# Patient Record
Sex: FEMALE | Race: WHITE | NOT HISPANIC OR LATINO | Employment: OTHER | URBAN - NONMETROPOLITAN AREA
[De-identification: names, ages, dates, MRNs, and addresses within clinical notes are randomized per-mention and may not be internally consistent; named-entity substitution may affect disease eponyms.]

---

## 2021-04-23 DIAGNOSIS — H53.461 HOMONYMOUS BILATERAL FIELD DEFECTS, RIGHT SIDE: ICD-10-CM

## 2021-04-30 ENCOUNTER — HOSPITAL ENCOUNTER (OUTPATIENT)
Dept: CT IMAGING | Facility: HOSPITAL | Age: 76
Discharge: HOME | End: 2021-04-30
Payer: MEDICARE

## 2021-04-30 DIAGNOSIS — H53.461 HOMONYMOUS BILATERAL FIELD DEFECTS, RIGHT SIDE: ICD-10-CM

## 2021-04-30 PROCEDURE — 70450 CT HEAD/BRAIN W/O DYE: CPT

## 2022-10-15 ENCOUNTER — APPOINTMENT (EMERGENCY)
Dept: CT IMAGING | Facility: HOSPITAL | Age: 77
End: 2022-10-15
Payer: MEDICARE

## 2022-10-15 ENCOUNTER — HOSPITAL ENCOUNTER (EMERGENCY)
Facility: HOSPITAL | Age: 77
Discharge: HOME | End: 2022-10-15
Attending: EMERGENCY MEDICINE
Payer: MEDICARE

## 2022-10-15 VITALS
HEART RATE: 65 BPM | OXYGEN SATURATION: 96 % | HEIGHT: 62 IN | BODY MASS INDEX: 20.49 KG/M2 | DIASTOLIC BLOOD PRESSURE: 73 MMHG | RESPIRATION RATE: 16 BRPM | WEIGHT: 111.33 LBS | SYSTOLIC BLOOD PRESSURE: 150 MMHG | TEMPERATURE: 96.6 F

## 2022-10-15 DIAGNOSIS — R41.0 INTERMITTENT CONFUSION: Primary | ICD-10-CM

## 2022-10-15 LAB
ALBUMIN SERPL BCP-MCNC: 3.3 G/DL (ref 3.5–5)
ALP SERPL-CCNC: 58 U/L (ref 46–116)
ALT SERPL W P-5'-P-CCNC: 14 U/L (ref 12–78)
ANION GAP SERPL CALCULATED.3IONS-SCNC: 3 MMOL/L (ref 4–13)
APTT PPP: 26 SECONDS (ref 23–37)
AST SERPL W P-5'-P-CCNC: 20 U/L (ref 5–45)
BASOPHILS # BLD AUTO: 0.02 THOUSANDS/ÂΜL (ref 0–0.1)
BASOPHILS NFR BLD AUTO: 0 % (ref 0–1)
BILIRUB SERPL-MCNC: 0.66 MG/DL (ref 0.2–1)
BILIRUB UR QL STRIP: NEGATIVE
BUN SERPL-MCNC: 24 MG/DL (ref 5–25)
CALCIUM ALBUM COR SERPL-MCNC: 9.1 MG/DL (ref 8.3–10.1)
CALCIUM SERPL-MCNC: 8.5 MG/DL (ref 8.3–10.1)
CARDIAC TROPONIN I PNL SERPL HS: 23 NG/L
CHLORIDE SERPL-SCNC: 106 MMOL/L (ref 96–108)
CLARITY UR: NORMAL
CO2 SERPL-SCNC: 32 MMOL/L (ref 21–32)
COLOR UR: YELLOW
CREAT SERPL-MCNC: 0.84 MG/DL (ref 0.6–1.3)
EOSINOPHIL # BLD AUTO: 0.2 THOUSAND/ÂΜL (ref 0–0.61)
EOSINOPHIL NFR BLD AUTO: 3 % (ref 0–6)
ERYTHROCYTE [DISTWIDTH] IN BLOOD BY AUTOMATED COUNT: 13.2 % (ref 11.6–15.1)
GFR SERPL CREATININE-BSD FRML MDRD: 66 ML/MIN/1.73SQ M
GLUCOSE SERPL-MCNC: 98 MG/DL (ref 65–140)
GLUCOSE UR STRIP-MCNC: NEGATIVE MG/DL
HCT VFR BLD AUTO: 42.4 % (ref 34.8–46.1)
HGB BLD-MCNC: 13.8 G/DL (ref 11.5–15.4)
HGB UR QL STRIP.AUTO: NEGATIVE
IMM GRANULOCYTES # BLD AUTO: 0.02 THOUSAND/UL (ref 0–0.2)
IMM GRANULOCYTES NFR BLD AUTO: 0 % (ref 0–2)
INR PPP: 1.05 (ref 0.84–1.19)
KETONES UR STRIP-MCNC: NEGATIVE MG/DL
LEUKOCYTE ESTERASE UR QL STRIP: NEGATIVE
LYMPHOCYTES # BLD AUTO: 0.83 THOUSANDS/ÂΜL (ref 0.6–4.47)
LYMPHOCYTES NFR BLD AUTO: 13 % (ref 14–44)
MCH RBC QN AUTO: 31.5 PG (ref 26.8–34.3)
MCHC RBC AUTO-ENTMCNC: 32.5 G/DL (ref 31.4–37.4)
MCV RBC AUTO: 97 FL (ref 82–98)
MONOCYTES # BLD AUTO: 0.52 THOUSAND/ÂΜL (ref 0.17–1.22)
MONOCYTES NFR BLD AUTO: 8 % (ref 4–12)
NEUTROPHILS # BLD AUTO: 4.78 THOUSANDS/ÂΜL (ref 1.85–7.62)
NEUTS SEG NFR BLD AUTO: 76 % (ref 43–75)
NITRITE UR QL STRIP: NEGATIVE
NRBC BLD AUTO-RTO: 0 /100 WBCS
PH UR STRIP.AUTO: 5 [PH]
PLATELET # BLD AUTO: 192 THOUSANDS/UL (ref 149–390)
PMV BLD AUTO: 9.9 FL (ref 8.9–12.7)
POTASSIUM SERPL-SCNC: 4.4 MMOL/L (ref 3.5–5.3)
PROT SERPL-MCNC: 6.3 G/DL (ref 6.4–8.4)
PROT UR STRIP-MCNC: NEGATIVE MG/DL
PROTHROMBIN TIME: 13.9 SECONDS (ref 11.6–14.5)
RBC # BLD AUTO: 4.38 MILLION/UL (ref 3.81–5.12)
SODIUM SERPL-SCNC: 141 MMOL/L (ref 135–147)
SP GR UR STRIP.AUTO: >=1.03 (ref 1–1.03)
UROBILINOGEN UR QL STRIP.AUTO: 0.2 E.U./DL
WBC # BLD AUTO: 6.37 THOUSAND/UL (ref 4.31–10.16)

## 2022-10-15 PROCEDURE — 84484 ASSAY OF TROPONIN QUANT: CPT | Performed by: EMERGENCY MEDICINE

## 2022-10-15 PROCEDURE — 99285 EMERGENCY DEPT VISIT HI MDM: CPT

## 2022-10-15 PROCEDURE — 93005 ELECTROCARDIOGRAM TRACING: CPT

## 2022-10-15 PROCEDURE — 99285 EMERGENCY DEPT VISIT HI MDM: CPT | Performed by: EMERGENCY MEDICINE

## 2022-10-15 PROCEDURE — 81003 URINALYSIS AUTO W/O SCOPE: CPT | Performed by: EMERGENCY MEDICINE

## 2022-10-15 PROCEDURE — G1004 CDSM NDSC: HCPCS

## 2022-10-15 PROCEDURE — 70450 CT HEAD/BRAIN W/O DYE: CPT

## 2022-10-15 PROCEDURE — 85610 PROTHROMBIN TIME: CPT | Performed by: EMERGENCY MEDICINE

## 2022-10-15 PROCEDURE — 80053 COMPREHEN METABOLIC PANEL: CPT | Performed by: EMERGENCY MEDICINE

## 2022-10-15 PROCEDURE — 36415 COLL VENOUS BLD VENIPUNCTURE: CPT | Performed by: EMERGENCY MEDICINE

## 2022-10-15 PROCEDURE — 85025 COMPLETE CBC W/AUTO DIFF WBC: CPT | Performed by: EMERGENCY MEDICINE

## 2022-10-15 PROCEDURE — 85730 THROMBOPLASTIN TIME PARTIAL: CPT | Performed by: EMERGENCY MEDICINE

## 2022-10-15 RX ORDER — BUSPIRONE HYDROCHLORIDE 10 MG/1
1 TABLET ORAL 2 TIMES DAILY
COMMUNITY
Start: 2022-06-10

## 2022-10-15 RX ORDER — DULOXETIN HYDROCHLORIDE 60 MG/1
60 CAPSULE, DELAYED RELEASE ORAL DAILY
COMMUNITY
Start: 2022-08-29

## 2022-10-15 RX ORDER — ROSUVASTATIN CALCIUM 10 MG/1
1 TABLET, COATED ORAL DAILY
COMMUNITY
Start: 2022-10-13

## 2022-10-15 RX ORDER — ALENDRONATE SODIUM 70 MG/1
70 TABLET ORAL WEEKLY
COMMUNITY
Start: 2022-07-26

## 2022-10-15 RX ORDER — LATANOPROST 50 UG/ML
SOLUTION/ DROPS OPHTHALMIC
COMMUNITY
Start: 2022-04-20

## 2022-10-15 RX ORDER — TIMOLOL MALEATE 5 MG/ML
1 SOLUTION/ DROPS OPHTHALMIC 2 TIMES DAILY
COMMUNITY
Start: 2022-04-20

## 2022-10-15 RX ORDER — BISOPROLOL FUMARATE AND HYDROCHLOROTHIAZIDE 10; 6.25 MG/1; MG/1
1 TABLET ORAL DAILY
COMMUNITY
Start: 2022-10-12

## 2022-10-15 RX ORDER — ASPIRIN 81 MG/1
81 TABLET ORAL DAILY
COMMUNITY

## 2022-10-15 NOTE — ED PROVIDER NOTES
History  Chief Complaint   Patient presents with   • Altered Mental Status     Son stated she called his home phone which she never does and was confused, asking where he lived and other questions that made no sense  Has history of stroke  Patient is a 77-year-old female with history of prior strokes and son describes some intermittent cognitive impairment not formally diagnosed with dementia but gets more confused in the mornings frequently  presents the emergency department due to increased confusion this morning no focal numbness or weakness no headache no slurred speech or stroke-like symptoms  Son reports patient called him and seemed more confused this morning this is gradually improving upon arrival to the ED but patient still mildly disoriented  Patient denies any abdominal pain nausea vomiting no chest pain cough or shortness of breath no fevers or chills  History provided by:  Patient  Altered Mental Status  Presenting symptoms: confusion    Severity:  Moderate  Most recent episode: Today  Episode history:  Single  Duration:  2 hours  Timing:  Intermittent  Progression:  Worsening  Chronicity:  Recurrent  Associated symptoms: no abdominal pain, no fever, no headaches, no light-headedness, no nausea, no palpitations, no rash, no vomiting and no weakness        Prior to Admission Medications   Prescriptions Last Dose Informant Patient Reported? Taking?    DULoxetine (CYMBALTA) 60 mg delayed release capsule   Yes Yes   Sig: Take 60 mg by mouth in the morning   Diclofenac Sodium (VOLTAREN) 1 %   Yes Yes   Sig: Apply topically 4 (four) times a day   alendronate (FOSAMAX) 70 mg tablet   Yes Yes   Sig: Take 70 mg by mouth once a week   aspirin (ECOTRIN LOW STRENGTH) 81 mg EC tablet  Self Yes Yes   Sig: Take 81 mg by mouth daily   bisoprolol-hydrochlorothiazide (ZIAC) 10-6 25 MG per tablet   Yes Yes   Sig: Take 1 tablet by mouth daily   busPIRone (BUSPAR) 10 mg tablet   Yes Yes   Sig: Take 1 tablet by mouth 2 (two) times a day   latanoprost (XALATAN) 0 005 % ophthalmic solution   Yes Yes   rosuvastatin (CRESTOR) 10 MG tablet   Yes Yes   Sig: Take 1 tablet by mouth daily   timolol (TIMOPTIC) 0 5 % ophthalmic solution   Yes Yes   Sig: Apply 1 drop to eye 2 (two) times a day      Facility-Administered Medications: None       Past Medical History:   Diagnosis Date   • Stroke [facility])        History reviewed  No pertinent surgical history  History reviewed  No pertinent family history  I have reviewed and agree with the history as documented  E-Cigarette/Vaping   • E-Cigarette Use Never User      E-Cigarette/Vaping Substances     Social History     Tobacco Use   • Smoking status: Current Every Day Smoker     Packs/day: 0 25     Types: Cigarettes   • Smokeless tobacco: Never Used   Vaping Use   • Vaping Use: Never used   Substance Use Topics   • Alcohol use: Yes     Comment: ocassional   • Drug use: Never       Review of Systems   Constitutional: Negative for activity change, appetite change, chills, fatigue and fever  HENT: Negative for congestion, ear pain, rhinorrhea and sore throat  Eyes: Negative for discharge, redness and visual disturbance  Respiratory: Negative for cough, chest tightness, shortness of breath and wheezing  Cardiovascular: Negative for chest pain and palpitations  Gastrointestinal: Negative for abdominal pain, constipation, diarrhea, nausea and vomiting  Endocrine: Negative for polydipsia and polyuria  Genitourinary: Negative for difficulty urinating, dysuria, frequency, hematuria and urgency  Musculoskeletal: Negative for arthralgias and myalgias  Skin: Negative for color change, pallor and rash  Neurological: Negative for dizziness, weakness, light-headedness, numbness and headaches  Hematological: Negative for adenopathy  Does not bruise/bleed easily  Psychiatric/Behavioral: Positive for confusion  All other systems reviewed and are negative        Physical Exam  Physical Exam  Vitals and nursing note reviewed  Constitutional:       Appearance: She is well-developed  HENT:      Head: Normocephalic and atraumatic  Right Ear: External ear normal       Left Ear: External ear normal       Nose: Nose normal    Eyes:      Conjunctiva/sclera: Conjunctivae normal       Pupils: Pupils are equal, round, and reactive to light  Cardiovascular:      Rate and Rhythm: Normal rate and regular rhythm  Heart sounds: Normal heart sounds  Pulmonary:      Effort: Pulmonary effort is normal  No respiratory distress  Breath sounds: Normal breath sounds  No wheezing or rales  Chest:      Chest wall: No tenderness  Abdominal:      General: Bowel sounds are normal  There is no distension  Palpations: Abdomen is soft  Tenderness: There is no abdominal tenderness  There is no guarding  Musculoskeletal:         General: Normal range of motion  Cervical back: Normal range of motion and neck supple  Skin:     General: Skin is warm and dry  Neurological:      Mental Status: She is alert  She is disoriented and confused  Cranial Nerves: No cranial nerve deficit  Sensory: No sensory deficit  Comments: Patient is oriented to person and year not to place and recall is impaired  Psychiatric:         Mood and Affect: Mood is anxious  Cognition and Memory: Memory is impaired           Vital Signs  ED Triage Vitals [10/15/22 0822]   Temperature Pulse Respirations Blood Pressure SpO2   (!) 96 6 °F (35 9 °C) 69 16 169/77 96 %      Temp Source Heart Rate Source Patient Position - Orthostatic VS BP Location FiO2 (%)   Temporal Monitor Lying Right arm --      Pain Score       8           Vitals:    10/15/22 0822 10/15/22 0845 10/15/22 0900 10/15/22 0930   BP: 169/77 130/62 128/62 138/67   Pulse: 69 64 73 70   Patient Position - Orthostatic VS: Lying Lying Lying Lying         Visual Acuity  Visual Acuity    Flowsheet Row Most Recent Value L Pupil Size (mm) 3   R Pupil Size (mm) 3          ED Medications  Medications - No data to display    Diagnostic Studies  Results Reviewed     Procedure Component Value Units Date/Time    UA w Reflex to Microscopic w Reflex to Culture [[id number]] Collected: 10/15/22 0936    Lab Status: Final result Specimen: Urine, Clean Catch Updated: 10/15/22 0958     Color, UA Yellow     Clarity, UA Cloudy     Specific Gravity, UA >=1 030     pH, UA 5 0     Leukocytes, UA Negative     Nitrite, UA Negative     Protein, UA Negative mg/dl      Glucose, UA Negative mg/dl      Ketones, UA Negative mg/dl      Urobilinogen, UA 0 2 E U /dl      Bilirubin, UA Negative     Occult Blood, UA Negative    HS Troponin 0hr (reflex protocol) [[id number]]  (Normal) Collected: 10/15/22 0844    Lab Status: Final result Specimen: Blood from Arm, Left Updated: 10/15/22 0923     hs TnI 0hr 23 ng/L     Comprehensive metabolic panel [[id number]]  (Abnormal) Collected: 10/15/22 0844    Lab Status: Final result Specimen: Blood from Arm, Left Updated: 10/15/22 0919     Sodium 141 mmol/L      Potassium 4 4 mmol/L      Chloride 106 mmol/L      CO2 32 mmol/L      ANION GAP 3 mmol/L      BUN 24 mg/dL      Creatinine 0 84 mg/dL      Glucose 98 mg/dL      Calcium 8 5 mg/dL      Corrected Calcium 9 1 mg/dL      AST 20 U/L      ALT 14 U/L      Alkaline Phosphatase 58 U/L      Total Protein 6 3 g/dL      Albumin 3 3 g/dL      Total Bilirubin 0 66 mg/dL      eGFR 66 ml/min/1 73sq m     Narrative:      Prema guidelines for Chronic Kidney Disease (CKD):   •  Stage 1 with normal or high GFR (GFR > 90 mL/min/1 73 square meters)  •  Stage 2 Mild CKD (GFR = 60-89 mL/min/1 73 square meters)  •  Stage 3A Moderate CKD (GFR = 45-59 mL/min/1 73 square meters)  •  Stage 3B Moderate CKD (GFR = 30-44 mL/min/1 73 square meters)  •  Stage 4 Severe CKD (GFR = 15-29 mL/min/1 73 square meters)  •  Stage 5 End Stage CKD (GFR <15 mL/min/1 73 square meters)  Note: GFR calculation is accurate only with a steady state creatinine    Protime-INR [[id number]]  (Normal) Collected: 10/15/22 0844    Lab Status: Final result Specimen: Blood from Arm, Left Updated: 10/15/22 0913     Protime 13 9 seconds      INR 1 05    APTT [[id number]]  (Normal) Collected: 10/15/22 0844    Lab Status: Final result Specimen: Blood from Arm, Left Updated: 10/15/22 0913     PTT 26 seconds     CBC and differential [[id number]]  (Abnormal) Collected: 10/15/22 0844    Lab Status: Final result Specimen: Blood from Arm, Left Updated: 10/15/22 0858     WBC 6 37 Thousand/uL      RBC 4 38 Million/uL      Hemoglobin 13 8 g/dL      Hematocrit 42 4 %      MCV 97 fL      MCH 31 5 pg      MCHC 32 5 g/dL      RDW 13 2 %      MPV 9 9 fL      Platelets 321 Thousands/uL      nRBC 0 /100 WBCs      Neutrophils Relative 76 %      Immat GRANS % 0 %      Lymphocytes Relative 13 %      Monocytes Relative 8 %      Eosinophils Relative 3 %      Basophils Relative 0 %      Neutrophils Absolute 4 78 Thousands/µL      Immature Grans Absolute 0 02 Thousand/uL      Lymphocytes Absolute 0 83 Thousands/µL      Monocytes Absolute 0 52 Thousand/µL      Eosinophils Absolute 0 20 Thousand/µL      Basophils Absolute 0 02 Thousands/µL                  CT head without contrast   Final Result by Jud Cohen MD (10/15 1022)      No acute intracranial abnormality  Stable chronic microangiopathic changes within the brain  Stable areas of prior lacunar and left occipital CVA  Workstation performed: [id number]                    Procedures  ECG 12 Lead Documentation Only    Date/Time: 10/15/2022 8:58 AM  Performed by: Kassandra Hatch DO  Authorized by:  Kassandra Hatch DO     ECG reviewed by me, the ED Provider: yes    Patient location:  ED  Previous ECG:     Comparison to cardiac monitor: Yes    Quality:     Tracing quality:  Limited by artifact  Rate:     ECG rate:  64    ECG rate assessment: normal    Rhythm:     Rhythm: sinus rhythm    QRS:     QRS axis:  Normal    QRS intervals:  Normal  Conduction:     Conduction: abnormal (lvh)      Abnormal conduction: incomplete LBBB    ST segments:     ST segments:  Non-specific  T waves:     T waves: non-specific and peaked               ED Course                               SBIRT 22yo+    Flowsheet Row Most Recent Value   SBIRT (23 yo +)    In order to provide better care to our patients, we are screening all of our patients for alcohol and drug use  Would it be okay to ask you these screening questions? Yes Filed at: 10/15/2022 0840   Initial Alcohol Screen: US AUDIT-C     1  How often do you have a drink containing alcohol? 1 Filed at: 10/15/2022 0840   2  How many drinks containing alcohol do you have on a typical day you are drinking? 0 Filed at: 10/15/2022 0840   3b  FEMALE Any Age, or MALE 65+: How often do you have 4 or more drinks on one occassion? 0 Filed at: 10/15/2022 0840   Audit-C Score 1 Filed at: 10/15/2022 0840   LETA: How many times in the past year have you    Used an illegal drug or used a prescription medication for non-medical reasons? Never Filed at: 10/15/2022 0840                    MDM  Number of Diagnoses or Management Options  Intermittent confusion: new and requires workup  Diagnosis management comments: Patient remained clinically and hemodynamically stable in the emergency department her mild confusion seemed to improve with rest and monitoring in the ED basis on son's report that his symptoms wax and wane frequently for the patient and are not new  No evidence of acute infarction on workup in the ED and no evidence of acute stroke clinically on evaluation in the ED neuro exam otherwise normal nonfocal aside from the intermittent confusion which is waxing and waning in nature    No evidence of acute infection or metabolic derangement advised supportive care for now and prompt follow-up with primary physician for further evaluation and treatment return precautions and anticipatory guidance discussed  Amount and/or Complexity of Data Reviewed  Clinical lab tests: ordered and reviewed  Tests in the radiology section of CPT®: reviewed and ordered  Tests in the medicine section of CPT®: ordered and reviewed  Decide to obtain previous medical records or to obtain history from someone other than the patient: yes  Review and summarize past medical records: yes  Independent visualization of images, tracings, or specimens: yes    Risk of Complications, Morbidity, and/or Mortality  Presenting problems: moderate  Diagnostic procedures: moderate  Management options: moderate    Patient Progress  Patient progress: stable      Disposition  Final diagnoses:   Intermittent confusion     Time reflects when diagnosis was documented in both MDM as applicable and the Disposition within this note     Time User Action Codes Description Comment    10/15/2022 10:02 AM Anabela Rangel Add [R41 0] Intermittent confusion       ED Disposition     ED Disposition   Discharge    Condition   Stable    Date/Time   Sat Oct 15, 2022 10:02 AM    Comment   Rober Villarreal discharge to home/self care  Follow-up Information     Follow up With Specialties Details Why Contact Info    Balwinder Riggs DO  Schedule an appointment as soon as possible for a visit in 2 days  [address] [place] [address]  [phone]            Patient's Medications   Discharge Prescriptions    No medications on file       No discharge procedures on file      PDMP Review     None          ED Provider  Electronically Signed by           García Painting DO  10/15/22 6602

## 2022-10-16 LAB
ATRIAL RATE: 64 BPM
P AXIS: 67 DEGREES
PR INTERVAL: 156 MS
QRS AXIS: 58 DEGREES
QRSD INTERVAL: 86 MS
QT INTERVAL: 400 MS
QTC INTERVAL: 412 MS
T WAVE AXIS: -49 DEGREES
VENTRICULAR RATE: 64 BPM

## 2023-01-01 ENCOUNTER — HOME CARE VISIT (OUTPATIENT)
Dept: HOME HEALTH SERVICES | Facility: HOME HEALTHCARE | Age: 78
End: 2023-01-01
Payer: MEDICARE

## 2023-01-01 ENCOUNTER — HOSPITAL ENCOUNTER (INPATIENT)
Facility: HOSPITAL | Age: 78
LOS: 4 days | End: 2023-06-18
Attending: STUDENT IN AN ORGANIZED HEALTH CARE EDUCATION/TRAINING PROGRAM | Admitting: STUDENT IN AN ORGANIZED HEALTH CARE EDUCATION/TRAINING PROGRAM
Payer: MEDICARE

## 2023-01-01 ENCOUNTER — HOSPITAL ENCOUNTER (INPATIENT)
Facility: HOSPITAL | Age: 78
LOS: 1 days | End: 2023-06-19
Attending: EMERGENCY MEDICINE | Admitting: INTERNAL MEDICINE
Payer: MEDICARE

## 2023-01-01 ENCOUNTER — APPOINTMENT (OUTPATIENT)
Dept: RADIOLOGY | Facility: HOSPITAL | Age: 78
End: 2023-01-01
Payer: MEDICARE

## 2023-01-01 ENCOUNTER — HOSPICE ADMISSION (OUTPATIENT)
Dept: HOME HOSPICE | Facility: HOSPICE | Age: 78
End: 2023-01-01
Payer: MEDICARE

## 2023-01-01 VITALS
OXYGEN SATURATION: 91 % | DIASTOLIC BLOOD PRESSURE: 63 MMHG | RESPIRATION RATE: 17 BRPM | HEART RATE: 106 BPM | TEMPERATURE: 97.5 F | SYSTOLIC BLOOD PRESSURE: 136 MMHG

## 2023-01-01 VITALS
HEIGHT: 62 IN | WEIGHT: 216.05 LBS | BODY MASS INDEX: 39.76 KG/M2 | DIASTOLIC BLOOD PRESSURE: 89 MMHG | SYSTOLIC BLOOD PRESSURE: 139 MMHG | OXYGEN SATURATION: 94 % | HEART RATE: 105 BPM | TEMPERATURE: 97.4 F | RESPIRATION RATE: 20 BRPM

## 2023-01-01 DIAGNOSIS — E43 SEVERE PROTEIN-CALORIE MALNUTRITION (HCC): ICD-10-CM

## 2023-01-01 DIAGNOSIS — Z00.8 MEDICAL CLEARANCE FOR PSYCHIATRIC ADMISSION: ICD-10-CM

## 2023-01-01 DIAGNOSIS — F01.C2 SEVERE VASCULAR DEMENTIA WITH PSYCHOTIC DISTURBANCE (HCC): ICD-10-CM

## 2023-01-01 DIAGNOSIS — F01.C0 MAJOR NEUROCOGNITIVE DISORDER DUE TO VASCULAR DISEASE, WITHOUT BEHAVIORAL DISTURBANCE, SEVERE (HCC): ICD-10-CM

## 2023-01-01 DIAGNOSIS — R09.02 HYPOXIA: ICD-10-CM

## 2023-01-01 DIAGNOSIS — F33.2 MAJOR DEPRESSIVE DISORDER, RECURRENT SEVERE WITHOUT PSYCHOTIC FEATURES (HCC): ICD-10-CM

## 2023-01-01 DIAGNOSIS — J69.0 ASPIRATION PNEUMONITIS (HCC): ICD-10-CM

## 2023-01-01 DIAGNOSIS — K44.9 HIATAL HERNIA: ICD-10-CM

## 2023-01-01 DIAGNOSIS — R06.03 RESPIRATORY DISTRESS: Primary | ICD-10-CM

## 2023-01-01 DIAGNOSIS — J69.0 ASPIRATION PNEUMONIA OF BOTH LUNGS, UNSPECIFIED ASPIRATION PNEUMONIA TYPE, UNSPECIFIED PART OF LUNG (HCC): ICD-10-CM

## 2023-01-01 LAB
25(OH)D3 SERPL-MCNC: 36.6 NG/ML (ref 30–100)
ALBUMIN SERPL BCP-MCNC: 2.6 G/DL (ref 3.5–5)
ALBUMIN SERPL BCP-MCNC: 3.2 G/DL (ref 3.5–5)
ALP SERPL-CCNC: 44 U/L (ref 34–104)
ALP SERPL-CCNC: 47 U/L (ref 34–104)
ALT SERPL W P-5'-P-CCNC: 7 U/L (ref 7–52)
ALT SERPL W P-5'-P-CCNC: 8 U/L (ref 7–52)
ANION GAP SERPL CALCULATED.3IONS-SCNC: 10 MMOL/L (ref 4–13)
ANION GAP SERPL CALCULATED.3IONS-SCNC: 6 MMOL/L (ref 4–13)
AST SERPL W P-5'-P-CCNC: 17 U/L (ref 13–39)
AST SERPL W P-5'-P-CCNC: 19 U/L (ref 13–39)
ATRIAL RATE: 102 BPM
BASOPHILS # BLD AUTO: 0.01 THOUSANDS/ÂΜL (ref 0–0.1)
BASOPHILS # BLD AUTO: 0.03 THOUSANDS/ÂΜL (ref 0–0.1)
BASOPHILS NFR BLD AUTO: 0 % (ref 0–1)
BASOPHILS NFR BLD AUTO: 0 % (ref 0–1)
BILIRUB SERPL-MCNC: 0.4 MG/DL (ref 0.2–1)
BILIRUB SERPL-MCNC: 0.53 MG/DL (ref 0.2–1)
BNP SERPL-MCNC: 359 PG/ML (ref 0–100)
BUN SERPL-MCNC: 18 MG/DL (ref 5–25)
BUN SERPL-MCNC: 7 MG/DL (ref 5–25)
CALCIUM ALBUM COR SERPL-MCNC: 10.5 MG/DL (ref 8.3–10.1)
CALCIUM ALBUM COR SERPL-MCNC: 9.7 MG/DL (ref 8.3–10.1)
CALCIUM SERPL-MCNC: 8.6 MG/DL (ref 8.4–10.2)
CALCIUM SERPL-MCNC: 9.9 MG/DL (ref 8.4–10.2)
CARDIAC TROPONIN I PNL SERPL HS: 66 NG/L (ref 8–18)
CHLORIDE SERPL-SCNC: 101 MMOL/L (ref 96–108)
CHLORIDE SERPL-SCNC: 107 MMOL/L (ref 96–108)
CO2 SERPL-SCNC: 29 MMOL/L (ref 21–32)
CO2 SERPL-SCNC: 32 MMOL/L (ref 21–32)
CREAT SERPL-MCNC: 0.51 MG/DL (ref 0.6–1.3)
CREAT SERPL-MCNC: 0.71 MG/DL (ref 0.6–1.3)
EOSINOPHIL # BLD AUTO: 0 THOUSAND/ÂΜL (ref 0–0.61)
EOSINOPHIL # BLD AUTO: 0.14 THOUSAND/ÂΜL (ref 0–0.61)
EOSINOPHIL NFR BLD AUTO: 0 % (ref 0–6)
EOSINOPHIL NFR BLD AUTO: 2 % (ref 0–6)
ERYTHROCYTE [DISTWIDTH] IN BLOOD BY AUTOMATED COUNT: 16.3 % (ref 11.6–15.1)
ERYTHROCYTE [DISTWIDTH] IN BLOOD BY AUTOMATED COUNT: 16.4 % (ref 11.6–15.1)
FOLATE SERPL-MCNC: 6.5 NG/ML
GFR SERPL CREATININE-BSD FRML MDRD: 81 ML/MIN/1.73SQ M
GFR SERPL CREATININE-BSD FRML MDRD: 91 ML/MIN/1.73SQ M
GLUCOSE SERPL-MCNC: 102 MG/DL (ref 65–140)
GLUCOSE SERPL-MCNC: 147 MG/DL (ref 65–140)
HCT VFR BLD AUTO: 33.4 % (ref 34.8–46.1)
HCT VFR BLD AUTO: 42 % (ref 34.8–46.1)
HGB BLD-MCNC: 10.6 G/DL (ref 11.5–15.4)
HGB BLD-MCNC: 13.1 G/DL (ref 11.5–15.4)
IMM GRANULOCYTES # BLD AUTO: 0.03 THOUSAND/UL (ref 0–0.2)
IMM GRANULOCYTES # BLD AUTO: 0.09 THOUSAND/UL (ref 0–0.2)
IMM GRANULOCYTES NFR BLD AUTO: 0 % (ref 0–2)
IMM GRANULOCYTES NFR BLD AUTO: 0 % (ref 0–2)
INR PPP: 1.04 (ref 0.84–1.19)
LACTATE SERPL-SCNC: 1.9 MMOL/L (ref 0.5–2)
LIPASE SERPL-CCNC: <6 U/L (ref 11–82)
LYMPHOCYTES # BLD AUTO: 1.26 THOUSANDS/ÂΜL (ref 0.6–4.47)
LYMPHOCYTES # BLD AUTO: 1.29 THOUSANDS/ÂΜL (ref 0.6–4.47)
LYMPHOCYTES NFR BLD AUTO: 19 % (ref 14–44)
LYMPHOCYTES NFR BLD AUTO: 6 % (ref 14–44)
MAGNESIUM SERPL-MCNC: 1.8 MG/DL (ref 1.9–2.7)
MCH RBC QN AUTO: 30 PG (ref 26.8–34.3)
MCH RBC QN AUTO: 30.5 PG (ref 26.8–34.3)
MCHC RBC AUTO-ENTMCNC: 31.2 G/DL (ref 31.4–37.4)
MCHC RBC AUTO-ENTMCNC: 31.7 G/DL (ref 31.4–37.4)
MCV RBC AUTO: 96 FL (ref 82–98)
MCV RBC AUTO: 96 FL (ref 82–98)
MONOCYTES # BLD AUTO: 0.6 THOUSAND/ÂΜL (ref 0.17–1.22)
MONOCYTES # BLD AUTO: 1.55 THOUSAND/ÂΜL (ref 0.17–1.22)
MONOCYTES NFR BLD AUTO: 7 % (ref 4–12)
MONOCYTES NFR BLD AUTO: 9 % (ref 4–12)
NEUTROPHILS # BLD AUTO: 19.22 THOUSANDS/ÂΜL (ref 1.85–7.62)
NEUTROPHILS # BLD AUTO: 4.65 THOUSANDS/ÂΜL (ref 1.85–7.62)
NEUTS SEG NFR BLD AUTO: 70 % (ref 43–75)
NEUTS SEG NFR BLD AUTO: 87 % (ref 43–75)
NRBC BLD AUTO-RTO: 0 /100 WBCS
NRBC BLD AUTO-RTO: 0 /100 WBCS
P AXIS: 28 DEGREES
PHOSPHATE SERPL-MCNC: 3.4 MG/DL (ref 2.3–4.1)
PLATELET # BLD AUTO: 189 THOUSANDS/UL (ref 149–390)
PLATELET # BLD AUTO: 255 THOUSANDS/UL (ref 149–390)
PMV BLD AUTO: 8.9 FL (ref 8.9–12.7)
PMV BLD AUTO: 9.6 FL (ref 8.9–12.7)
POTASSIUM SERPL-SCNC: 3.7 MMOL/L (ref 3.5–5.3)
POTASSIUM SERPL-SCNC: 3.8 MMOL/L (ref 3.5–5.3)
PR INTERVAL: 164 MS
PROCALCITONIN SERPL-MCNC: 0.64 NG/ML
PROT SERPL-MCNC: 5.8 G/DL (ref 6.4–8.4)
PROT SERPL-MCNC: 7.6 G/DL (ref 6.4–8.4)
PROTHROMBIN TIME: 13.9 SECONDS (ref 11.6–14.5)
QRS AXIS: 38 DEGREES
QRSD INTERVAL: 84 MS
QT INTERVAL: 362 MS
QTC INTERVAL: 471 MS
RBC # BLD AUTO: 3.47 MILLION/UL (ref 3.81–5.12)
RBC # BLD AUTO: 4.36 MILLION/UL (ref 3.81–5.12)
SODIUM SERPL-SCNC: 142 MMOL/L (ref 135–147)
SODIUM SERPL-SCNC: 143 MMOL/L (ref 135–147)
T WAVE AXIS: -76 DEGREES
TSH SERPL DL<=0.05 MIU/L-ACNC: 2.1 UIU/ML (ref 0.45–4.5)
VALPROATE SERPL-MCNC: 55 UG/ML (ref 50–100)
VENTRICULAR RATE: 102 BPM
VIT B12 SERPL-MCNC: 600 PG/ML (ref 180–914)
WBC # BLD AUTO: 22.15 THOUSAND/UL (ref 4.31–10.16)
WBC # BLD AUTO: 6.72 THOUSAND/UL (ref 4.31–10.16)

## 2023-01-01 PROCEDURE — 99222 1ST HOSP IP/OBS MODERATE 55: CPT | Performed by: PHYSICIAN ASSISTANT

## 2023-01-01 PROCEDURE — 99223 1ST HOSP IP/OBS HIGH 75: CPT | Performed by: INTERNAL MEDICINE

## 2023-01-01 PROCEDURE — 93010 ELECTROCARDIOGRAM REPORT: CPT | Performed by: STUDENT IN AN ORGANIZED HEALTH CARE EDUCATION/TRAINING PROGRAM

## 2023-01-01 PROCEDURE — 80164 ASSAY DIPROPYLACETIC ACD TOT: CPT | Performed by: STUDENT IN AN ORGANIZED HEALTH CARE EDUCATION/TRAINING PROGRAM

## 2023-01-01 PROCEDURE — 85610 PROTHROMBIN TIME: CPT | Performed by: EMERGENCY MEDICINE

## 2023-01-01 PROCEDURE — 93005 ELECTROCARDIOGRAM TRACING: CPT

## 2023-01-01 PROCEDURE — 99232 SBSQ HOSP IP/OBS MODERATE 35: CPT | Performed by: STUDENT IN AN ORGANIZED HEALTH CARE EDUCATION/TRAINING PROGRAM

## 2023-01-01 PROCEDURE — 85025 COMPLETE CBC W/AUTO DIFF WBC: CPT | Performed by: EMERGENCY MEDICINE

## 2023-01-01 PROCEDURE — C9113 INJ PANTOPRAZOLE SODIUM, VIA: HCPCS | Performed by: INTERNAL MEDICINE

## 2023-01-01 PROCEDURE — 99239 HOSP IP/OBS DSCHRG MGMT >30: CPT | Performed by: STUDENT IN AN ORGANIZED HEALTH CARE EDUCATION/TRAINING PROGRAM

## 2023-01-01 PROCEDURE — 83605 ASSAY OF LACTIC ACID: CPT | Performed by: EMERGENCY MEDICINE

## 2023-01-01 PROCEDURE — 99223 1ST HOSP IP/OBS HIGH 75: CPT | Performed by: STUDENT IN AN ORGANIZED HEALTH CARE EDUCATION/TRAINING PROGRAM

## 2023-01-01 PROCEDURE — 36415 COLL VENOUS BLD VENIPUNCTURE: CPT | Performed by: EMERGENCY MEDICINE

## 2023-01-01 PROCEDURE — 99285 EMERGENCY DEPT VISIT HI MDM: CPT

## 2023-01-01 PROCEDURE — 82607 VITAMIN B-12: CPT

## 2023-01-01 PROCEDURE — 83690 ASSAY OF LIPASE: CPT | Performed by: EMERGENCY MEDICINE

## 2023-01-01 PROCEDURE — 80053 COMPREHEN METABOLIC PANEL: CPT

## 2023-01-01 PROCEDURE — 84145 PROCALCITONIN (PCT): CPT | Performed by: EMERGENCY MEDICINE

## 2023-01-01 PROCEDURE — 80053 COMPREHEN METABOLIC PANEL: CPT | Performed by: EMERGENCY MEDICINE

## 2023-01-01 PROCEDURE — 84443 ASSAY THYROID STIM HORMONE: CPT

## 2023-01-01 PROCEDURE — 85025 COMPLETE CBC W/AUTO DIFF WBC: CPT

## 2023-01-01 PROCEDURE — 99285 EMERGENCY DEPT VISIT HI MDM: CPT | Performed by: EMERGENCY MEDICINE

## 2023-01-01 PROCEDURE — 87040 BLOOD CULTURE FOR BACTERIA: CPT | Performed by: EMERGENCY MEDICINE

## 2023-01-01 PROCEDURE — 94762 N-INVAS EAR/PLS OXIMTRY CONT: CPT

## 2023-01-01 PROCEDURE — 82306 VITAMIN D 25 HYDROXY: CPT

## 2023-01-01 PROCEDURE — 84100 ASSAY OF PHOSPHORUS: CPT

## 2023-01-01 PROCEDURE — 84484 ASSAY OF TROPONIN QUANT: CPT | Performed by: EMERGENCY MEDICINE

## 2023-01-01 PROCEDURE — 82746 ASSAY OF FOLIC ACID SERUM: CPT

## 2023-01-01 PROCEDURE — 71045 X-RAY EXAM CHEST 1 VIEW: CPT

## 2023-01-01 PROCEDURE — 99239 HOSP IP/OBS DSCHRG MGMT >30: CPT | Performed by: INTERNAL MEDICINE

## 2023-01-01 PROCEDURE — 83880 ASSAY OF NATRIURETIC PEPTIDE: CPT | Performed by: EMERGENCY MEDICINE

## 2023-01-01 PROCEDURE — 83735 ASSAY OF MAGNESIUM: CPT

## 2023-01-01 RX ORDER — DIVALPROEX SODIUM 125 MG/1
500 CAPSULE, COATED PELLETS ORAL
Status: DISCONTINUED | OUTPATIENT
Start: 2023-01-01 | End: 2023-01-01

## 2023-01-01 RX ORDER — DIVALPROEX SODIUM 125 MG/1
625 CAPSULE, COATED PELLETS ORAL
Status: DISCONTINUED | OUTPATIENT
Start: 2023-01-01 | End: 2023-01-01

## 2023-01-01 RX ORDER — ONDANSETRON 4 MG/1
4 TABLET, ORALLY DISINTEGRATING ORAL EVERY 6 HOURS PRN
Status: DISCONTINUED | OUTPATIENT
Start: 2023-01-01 | End: 2023-01-01

## 2023-01-01 RX ORDER — ONDANSETRON 4 MG/1
4 TABLET, ORALLY DISINTEGRATING ORAL EVERY 6 HOURS PRN
Status: CANCELLED | OUTPATIENT
Start: 2023-01-01

## 2023-01-01 RX ORDER — BISACODYL 10 MG
10 SUPPOSITORY, RECTAL RECTAL DAILY PRN
Status: DISCONTINUED | OUTPATIENT
Start: 2023-01-01 | End: 2023-01-01

## 2023-01-01 RX ORDER — HALOPERIDOL 0.5 MG/1
2 TABLET ORAL EVERY 6 HOURS PRN
Status: CANCELLED | OUTPATIENT
Start: 2023-01-01

## 2023-01-01 RX ORDER — FAMOTIDINE 20 MG/1
20 TABLET, FILM COATED ORAL 2 TIMES DAILY
Status: CANCELLED | OUTPATIENT
Start: 2023-01-01

## 2023-01-01 RX ORDER — POLYETHYLENE GLYCOL 3350 17 G/17G
17 POWDER, FOR SOLUTION ORAL DAILY PRN
Status: DISCONTINUED | OUTPATIENT
Start: 2023-01-01 | End: 2023-01-01

## 2023-01-01 RX ORDER — BENZTROPINE MESYLATE 1 MG/ML
1 INJECTION INTRAMUSCULAR; INTRAVENOUS 2 TIMES DAILY PRN
Status: DISCONTINUED | OUTPATIENT
Start: 2023-01-01 | End: 2023-01-01 | Stop reason: HOSPADM

## 2023-01-01 RX ORDER — BUSPIRONE HYDROCHLORIDE 10 MG/1
10 TABLET ORAL 3 TIMES DAILY
Status: CANCELLED | OUTPATIENT
Start: 2023-01-01

## 2023-01-01 RX ORDER — HYDROXYZINE 50 MG/1
50 TABLET, FILM COATED ORAL
Status: DISCONTINUED | OUTPATIENT
Start: 2023-01-01 | End: 2023-01-01 | Stop reason: HOSPADM

## 2023-01-01 RX ORDER — HALOPERIDOL 0.5 MG/1
2 TABLET ORAL EVERY 6 HOURS PRN
Status: DISCONTINUED | OUTPATIENT
Start: 2023-01-01 | End: 2023-01-01

## 2023-01-01 RX ORDER — ACETAMINOPHEN 325 MG/1
650 TABLET ORAL EVERY 4 HOURS PRN
Status: DISCONTINUED | OUTPATIENT
Start: 2023-01-01 | End: 2023-01-01

## 2023-01-01 RX ORDER — SODIUM CHLORIDE 9 MG/ML
75 INJECTION, SOLUTION INTRAVENOUS CONTINUOUS
Status: DISCONTINUED | OUTPATIENT
Start: 2023-01-01 | End: 2023-01-01

## 2023-01-01 RX ORDER — TRAZODONE HYDROCHLORIDE 50 MG/1
50 TABLET ORAL
Status: DISCONTINUED | OUTPATIENT
Start: 2023-01-01 | End: 2023-01-01 | Stop reason: HOSPADM

## 2023-01-01 RX ORDER — ONDANSETRON 2 MG/ML
4 INJECTION INTRAMUSCULAR; INTRAVENOUS EVERY 6 HOURS PRN
Status: CANCELLED | OUTPATIENT
Start: 2023-01-01

## 2023-01-01 RX ORDER — SERTRALINE HYDROCHLORIDE 100 MG/1
100 TABLET, FILM COATED ORAL DAILY
Status: CANCELLED | OUTPATIENT
Start: 2023-01-01

## 2023-01-01 RX ORDER — ACETAMINOPHEN 325 MG/1
650 TABLET ORAL EVERY 4 HOURS PRN
Status: CANCELLED | OUTPATIENT
Start: 2023-01-01

## 2023-01-01 RX ORDER — QUETIAPINE FUMARATE 25 MG/1
25 TABLET, FILM COATED ORAL 3 TIMES DAILY PRN
Status: CANCELLED | OUTPATIENT
Start: 2023-01-01

## 2023-01-01 RX ORDER — HALOPERIDOL 5 MG/ML
5 INJECTION INTRAMUSCULAR EVERY 6 HOURS PRN
Status: CANCELLED | OUTPATIENT
Start: 2023-01-01

## 2023-01-01 RX ORDER — HALOPERIDOL 5 MG/ML
1 INJECTION INTRAMUSCULAR EVERY 6 HOURS PRN
Status: CANCELLED | OUTPATIENT
Start: 2023-01-01

## 2023-01-01 RX ORDER — HALOPERIDOL 1 MG/1
1 TABLET ORAL EVERY 6 HOURS PRN
Status: CANCELLED | OUTPATIENT
Start: 2023-01-01

## 2023-01-01 RX ORDER — LATANOPROST 50 UG/ML
1 SOLUTION/ DROPS OPHTHALMIC
Status: CANCELLED | OUTPATIENT
Start: 2023-01-01

## 2023-01-01 RX ORDER — CEFTRIAXONE 1 G/50ML
1000 INJECTION, SOLUTION INTRAVENOUS EVERY 24 HOURS
Status: CANCELLED | OUTPATIENT
Start: 2023-01-01

## 2023-01-01 RX ORDER — HYDROXYZINE HYDROCHLORIDE 25 MG/1
25 TABLET, FILM COATED ORAL
Status: DISCONTINUED | OUTPATIENT
Start: 2023-01-01 | End: 2023-01-01

## 2023-01-01 RX ORDER — LANOLIN ALCOHOL/MO/W.PET/CERES
3 CREAM (GRAM) TOPICAL
Status: DISCONTINUED | OUTPATIENT
Start: 2023-01-01 | End: 2023-01-01 | Stop reason: HOSPADM

## 2023-01-01 RX ORDER — BISACODYL 5 MG/1
5 TABLET, DELAYED RELEASE ORAL DAILY PRN
Status: DISCONTINUED | OUTPATIENT
Start: 2023-01-01 | End: 2023-01-01

## 2023-01-01 RX ORDER — LANOLIN ALCOHOL/MO/W.PET/CERES
3 CREAM (GRAM) TOPICAL
Status: DISCONTINUED | OUTPATIENT
Start: 2023-01-01 | End: 2023-01-01

## 2023-01-01 RX ORDER — LANOLIN ALCOHOL/MO/W.PET/CERES
400 CREAM (GRAM) TOPICAL 2 TIMES DAILY
Status: DISCONTINUED | OUTPATIENT
Start: 2023-01-01 | End: 2023-01-01 | Stop reason: HOSPADM

## 2023-01-01 RX ORDER — BENZTROPINE MESYLATE 1 MG/ML
1 INJECTION INTRAMUSCULAR; INTRAVENOUS 2 TIMES DAILY PRN
Status: CANCELLED | OUTPATIENT
Start: 2023-01-01

## 2023-01-01 RX ORDER — HALOPERIDOL 0.5 MG/1
1 TABLET ORAL EVERY 6 HOURS PRN
Status: DISCONTINUED | OUTPATIENT
Start: 2023-01-01 | End: 2023-01-01

## 2023-01-01 RX ORDER — BISACODYL 10 MG
10 SUPPOSITORY, RECTAL RECTAL DAILY PRN
Status: CANCELLED | OUTPATIENT
Start: 2023-01-01

## 2023-01-01 RX ORDER — RISPERIDONE 0.25 MG/1
0.25 TABLET ORAL
Status: DISCONTINUED | OUTPATIENT
Start: 2023-01-01 | End: 2023-01-01

## 2023-01-01 RX ORDER — LORAZEPAM 1 MG/1
1 TABLET ORAL
Status: DISCONTINUED | OUTPATIENT
Start: 2023-01-01 | End: 2023-01-01

## 2023-01-01 RX ORDER — OLANZAPINE 5 MG/1
5 TABLET ORAL EVERY 6 HOURS PRN
Status: DISCONTINUED | OUTPATIENT
Start: 2023-01-01 | End: 2023-01-01

## 2023-01-01 RX ORDER — WATER 1000 ML/1000ML
INJECTION, SOLUTION INTRAVENOUS
Status: COMPLETED
Start: 2023-01-01 | End: 2023-01-01

## 2023-01-01 RX ORDER — TRAZODONE HYDROCHLORIDE 50 MG/1
50 TABLET ORAL
Status: DISCONTINUED | OUTPATIENT
Start: 2023-01-01 | End: 2023-01-01

## 2023-01-01 RX ORDER — HALOPERIDOL 5 MG/ML
2 INJECTION INTRAMUSCULAR EVERY 6 HOURS PRN
Status: CANCELLED | OUTPATIENT
Start: 2023-01-01

## 2023-01-01 RX ORDER — DIVALPROEX SODIUM 125 MG/1
625 CAPSULE, COATED PELLETS ORAL
Status: DISCONTINUED | OUTPATIENT
Start: 2023-01-01 | End: 2023-01-01 | Stop reason: HOSPADM

## 2023-01-01 RX ORDER — HALOPERIDOL 5 MG/ML
5 INJECTION INTRAMUSCULAR EVERY 6 HOURS PRN
Status: DISCONTINUED | OUTPATIENT
Start: 2023-01-01 | End: 2023-01-01

## 2023-01-01 RX ORDER — ACETAMINOPHEN 325 MG/1
975 TABLET ORAL EVERY 6 HOURS PRN
Status: DISCONTINUED | OUTPATIENT
Start: 2023-01-01 | End: 2023-01-01

## 2023-01-01 RX ORDER — HALOPERIDOL 5 MG/ML
5 INJECTION INTRAMUSCULAR EVERY 6 HOURS PRN
Status: DISCONTINUED | OUTPATIENT
Start: 2023-01-01 | End: 2023-01-01 | Stop reason: HOSPADM

## 2023-01-01 RX ORDER — HALOPERIDOL 5 MG/1
5 TABLET ORAL EVERY 6 HOURS PRN
Status: CANCELLED | OUTPATIENT
Start: 2023-01-01

## 2023-01-01 RX ORDER — HALOPERIDOL 5 MG/ML
1 INJECTION INTRAMUSCULAR EVERY 6 HOURS PRN
Status: DISCONTINUED | OUTPATIENT
Start: 2023-01-01 | End: 2023-01-01 | Stop reason: HOSPADM

## 2023-01-01 RX ORDER — HALOPERIDOL 5 MG/1
5 TABLET ORAL EVERY 6 HOURS PRN
Status: DISCONTINUED | OUTPATIENT
Start: 2023-01-01 | End: 2023-01-01

## 2023-01-01 RX ORDER — HALOPERIDOL 5 MG/ML
5 INJECTION INTRAMUSCULAR
Status: DISCONTINUED | OUTPATIENT
Start: 2023-01-01 | End: 2023-01-01

## 2023-01-01 RX ORDER — OLANZAPINE 10 MG/1
5 INJECTION, POWDER, LYOPHILIZED, FOR SOLUTION INTRAMUSCULAR EVERY 6 HOURS PRN
Status: DISCONTINUED | OUTPATIENT
Start: 2023-01-01 | End: 2023-01-01

## 2023-01-01 RX ORDER — HYDROXYZINE HYDROCHLORIDE 25 MG/1
25 TABLET, FILM COATED ORAL
Status: DISCONTINUED | OUTPATIENT
Start: 2023-01-01 | End: 2023-01-01 | Stop reason: HOSPADM

## 2023-01-01 RX ORDER — ACETAMINOPHEN 325 MG/1
975 TABLET ORAL EVERY 6 HOURS PRN
Status: DISCONTINUED | OUTPATIENT
Start: 2023-01-01 | End: 2023-01-01 | Stop reason: HOSPADM

## 2023-01-01 RX ORDER — LANOLIN ALCOHOL/MO/W.PET/CERES
3 CREAM (GRAM) TOPICAL
Status: CANCELLED | OUTPATIENT
Start: 2023-01-01

## 2023-01-01 RX ORDER — ONDANSETRON 4 MG/1
4 TABLET, ORALLY DISINTEGRATING ORAL EVERY 6 HOURS PRN
Status: DISCONTINUED | OUTPATIENT
Start: 2023-01-01 | End: 2023-01-01 | Stop reason: HOSPADM

## 2023-01-01 RX ORDER — HALOPERIDOL 5 MG/ML
2 INJECTION INTRAMUSCULAR EVERY 6 HOURS PRN
Status: DISCONTINUED | OUTPATIENT
Start: 2023-01-01 | End: 2023-01-01 | Stop reason: HOSPADM

## 2023-01-01 RX ORDER — CEFTRIAXONE 1 G/50ML
1000 INJECTION, SOLUTION INTRAVENOUS EVERY 24 HOURS
Status: DISCONTINUED | OUTPATIENT
Start: 2023-01-01 | End: 2023-01-01

## 2023-01-01 RX ORDER — BUSPIRONE HYDROCHLORIDE 5 MG/1
5 TABLET ORAL 2 TIMES DAILY
Status: DISCONTINUED | OUTPATIENT
Start: 2023-01-01 | End: 2023-01-01

## 2023-01-01 RX ORDER — OLANZAPINE 10 MG/1
2.5 INJECTION, POWDER, LYOPHILIZED, FOR SOLUTION INTRAMUSCULAR EVERY 6 HOURS PRN
Status: DISCONTINUED | OUTPATIENT
Start: 2023-01-01 | End: 2023-01-01

## 2023-01-01 RX ORDER — VALPROIC ACID 250 MG/5ML
250 SOLUTION ORAL 2 TIMES DAILY
Status: CANCELLED | OUTPATIENT
Start: 2023-01-01

## 2023-01-01 RX ORDER — POLYETHYLENE GLYCOL 3350 17 G/17G
17 POWDER, FOR SOLUTION ORAL DAILY PRN
Status: CANCELLED | OUTPATIENT
Start: 2023-01-01

## 2023-01-01 RX ORDER — DIVALPROEX SODIUM 125 MG/1
625 CAPSULE, COATED PELLETS ORAL
Status: CANCELLED | OUTPATIENT
Start: 2023-01-01

## 2023-01-01 RX ORDER — ACETAMINOPHEN 325 MG/1
975 TABLET ORAL EVERY 6 HOURS PRN
Status: CANCELLED | OUTPATIENT
Start: 2023-01-01

## 2023-01-01 RX ORDER — LANOLIN ALCOHOL/MO/W.PET/CERES
400 CREAM (GRAM) TOPICAL 2 TIMES DAILY
Status: CANCELLED | OUTPATIENT
Start: 2023-01-01

## 2023-01-01 RX ORDER — RISPERIDONE 0.5 MG/1
0.5 TABLET ORAL
Status: DISCONTINUED | OUTPATIENT
Start: 2023-01-01 | End: 2023-01-01

## 2023-01-01 RX ORDER — HALOPERIDOL 1 MG/1
1 TABLET ORAL EVERY 6 HOURS PRN
Status: DISCONTINUED | OUTPATIENT
Start: 2023-01-01 | End: 2023-01-01 | Stop reason: HOSPADM

## 2023-01-01 RX ORDER — HALOPERIDOL 5 MG/ML
1 INJECTION INTRAMUSCULAR EVERY 6 HOURS PRN
Status: DISCONTINUED | OUTPATIENT
Start: 2023-01-01 | End: 2023-01-01

## 2023-01-01 RX ORDER — BENZTROPINE MESYLATE 1 MG/ML
1 INJECTION INTRAMUSCULAR; INTRAVENOUS 2 TIMES DAILY PRN
Status: DISCONTINUED | OUTPATIENT
Start: 2023-01-01 | End: 2023-01-01

## 2023-01-01 RX ORDER — ACETAMINOPHEN 325 MG/1
650 TABLET ORAL EVERY 4 HOURS PRN
Status: DISCONTINUED | OUTPATIENT
Start: 2023-01-01 | End: 2023-01-01 | Stop reason: HOSPADM

## 2023-01-01 RX ORDER — SODIUM CHLORIDE 9 MG/ML
75 INJECTION, SOLUTION INTRAVENOUS CONTINUOUS
Status: CANCELLED | OUTPATIENT
Start: 2023-01-01

## 2023-01-01 RX ORDER — LANOLIN ALCOHOL/MO/W.PET/CERES
400 CREAM (GRAM) TOPICAL 2 TIMES DAILY
Status: DISCONTINUED | OUTPATIENT
Start: 2023-01-01 | End: 2023-01-01

## 2023-01-01 RX ORDER — OLANZAPINE 10 MG/1
10 TABLET ORAL EVERY 6 HOURS PRN
Status: DISCONTINUED | OUTPATIENT
Start: 2023-01-01 | End: 2023-01-01

## 2023-01-01 RX ORDER — MELATONIN
1000 DAILY
Status: DISCONTINUED | OUTPATIENT
Start: 2023-01-01 | End: 2023-01-01

## 2023-01-01 RX ORDER — OLANZAPINE 10 MG/1
7.5 INJECTION, POWDER, LYOPHILIZED, FOR SOLUTION INTRAMUSCULAR EVERY 6 HOURS PRN
Status: DISCONTINUED | OUTPATIENT
Start: 2023-01-01 | End: 2023-01-01

## 2023-01-01 RX ORDER — RISPERIDONE 1 MG/1
1 TABLET ORAL
Status: DISCONTINUED | OUTPATIENT
Start: 2023-01-01 | End: 2023-01-01

## 2023-01-01 RX ORDER — ATORVASTATIN CALCIUM 20 MG/1
40 TABLET, FILM COATED ORAL EVERY EVENING
Status: CANCELLED | OUTPATIENT
Start: 2023-01-01

## 2023-01-01 RX ORDER — HALOPERIDOL 5 MG/1
5 TABLET ORAL EVERY 6 HOURS PRN
Status: DISCONTINUED | OUTPATIENT
Start: 2023-01-01 | End: 2023-01-01 | Stop reason: HOSPADM

## 2023-01-01 RX ORDER — POLYETHYLENE GLYCOL 3350 17 G/17G
17 POWDER, FOR SOLUTION ORAL DAILY PRN
Status: DISCONTINUED | OUTPATIENT
Start: 2023-01-01 | End: 2023-01-01 | Stop reason: HOSPADM

## 2023-01-01 RX ORDER — LORAZEPAM 2 MG/ML
1 INJECTION INTRAMUSCULAR
Status: DISCONTINUED | OUTPATIENT
Start: 2023-01-01 | End: 2023-01-01

## 2023-01-01 RX ORDER — ONDANSETRON 2 MG/ML
4 INJECTION INTRAMUSCULAR; INTRAVENOUS EVERY 6 HOURS PRN
Status: DISCONTINUED | OUTPATIENT
Start: 2023-01-01 | End: 2023-01-01 | Stop reason: HOSPADM

## 2023-01-01 RX ORDER — BISACODYL 10 MG
10 SUPPOSITORY, RECTAL RECTAL DAILY PRN
Status: DISCONTINUED | OUTPATIENT
Start: 2023-01-01 | End: 2023-01-01 | Stop reason: HOSPADM

## 2023-01-01 RX ORDER — HYDROXYZINE HYDROCHLORIDE 25 MG/1
25 TABLET, FILM COATED ORAL
Status: CANCELLED | OUTPATIENT
Start: 2023-01-01

## 2023-01-01 RX ORDER — SERTRALINE HYDROCHLORIDE 100 MG/1
100 TABLET, FILM COATED ORAL DAILY
Status: DISCONTINUED | OUTPATIENT
Start: 2023-01-01 | End: 2023-01-01 | Stop reason: HOSPADM

## 2023-01-01 RX ORDER — HALOPERIDOL 5 MG/ML
2 INJECTION INTRAMUSCULAR EVERY 6 HOURS PRN
Status: DISCONTINUED | OUTPATIENT
Start: 2023-01-01 | End: 2023-01-01

## 2023-01-01 RX ORDER — HYDROXYZINE 50 MG/1
50 TABLET, FILM COATED ORAL
Status: DISCONTINUED | OUTPATIENT
Start: 2023-01-01 | End: 2023-01-01

## 2023-01-01 RX ORDER — TRAZODONE HYDROCHLORIDE 50 MG/1
50 TABLET ORAL
Status: CANCELLED | OUTPATIENT
Start: 2023-01-01

## 2023-01-01 RX ORDER — HYDROXYZINE 50 MG/1
50 TABLET, FILM COATED ORAL
Status: CANCELLED | OUTPATIENT
Start: 2023-01-01

## 2023-01-01 RX ORDER — HALOPERIDOL 0.5 MG/1
1 TABLET ORAL EVERY 6 HOURS PRN
Status: CANCELLED | OUTPATIENT
Start: 2023-01-01

## 2023-01-01 RX ORDER — GLYCOPYRROLATE 0.2 MG/ML
0.1 INJECTION INTRAMUSCULAR; INTRAVENOUS EVERY 4 HOURS PRN
Status: DISCONTINUED | OUTPATIENT
Start: 2023-01-01 | End: 2023-01-01 | Stop reason: HOSPADM

## 2023-01-01 RX ORDER — PANTOPRAZOLE SODIUM 40 MG/10ML
40 INJECTION, POWDER, LYOPHILIZED, FOR SOLUTION INTRAVENOUS EVERY 12 HOURS SCHEDULED
Status: DISCONTINUED | OUTPATIENT
Start: 2023-01-01 | End: 2023-01-01 | Stop reason: HOSPADM

## 2023-01-01 RX ORDER — HALOPERIDOL 1 MG/1
2 TABLET ORAL EVERY 6 HOURS PRN
Status: CANCELLED | OUTPATIENT
Start: 2023-01-01

## 2023-01-01 RX ORDER — HALOPERIDOL 1 MG/1
2 TABLET ORAL EVERY 6 HOURS PRN
Status: DISCONTINUED | OUTPATIENT
Start: 2023-01-01 | End: 2023-01-01 | Stop reason: HOSPADM

## 2023-01-01 RX ORDER — ENOXAPARIN SODIUM 100 MG/ML
40 INJECTION SUBCUTANEOUS
Status: DISCONTINUED | OUTPATIENT
Start: 2023-01-01 | End: 2023-01-01

## 2023-01-01 RX ADMIN — QUETIAPINE FUMARATE 125 MG: 100 TABLET ORAL at 17:23

## 2023-01-01 RX ADMIN — Medication 3 MG: at 21:13

## 2023-01-01 RX ADMIN — RISPERIDONE 1 MG: 1 TABLET ORAL at 18:28

## 2023-01-01 RX ADMIN — HALOPERIDOL 5 MG: 5 TABLET ORAL at 19:25

## 2023-01-01 RX ADMIN — MAGNESIUM OXIDE TAB 400 MG (241.3 MG ELEMENTAL MG) 400 MG: 400 (241.3 MG) TAB at 09:57

## 2023-01-01 RX ADMIN — HALOPERIDOL LACTATE 2 MG: 5 INJECTION, SOLUTION INTRAMUSCULAR at 00:59

## 2023-01-01 RX ADMIN — QUETIAPINE FUMARATE 125 MG: 100 TABLET ORAL at 21:14

## 2023-01-01 RX ADMIN — DIVALPROEX SODIUM 625 MG: 125 CAPSULE, COATED PELLETS ORAL at 21:14

## 2023-01-01 RX ADMIN — CHOLECALCIFEROL TAB 25 MCG (1000 UNIT) 1000 UNITS: 25 TAB at 09:34

## 2023-01-01 RX ADMIN — ACETAMINOPHEN 975 MG: 325 TABLET ORAL at 16:35

## 2023-01-01 RX ADMIN — HYDROXYZINE HYDROCHLORIDE 50 MG: 50 TABLET, FILM COATED ORAL at 21:49

## 2023-01-01 RX ADMIN — PANTOPRAZOLE SODIUM 40 MG: 40 INJECTION, POWDER, FOR SOLUTION INTRAVENOUS at 20:50

## 2023-01-01 RX ADMIN — WATER 10 ML: 1 INJECTION, SOLUTION INTRAMUSCULAR; INTRAVENOUS; SUBCUTANEOUS at 20:50

## 2023-01-01 RX ADMIN — OLANZAPINE 10 MG: 10 TABLET, FILM COATED ORAL at 13:05

## 2023-01-01 RX ADMIN — MAGNESIUM OXIDE TAB 400 MG (241.3 MG ELEMENTAL MG) 400 MG: 400 (241.3 MG) TAB at 21:13

## 2023-01-01 RX ADMIN — MORPHINE SULFATE 2 MG: 2 INJECTION, SOLUTION INTRAMUSCULAR; INTRAVENOUS at 01:45

## 2023-01-01 RX ADMIN — DIVALPROEX SODIUM 500 MG: 125 CAPSULE, COATED PELLETS ORAL at 21:44

## 2023-01-01 RX ADMIN — OLANZAPINE 7.5 MG: 10 INJECTION, POWDER, FOR SOLUTION INTRAMUSCULAR at 09:35

## 2023-01-01 RX ADMIN — BUSPIRONE HYDROCHLORIDE 5 MG: 5 TABLET ORAL at 17:23

## 2023-01-01 RX ADMIN — ENOXAPARIN SODIUM 40 MG: 40 INJECTION SUBCUTANEOUS at 12:22

## 2023-01-01 RX ADMIN — MAGNESIUM OXIDE TAB 400 MG (241.3 MG ELEMENTAL MG) 400 MG: 400 (241.3 MG) TAB at 19:25

## 2023-01-01 RX ADMIN — WATER 10 ML: 1 INJECTION, SOLUTION INTRAMUSCULAR; INTRAVENOUS; SUBCUTANEOUS at 09:34

## 2023-01-01 RX ADMIN — SERTRALINE HYDROCHLORIDE 100 MG: 100 TABLET ORAL at 08:26

## 2023-01-01 RX ADMIN — TRAZODONE HYDROCHLORIDE 50 MG: 50 TABLET ORAL at 22:39

## 2023-01-01 RX ADMIN — OLANZAPINE 5 MG: 5 TABLET, FILM COATED ORAL at 00:02

## 2023-01-01 RX ADMIN — TRAZODONE HYDROCHLORIDE 50 MG: 50 TABLET ORAL at 23:00

## 2023-01-01 RX ADMIN — HYDROXYZINE HYDROCHLORIDE 50 MG: 50 TABLET, FILM COATED ORAL at 18:10

## 2023-01-01 RX ADMIN — DIVALPROEX SODIUM 625 MG: 125 CAPSULE, COATED PELLETS ORAL at 21:00

## 2023-01-01 RX ADMIN — CHOLECALCIFEROL TAB 25 MCG (1000 UNIT) 1000 UNITS: 25 TAB at 08:26

## 2023-01-01 RX ADMIN — PANTOPRAZOLE SODIUM 40 MG: 40 INJECTION, POWDER, FOR SOLUTION INTRAVENOUS at 08:26

## 2023-01-01 RX ADMIN — SERTRALINE HYDROCHLORIDE 100 MG: 100 TABLET ORAL at 09:34

## 2023-01-01 RX ADMIN — RISPERIDONE 1 MG: 1 TABLET ORAL at 00:43

## 2023-01-01 RX ADMIN — CEFTRIAXONE 1000 MG: 1 INJECTION, POWDER, FOR SOLUTION INTRAMUSCULAR; INTRAVENOUS at 08:48

## 2023-01-01 RX ADMIN — CHOLECALCIFEROL TAB 25 MCG (1000 UNIT) 1000 UNITS: 25 TAB at 17:23

## 2023-01-01 RX ADMIN — MORPHINE SULFATE 2 MG: 2 INJECTION, SOLUTION INTRAMUSCULAR; INTRAVENOUS at 05:20

## 2023-01-01 RX ADMIN — DIVALPROEX SODIUM 500 MG: 125 CAPSULE, COATED PELLETS ORAL at 21:47

## 2023-01-01 RX ADMIN — ONDANSETRON 4 MG: 2 INJECTION INTRAMUSCULAR; INTRAVENOUS at 13:35

## 2023-01-01 RX ADMIN — MORPHINE SULFATE 2 MG: 2 INJECTION, SOLUTION INTRAMUSCULAR; INTRAVENOUS at 13:35

## 2023-01-01 RX ADMIN — HALOPERIDOL 5 MG: 5 TABLET ORAL at 16:57

## 2023-01-01 RX ADMIN — MORPHINE SULFATE 2 MG: 2 INJECTION, SOLUTION INTRAMUSCULAR; INTRAVENOUS at 21:25

## 2023-01-01 RX ADMIN — SODIUM CHLORIDE 75 ML/HR: 0.9 INJECTION, SOLUTION INTRAVENOUS at 11:46

## 2023-01-01 RX ADMIN — ACETAMINOPHEN 650 MG: 325 TABLET ORAL at 23:00

## 2023-01-01 RX ADMIN — BUSPIRONE HYDROCHLORIDE 5 MG: 5 TABLET ORAL at 09:34

## 2023-01-01 RX ADMIN — BISACODYL 5 MG: 5 TABLET, COATED ORAL at 08:27

## 2023-01-01 RX ADMIN — OLANZAPINE 5 MG: 5 TABLET, FILM COATED ORAL at 16:35

## 2023-01-01 RX ADMIN — ONDANSETRON 4 MG: 4 TABLET, ORALLY DISINTEGRATING ORAL at 01:18

## 2023-01-01 RX ADMIN — BISACODYL 10 MG: 10 SUPPOSITORY RECTAL at 06:38

## 2023-01-01 RX ADMIN — QUETIAPINE FUMARATE 125 MG: 100 TABLET ORAL at 21:47

## 2023-01-01 RX ADMIN — Medication 3 MG: at 21:47

## 2023-01-01 RX ADMIN — ACETAMINOPHEN 975 MG: 325 TABLET ORAL at 09:34

## 2023-01-01 RX ADMIN — Medication 3 MG: at 21:01

## 2023-01-01 RX ADMIN — QUETIAPINE FUMARATE 125 MG: 100 TABLET ORAL at 21:01

## 2023-01-01 RX ADMIN — MORPHINE SULFATE 2 MG: 2 INJECTION, SOLUTION INTRAMUSCULAR; INTRAVENOUS at 16:58

## 2023-01-01 RX ADMIN — PANTOPRAZOLE SODIUM 40 MG: 40 INJECTION, POWDER, FOR SOLUTION INTRAVENOUS at 14:50

## 2023-01-01 RX ADMIN — TRAZODONE HYDROCHLORIDE 50 MG: 50 TABLET ORAL at 00:31

## 2023-01-01 RX ADMIN — MAGNESIUM OXIDE TAB 400 MG (241.3 MG ELEMENTAL MG) 400 MG: 400 (241.3 MG) TAB at 17:57

## 2023-01-01 RX ADMIN — MAGNESIUM OXIDE TAB 400 MG (241.3 MG ELEMENTAL MG) 400 MG: 400 (241.3 MG) TAB at 08:26

## 2023-01-01 RX ADMIN — MORPHINE SULFATE 2 MG: 2 INJECTION, SOLUTION INTRAMUSCULAR; INTRAVENOUS at 12:29

## 2023-02-17 ENCOUNTER — HOSPITAL ENCOUNTER (OUTPATIENT)
Dept: RADIOLOGY | Facility: CLINIC | Age: 78
End: 2023-02-17

## 2023-02-17 VITALS — BODY MASS INDEX: 23.91 KG/M2 | WEIGHT: 118.6 LBS | HEIGHT: 59 IN

## 2023-02-17 DIAGNOSIS — M81.0 HIGH RISK FOR FRACTURE DUE TO OSTEOPOROSIS BY DEXA SCAN: ICD-10-CM

## 2023-03-16 ENCOUNTER — APPOINTMENT (EMERGENCY)
Dept: RADIOLOGY | Facility: HOSPITAL | Age: 78
End: 2023-03-16

## 2023-03-16 ENCOUNTER — APPOINTMENT (EMERGENCY)
Dept: CT IMAGING | Facility: HOSPITAL | Age: 78
End: 2023-03-16

## 2023-03-16 ENCOUNTER — HOSPITAL ENCOUNTER (INPATIENT)
Facility: HOSPITAL | Age: 78
LOS: 1 days | Discharge: HOME HEALTH | End: 2023-03-17
Attending: EMERGENCY MEDICINE | Admitting: INTERNAL MEDICINE

## 2023-03-16 DIAGNOSIS — J18.9 PNEUMONIA: ICD-10-CM

## 2023-03-16 DIAGNOSIS — F01.50 VASCULAR DEMENTIA (HCC): ICD-10-CM

## 2023-03-16 DIAGNOSIS — R41.82 ALTERED MENTAL STATUS: Primary | ICD-10-CM

## 2023-03-16 DIAGNOSIS — F41.1 GENERALIZED ANXIETY DISORDER: ICD-10-CM

## 2023-03-16 PROBLEM — I10 HYPERTENSION: Status: ACTIVE | Noted: 2023-03-16

## 2023-03-16 PROBLEM — G93.40 ACUTE ENCEPHALOPATHY: Status: ACTIVE | Noted: 2023-03-16

## 2023-03-16 LAB
2HR DELTA HS TROPONIN: -4 NG/L
4HR DELTA HS TROPONIN: -6 NG/L
ALBUMIN SERPL BCP-MCNC: 3.7 G/DL (ref 3.5–5)
ALP SERPL-CCNC: 49 U/L (ref 34–104)
ALT SERPL W P-5'-P-CCNC: 10 U/L (ref 7–52)
ANION GAP SERPL CALCULATED.3IONS-SCNC: 3 MMOL/L (ref 4–13)
AST SERPL W P-5'-P-CCNC: 17 U/L (ref 13–39)
BACTERIA UR QL AUTO: NORMAL /HPF
BASOPHILS # BLD AUTO: 0.01 THOUSANDS/ÂΜL (ref 0–0.1)
BASOPHILS NFR BLD AUTO: 0 % (ref 0–1)
BILIRUB SERPL-MCNC: 0.84 MG/DL (ref 0.2–1)
BILIRUB UR QL STRIP: ABNORMAL
BUN SERPL-MCNC: 13 MG/DL (ref 5–25)
CALCIUM SERPL-MCNC: 8.9 MG/DL (ref 8.4–10.2)
CARDIAC TROPONIN I PNL SERPL HS: 24 NG/L
CARDIAC TROPONIN I PNL SERPL HS: 26 NG/L
CARDIAC TROPONIN I PNL SERPL HS: 30 NG/L
CHLORIDE SERPL-SCNC: 107 MMOL/L (ref 96–108)
CLARITY UR: CLEAR
CO2 SERPL-SCNC: 31 MMOL/L (ref 21–32)
COLOR UR: YELLOW
CREAT SERPL-MCNC: 0.67 MG/DL (ref 0.6–1.3)
EOSINOPHIL # BLD AUTO: 0.21 THOUSAND/ÂΜL (ref 0–0.61)
EOSINOPHIL NFR BLD AUTO: 4 % (ref 0–6)
ERYTHROCYTE [DISTWIDTH] IN BLOOD BY AUTOMATED COUNT: 14 % (ref 11.6–15.1)
GFR SERPL CREATININE-BSD FRML MDRD: 83 ML/MIN/1.73SQ M
GLUCOSE SERPL-MCNC: 94 MG/DL (ref 65–140)
GLUCOSE UR STRIP-MCNC: NEGATIVE MG/DL
HCT VFR BLD AUTO: 42.5 % (ref 34.8–46.1)
HGB BLD-MCNC: 13.5 G/DL (ref 11.5–15.4)
HGB UR QL STRIP.AUTO: ABNORMAL
IMM GRANULOCYTES # BLD AUTO: 0.02 THOUSAND/UL (ref 0–0.2)
IMM GRANULOCYTES NFR BLD AUTO: 0 % (ref 0–2)
KETONES UR STRIP-MCNC: NEGATIVE MG/DL
LACTATE SERPL-SCNC: 0.7 MMOL/L (ref 0.5–2)
LEUKOCYTE ESTERASE UR QL STRIP: ABNORMAL
LYMPHOCYTES # BLD AUTO: 0.75 THOUSANDS/ÂΜL (ref 0.6–4.47)
LYMPHOCYTES NFR BLD AUTO: 16 % (ref 14–44)
MCH RBC QN AUTO: 31.1 PG (ref 26.8–34.3)
MCHC RBC AUTO-ENTMCNC: 31.8 G/DL (ref 31.4–37.4)
MCV RBC AUTO: 98 FL (ref 82–98)
MONOCYTES # BLD AUTO: 0.41 THOUSAND/ÂΜL (ref 0.17–1.22)
MONOCYTES NFR BLD AUTO: 9 % (ref 4–12)
NEUTROPHILS # BLD AUTO: 3.39 THOUSANDS/ÂΜL (ref 1.85–7.62)
NEUTS SEG NFR BLD AUTO: 71 % (ref 43–75)
NITRITE UR QL STRIP: NEGATIVE
NON-SQ EPI CELLS URNS QL MICRO: NORMAL /HPF
NRBC BLD AUTO-RTO: 0 /100 WBCS
PH UR STRIP.AUTO: 6 [PH]
PLATELET # BLD AUTO: 219 THOUSANDS/UL (ref 149–390)
PMV BLD AUTO: 9.8 FL (ref 8.9–12.7)
POTASSIUM SERPL-SCNC: 3.9 MMOL/L (ref 3.5–5.3)
PROCALCITONIN SERPL-MCNC: <0.05 NG/ML
PROT SERPL-MCNC: 6.3 G/DL (ref 6.4–8.4)
PROT UR STRIP-MCNC: NEGATIVE MG/DL
RBC # BLD AUTO: 4.34 MILLION/UL (ref 3.81–5.12)
RBC #/AREA URNS AUTO: NORMAL /HPF
SODIUM SERPL-SCNC: 141 MMOL/L (ref 135–147)
SP GR UR STRIP.AUTO: >=1.03 (ref 1–1.03)
UROBILINOGEN UR QL STRIP.AUTO: 1 E.U./DL
WBC # BLD AUTO: 4.79 THOUSAND/UL (ref 4.31–10.16)
WBC #/AREA URNS AUTO: NORMAL /HPF

## 2023-03-16 RX ORDER — HYDROCHLOROTHIAZIDE 12.5 MG/1
6.25 TABLET ORAL DAILY
Status: DISCONTINUED | OUTPATIENT
Start: 2023-03-16 | End: 2023-03-17 | Stop reason: HOSPADM

## 2023-03-16 RX ORDER — ENOXAPARIN SODIUM 100 MG/ML
40 INJECTION SUBCUTANEOUS DAILY
Status: DISCONTINUED | OUTPATIENT
Start: 2023-03-16 | End: 2023-03-17 | Stop reason: HOSPADM

## 2023-03-16 RX ORDER — BENZONATATE 100 MG/1
100 CAPSULE ORAL 3 TIMES DAILY PRN
Status: DISCONTINUED | OUTPATIENT
Start: 2023-03-16 | End: 2023-03-17 | Stop reason: HOSPADM

## 2023-03-16 RX ORDER — ACETAMINOPHEN 325 MG/1
650 TABLET ORAL EVERY 6 HOURS PRN
Status: DISCONTINUED | OUTPATIENT
Start: 2023-03-16 | End: 2023-03-17 | Stop reason: HOSPADM

## 2023-03-16 RX ORDER — CEFTRIAXONE 1 G/50ML
1000 INJECTION, SOLUTION INTRAVENOUS EVERY 24 HOURS
Status: DISCONTINUED | OUTPATIENT
Start: 2023-03-17 | End: 2023-03-17 | Stop reason: HOSPADM

## 2023-03-16 RX ORDER — TIMOLOL MALEATE 5 MG/ML
1 SOLUTION/ DROPS OPHTHALMIC 2 TIMES DAILY
Status: DISCONTINUED | OUTPATIENT
Start: 2023-03-16 | End: 2023-03-17 | Stop reason: HOSPADM

## 2023-03-16 RX ORDER — ASPIRIN 81 MG/1
81 TABLET ORAL DAILY
Status: DISCONTINUED | OUTPATIENT
Start: 2023-03-16 | End: 2023-03-17 | Stop reason: HOSPADM

## 2023-03-16 RX ORDER — HYDROCHLOROTHIAZIDE 12.5 MG/1
6.25 TABLET ORAL DAILY
Status: DISCONTINUED | OUTPATIENT
Start: 2023-03-16 | End: 2023-03-16

## 2023-03-16 RX ORDER — LATANOPROST 50 UG/ML
1 SOLUTION/ DROPS OPHTHALMIC
Status: DISCONTINUED | OUTPATIENT
Start: 2023-03-16 | End: 2023-03-17 | Stop reason: HOSPADM

## 2023-03-16 RX ORDER — ACETAMINOPHEN 325 MG/1
650 TABLET ORAL ONCE
Status: COMPLETED | OUTPATIENT
Start: 2023-03-16 | End: 2023-03-16

## 2023-03-16 RX ORDER — PRAVASTATIN SODIUM 40 MG
40 TABLET ORAL
Status: DISCONTINUED | OUTPATIENT
Start: 2023-03-16 | End: 2023-03-17 | Stop reason: HOSPADM

## 2023-03-16 RX ORDER — BISOPROLOL FUMARATE 5 MG/1
10 TABLET, FILM COATED ORAL DAILY
Status: DISCONTINUED | OUTPATIENT
Start: 2023-03-16 | End: 2023-03-16

## 2023-03-16 RX ORDER — ONDANSETRON 2 MG/ML
4 INJECTION INTRAMUSCULAR; INTRAVENOUS EVERY 6 HOURS PRN
Status: DISCONTINUED | OUTPATIENT
Start: 2023-03-16 | End: 2023-03-17 | Stop reason: HOSPADM

## 2023-03-16 RX ORDER — BISOPROLOL FUMARATE 5 MG/1
10 TABLET, FILM COATED ORAL DAILY
Status: DISCONTINUED | OUTPATIENT
Start: 2023-03-16 | End: 2023-03-17 | Stop reason: HOSPADM

## 2023-03-16 RX ORDER — DULOXETIN HYDROCHLORIDE 60 MG/1
60 CAPSULE, DELAYED RELEASE ORAL DAILY
Status: DISCONTINUED | OUTPATIENT
Start: 2023-03-16 | End: 2023-03-17 | Stop reason: HOSPADM

## 2023-03-16 RX ORDER — BUSPIRONE HYDROCHLORIDE 10 MG/1
10 TABLET ORAL 3 TIMES DAILY
Status: DISCONTINUED | OUTPATIENT
Start: 2023-03-16 | End: 2023-03-17 | Stop reason: HOSPADM

## 2023-03-16 RX ORDER — BISOPROLOL FUMARATE AND HYDROCHLOROTHIAZIDE 10; 6.25 MG/1; MG/1
1 TABLET ORAL DAILY
Status: DISCONTINUED | OUTPATIENT
Start: 2023-03-16 | End: 2023-03-16

## 2023-03-16 RX ORDER — CEFTRIAXONE 1 G/50ML
1000 INJECTION, SOLUTION INTRAVENOUS ONCE
Status: COMPLETED | OUTPATIENT
Start: 2023-03-16 | End: 2023-03-16

## 2023-03-16 RX ADMIN — ACETAMINOPHEN 325MG 650 MG: 325 TABLET ORAL at 09:49

## 2023-03-16 RX ADMIN — ENOXAPARIN SODIUM 40 MG: 40 INJECTION SUBCUTANEOUS at 14:34

## 2023-03-16 RX ADMIN — LATANOPROST 1 DROP: 50 SOLUTION OPHTHALMIC at 22:10

## 2023-03-16 RX ADMIN — ASPIRIN 81 MG: 81 TABLET, COATED ORAL at 14:34

## 2023-03-16 RX ADMIN — CEFTRIAXONE 1000 MG: 1 INJECTION, SOLUTION INTRAVENOUS at 11:26

## 2023-03-16 RX ADMIN — DULOXETINE HYDROCHLORIDE 60 MG: 60 CAPSULE, DELAYED RELEASE ORAL at 14:34

## 2023-03-16 RX ADMIN — BUSPIRONE HYDROCHLORIDE 10 MG: 10 TABLET ORAL at 17:56

## 2023-03-16 RX ADMIN — PRAVASTATIN SODIUM 40 MG: 40 TABLET ORAL at 17:56

## 2023-03-16 RX ADMIN — BISOPROLOL FUMARATE 10 MG: 5 TABLET ORAL at 14:36

## 2023-03-16 RX ADMIN — HYDROCHLOROTHIAZIDE 6.25 MG: 12.5 TABLET ORAL at 14:36

## 2023-03-16 RX ADMIN — TIMOLOL MALEATE 1 DROP: 5 SOLUTION OPHTHALMIC at 17:58

## 2023-03-16 RX ADMIN — BUSPIRONE HYDROCHLORIDE 10 MG: 10 TABLET ORAL at 21:55

## 2023-03-16 NOTE — ED PROVIDER NOTES
History  Chief Complaint   Patient presents with   • Dementia     Patient found walking outside her home, [facility]SP on scene and called 911 for patient evaluation  Patient offers no complaint, has history of dementia along with her   70-year-old female presents emergency room via EMS  Presents with her son  Patient was found wandering by state police today  Patient lives at home with her elderly  who has dementia  Patient was herself recently diagnosed with dementia  There is some care support in the home for her   Son reports none for this patient  Son reports that he and his sibling have been trying to make arrangements for placement for both parents  However, parents have been resistant to any type of other support her alternative locations  History provided by:  Patient and relative  Altered Mental Status  Presenting symptoms: behavior changes and memory loss    Severity:  Moderate  Most recent episode:  More than 2 days ago  Episode history:  Continuous  Timing:  Intermittent  Progression:  Waxing and waning  Context: dementia        Prior to Admission Medications   Prescriptions Last Dose Informant Patient Reported? Taking?    DULoxetine (CYMBALTA) 60 mg delayed release capsule 3/15/2023  Yes Yes   Sig: Take 60 mg by mouth in the morning   Diclofenac Sodium (VOLTAREN) 1 % 3/15/2023  Yes Yes   Sig: Apply topically 4 (four) times a day   alendronate (FOSAMAX) 70 mg tablet Past Week  Yes Yes   Sig: Take 70 mg by mouth once a week   aspirin (ECOTRIN LOW STRENGTH) 81 mg EC tablet 3/15/2023  Yes Yes   Sig: Take 81 mg by mouth daily   bisoprolol-hydrochlorothiazide (ZIAC) 10-6 25 MG per tablet 3/15/2023  Yes Yes   Sig: Take 1 tablet by mouth daily   busPIRone (BUSPAR) 10 mg tablet 3/15/2023  Yes Yes   Sig: Take 1 tablet by mouth 2 (two) times a day   latanoprost (XALATAN) 0 005 % ophthalmic solution 3/15/2023  Yes Yes   Sig: Administer 1 drop to both eyes daily at bedtime rosuvastatin (CRESTOR) 10 MG tablet 3/15/2023  Yes Yes   Sig: Take 1 tablet by mouth daily   timolol (TIMOPTIC) 0 5 % ophthalmic solution 3/15/2023  Yes Yes   Sig: Apply 1 drop to eye 2 (two) times a day      Facility-Administered Medications: None       Past Medical History:   Diagnosis Date   • Dementia ([facility] [address] )    • Stroke [facility])        History reviewed  No pertinent surgical history  History reviewed  No pertinent family history  I have reviewed and agree with the history as documented  E-Cigarette/Vaping   • E-Cigarette Use Never User      E-Cigarette/Vaping Substances     Social History     Tobacco Use   • Smoking status: Every Day     Packs/day: 0 25     Types: Cigarettes   • Smokeless tobacco: Never   Vaping Use   • Vaping Use: Never used   Substance Use Topics   • Alcohol use: Yes     Comment: ocassional   • Drug use: Never       Review of Systems   Unable to perform ROS: Dementia (Limited by)   Musculoskeletal: Positive for arthralgias and back pain  Psychiatric/Behavioral: Positive for memory loss  The patient is nervous/anxious  All other systems reviewed and are negative  Physical Exam  Physical Exam  Vitals and nursing note reviewed  Constitutional:       General: She is awake  She is not in acute distress  Appearance: Normal appearance  She is well-developed  She is not ill-appearing or toxic-appearing  HENT:      Head: Normocephalic and atraumatic  Right Ear: External ear normal       Left Ear: External ear normal       Nose: Nose normal       Mouth/Throat:      Mouth: Mucous membranes are dry  Eyes:      General: Lids are normal  No scleral icterus  Extraocular Movements: Extraocular movements intact  Pupils: Pupils are equal, round, and reactive to light  Cardiovascular:      Rate and Rhythm: Normal rate and regular rhythm  Heart sounds: Normal heart sounds  No murmur heard  Pulmonary:      Effort: Pulmonary effort is normal  No respiratory distress  Breath sounds: Normal breath sounds  No wheezing, rhonchi or rales  Abdominal:      General: Abdomen is flat  There is no distension  Palpations: Abdomen is soft  Tenderness: There is no abdominal tenderness  There is no guarding or rebound  Musculoskeletal:         General: No swelling or deformity  Normal range of motion  Cervical back: Normal, normal range of motion and neck supple  Thoracic back: Normal       Lumbar back: Tenderness present  No bony tenderness  Right hip: Tenderness present  No deformity  Normal range of motion  Left hip: No tenderness  Normal range of motion  Right upper leg: Normal       Left upper leg: Normal    Skin:     General: Skin is warm and dry  Coloration: Skin is not jaundiced or pale  Findings: Bruising (Upper extremity bruising ) present  No rash  Neurological:      General: No focal deficit present  Mental Status: She is alert and oriented to person, place, and time  Mental status is at baseline  She is confused  Cranial Nerves: Cranial nerves 2-12 are intact  No cranial nerve deficit  Sensory: Sensation is intact  Motor: Motor function is intact  No weakness  Coordination: Coordination is intact     Psychiatric:         Attention and Perception: Attention normal          Mood and Affect: Mood normal          Speech: Speech normal          Behavior: Behavior normal          Vital Signs  ED Triage Vitals [03/16/23 0807]   Temperature Pulse Respirations Blood Pressure SpO2   97 5 °F (36 4 °C) 74 16 (!) 179/77 96 %      Temp Source Heart Rate Source Patient Position - Orthostatic VS BP Location FiO2 (%)   Temporal Monitor Lying Left arm --      Pain Score       No Pain           Vitals:    03/16/23 1000 03/16/23 1015 03/16/23 1100 03/16/23 1155   BP: 158/75 163/72 157/70 157/76   Pulse: 74 64 66 81   Patient Position - Orthostatic VS:             Visual Acuity      ED Medications  Medications acetaminophen (TYLENOL) tablet 650 mg (650 mg Oral Given 3/16/23 0949)   cefTRIAXone (ROCEPHIN) IVPB (premix in dextrose) 1,000 mg 50 mL (1,000 mg Intravenous New Bag 3/16/23 1126)       Diagnostic Studies  Results Reviewed     Procedure Component Value Units Date/Time    Lactic acid [[id number]]  (Normal) Collected: 03/16/23 1121    Lab Status: Final result Specimen: Blood from Arm, Left Updated: 03/16/23 1156     LACTIC ACID 0 7 mmol/L     Narrative:      Result may be elevated if tourniquet was used during collection  Blood culture #2 [[id number]] Collected: 03/16/23 1121    Lab Status: In process Specimen: Blood from Arm, Left Updated: 03/16/23 1130    Blood culture #1 [[id number]] Collected: 03/16/23 1121    Lab Status:  In process Specimen: Blood from Arm, Left Updated: 03/16/23 1130    Urine Microscopic [[id number]]  (Normal) Collected: 03/16/23 1050    Lab Status: Final result Specimen: Urine, Clean Catch Updated: 03/16/23 1128     RBC, UA 0-1 /hpf      WBC, UA 2-4 /hpf      Epithelial Cells Occasional /hpf      Bacteria, UA Occasional /hpf     HS Troponin I 2hr [[id number]]  (Normal) Collected: 03/16/23 1045    Lab Status: Final result Specimen: Blood from Arm, Right Updated: 03/16/23 1126     hs TnI 2hr 26 ng/L      Delta 2hr hsTnI -4 ng/L     HS Troponin I 4hr [[id number]]     Lab Status: No result Specimen: Blood     UA w Reflex to Microscopic w Reflex to Culture [[id number]]  (Abnormal) Collected: 03/16/23 1050    Lab Status: Final result Specimen: Urine, Clean Catch Updated: 03/16/23 1104     Color, UA Yellow     Clarity, UA Clear     Specific Gravity, UA >=1 030     pH, UA 6 0     Leukocytes, UA Trace     Nitrite, UA Negative     Protein, UA Negative mg/dl      Glucose, UA Negative mg/dl      Ketones, UA Negative mg/dl      Urobilinogen, UA 1 0 E U /dl      Bilirubin, UA Small     Occult Blood, UA Large    HS Troponin 0hr (reflex protocol) [[id number]]  (Normal) Collected: 03/16/23 0917    Lab Status: Final result Specimen: Blood from Arm, Right Updated: 03/16/23 0945     hs TnI 0hr 30 ng/L     Comprehensive metabolic panel [[id number]]  (Abnormal) Collected: 03/16/23 0917    Lab Status: Final result Specimen: Blood from Arm, Right Updated: 03/16/23 0936     Sodium 141 mmol/L      Potassium 3 9 mmol/L      Chloride 107 mmol/L      CO2 31 mmol/L      ANION GAP 3 mmol/L      BUN 13 mg/dL      Creatinine 0 67 mg/dL      Glucose 94 mg/dL      Calcium 8 9 mg/dL      AST 17 U/L      ALT 10 U/L      Alkaline Phosphatase 49 U/L      Total Protein 6 3 g/dL      Albumin 3 7 g/dL      Total Bilirubin 0 84 mg/dL      eGFR 83 ml/min/1 73sq m     Narrative:      [place] guidelines for Chronic Kidney Disease (CKD):   •  Stage 1 with normal or high GFR (GFR > 90 mL/min/1 73 square meters)  •  Stage 2 Mild CKD (GFR = 60-89 mL/min/1 73 square meters)  •  Stage 3A Moderate CKD (GFR = 45-59 mL/min/1 73 square meters)  •  Stage 3B Moderate CKD (GFR = 30-44 mL/min/1 73 square meters)  •  Stage 4 Severe CKD (GFR = 15-29 mL/min/1 73 square meters)  •  Stage 5 End Stage CKD (GFR <15 mL/min/1 73 square meters)  Note: GFR calculation is accurate only with a steady state creatinine    CBC and differential [[id number]] Collected: 03/16/23 0917    Lab Status: Final result Specimen: Blood from Arm, Right Updated: 03/16/23 0921     WBC 4 79 Thousand/uL      RBC 4 34 Million/uL      Hemoglobin 13 5 g/dL      Hematocrit 42 5 %      MCV 98 fL      MCH 31 1 pg      MCHC 31 8 g/dL      RDW 14 0 %      MPV 9 8 fL      Platelets 775 Thousands/uL      nRBC 0 /100 WBCs      Neutrophils Relative 71 %      Immat GRANS % 0 %      Lymphocytes Relative 16 %      Monocytes Relative 9 %      Eosinophils Relative 4 %      Basophils Relative 0 %      Neutrophils Absolute 3 39 Thousands/µL      Immature Grans Absolute 0 02 Thousand/uL      Lymphocytes Absolute 0 75 Thousands/µL      Monocytes Absolute 0 41 Thousand/µL      Eosinophils Absolute 0 21 Thousand/µL      Basophils Absolute 0 01 Thousands/µL                  XR chest 1 view portable   Final Result by Nathalia Coyle MD (03/16 1045)      Bibasilar infiltrates, more on the right suggested  Workstation performed: [id number]         XR hip/pelv 2-3 vws right if performed   Final Result by Nathalia Coyle MD (03/16 1046)      No acute osseous abnormality  Workstation performed: [id number]         CT head without contrast   Final Result by Lei Pulido MD (03/16 1029)      No acute intracranial abnormality  Stable chronic microangiopathic changes within the brain  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  Workstation performed: [id number]                    Procedures  ECG 12 Lead Documentation Only    Date/Time: 3/16/2023 9:28 AM  Performed by: Miller Whitehead DO  Authorized by: Miller Whitehead DO     Indications / Diagnosis:  Change in mental status  ECG reviewed by me, the ED Provider: yes    Patient location:  ED  Previous ECG:     Previous ECG:  Compared to current    Comparison ECG info:  No significant change from 10/15/2022    Similarity:  No change  Interpretation:     Interpretation: normal    Rate:     ECG rate assessment: normal    Rhythm:     Rhythm: sinus rhythm    Ectopy:     Ectopy: none    QRS:     QRS axis:  Normal  Conduction:     Conduction: normal    ST segments:     ST segments:  Non-specific  T waves:     T waves: non-specific               ED Course  ED Course as of 03/16/23 1237   Thu Mar 16, 2023   1039 CT shows no acute findings   1051 Bilateral lower lobe infiltrates  1051 No acute abnormality noted on  Pelvic x-ray  SBIRT 20yo+    Flowsheet Row Most Recent Value   SBIRT (25 yo +)    In order to provide better care to our patients, we are screening all of our patients for alcohol and drug use   Would it be okay to ask you these screening questions? Yes Filed at: 03/16/2023 3639   Initial Alcohol Screen: US AUDIT-C     1  How often do you have a drink containing alcohol? 0 Filed at: 03/16/2023 0811   2  How many drinks containing alcohol do you have on a typical day you are drinking? 0 Filed at: 03/16/2023 0811   3b  FEMALE Any Age, or MALE 65+: How often do you have 4 or more drinks on one occassion? 0 Filed at: 03/16/2023 0811   Audit-C Score 0 Filed at: 03/16/2023 4205   LETA: How many times in the past year have you    Used an illegal drug or used a prescription medication for non-medical reasons? Never Filed at: 03/16/2023 3454                    Medical Decision Making  Patient presented to the emergency department and a MSE was performed  The patient was evaluated for complaint related to acute change in mental status  Patient is potentially at risk for, but not limited to, acid-base imbalance, endocrine disorder, seizure disorder, multifactorial or single etiology encephalopathy, pneumonia, urinary tract infection, pyelonephritis, other infectious process, medication overdose, trauma, hypo or hyperglycemia, uremia, syncope, stroke, substance use disorder or mental health disorder  Several of these diagnoses have been evaluated and ruled out by history and physical   As needed, patient will be further evaluated with laboratory and imaging studies  Higher level diagnostics, such as CT imaging or ultrasound, may also be required  Please see work-up portion of the note for further evaluation of patient's risk  Socioeconomic factors were also considered as part of the decision-making process  Unless otherwise stated in the chart or patient is admitted as elsewhere documented, any previously prescribed medications will be maintained        Altered mental status: chronic illness or injury with severe exacerbation, progression, or side effects of treatment  Pneumonia: complicated acute illness or injury  Amount and/or Complexity of Data Reviewed  Independent Historian: caregiver     Details: Most hx from son  Labs: ordered  Radiology: ordered  Decision-making details documented in ED Course  ECG/medicine tests:  Decision-making details documented in ED Course  Risk  OTC drugs  Prescription drug management  Decision regarding hospitalization  Risk Details: Patient presented to the emergency department and a MSE was performed  The patient was evaluated and diagnosed with acute exacerbation of altered mental status and dementia with patient wandering in the street today and now with imaging evidence of bilateral lower lobe pneumonia which appears new from prior imaging studies   This is an acute exacerbation of a chronic disease process that will require additional planned work-up and treatment in a hospitalized setting  As may have been required as part of this evaluation, clinical laboratory test, radiology imaging and medical testing (I e  EKG) were ordered as necessitated by the patient's presentation  I independently reviewed these studies, imaging and testing  This patient's case is considered to be a considerable risk secondary to the above listed disease process and poses a threat to the patient's well-being and baseline function  Further in-patient diagnostic testing and management, which may include the administration of parenteral medications, is required  Disposition  Final diagnoses:    Altered mental status   Pneumonia     Time reflects when diagnosis was documented in both MDM as applicable and the Disposition within this note     Time User Action Codes Description Comment    3/16/2023 11:04 AM Golden Holstein Add [R41 82] Altered mental status     3/16/2023 11:05 AM Golden Holstein Add [J18 9] Pneumonia       ED Disposition     ED Disposition   Admit    Condition   Stable    Date/Time   Thu Mar 16, 2023 11:04 AM    Comment              Follow-up Information    None         Current Discharge Medication List CONTINUE these medications which have NOT CHANGED    Details   alendronate (FOSAMAX) 70 mg tablet Take 70 mg by mouth once a week      aspirin (ECOTRIN LOW STRENGTH) 81 mg EC tablet Take 81 mg by mouth daily      bisoprolol-hydrochlorothiazide (ZIAC) 10-6 25 MG per tablet Take 1 tablet by mouth daily      busPIRone (BUSPAR) 10 mg tablet Take 1 tablet by mouth 2 (two) times a day      Diclofenac Sodium (VOLTAREN) 1 % Apply topically 4 (four) times a day      DULoxetine (CYMBALTA) 60 mg delayed release capsule Take 60 mg by mouth in the morning      latanoprost (XALATAN) 0 005 % ophthalmic solution Administer 1 drop to both eyes daily at bedtime      rosuvastatin (CRESTOR) 10 MG tablet Take 1 tablet by mouth daily      timolol (TIMOPTIC) 0 5 % ophthalmic solution Apply 1 drop to eye 2 (two) times a day             No discharge procedures on file      PDMP Review     None          ED Provider  Electronically Signed by           Tessie Bullock DO  03/16/23 7067

## 2023-03-16 NOTE — ASSESSMENT & PLAN NOTE
Maintained on BuSpar and Cymbalta by her outpatient primary care physician  BuSpar dose was recently increased to 10 mg 3 times daily  We will continue

## 2023-03-16 NOTE — H&P
[address]  H&P- Micah Gil 1945, 76 y o  female MRN: [id number]  Unit/Bed#: -01 Encounter: [id number]  Primary Care Provider: Josias Mehta DO   Date and time admitted to hospital: 3/16/2023  8:04 AM    * Acute encephalopathy  Assessment & Plan  Patient has a history of vascular dementia at baseline and is usually independent with ADLs  Was found wandering in the street outside her home in her nightgown by police  Patient does not have any recollection  CT head on admission was negative for any acute intracranial abnormality  Her mentation is back to baseline  Likely acute encephalopathy in the setting of underlying pneumonia  No other metabolic or electrolyte abnormality seen on labs today  Maintain delirium and fall precautions  Avoid benzodiazepine and narcotic medications  Ensure adequate sleep-wake cycle  PT OT evaluation will be obtained  Continue with antibiotics for pneumonia  Hypertension  Assessment & Plan  Will resume bisoprolol hydrochlorothiazide  Blood pressure currently stable  Generalized anxiety disorder  Assessment & Plan  Maintained on BuSpar and Cymbalta by her outpatient primary care physician  BuSpar dose was recently increased to 10 mg 3 times daily  We will continue  Vascular dementia [facility])  Assessment & Plan  Continue with supportive care  Maintain delirium precautions  PT OT evaluation will be obtained  Community acquired pneumonia of right lower lobe of lung  Assessment & Plan  Chest x-ray shows right basilar pneumonia  Patient does report coughing with eating occasionally  Denied any nausea vomiting  Continue with IV ceftriaxone  Check procalcitonin and trend  Speech therapy evaluation will be obtained  VTE Pharmacologic Prophylaxis: VTE Score: 4 High Risk (Score >/= 5) - Pharmacological DVT Prophylaxis Ordered: enoxaparin (Lovenox)  Sequential Compression Devices Ordered    Code Status: Level 1 - Full Code   Discussion with family: Updated  (son) at bedside  Anticipated Length of Stay: Patient will be admitted on an inpatient basis with an anticipated length of stay of greater than 2 midnights secondary to Ongoing management of pneumonia  Total Time Spent on Date of Encounter in care of patient: 75 minutes This time was spent on one or more of the following: performing physical exam; counseling and coordination of care; obtaining or reviewing history; documenting in the medical record; reviewing/ordering tests, medications or procedures; communicating with other healthcare professionals and discussing with patient's family/caregivers  Chief Complaint: Confusion    History of Present Illness:  Gudelia William is a 76 y o  female with a PMH of vascular dementia, hypertension, hyperlipidemia, generalized anxiety disorder who presents with via EMS when she was found by the police wandering in her nightgown outside  Patient lives with her elderly  who has dementia and requires caretaker support  She herself has been diagnosed with vascular dementia but is independent with ADLs  Son reports that she has waxing and waning mentation and good and bad days  Patient herself does not have any recollection of the event  Denies any falls recently or any head injury  She does admit to having some cough and occasional difficulty swallowing  Denies any shortness of breath  Denies any fever or chills nausea or vomiting  No recent changes in medications other than BuSpar increase in dose to 10 mg 3 times daily last month  Review of Systems:  Review of Systems   Constitutional: Positive for activity change and fatigue  HENT: Positive for trouble swallowing  Eyes: Negative  Respiratory: Positive for cough  Gastrointestinal: Negative  Endocrine: Negative  Genitourinary: Negative  Musculoskeletal: Positive for gait problem  Skin: Negative      Allergic/Immunologic: Negative  Hematological: Negative  Psychiatric/Behavioral: Positive for confusion and decreased concentration  Past Medical and Surgical History:   Past Medical History:   Diagnosis Date   • Dementia ([facility] [address] )    • Stroke [facility])        History reviewed  No pertinent surgical history  Meds/Allergies:  Prior to Admission medications    Medication Sig Start Date End Date Taking? Authorizing Provider   alendronate (FOSAMAX) 70 mg tablet Take 70 mg by mouth once a week 7/26/22  Yes Historical Provider, MD   aspirin (ECOTRIN LOW STRENGTH) 81 mg EC tablet Take 81 mg by mouth daily   Yes Historical Provider, MD   bisoprolol-hydrochlorothiazide [facility]) 10-6 25 MG per tablet Take 1 tablet by mouth daily 10/12/22  Yes Historical Provider, MD   busPIRone (BUSPAR) 10 mg tablet Take 1 tablet by mouth 2 (two) times a day 6/10/22  Yes Historical Provider, MD   Diclofenac Sodium (VOLTAREN) 1 % Apply topically 4 (four) times a day 6/8/22  Yes Historical Provider, MD   DULoxetine (CYMBALTA) 60 mg delayed release capsule Take 60 mg by mouth in the morning 8/29/22  Yes Historical Provider, MD   latanoprost (XALATAN) 0 005 % ophthalmic solution Administer 1 drop to both eyes daily at bedtime 4/20/22  Yes Historical Provider, MD   rosuvastatin (CRESTOR) 10 MG tablet Take 1 tablet by mouth daily 10/13/22  Yes Historical Provider, MD   timolol (TIMOPTIC) 0 5 % ophthalmic solution Apply 1 drop to eye 2 (two) times a day 4/20/22  Yes Historical Provider, MD     I have reviewed home medications with patient family member  Allergies:    Allergies   Allergen Reactions   • Lorazepam Other (See Comments)     Increased agitation         Social History:  Marital Status: /Civil Union   Substance Use History:   Social History     Substance and Sexual Activity   Alcohol Use Yes    Comment: ocassional     Social History     Tobacco Use   Smoking Status Every Day   • Packs/day: 0 25   • Types: Cigarettes   Smokeless Tobacco Never Social History     Substance and Sexual Activity   Drug Use Never       Family History:  History reviewed  No pertinent family history  Physical Exam:     Vitals:   Blood Pressure: 157/76 (03/16/23 1155)  Pulse: 81 (03/16/23 1155)  Temperature: 97 7 °F (36 5 °C) (03/16/23 1155)  Temp Source: Temporal (03/16/23 0807)  Respirations: 19 (03/16/23 1155)  Height: 5' 1" (154 9 cm) (03/16/23 1215)  Weight - Scale: 58 1 kg (128 lb 1 4 oz) (03/16/23 1215)  SpO2: 96 % (03/16/23 1210)    Physical Exam  Constitutional:       General: She is not in acute distress  HENT:      Head: Normocephalic  Nose: Nose normal       Mouth/Throat:      Mouth: Mucous membranes are moist    Eyes:      Extraocular Movements: Extraocular movements intact  Pupils: Pupils are equal, round, and reactive to light  Cardiovascular:      Rate and Rhythm: Normal rate and regular rhythm  Pulmonary:      Comments: Diminished breath sounds right bases  Abdominal:      General: Abdomen is flat  Bowel sounds are normal       Palpations: Abdomen is soft  Musculoskeletal:      Cervical back: Neck supple  Right lower leg: No edema  Left lower leg: No edema  Skin:     General: Skin is warm  Neurological:      Mental Status: She is alert  Comments: Patient is oriented to self,time and person  Does have episodic confusion  Follows all commands  No pronator drift or dysarthria noted  Muscle strength equal and symmetrical in all extremities           Additional Data:     Lab Results:  Results from last 7 days   Lab Units 03/16/23  0917   WBC Thousand/uL 4 79   HEMOGLOBIN g/dL 13 5   HEMATOCRIT % 42 5   PLATELETS Thousands/uL 219   NEUTROS PCT % 71   LYMPHS PCT % 16   MONOS PCT % 9   EOS PCT % 4     Results from last 7 days   Lab Units 03/16/23  0917   SODIUM mmol/L 141   POTASSIUM mmol/L 3 9   CHLORIDE mmol/L 107   CO2 mmol/L 31   BUN mg/dL 13   CREATININE mg/dL 0 67   ANION GAP mmol/L 3*   CALCIUM mg/dL 8 9   ALBUMIN g/dL 3 7   TOTAL BILIRUBIN mg/dL 0 84   ALK PHOS U/L 49   ALT U/L 10   AST U/L 17   GLUCOSE RANDOM mg/dL 94                 Results from last 7 days   Lab Units 03/16/23  1121   LACTIC ACID mmol/L 0 7       Lines/Drains:  Invasive Devices     Peripheral Intravenous Line  Duration           Peripheral IV 03/16/23 Right Antecubital <1 day                    Imaging: Reviewed radiology reports from this admission including: chest xray  XR chest 1 view portable   Final Result by Mimi Reyes MD (03/16 1045)      Bibasilar infiltrates, more on the right suggested  Workstation performed: [id number]         XR hip/pelv 2-3 vws right if performed   Final Result by Mimi Reyes MD (03/16 1046)      No acute osseous abnormality  Workstation performed: [id number]         CT head without contrast   Final Result by Shelley Nevarez MD (03/16 1029)      No acute intracranial abnormality  Stable chronic microangiopathic changes within the brain  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  Workstation performed: [id number]             EKG and Other Studies Reviewed on Admission:   · EKG: NSR  HR 85     ** Please Note: This note has been constructed using a voice recognition system   **

## 2023-03-16 NOTE — PLAN OF CARE
Problem: Potential for Falls  Goal: Patient will remain free of falls  Description: INTERVENTIONS:  - Educate patient/family on patient safety including physical limitations  - Instruct patient to call for assistance with activity   - Consult OT/PT to assist with strengthening/mobility   - Keep Call bell within reach  - Keep bed low and locked with side rails adjusted as appropriate  - Keep care items and personal belongings within reach  - Initiate and maintain comfort rounds    - Apply yellow socks and bracelet for high fall risk patients  - Consider moving patient to room near nurses station  Outcome: Progressing     Problem: PAIN - ADULT  Goal: Verbalizes/displays adequate comfort level or baseline comfort level  Description: Interventions:  - Encourage patient to monitor pain and request assistance  - Assess pain using appropriate pain scale  - Administer analgesics based on type and severity of pain and evaluate response  - Implement non-pharmacological measures as appropriate and evaluate response  - Consider cultural and social influences on pain and pain management  - Notify physician/advanced practitioner if interventions unsuccessful or patient reports new pain  Outcome: Progressing     Problem: INFECTION - ADULT  Goal: Absence or prevention of progression during hospitalization  Description: INTERVENTIONS:  - Assess and monitor for signs and symptoms of infection  - Monitor lab/diagnostic results  - Monitor all insertion sites, i e  indwelling lines, tubes, and drains  - Monitor endotracheal if appropriate and nasal secretions for changes in amount and color  - [place] appropriate cooling/warming therapies per order  - Administer medications as ordered  - Instruct and encourage patient and family to use good hand hygiene technique  - Identify and instruct in appropriate isolation precautions for identified infection/condition  Outcome: Progressing     Problem: SAFETY ADULT  Goal: Patient will remain free of falls  Description: INTERVENTIONS:  - Educate patient/family on patient safety including physical limitations  - Instruct patient to call for assistance with activity   - Consult OT/PT to assist with strengthening/mobility   - Keep Call bell within reach  - Keep bed low and locked with side rails adjusted as appropriate  - Keep care items and personal belongings within reach  - Initiate and maintain comfort rounds  - Make Fall Risk Sign visible to staff    - Apply yellow socks and bracelet for high fall risk patients  - Consider moving patient to room near nurses station  Outcome: Progressing  Goal: Maintain or return to baseline ADL function  Description: INTERVENTIONS:  -  Assess patient's ability to carry out ADLs; assess patient's baseline for ADL function and identify physical deficits which impact ability to perform ADLs (bathing, care of mouth/teeth, toileting, grooming, dressing, etc )  - Assess/evaluate cause of self-care deficits   - Assess range of motion  - Assess patient's mobility; develop plan if impaired  - Assess patient's need for assistive devices and provide as appropriate  - Encourage maximum independence but intervene and supervise when necessary  - Involve family in performance of ADLs  - Assess for home care needs following discharge   - Consider OT consult to assist with ADL evaluation and planning for discharge  - Provide patient education as appropriate  Outcome: Progressing  Goal: Maintains/Returns to pre admission functional level  Description: INTERVENTIONS:  - Perform BMAT or MOVE assessment daily    - Set and communicate daily mobility goal to care team and patient/family/caregiver     - Collaborate with rehabilitation services on mobility goals if consulted    - Out of bed for toileting  - Record patient progress and toleration of activity level   Outcome: Progressing     Problem: DISCHARGE PLANNING  Goal: Discharge to home or other facility with appropriate resources  Description: INTERVENTIONS:  - Identify barriers to discharge w/patient and caregiver  - Arrange for needed discharge resources and transportation as appropriate  - Identify discharge learning needs (meds, wound care, etc )  - Arrange for interpretive services to assist at discharge as needed  - Refer to Case Management Department for coordinating discharge planning if the patient needs post-hospital services based on physician/advanced practitioner order or complex needs related to functional status, cognitive ability, or social support system  Outcome: Progressing     Problem: Knowledge Deficit  Goal: Patient/family/caregiver demonstrates understanding of disease process, treatment plan, medications, and discharge instructions  Description: Complete learning assessment and assess knowledge base  Interventions:  - Provide teaching at level of understanding  - Provide teaching via preferred learning methods  Outcome: Progressing     Problem: NEUROSENSORY - ADULT  Goal: Achieves stable or improved neurological status  Description: INTERVENTIONS  - Monitor and report changes in neurological status  - Monitor vital signs such as temperature, blood pressure, glucose, and any other labs ordered   - Initiate measures to prevent increased intracranial pressure  - Monitor for seizure activity and implement precautions if appropriate      Outcome: Progressing  Goal: Achieves maximal functionality and self care  Description: INTERVENTIONS  - Monitor swallowing and airway patency with patient fatigue and changes in neurological status  - Encourage and assist patient to increase activity and self care     - Encourage visually impaired, hearing impaired and aphasic patients to use assistive/communication devices  Outcome: Progressing     Problem: MOBILITY - ADULT  Goal: Maintain or return to baseline ADL function  Description: INTERVENTIONS:  -  Assess patient's ability to carry out ADLs; assess patient's baseline for ADL function and identify physical deficits which impact ability to perform ADLs (bathing, care of mouth/teeth, toileting, grooming, dressing, etc )  - Assess/evaluate cause of self-care deficits   - Assess range of motion  - Assess patient's mobility; develop plan if impaired  - Assess patient's need for assistive devices and provide as appropriate  - Encourage maximum independence but intervene and supervise when necessary  - Involve family in performance of ADLs  - Assess for home care needs following discharge   - Consider OT consult to assist with ADL evaluation and planning for discharge  - Provide patient education as appropriate  Outcome: Progressing  Goal: Maintains/Returns to pre admission functional level  Description: INTERVENTIONS:  - Perform BMAT or MOVE assessment daily    - Set and communicate daily mobility goal to care team and patient/family/caregiver     - Collaborate with rehabilitation services on mobility goals if consulted    - Out of bed for toileting  - Record patient progress and toleration of activity level   Outcome: Progressing

## 2023-03-16 NOTE — ASSESSMENT & PLAN NOTE
Chest x-ray shows right basilar pneumonia  Patient does report coughing with eating occasionally  Denied any nausea vomiting  Continue with IV ceftriaxone  Check procalcitonin and trend  Speech therapy evaluation will be obtained

## 2023-03-16 NOTE — ED NOTES
Patient very pleasantly confused, but aware of where she is but can not remember being outside this am walking around  She knows her  is at home, had a stroke and she takes care of him    She recalls stating "I think I had a stroke as well, but I still take care of my  and we have a big yard and stuff to take care of "      Mona Jha, RN  03/16/23 6171

## 2023-03-16 NOTE — ASSESSMENT & PLAN NOTE
Patient has a history of vascular dementia at baseline and is usually independent with ADLs  Was found wandering in the street outside her home in her nightgown by police  Patient does not have any recollection  CT head on admission was negative for any acute intracranial abnormality  Her mentation is back to baseline  Likely acute encephalopathy in the setting of underlying pneumonia  No other metabolic or electrolyte abnormality seen on labs today  Maintain delirium and fall precautions  Avoid benzodiazepine and narcotic medications  Ensure adequate sleep-wake cycle  PT OT evaluation will be obtained  Continue with antibiotics for pneumonia

## 2023-03-17 VITALS
HEART RATE: 56 BPM | OXYGEN SATURATION: 97 % | HEIGHT: 61 IN | DIASTOLIC BLOOD PRESSURE: 52 MMHG | TEMPERATURE: 97.8 F | BODY MASS INDEX: 24.18 KG/M2 | SYSTOLIC BLOOD PRESSURE: 134 MMHG | WEIGHT: 128.09 LBS | RESPIRATION RATE: 18 BRPM

## 2023-03-17 LAB
ANION GAP SERPL CALCULATED.3IONS-SCNC: 2 MMOL/L (ref 4–13)
BASOPHILS # BLD AUTO: 0.02 THOUSANDS/ÂΜL (ref 0–0.1)
BASOPHILS NFR BLD AUTO: 1 % (ref 0–1)
BUN SERPL-MCNC: 16 MG/DL (ref 5–25)
CALCIUM SERPL-MCNC: 8.6 MG/DL (ref 8.4–10.2)
CHLORIDE SERPL-SCNC: 107 MMOL/L (ref 96–108)
CO2 SERPL-SCNC: 31 MMOL/L (ref 21–32)
CREAT SERPL-MCNC: 0.8 MG/DL (ref 0.6–1.3)
EOSINOPHIL # BLD AUTO: 0.17 THOUSAND/ÂΜL (ref 0–0.61)
EOSINOPHIL NFR BLD AUTO: 4 % (ref 0–6)
ERYTHROCYTE [DISTWIDTH] IN BLOOD BY AUTOMATED COUNT: 13.9 % (ref 11.6–15.1)
GFR SERPL CREATININE-BSD FRML MDRD: 70 ML/MIN/1.73SQ M
GLUCOSE SERPL-MCNC: 89 MG/DL (ref 65–140)
HCT VFR BLD AUTO: 38.2 % (ref 34.8–46.1)
HGB BLD-MCNC: 12.2 G/DL (ref 11.5–15.4)
IMM GRANULOCYTES # BLD AUTO: 0.02 THOUSAND/UL (ref 0–0.2)
IMM GRANULOCYTES NFR BLD AUTO: 1 % (ref 0–2)
L PNEUMO1 AG UR QL IA.RAPID: NEGATIVE
LYMPHOCYTES # BLD AUTO: 1.09 THOUSANDS/ÂΜL (ref 0.6–4.47)
LYMPHOCYTES NFR BLD AUTO: 25 % (ref 14–44)
MCH RBC QN AUTO: 31 PG (ref 26.8–34.3)
MCHC RBC AUTO-ENTMCNC: 31.9 G/DL (ref 31.4–37.4)
MCV RBC AUTO: 97 FL (ref 82–98)
MONOCYTES # BLD AUTO: 0.5 THOUSAND/ÂΜL (ref 0.17–1.22)
MONOCYTES NFR BLD AUTO: 11 % (ref 4–12)
NEUTROPHILS # BLD AUTO: 2.62 THOUSANDS/ÂΜL (ref 1.85–7.62)
NEUTS SEG NFR BLD AUTO: 58 % (ref 43–75)
NRBC BLD AUTO-RTO: 0 /100 WBCS
PLATELET # BLD AUTO: 193 THOUSANDS/UL (ref 149–390)
PMV BLD AUTO: 9.6 FL (ref 8.9–12.7)
POTASSIUM SERPL-SCNC: 3.9 MMOL/L (ref 3.5–5.3)
PROCALCITONIN SERPL-MCNC: <0.05 NG/ML
RBC # BLD AUTO: 3.94 MILLION/UL (ref 3.81–5.12)
S PNEUM AG UR QL: NEGATIVE
SODIUM SERPL-SCNC: 140 MMOL/L (ref 135–147)
WBC # BLD AUTO: 4.42 THOUSAND/UL (ref 4.31–10.16)

## 2023-03-17 RX ORDER — BUSPIRONE HYDROCHLORIDE 10 MG/1
10 TABLET ORAL 3 TIMES DAILY
Qty: 90 TABLET | Refills: 0 | Status: SHIPPED | OUTPATIENT
Start: 2023-03-17

## 2023-03-17 RX ADMIN — ASPIRIN 81 MG: 81 TABLET, COATED ORAL at 08:35

## 2023-03-17 RX ADMIN — BUSPIRONE HYDROCHLORIDE 10 MG: 10 TABLET ORAL at 08:35

## 2023-03-17 RX ADMIN — CEFTRIAXONE 1000 MG: 1 INJECTION, SOLUTION INTRAVENOUS at 10:53

## 2023-03-17 RX ADMIN — TIMOLOL MALEATE 1 DROP: 5 SOLUTION OPHTHALMIC at 08:37

## 2023-03-17 RX ADMIN — HYDROCHLOROTHIAZIDE 6.25 MG: 12.5 TABLET ORAL at 08:35

## 2023-03-17 RX ADMIN — BISOPROLOL FUMARATE 10 MG: 5 TABLET ORAL at 08:35

## 2023-03-17 RX ADMIN — DULOXETINE HYDROCHLORIDE 60 MG: 60 CAPSULE, DELAYED RELEASE ORAL at 08:35

## 2023-03-17 NOTE — DISCHARGE SUMMARY
[address]  Discharge- Reji Mar 1945, 76 y o  female MRN: [id number]  Unit/Bed#: -01 Encounter: [id number]  Primary Care Provider: Ceil Scheuermann, DO   Date and time admitted to hospital: 3/16/2023  8:04 AM    * Acute encephalopathy  Assessment & Plan  Patient has a history of vascular dementia at baseline and is usually independent with ADLs  Was found wandering in the street outside her home in her nightgown by police  Patient does not have any recollection  CT head on admission was negative for any acute intracranial abnormality  Her mentation is back to baseline  Likely acute encephalopathy in the setting of underlying pneumonia  Procalcitonin x2 were negative and antibiotics were discontinued  Patient has underlying dementia and will need close supervision on discharge  Is being discharged to live with daughter upon discharge  No other metabolic or electrolyte abnormality seen on labs today  Maintain delirium and fall precautions  Avoid benzodiazepine and narcotic medications  Ensure adequate sleep-wake cycle  PT OT evaluation obtained--Home care set up on discharge      Hypertension  Assessment & Plan  Will resume bisoprolol hydrochlorothiazide  Blood pressure currently stable  Generalized anxiety disorder  Assessment & Plan  Maintained on BuSpar and Cymbalta by her outpatient primary care physician  BuSpar dose was recently increased to 10 mg 3 times daily  We will continue  Vascular dementia [facility])  Assessment & Plan    Continue with supportive care  Maintain delirium precautions  PT OT evaluation appreciated  Community acquired pneumonia of right lower lobe of lung  Assessment & Plan  Chest x-ray shows right basilar pneumonia  Patient does report coughing with eating occasionally  Denied any nausea vomiting  Continue with IV ceftriaxone  Procalcitonin x2 negative and antibiotics discontinued    Patient denies any shortness of breath or cough and does not have any fever or leukocytosis  Medical Problems      Discharging Physician / Practitioner: Ginny Singletary MD  PCP: Chloe Miranda DO  Admission Date:   Admission Orders (From admission, onward)     Ordered        03/16/23 1105  [address] [facility] [place]  Once                      Discharge Date: 03/17/23    Consultations During Hospital Stay:  · None     Procedures Performed:   · Ct head no acute intracranial abnormality  Chronic basal ganglia lacunar infarct and occipital lobe encephalomalacia    Significant Findings / Test Results:   · Procalcitonin x2 negative  Incidental Findings:   · NA    Test Results Pending at Discharge (will require follow up): · Blood cultures     Outpatient Tests Requested:  · NA    Complications:  None   Reason for Admission: Confusion    Hospital Course:   Sav Lomeli is a 76 y o  female patient who originally presented to the hospital on 3/16/2023 due to confusion and was found wandering in the street by the state police  Patient has history of vascular dementia and lives with her  who also has dementia  Upon admission metabolic work-up was negative for any causes of encephalopathy or confusion  She was found to have basilar infiltrate suggestive of pneumonia however did not have any fever ,leukocytosis or respiratory symptoms  Initially started on antibiotics and procalcitonin was done  Procalcitonin x2 negative and antibiotics were subsequently discontinued  She was ambulated with physical therapy and cleared for home PT  Discussed with patient's family the importance of constant supervision because of her advanced dementia  Patient will be going to live with her daughter upon discharge  Please see above list of diagnoses and related plan for additional information  Condition at Discharge: stable    Discharge Day Visit / Exam:   Subjective: Patient seen and examined at bedside  Does not offer any complaints    No acute events overnight  Remains afebrile  Denies any cough  Does not requiring supplemental oxygen  Confusion has improved and she appears at baseline  Vitals: Blood Pressure: 134/52 (03/17/23 1106)  Pulse: 56 (03/17/23 1300)  Temperature: 97 8 °F (36 6 °C) (03/17/23 1130)  Temp Source: Temporal (03/16/23 0807)  Respirations: 18 (03/17/23 1130)  Height: 5' 1" (154 9 cm) (03/17/23 1056)  Weight - Scale: 58 1 kg (128 lb 1 4 oz) (03/16/23 1215)  SpO2: 97 % (03/17/23 1106)  Exam:   Physical Exam  Constitutional:       General: She is not in acute distress  HENT:      Head: Normocephalic  Nose: Nose normal       Mouth/Throat:      Mouth: Mucous membranes are moist    Eyes:      Extraocular Movements: Extraocular movements intact  Pupils: Pupils are equal, round, and reactive to light  Cardiovascular:      Rate and Rhythm: Normal rate and regular rhythm  Pulses: Normal pulses  Pulmonary:      Effort: Pulmonary effort is normal       Breath sounds: Normal breath sounds  No wheezing or rales  Abdominal:      General: Abdomen is flat  Bowel sounds are normal       Palpations: Abdomen is soft  Musculoskeletal:      Cervical back: Neck supple  Left lower leg: No edema  Skin:     General: Skin is warm  Neurological:      Mental Status: She is alert  Comments: Oriented to self place and person  Nonfocal exam          Discussion with Family: Updated  (daughter) via phone  Discharge instructions/Information to patient and family:   See after visit summary for information provided to patient and family  Provisions for Follow-Up Care:  See after visit summary for information related to follow-up care and any pertinent home health orders  Disposition:   Home with VNA Services (Reminder: Complete face to face encounter)    Planned Readmission: No     Discharge Statement:  I spent 45 minutes discharging the patient  This time was spent on the day of discharge   I had direct contact with the patient on the day of discharge  Greater than 50% of the total time was spent examining patient, answering all patient questions, arranging and discussing plan of care with patient as well as directly providing post-discharge instructions  Additional time then spent on discharge activities  Discharge Medications:  See after visit summary for reconciled discharge medications provided to patient and/or family        **Please Note: This note may have been constructed using a voice recognition system**

## 2023-03-17 NOTE — ASSESSMENT & PLAN NOTE
Chest x-ray shows right basilar pneumonia  Patient does report coughing with eating occasionally  Denied any nausea vomiting  Continue with IV ceftriaxone  Procalcitonin x2 negative and antibiotics discontinued  Patient denies any shortness of breath or cough and does not have any fever or leukocytosis

## 2023-03-17 NOTE — PLAN OF CARE
Problem: Potential for Falls  Goal: Patient will remain free of falls  Description: INTERVENTIONS:  - Educate patient/family on patient safety including physical limitations  - Instruct patient to call for assistance with activity   - Consult OT/PT to assist with strengthening/mobility   - Keep Call bell within reach  - Keep bed low and locked with side rails adjusted as appropriate  - Keep care items and personal belongings within reach  - Initiate and maintain comfort rounds  - Make Fall Risk Sign visible to staff    - Apply yellow socks and bracelet for high fall risk patients  - Consider moving patient to room near nurses station  Outcome: Progressing     Problem: PAIN - ADULT  Goal: Verbalizes/displays adequate comfort level or baseline comfort level  Description: Interventions:  - Encourage patient to monitor pain and request assistance  - Assess pain using appropriate pain scale  - Administer analgesics based on type and severity of pain and evaluate response  - Implement non-pharmacological measures as appropriate and evaluate response  - Consider cultural and social influences on pain and pain management  - Notify physician/advanced practitioner if interventions unsuccessful or patient reports new pain  Outcome: Progressing     Problem: INFECTION - ADULT  Goal: Absence or prevention of progression during hospitalization  Description: INTERVENTIONS:  - Assess and monitor for signs and symptoms of infection  - Monitor lab/diagnostic results  - Monitor all insertion sites, i e  indwelling lines, tubes, and drains  - Monitor endotracheal if appropriate and nasal secretions for changes in amount and color  - [place] appropriate cooling/warming therapies per order  - Administer medications as ordered  - Instruct and encourage patient and family to use good hand hygiene technique  - Identify and instruct in appropriate isolation precautions for identified infection/condition  Outcome: Progressing     Problem: SAFETY ADULT  Goal: Patient will remain free of falls  Description: INTERVENTIONS:  - Educate patient/family on patient safety including physical limitations  - Instruct patient to call for assistance with activity   - Consult OT/PT to assist with strengthening/mobility   - Keep Call bell within reach  - Keep bed low and locked with side rails adjusted as appropriate  - Keep care items and personal belongings within reach  - Initiate and maintain comfort rounds  - Make Fall Risk Sign visible to staff    - Apply yellow socks and bracelet for high fall risk patients  - Consider moving patient to room near nurses station  Outcome: Progressing  Goal: Maintain or return to baseline ADL function  Description: INTERVENTIONS:  -  Assess patient's ability to carry out ADLs; assess patient's baseline for ADL function and identify physical deficits which impact ability to perform ADLs (bathing, care of mouth/teeth, toileting, grooming, dressing, etc )  - Assess/evaluate cause of self-care deficits   - Assess range of motion  - Assess patient's mobility; develop plan if impaired  - Assess patient's need for assistive devices and provide as appropriate  - Encourage maximum independence but intervene and supervise when necessary  - Involve family in performance of ADLs  - Assess for home care needs following discharge   - Consider OT consult to assist with ADL evaluation and planning for discharge  - Provide patient education as appropriate  Outcome: Progressing  Goal: Maintains/Returns to pre admission functional level  Description: INTERVENTIONS:  - Perform BMAT or MOVE assessment daily    - Set and communicate daily mobility goal to care team and patient/family/caregiver     - Collaborate with rehabilitation services on mobility goals if consulted    - Out of bed for toileting  - Record patient progress and toleration of activity level   Outcome: Progressing     Problem: DISCHARGE PLANNING  Goal: Discharge to home or other facility with appropriate resources  Description: INTERVENTIONS:  - Identify barriers to discharge w/patient and caregiver  - Arrange for needed discharge resources and transportation as appropriate  - Identify discharge learning needs (meds, wound care, etc )  - Arrange for interpretive services to assist at discharge as needed  - Refer to Case Management Department for coordinating discharge planning if the patient needs post-hospital services based on physician/advanced practitioner order or complex needs related to functional status, cognitive ability, or social support system  Outcome: Progressing     Problem: Knowledge Deficit  Goal: Patient/family/caregiver demonstrates understanding of disease process, treatment plan, medications, and discharge instructions  Description: Complete learning assessment and assess knowledge base  Interventions:  - Provide teaching at level of understanding  - Provide teaching via preferred learning methods  Outcome: Progressing     Problem: NEUROSENSORY - ADULT  Goal: Achieves stable or improved neurological status  Description: INTERVENTIONS  - Monitor and report changes in neurological status  - Monitor vital signs such as temperature, blood pressure, glucose, and any other labs ordered   - Initiate measures to prevent increased intracranial pressure  - Monitor for seizure activity and implement precautions if appropriate      Outcome: Progressing  Goal: Achieves maximal functionality and self care  Description: INTERVENTIONS  - Monitor swallowing and airway patency with patient fatigue and changes in neurological status  - Encourage and assist patient to increase activity and self care     - Encourage visually impaired, hearing impaired and aphasic patients to use assistive/communication devices  Outcome: Progressing     Problem: MOBILITY - ADULT  Goal: Maintain or return to baseline ADL function  Description: INTERVENTIONS:  -  Assess patient's ability to carry out ADLs; assess patient's baseline for ADL function and identify physical deficits which impact ability to perform ADLs (bathing, care of mouth/teeth, toileting, grooming, dressing, etc )  - Assess/evaluate cause of self-care deficits   - Assess range of motion  - Assess patient's mobility; develop plan if impaired  - Assess patient's need for assistive devices and provide as appropriate  - Encourage maximum independence but intervene and supervise when necessary  - Involve family in performance of ADLs  - Assess for home care needs following discharge   - Consider OT consult to assist with ADL evaluation and planning for discharge  - Provide patient education as appropriate  Outcome: Progressing  Goal: Maintains/Returns to pre admission functional level  Description: INTERVENTIONS:  - Perform BMAT or MOVE assessment daily    - Set and communicate daily mobility goal to care team and patient/family/caregiver  - Collaborate with rehabilitation services on mobility goals if consulted    - Out of bed for toileting  - Record patient progress and toleration of activity level   Outcome: Progressing     Problem: Nutrition/Hydration-ADULT  Goal: Nutrient/Hydration intake appropriate for improving, restoring or maintaining nutritional needs  Description: Monitor and assess patient's nutrition/hydration status for malnutrition  Collaborate with interdisciplinary team and initiate plan and interventions as ordered  Monitor patient's weight and dietary intake as ordered or per policy  Utilize nutrition screening tool and intervene as necessary  Determine patient's food preferences and provide high-protein, high-caloric foods as appropriate       INTERVENTIONS:  - Monitor oral intake, urinary output, labs, and treatment plans  - Assess nutrition and hydration status and recommend course of action  - Evaluate amount of meals eaten  - Assist patient with eating if necessary   - Allow adequate time for meals  - Recommend/ encourage appropriate diets, oral nutritional supplements, and vitamin/mineral supplements  - Order, calculate, and assess calorie counts as needed  - Recommend, monitor, and adjust tube feedings and TPN/PPN based on assessed needs  - Assess need for intravenous fluids  - Provide specific nutrition/hydration education as appropriate  - Include patient/family/caregiver in decisions related to nutrition  Outcome: Progressing     Problem: Prexisting or High Potential for Compromised Skin Integrity  Goal: Skin integrity is maintained or improved  Description: INTERVENTIONS:  - Identify patients at risk for skin breakdown  - Assess and monitor skin integrity  - Assess and monitor nutrition and hydration status  - Monitor labs   - Assess for incontinence   - Turn and reposition patient  - Assist with mobility/ambulation  - Relieve pressure over bony prominences  - Avoid friction and shearing  - Provide appropriate hygiene as needed including keeping skin clean and dry  - Evaluate need for skin moisturizer/barrier cream  - Collaborate with interdisciplinary team   - Patient/family teaching  - Consider wound care consult   Outcome: Progressing

## 2023-03-17 NOTE — ASSESSMENT & PLAN NOTE
Patient has a history of vascular dementia at baseline and is usually independent with ADLs  Was found wandering in the street outside her home in her nightgown by police  Patient does not have any recollection  CT head on admission was negative for any acute intracranial abnormality  Her mentation is back to baseline  Likely acute encephalopathy in the setting of underlying pneumonia  Procalcitonin x2 were negative and antibiotics were discontinued  Patient has underlying dementia and will need close supervision on discharge  Is being discharged to live with daughter upon discharge  No other metabolic or electrolyte abnormality seen on labs today  Maintain delirium and fall precautions  Avoid benzodiazepine and narcotic medications  Ensure adequate sleep-wake cycle    PT OT evaluation obtained--Home care set up on discharge

## 2023-03-17 NOTE — PLAN OF CARE
Problem: Potential for Falls  Goal: Patient will remain free of falls  Description: INTERVENTIONS:  - Educate patient/family on patient safety including physical limitations  - Instruct patient to call for assistance with activity   - Consult OT/PT to assist with strengthening/mobility   - Keep Call bell within reach  - Keep bed low and locked with side rails adjusted as appropriate  - Keep care items and personal belongings within reach  - Initiate and maintain comfort rounds  - Make Fall Risk Sign visible to staff    - Apply yellow socks and bracelet for high fall risk patients  - Consider moving patient to room near nurses station  3/17/2023 1349 by Sonido Bower RN  Outcome: Adequate for Discharge  3/17/2023 0905 by Sonido Bower RN  Outcome: Progressing     Problem: PAIN - ADULT  Goal: Verbalizes/displays adequate comfort level or baseline comfort level  Description: Interventions:  - Encourage patient to monitor pain and request assistance  - Assess pain using appropriate pain scale  - Administer analgesics based on type and severity of pain and evaluate response  - Implement non-pharmacological measures as appropriate and evaluate response  - Consider cultural and social influences on pain and pain management  - Notify physician/advanced practitioner if interventions unsuccessful or patient reports new pain  3/17/2023 1349 by Sonido Bower RN  Outcome: Adequate for Discharge  3/17/2023 0905 by Sonido Bower RN  Outcome: Progressing     Problem: INFECTION - ADULT  Goal: Absence or prevention of progression during hospitalization  Description: INTERVENTIONS:  - Assess and monitor for signs and symptoms of infection  - Monitor lab/diagnostic results  - Monitor all insertion sites, i e  indwelling lines, tubes, and drains  - Monitor endotracheal if appropriate and nasal secretions for changes in amount and color  - [place] appropriate cooling/warming therapies per order  - Administer medications as ordered  - Instruct and encourage patient and family to use good hand hygiene technique  - Identify and instruct in appropriate isolation precautions for identified infection/condition  3/17/2023 1349 by Lydia Acuna RN  Outcome: Adequate for Discharge  3/17/2023 0905 by Lydia Acuna RN  Outcome: Progressing     Problem: SAFETY ADULT  Goal: Patient will remain free of falls  Description: INTERVENTIONS:  - Educate patient/family on patient safety including physical limitations  - Instruct patient to call for assistance with activity   - Consult OT/PT to assist with strengthening/mobility   - Keep Call bell within reach  - Keep bed low and locked with side rails adjusted as appropriate  - Keep care items and personal belongings within reach  - Initiate and maintain comfort rounds  - Make Fall Risk Sign visible to staff    - Apply yellow socks and bracelet for high fall risk patients  - Consider moving patient to room near nurses station  3/17/2023 1349 by Lydia Acuna RN  Outcome: Adequate for Discharge  3/17/2023 0905 by Lydia Acuna RN  Outcome: Progressing  Goal: Maintain or return to baseline ADL function  Description: INTERVENTIONS:  -  Assess patient's ability to carry out ADLs; assess patient's baseline for ADL function and identify physical deficits which impact ability to perform ADLs (bathing, care of mouth/teeth, toileting, grooming, dressing, etc )  - Assess/evaluate cause of self-care deficits   - Assess range of motion  - Assess patient's mobility; develop plan if impaired  - Assess patient's need for assistive devices and provide as appropriate  - Encourage maximum independence but intervene and supervise when necessary  - Involve family in performance of ADLs  - Assess for home care needs following discharge   - Consider OT consult to assist with ADL evaluation and planning for discharge  - Provide patient education as appropriate  3/17/2023 1349 by Lydia Acuna RN  Outcome: Adequate for Discharge  3/17/2023 3620 by Svetlana Newsome RN  Outcome: Progressing  Goal: Maintains/Returns to pre admission functional level  Description: INTERVENTIONS:  - Perform BMAT or MOVE assessment daily    - Set and communicate daily mobility goal to care team and patient/family/caregiver  - Collaborate with rehabilitation services on mobility goals if consulted    - Out of bed for toileting  - Record patient progress and toleration of activity level   3/17/2023 1349 by Svetlana Newsome RN  Outcome: Adequate for Discharge  3/17/2023 0905 by Svetlana Newsome RN  Outcome: Progressing     Problem: DISCHARGE PLANNING  Goal: Discharge to home or other facility with appropriate resources  Description: INTERVENTIONS:  - Identify barriers to discharge w/patient and caregiver  - Arrange for needed discharge resources and transportation as appropriate  - Identify discharge learning needs (meds, wound care, etc )  - Arrange for interpretive services to assist at discharge as needed  - Refer to Case Management Department for coordinating discharge planning if the patient needs post-hospital services based on physician/advanced practitioner order or complex needs related to functional status, cognitive ability, or social support system  3/17/2023 1349 by Svetlana Newsome RN  Outcome: Adequate for Discharge  3/17/2023 0905 by Svetlana Newsome RN  Outcome: Progressing     Problem: Knowledge Deficit  Goal: Patient/family/caregiver demonstrates understanding of disease process, treatment plan, medications, and discharge instructions  Description: Complete learning assessment and assess knowledge base    Interventions:  - Provide teaching at level of understanding  - Provide teaching via preferred learning methods  3/17/2023 1349 by Svetlana Newsome RN  Outcome: Adequate for Discharge  3/17/2023 0905 by Svetlana Newsome RN  Outcome: Progressing     Problem: NEUROSENSORY - ADULT  Goal: Achieves stable or improved neurological status  Description: INTERVENTIONS  - Monitor and report changes in neurological status  - Monitor vital signs such as temperature, blood pressure, glucose, and any other labs ordered   - Initiate measures to prevent increased intracranial pressure  - Monitor for seizure activity and implement precautions if appropriate      3/17/2023 1349 by Jackie Evans RN  Outcome: Adequate for Discharge  3/17/2023 0905 by Jackie Evans RN  Outcome: Progressing  Goal: Achieves maximal functionality and self care  Description: INTERVENTIONS  - Monitor swallowing and airway patency with patient fatigue and changes in neurological status  - Encourage and assist patient to increase activity and self care     - Encourage visually impaired, hearing impaired and aphasic patients to use assistive/communication devices  3/17/2023 1349 by Jackie Evans RN  Outcome: Adequate for Discharge  3/17/2023 0905 by Jackie Evans RN  Outcome: Progressing     Problem: MOBILITY - ADULT  Goal: Maintain or return to baseline ADL function  Description: INTERVENTIONS:  -  Assess patient's ability to carry out ADLs; assess patient's baseline for ADL function and identify physical deficits which impact ability to perform ADLs (bathing, care of mouth/teeth, toileting, grooming, dressing, etc )  - Assess/evaluate cause of self-care deficits   - Assess range of motion  - Assess patient's mobility; develop plan if impaired  - Assess patient's need for assistive devices and provide as appropriate  - Encourage maximum independence but intervene and supervise when necessary  - Involve family in performance of ADLs  - Assess for home care needs following discharge   - Consider OT consult to assist with ADL evaluation and planning for discharge  - Provide patient education as appropriate  3/17/2023 1349 by Jackie Evans RN  Outcome: Adequate for Discharge  3/17/2023 0905 by Jackie Evans RN  Outcome: Progressing  Goal: Maintains/Returns to pre admission functional level  Description: INTERVENTIONS:  - Perform BMAT or MOVE assessment daily    - Set and communicate daily mobility goal to care team and patient/family/caregiver  - Out of bed for toileting  - Record patient progress and toleration of activity level   3/17/2023 1349 by Julio César Talavera RN  Outcome: Adequate for Discharge  3/17/2023 0905 by Julio César Talavera RN  Outcome: Progressing     Problem: Nutrition/Hydration-ADULT  Goal: Nutrient/Hydration intake appropriate for improving, restoring or maintaining nutritional needs  Description: Monitor and assess patient's nutrition/hydration status for malnutrition  Collaborate with interdisciplinary team and initiate plan and interventions as ordered  Monitor patient's weight and dietary intake as ordered or per policy  Utilize nutrition screening tool and intervene as necessary  Determine patient's food preferences and provide high-protein, high-caloric foods as appropriate       INTERVENTIONS:  - Monitor oral intake, urinary output, labs, and treatment plans  - Assess nutrition and hydration status and recommend course of action  - Evaluate amount of meals eaten  - Assist patient with eating if necessary   - Allow adequate time for meals  - Recommend/ encourage appropriate diets, oral nutritional supplements, and vitamin/mineral supplements  - Order, calculate, and assess calorie counts as needed  - Recommend, monitor, and adjust tube feedings and TPN/PPN based on assessed needs  - Assess need for intravenous fluids  - Provide specific nutrition/hydration education as appropriate  - Include patient/family/caregiver in decisions related to nutrition  3/17/2023 1349 by Julio César Talavera RN  Outcome: Adequate for Discharge  3/17/2023 0905 by Julio César Talavera RN  Outcome: Progressing     Problem: Prexisting or High Potential for Compromised Skin Integrity  Goal: Skin integrity is maintained or improved  Description: INTERVENTIONS:  - Identify patients at risk for skin breakdown  - Assess and monitor skin integrity  - Assess and monitor nutrition and hydration status  - Monitor labs   - Assess for incontinence   - Turn and reposition patient  - Assist with mobility/ambulation  - Relieve pressure over bony prominences  - Avoid friction and shearing  - Provide appropriate hygiene as needed including keeping skin clean and dry  - Evaluate need for skin moisturizer/barrier cream  - Collaborate with interdisciplinary team   - Patient/family teaching  - Consider wound care consult   3/17/2023 1349 by Brianna Walker RN  Outcome: Adequate for Discharge  3/17/2023 0905 by Brianna Walker RN  Outcome: Progressing

## 2023-03-17 NOTE — CASE MANAGEMENT
Case Management Discharge Planning Note    Patient name Diya Tillman  Location /-27 MRN [id number]  : 1945 Date 3/17/2023       Current Admission Date: 3/16/2023  Current Admission Diagnosis:Acute encephalopathy   Patient Active Problem List    Diagnosis Date Noted   • Acute encephalopathy 2023   • Community acquired pneumonia of right lower lobe of lung 2023   • Vascular dementia ([address] ) 2023   • Generalized anxiety disorder 2023   • Hypertension 2023      LOS (days): 1  Geometric Mean LOS (GMLOS) (days): 2 30  Days to GMLOS:1 2     OBJECTIVE:  Risk of Unplanned Readmission Score: 10 05         Current admission status: Inpatient   Preferred Pharmacy:   [address] [place] [address]  Phone: [phone] Fax: [phone]    Primary Care Provider: Candis Kidd DO    Primary Insurance: MEDICARE  Secondary Insurance: BLUE CROSS    DISCHARGE DETAILS:    Discharge planning discussed with[de-identified] daughterJames of Choice: Yes  Comments - Freedom of Choice: [facility] referral  CM contacted family/caregiver?: Yes  Were Treatment Team discharge recommendations reviewed with patient/caregiver?: Yes  Did patient/caregiver verbalize understanding of patient care needs?: Yes  Were patient/caregiver advised of the risks associated with not following Treatment Team discharge recommendations?: Yes    Contacts  Patient Contacts: Elizasandrine Espinosa  Relationship to Patient[de-identified] Family  Contact Method: Phone  Phone Number: [phone]  Reason/Outcome: Discharge [address]         Is the patient interested in [facility] at discharge?: Yes  [address] requested[de-identified] Physical Therapy, Nursing, [address] Name[de-identified] Other ([address]) [address] Provider[de-identified] PCP  [address] Needed[de-identified] Evaluate Functional Status and Safety, Other (comment) (Medication Management)  Homebound Criteria Met[de-identified] Requires the Assistance of Another Person for Safe Ambulation or to Leave the Home  Supporting Clincal Findings[de-identified] Limited Endurance    DME Referral Provided  Referral made for DME?: No    Other Referral/Resources/Interventions Provided:  Interventions: [facility]  Referral Comments: [address]    Would you like to participate in our [address] [place] [address] service program?  : No - Declined    Treatment Team Recommendation: Home with [facility]  Discharge Destination Plan[de-identified] Home with [place] at Discharge : Family               CM submitted AVS and HHC order [facility] [address]

## 2023-03-17 NOTE — CASE MANAGEMENT
Case Management Discharge Planning Note    Patient name Diya Tillman  Location /-34 MRN [id number]  : 1945 Date 3/17/2023       Current Admission Date: 3/16/2023  Current Admission Diagnosis:Acute encephalopathy   Patient Active Problem List    Diagnosis Date Noted   • Acute encephalopathy 2023   • Community acquired pneumonia of right lower lobe of lung 2023   • Vascular dementia ([address] ) 2023   • Generalized anxiety disorder 2023   • Hypertension 2023      LOS (days): 1  Geometric Mean LOS (GMLOS) (days): 2 30  Days to GMLOS:1 2     OBJECTIVE:  Risk of Unplanned Readmission Score: 10 05         Current admission status: Inpatient   Preferred Pharmacy:   [address] [place] [address]  Phone: [phone] Fax: [phone]    Primary Care Provider: Candis Kidd DO    Primary Insurance: MEDICARE  Secondary Insurance: BLUE CROSS    DISCHARGE DETAILS:    Discharge planning discussed with[de-identified] daughterJames of Choice: Yes  Comments - Freedom of Choice: [facility] referral  CM contacted family/caregiver?: Yes  Were Treatment Team discharge recommendations reviewed with patient/caregiver?: Yes  Did patient/caregiver verbalize understanding of patient care needs?: Yes  Were patient/caregiver advised of the risks associated with not following Treatment Team discharge recommendations?: Yes    Contacts  Patient Contacts: Elizasandrine Louie  Relationship to Patient[de-identified] Family  Contact Method: Phone  Phone Number: [phone]  Reason/Outcome: Discharge [address]         Is the patient interested in [facility] at discharge?: Yes  [address] requested[de-identified] Physical Therapy, Nursing, [address] Name[de-identified] Other ([address]) [address] Provider[de-identified] PCP  [address] Needed[de-identified] Evaluate Functional Status and Safety, Other (comment) (Medication Management)  Homebound Criteria Met[de-identified] Requires the Assistance of Another Person for Safe Ambulation or to Leave the Home  Supporting Clincal Findings[de-identified] Limited Endurance    DME Referral Provided  Referral made for DME?: No    Other Referral/Resources/Interventions Provided:  Interventions: C  Referral Comments: [address]    Would you like to participate in our [address] [place] [address] Ira Bradley service program?  : No - Declined    Treatment Team Recommendation: Home with [address]  Discharge Destination Plan[de-identified] Home with [place] at Discharge : Family               CM contacted daughter, Javier Sparks, to discuss [address] agency referral in [address]  Available [facility] agencies: Franky Devine and [facility]  Daughter chose [facility], indicating patients spouse has [facility] and services will end next week  CM made [address] providers aware of daughters choice  Daughter indicated patient has returned to her home with spouse and plan is for daughter to stay at patients home at night until family can get spouse into Nursing Home  Spouse currently has waiver services 6 hours/day during week and 5hrs/day on weekend but this is not enough services for spouse  Plan is for patient to then live with daughter in her home

## 2023-03-17 NOTE — PHYSICAL THERAPY NOTE
PHYSICAL THERAPY EVALUATION  NAME:  Nelia Ewing: 03/17/23    AGE:   76 y o   Mrn:   [id number]  ADMIT DX:  Altered mental status [R41 82]  Pneumonia [J18 9]  Dementia ([facility] ) [F03 90]    Past Medical History:   Diagnosis Date   • Dementia ([facility] )    • No abnormality of hip joint detected on examination    • Stroke [facility])      Length Of Stay: 1  Performed at least 2 patient identifiers during session: Name and Birthday  PHYSICAL THERAPY EVALUATION :    03/17/23 0900   PT Last Visit   PT Visit Date 03/17/23   Note Type   Note type Evaluation   Pain Assessment   Pain Assessment Tool FLACC   Pain Location/Orientation Orientation: Right;Orientation: Left; Location: Knee;Orientation: Lower; Location: Back   Pain Rating: FLACC (Rest) - Face 0   Pain Rating: FLACC (Rest) - Legs 0   Pain Rating: FLACC (Rest) - Activity 0   Pain Rating: FLACC (Rest) - Cry 1   Pain Rating: FLACC (Rest) - Consolability 0   Score: FLACC (Rest) 1   Pain Rating: FLACC (Activity) - Face 1   Pain Rating: FLACC (Activity) - Legs 0   Pain Rating: FLACC (Activity) - Activity 0   Pain Rating: FLACC (Activity) - Cry 1   Pain Rating: FLACC (Activity) - Consolability 0   Score: FLACC (Activity) 2   Restrictions/Precautions   Other Precautions Chair Alarm; Bed Alarm; Fall Risk;Pain   Home Living   Type of Home House  ([address])   Home Layout One level;Performs ADLs on one level; Able to live on main level with bedroom/bathroom   Bathroom Shower/Tub Walk-in shower   Bathroom Toilet Standard   Bathroom Equipment Grab bars in shower; Shower chair   Home Equipment Walker;Cane  (doesn't use)   Additional Comments Reports living in a [facility] with [address] and not using an AD for mobility  Prior Function   Level of [place] Independent with ADLs; Independent with functional mobility; Independent with IADLS   Lives With Spouse   Receives Help From Family   IADLs Independent with driving; Independent with meal prep; Independent with medication management   Falls in the last 6 months 0   Comments Reports being independent with mobility, ADLs and IADLs  Reports her spouse has dementia and a caregiver  Reports she is unable to care for him and her dtr wants her to move in with her with pt stating "I guess I have no choice, I have to "   General   Additional Pertinent History Pt with left leg length discrepancy, wears ~ 2inch shoe lift  Cognition   Orientation Level Oriented X4  ("I was forgetting stuff"-partially to situation)   Following Commands Follows one step commands without difficulty   Subjective   Subjective "My daughter wants me to move in with her "   RLE Assessment   RLE Assessment WFL  (3+/5, hip flexion 3-/5)   LLE Assessment   LLE Assessment WFL  (hip flexion < 90 degrees  hard endfeel w pt reporting replacement  strength in avail ROM 2/5, otherwise 3-/5 at hip and 3+/5 at knee and ankle)   Coordination   Rapid Alternating Movements Intact   Light Touch   RLE Light Touch Grossly intact   LLE Light Touch Grossly intact   Bed Mobility   Supine to Sit 6  Modified independent   Additional items Increased time required   Additional Comments HOB flat without bedrail  inc time to complete   Transfers   Sit to Stand   (SBA without AD, supervision with RW)   Additional items Increased time required;Verbal cues   Stand to Sit 5  Supervision   Additional items Verbal cues   Stand pivot   (steadying/min assistance without AD and supervision with RW)   Additional items Increased time required;Verbal cues   Additional Comments no AD initially  sit to stand with SBA  spt without AD with steadying to minAx1 with dec stance L LE, pt reaching for external support  initiated RW  spt with RW with supervision with min cues for turnign completely prior to sitting  stand to sit with supervision  Ambulation/Elevation   Gait pattern Wide SHWETA; Antalgic; Short stride; Excessively slow  (dec heel contact L LE, inc L knee flexion, dec stance L LE  pt with left shoe lift on throughout ambulation) Gait Assistance   (steadying to minAx1 without AD and supervision with RW)   Additional items Assist x 1;Verbal cues   Assistive Device Rolling walker   Distance 5' without AD with steadying to minAx1 wiht pt reaching for external support with manual cues for balance  180'x1 with RW with Supervision with slow chikis, no left heel contact, decreased step length and inc L knee flexion  inc path deviation with head turns  cues to stay within SHWETA of RW  Balance   Static Sitting Normal   Dynamic Sitting Good   Static Standing Fair +   Dynamic Standing Fair   Ambulatory Fair   Activity Tolerance   Activity Tolerance Patient tolerated treatment well   Medical Staff Made Aware Arcelia DELAROSA   Nurse Made Aware RN   Assessment   Prognosis Good   Problem List Decreased strength;Decreased endurance; Impaired balance;Decreased mobility; Decreased range of motion;Decreased safety awareness;Decreased cognition   Barriers to Discharge Decreased caregiver support   Barriers to Discharge Comments lives with spouse, but spouse requires caregiver   Goals   Patient Goals "Go home"   STG Expiration Date 03/31/23   PT Treatment Day 0   Plan   Treatment/Interventions ADL retraining;Functional transfer training;LE strengthening/ROM; Elevations; Therapeutic exercise; Endurance training;Patient/family training;Equipment eval/education; Bed mobility;Gait training; Compensatory technique education;Spoke to nursing;Spoke to case management;OT   PT Frequency 2-3x/wk   Recommendation   PT Discharge Recommendation Home with home health rehabilitation   Equipment Recommended   (walker-pt reports having)   AM-PAC Basic Mobility Inpatient   Turning in Flat Bed Without Bedrails 4   Lying on Back to Sitting on Edge of Flat Bed Without Bedrails 4   Moving Bed to Chair 3   Standing Up From Chair Using Arms 3   Walk in Room 3   Climb 3-5 Stairs With Railing 3   Basic Mobility Inpatient Raw Score 20   Basic Mobility Standardized Score 43 99   Highest Level Of Mobility   JH-HLM Goal 6: Walk 10 steps or more   JH-HLM Achieved 7: Walk 25 feet or more   End of Consult   Patient Position at End of Consult Bedside chair;Bed/Chair alarm activated; All needs within reach       (Please find full objective findings from PT assessment regarding body systems outlined above)  Assessment: Pt is a 76 y o  female seen for PT evaluation s/p admission to [address] [place] [address] on 3/16/2023 with Acute encephalopathy  Order placed for PT services  Upon evaluation: Pt is presenting with impaired functional mobility due to pain, decreased strength, decreased ROM, decreased endurance, impaired balance, gait deviations, decreased safety awareness and fall risk requiring stand by to minimal assistance for transfers and steadying to minimal assistance for ambulation with out AD and supervision with RW  Pt's clinical presentation is currently unpredictable given the functional mobility deficits above, especially weakness, decreased ROM, decreased endurance, gait deviations, pain, decreased activity tolerance and decreased safety awareness, coupled with fall risks as indicated by AM-PAC 6-Clicks: 94/51 as well as impaired balance and decreased safety awareness and combined with medical complications of need for input for mobility technique/safety and Bibasilar infiltrates, more on the right suggested on chest xray, community acquired PNA of right LL of lung, acute encephalopathy  Pt's PMHx and comorbidities that may affect physical performance and progress include: HTN and generalized anxiety disorder, vascular dementia, CVA  Personal factors affecting pt at time of IE include: step(s) to enter environment, inability to perform IADLs, inability to perform ADLs and inability to navigate level surfaces without external assistance   Pt will benefit from continued skilled PT services to address deficits as defined above and to maximize level of functional mobility to facilitate return toward PLOF and improved QOL  From PT/mobility standpoint, recommendation at time of d/c would be Home PT, home with family support and with walker (pt verbalized understanding to use RW) pending progress in order to reduce fall risk and maximize pt's functional independence and consistency with mobility in order to facilitate return to PLOF  Recommend trial with walker next 1-2 sessions and ther ex next 1-2 sessions  The patient's AM-PAC Basic Mobility Inpatient Short Form Raw Score is 20  A Raw score of greater than 16 suggests the patient may benefit from discharge to home  Please also refer to the recommendation of the Physical Therapist for safe discharge planning  Goals: Pt will: Perform bed mobility tasks with independent to reposition in bed and prepare for transfers  Pt will perform transfers with modified Independent to decrease burden of care and decrease risk for falls and prepare for ambulation  Pt will ambulate with LRAD for >/= 250' with  modified Independent  to decrease burden of care, decrease risk for falls, improve activity tolerance and improve gait quality and to access home environment  Pt will complete 1 step with LRAD with modified Independent to return to home with LILLY and decrease risk for falls  Pt will participate in objective balance assessment to determine baseline fall risk  Pt will participate in SSWS assessment to determine level of mobility  Pt will increase B LE strength >/= 1/2 MMT grade to facilitate functional mobility        Rubi Maradiaga, PT,DPT

## 2023-03-17 NOTE — OCCUPATIONAL THERAPY NOTE
Occupational Therapy Evaluation     Evaluation: 1591-9520  Treatment: 8514-4959    Patient Name: Trinh ARENAS Date: 3/17/2023  Problem List  Principal Problem:    Acute encephalopathy  Active Problems:    Community acquired pneumonia of right lower lobe of lung    Vascular dementia ([facility] [address] )    Generalized anxiety disorder    Hypertension    Past Medical History  Past Medical History:   Diagnosis Date    Dementia ([facility] [address] )     No abnormality of hip joint detected on examination     Stroke [facility])      Past Surgical History  History reviewed  No pertinent surgical history  03/17/23 0901   Note Type   Note type Evaluation   Pain Assessment   Pain Assessment Tool FLACC   Pain Location/Orientation Orientation: Bilateral;Location: Knee;Orientation: Lower; Location: Back   Pain Rating: FLACC (Rest) - Face 0   Pain Rating: FLACC (Rest) - Legs 0   Pain Rating: FLACC (Rest) - Activity 0   Pain Rating: FLACC (Rest) - Cry 1   Pain Rating: FLACC (Rest) - Consolability 0   Score: FLACC (Rest) 1   Pain Rating: FLACC (Activity) - Face 1   Pain Rating: FLACC (Activity) - Legs 0   Pain Rating: FLACC (Activity) - Activity 0   Pain Rating: FLACC (Activity) - Cry 1   Pain Rating: FLACC (Activity) - Consolability 0   Score: FLACC (Activity) 2   Restrictions/Precautions   Other Precautions Chair Alarm; Bed Alarm; Fall Risk   Home Living   Type of Home House  (1 LILLY)   Home Layout One level;Performs ADLs on one level; Able to live on main level with bedroom/bathroom   Bathroom Shower/Tub Walk-in shower   Bathroom Toilet Standard   Bathroom Equipment Grab bars in shower; Shower chair   Home Equipment Walker;Cane  (did not use PTA)   Additional Comments Pt reports living in a 1 story home with 1 LILLY  Pt ambulates with no AD at [facility]   Prior Function   Level of [place] Independent with ADLs; Independent with functional mobility; Independent with IADLS   Lives With Spouse   Receives Help From Family   IADLs Independent with driving; Independent with meal prep; Independent with medication management   Falls in the last 6 months 0   Comments Pt reports completing ADLs, light IADLs, and functional ambulation @ I  Pt lives with her  who has progressed dementia, Pt's  has caretakers although Pt assist when she can  Pt reports she is considering moving in with her daughter   ADL   Where Assessed Edge of bed   Grooming Assistance 5  Supervision/Setup   Grooming Deficit Setup;Verbal cueing;Supervision/safety; Increased time to complete   UB Dressing Assistance 5  Supervision/Setup   UB Dressing Deficit Setup;Verbal cueing;Supervision/safety; Increased time to complete   LB Dressing Assistance 5  Supervision/Setup   LB Dressing Deficit Setup; Requires assistive device for steadying;Verbal cueing;Supervision/safety; Increased time to complete   Additional Comments Pt completing ADL tasks while seated at EOB  UB Dressing and grooming tasks @ S after set-up  LB Dressing @ S including donning socks/pants around feet and CM around waist with UB supported from RW PRN  Bed Mobility   Supine to Sit 6  Modified independent   Additional items Increased time required   Additional Comments HOB flat, no bedrails   Transfers   Sit to Stand 5  Supervision   Additional items Increased time required;Verbal cues   Stand to Sit 5  Supervision   Additional items Increased time required;Verbal cues   Stand pivot 5  Supervision   Additional items Increased time required;Verbal cues   Additional Comments Pt completing functional transfers with use of RW for U bsupport   S for STS and SPT with increased time and vc'ing for safety/technique and RW management PRN   Balance   Static Sitting Normal   Dynamic Sitting Good   Static Standing Fair +   Dynamic Standing Fair   Activity Tolerance   Activity Tolerance Patient tolerated treatment well   Medical Staff Made Aware Spoke with PT, Martha Calderón   Nurse Made Aware Spoke with RN   RUE Assessment   RUE Assessment [facility] LUE Assessment   LUE Assessment WFL   Hand Function   Gross Motor Coordination Functional   Fine Motor Coordination Functional   Cognition   Arousal/Participation Alert; Responsive; Cooperative   Attention Attends with cues to redirect   Orientation Level Oriented X4  ("I was forgetting stuff"-partially to situation)   Memory Within functional limits   Following Commands Follows one step commands without difficulty   Comments Therapist completed SLUMS with Pt, please refer to treatment note for more information   Cognition Assessment Tools SLUMS   Score 25   Assessment   Limitation Decreased ADL status; Decreased UE strength;Decreased Safe judgement during ADL;Decreased cognition;Decreased endurance;Decreased self-care trans;Decreased high-level ADLs   Prognosis Good   Assessment Pt is a 76 y o  female, admitted to [address] [place] [address] 3/16/2023 d/t experiencing AMS, Pt was found wondering outside  Dx: acute encephalopathy  Pt with PMHx impacting their performance during ADL tasks, including: dementia, CVA  Prior to admission to the hospital Pt was performing ADLs without physical assistance  IADLs with physical assistance  Functional transfers/ambulation without physical assistance  Cognitive status was PTA was impaired  OT order placed to assess Pt's ADLs, cognitive status, and performance during functional tasks in order to maximize safety and independence while making most appropriate d/c recommendations   Pt's clinical presentation is currently unstable/unpredictable given new onset deficits that effect Pt's occupational performance and ability to safely return to OF including decrease activity tolerance, decrease standing balance, decrease cognition, decrease safety awareness , decrease generalized strength, decrease activity engagement, limited family support, high fall risk and limited insight to deficits combined with medical complications of hypertension , change in mental status, decreased skin integrity and need for input for mobility technique/safety  Personal factors affecting Pt at time of initial evaluation include: availability as recommended, limited home support, inability to perform current job functions, inability to perform IADLs, new need for AD, inability to navigate community distances, limited insight into impairments, decreased initiation and engagement and questionable non-compliance  Pt will benefit from continued skilled OT services to address deficits as defined above and to maximize level independence/participation during ADLs and functional tasks to facilitate return toward PLOF and improved quality of life  From an occupational therapy standpoint, recommendation at time of d/c would be HHOT with increased support/supervision from family and assistance with IADLs  Plan   Treatment Interventions ADL retraining;Functional transfer training;UE strengthening/ROM; Endurance training;Cognitive reorientation;Patient/family training;Equipment evaluation/education; Neuromuscular reeducation; Compensatory technique education;Continued evaluation; Energy conservation; Activityengagement   Goal Expiration Date 03/31/23   OT Treatment Day 1   OT Frequency 2-3x/wk   Recommendation   OT Discharge Recommendation Home with home health rehabilitation   Additional Comments  (S)  HHOT with increased assistance from family for IADLs (medication management, meal prep) and all community mobility  frequent calls/daily check in's to assess home for potential hazards     AM-PAC Daily Activity Inpatient   Lower Body Dressing 3   Bathing 3   Toileting 3   Upper Body Dressing 3   Grooming 3   Eating 4   Daily Activity Raw Score 19   Daily Activity Standardized Score (Calc for Raw Score >=11) 40 22   AM-PAC Applied Cognition Inpatient   Following a Speech/Presentation 3   Understanding Ordinary Conversation 4   Taking Medications 2   Remembering Where Things Are Placed or Put Away 3   Remembering List of 4-5 Errands 3   Taking Care of Complicated Tasks 2   Applied Cognition Raw Score 17   Applied Cognition Standardized Score 36 52   Additional Treatment Session   Start Time 3912   End Time 9249   Treatment Assessment Pt seen for treatment session #1 this date  Pt alert and agreeable to participate at this time  Pt seated OOB in recliner chair while completing SLUMS cognitive assessment with therapist  Pt with good attention and participation during task  Pt scoreing 25/30 which suggest a mild cognitive impairment  Pt with difficulty recalling 5 objects - able to recall 2/5  Pt losing 1 point with naming animals, Pt noted to be sporatic and non-organized with her thought process with recalling animals  Pt at this time is able to follow simple 2 step commands, listen and comprehend instructions/stories, Pt able to complete simple math and repeat numbers backwards to therapist  Pt oriented and is approparite during session  MILES thomas increased family support for IADLs (medication management and meal prep) as well as for all community mobility   Additional Treatment Day 1       The patient's raw score on the -PAC Daily Activity Inpatient Short Form is 19  A raw score of greater than or equal to 19 suggests the patient may benefit from discharge to home  Please refer to the recommendation of the Occupational Therapist for safe discharge planning  Pt goals to be met by 3/31/2023    Pt will demonstrate ability to complete LB dressing @ Mod I in order to increase safety and independence during meaningful tasks  Pt will demonstrate ability to scarlett/doff socks/shoes while sitting EOB @ Mod I in order to increase safety and independence during meaningful tasks  Pt will demonstrate ability to complete toileting tasks including CM and pericare @ Mod I in order to increase safety and independence during meaningful tasks    Pt will demonstrate ability to complete EOB, chair, toilet/commode transfers @ Mod I in order to increase performance and participation during functional tasks  Pt will demonstrate ability to stand for 10+ minutes while maintaining G balance with use of RW for UB support PRN  Pt will demonstrate ability to tolerate 30-35 minute OT session with no vc'ing for deep breathing or use of energy conservation techniques in order to increase activity tolerance during functional tasks  Pt will demonstrate Good carryover of use of energy conservation/compensatory strategies during ADLs and functional tasks in order to increase safety and reduce risk for falls  Pt will demonstrate Good attention and participation in continued evaluation of functional ambulation house hold distances in order to assist with safe d/c planning  Pt will attend to continued cognitive assessments 100% of the time in order to provide most appropriate d/c recommendations  Pt will follow 100% simple 2-step commands and be A&O x4 consistently with environmental cues to increase participation in functional activities  Pt will identify 3 areas of interest/hobbies and 1 intervention on how to incorporate into daily life in order to increase interaction with environment and peers as well as increase participation in meaningful tasks  Pt will demonstrate 100% carryover of BUE HEP in order to increase BUE MS and increase performance during functional tasks upon d/c home      Christianne Cohen OTR/L

## 2023-03-17 NOTE — PLAN OF CARE
Problem: Potential for Falls  Goal: Patient will remain free of falls  Description: INTERVENTIONS:  - Educate patient/family on patient safety including physical limitations  - Instruct patient to call for assistance with activity   - Consult OT/PT to assist with strengthening/mobility   - Keep Call bell within reach  - Keep bed low and locked with side rails adjusted as appropriate  - Keep care items and personal belongings within reach  - Initiate and maintain comfort rounds  - Make Fall Risk Sign visible to staff  - Offer Toileting every 2 Hours, in advance of need  - Initiate/Maintain bedalarm  - Obtain necessary fall risk management equipment: yellow socks  - Apply yellow socks and bracelet for high fall risk patients  - Consider moving patient to room near nurses station  Outcome: Progressing     Problem: PAIN - ADULT  Goal: Verbalizes/displays adequate comfort level or baseline comfort level  Description: Interventions:  - Encourage patient to monitor pain and request assistance  - Assess pain using appropriate pain scale  - Administer analgesics based on type and severity of pain and evaluate response  - Implement non-pharmacological measures as appropriate and evaluate response  - Consider cultural and social influences on pain and pain management  - Notify physician/advanced practitioner if interventions unsuccessful or patient reports new pain  Outcome: Progressing

## 2023-03-17 NOTE — PLAN OF CARE
Problem: PHYSICAL THERAPY ADULT  Goal: Performs mobility at highest level of function for planned discharge setting  See evaluation for individualized goals  Description: Treatment/Interventions: ADL retraining, Functional transfer training, LE strengthening/ROM, Elevations, Therapeutic exercise, Endurance training, Patient/family training, Equipment eval/education, Bed mobility, Gait training, Compensatory technique education, Spoke to nursing, Spoke to case management, OT  Equipment Recommended:  (walker-pt reports having)       See flowsheet documentation for full assessment, interventions and recommendations  Note: Prognosis: Good  Problem List: Decreased strength, Decreased endurance, Impaired balance, Decreased mobility, Decreased range of motion, Decreased safety awareness, Decreased cognition  Assessment: Pt is a 76 y o  female seen for PT evaluation s/p admission to [address] [place] [address] on 3/16/2023 with Acute encephalopathy  Order placed for PT services  Upon evaluation: Pt is presenting with impaired functional mobility due to pain, decreased strength, decreased ROM, decreased endurance, impaired balance, gait deviations, decreased safety awareness and fall risk requiring stand by to minimal assistance for transfers and steadying to minimal assistance for ambulation with out AD and supervision with RW  Pt's clinical presentation is currently unpredictable given the functional mobility deficits above, especially weakness, decreased ROM, decreased endurance, gait deviations, pain, decreased activity tolerance and decreased safety awareness, coupled with fall risks as indicated by AM-PAC 6-Clicks: 51/49 as well as impaired balance and decreased safety awareness and combined with medical complications of need for input for mobility technique/safety and Bibasilar infiltrates, more on the right suggested on chest xray, community acquired PNA of right LL of lung, acute encephalopathy    Pt's PMHx and comorbidities that may affect physical performance and progress include: HTN and generalized anxiety disorder, vascular dementia, CVA  Personal factors affecting pt at time of IE include: step(s) to enter environment, inability to perform IADLs, inability to perform ADLs and inability to navigate level surfaces without external assistance  Pt will benefit from continued skilled PT services to address deficits as defined above and to maximize level of functional mobility to facilitate return toward PLOF and improved QOL  From PT/mobility standpoint, recommendation at time of d/c would be Home PT, home with family support and with walker (pt verbalized understanding to use RW) pending progress in order to reduce fall risk and maximize pt's functional independence and consistency with mobility in order to facilitate return to PLOF  Recommend trial with walker next 1-2 sessions and ther ex next 1-2 sessions  Barriers to Discharge: Decreased caregiver support  Barriers to Discharge Comments: lives with spouse, but spouse requires caregiver  PT Discharge Recommendation: Home with home health rehabilitation    See flowsheet documentation for full assessment

## 2023-03-17 NOTE — PLAN OF CARE
Problem: OCCUPATIONAL THERAPY ADULT  Goal: Performs self-care activities at highest level of function for planned discharge setting  See evaluation for individualized goals  Description: Treatment Interventions: ADL retraining, Functional transfer training, UE strengthening/ROM, Endurance training, Cognitive reorientation, Patient/family training, Equipment evaluation/education, Neuromuscular reeducation, Compensatory technique education, Continued evaluation, Energy conservation, Activityengagement          See flowsheet documentation for full assessment, interventions and recommendations  Note: Limitation: Decreased ADL status, Decreased UE strength, Decreased Safe judgement during ADL, Decreased cognition, Decreased endurance, Decreased self-care trans, Decreased high-level ADLs  Prognosis: Good  Assessment: Pt is a 76 y o  female, admitted to [address] [place] [address] 3/16/2023 d/t experiencing AMS, Pt was found wondering outside  Dx: acute encephalopathy  Pt with PMHx impacting their performance during ADL tasks, including: dementia, CVA  Prior to admission to the hospital Pt was performing ADLs without physical assistance  IADLs with physical assistance  Functional transfers/ambulation without physical assistance  Cognitive status was PTA was impaired  OT order placed to assess Pt's ADLs, cognitive status, and performance during functional tasks in order to maximize safety and independence while making most appropriate d/c recommendations   Pt's clinical presentation is currently unstable/unpredictable given new onset deficits that effect Pt's occupational performance and ability to safely return to [facility] including decrease activity tolerance, decrease standing balance, decrease cognition, decrease safety awareness , decrease generalized strength, decrease activity engagement, limited family support, high fall risk and limited insight to deficits combined with medical complications of hypertension , change in mental status, decreased skin integrity and need for input for mobility technique/safety  Personal factors affecting Pt at time of initial evaluation include: availability as recommended, limited home support, inability to perform current job functions, inability to perform IADLs, new need for AD, inability to navigate community distances, limited insight into impairments, decreased initiation and engagement and questionable non-compliance  Pt will benefit from continued skilled OT services to address deficits as defined above and to maximize level independence/participation during ADLs and functional tasks to facilitate return toward PLOF and improved quality of life  From an occupational therapy standpoint, recommendation at time of d/c would be HHOT with increased support/supervision from family and assistance with IADLs       OT Discharge Recommendation: Home with home health rehabilitation

## 2023-03-19 LAB
ATRIAL RATE: 70 BPM
BACTERIA BLD CULT: ABNORMAL
GRAM STN SPEC: ABNORMAL
GRAM STN SPEC: ABNORMAL
P AXIS: 61 DEGREES
PR INTERVAL: 156 MS
QRS AXIS: 52 DEGREES
QRSD INTERVAL: 86 MS
QT INTERVAL: 400 MS
QTC INTERVAL: 432 MS
S AUREUS+CONS DNA BLD POS NAA+NON-PROBE: DETECTED
STREPTOCOCCUS DNA BLD POS NAA+NON-PROBE: DETECTED
T WAVE AXIS: -52 DEGREES
VENTRICULAR RATE: 70 BPM

## 2023-03-20 LAB
BACTERIA BLD CULT: ABNORMAL
GRAM STN SPEC: ABNORMAL

## 2023-03-21 ENCOUNTER — HOSPITAL ENCOUNTER (EMERGENCY)
Facility: HOSPITAL | Age: 78
Discharge: HOME | End: 2023-03-21
Attending: EMERGENCY MEDICINE

## 2023-03-21 DIAGNOSIS — R78.81 POSITIVE BLOOD CULTURE: Primary | ICD-10-CM

## 2023-03-21 NOTE — ED PROVIDER NOTES
History  Chief Complaint   Patient presents with   • Abnormal Lab     Patient admitted on Friday for confusion  Patients family received a phone call stating she had positive blood cultures  Patient is a 64-year-old female with history of dementia recent hospitalization for encephalopathy  Patient was contacted yesterday evening and advised that her blood culture had come back positive  Patient was advised to have blood culture repeated after hospitalist had consulted with infectious disease  Patient and family reports she is doing well currently denies any fevers no significant symptoms at present  History provided by:  Patient      Prior to Admission Medications   Prescriptions Last Dose Informant Patient Reported? Taking? DULoxetine (CYMBALTA) 60 mg delayed release capsule   Yes No   Sig: Take 60 mg by mouth in the morning   Diclofenac Sodium (VOLTAREN) 1 %   Yes No   Sig: Apply topically 4 (four) times a day   alendronate (FOSAMAX) 70 mg tablet   Yes No   Sig: Take 70 mg by mouth once a week   aspirin (ECOTRIN LOW STRENGTH) 81 mg EC tablet   Yes No   Sig: Take 81 mg by mouth daily   bisoprolol-hydrochlorothiazide (ZIAC) 10-6 25 MG per tablet   Yes No   Sig: Take 1 tablet by mouth daily   busPIRone (BUSPAR) 10 mg tablet   No No   Sig: Take 1 tablet (10 mg total) by mouth 3 (three) times a day   latanoprost (XALATAN) 0 005 % ophthalmic solution   Yes No   Sig: Administer 1 drop to both eyes daily at bedtime   rosuvastatin (CRESTOR) 10 MG tablet   Yes No   Sig: Take 1 tablet by mouth daily   timolol (TIMOPTIC) 0 5 % ophthalmic solution   Yes No   Sig: Apply 1 drop to eye 2 (two) times a day      Facility-Administered Medications: None       Past Medical History:   Diagnosis Date   • Dementia (HCC)    • No abnormality of hip joint detected on examination    • Stroke [facility])        History reviewed  No pertinent surgical history  History reviewed  No pertinent family history    I have reviewed and agree with the history as documented  E-Cigarette/Vaping   • E-Cigarette Use Never User      E-Cigarette/Vaping Substances     Social History     Tobacco Use   • Smoking status: Every Day     Packs/day: 0 25     Types: Cigarettes   • Smokeless tobacco: Never   Vaping Use   • Vaping Use: Never used   Substance Use Topics   • Alcohol use: Yes     Comment: ocassional   • Drug use: Never       Review of Systems   Constitutional: Negative for activity change, appetite change, chills, fatigue and fever  HENT: Negative for congestion, ear pain, rhinorrhea and sore throat  Eyes: Negative for discharge, redness and visual disturbance  Respiratory: Negative for cough, chest tightness, shortness of breath and wheezing  Cardiovascular: Negative for chest pain and palpitations  Gastrointestinal: Negative for abdominal pain, constipation, diarrhea, nausea and vomiting  Endocrine: Negative for polydipsia and polyuria  Genitourinary: Negative for difficulty urinating, dysuria, frequency, hematuria and urgency  Musculoskeletal: Negative for arthralgias and myalgias  Skin: Negative for color change, pallor and rash  Neurological: Negative for dizziness, weakness, light-headedness, numbness and headaches  Hematological: Negative for adenopathy  Does not bruise/bleed easily  All other systems reviewed and are negative  Physical Exam  Physical Exam  Vitals and nursing note reviewed  Constitutional:       Appearance: She is well-developed  HENT:      Head: Normocephalic and atraumatic  Right Ear: External ear normal       Left Ear: External ear normal       Nose: Nose normal    Eyes:      Conjunctiva/sclera: Conjunctivae normal       Pupils: Pupils are equal, round, and reactive to light  Cardiovascular:      Rate and Rhythm: Normal rate and regular rhythm  Heart sounds: Normal heart sounds  Pulmonary:      Effort: Pulmonary effort is normal  No respiratory distress        Breath sounds: Normal breath sounds  No wheezing or rales  Chest:      Chest wall: No tenderness  Abdominal:      General: Bowel sounds are normal  There is no distension  Palpations: Abdomen is soft  Tenderness: There is no abdominal tenderness  There is no guarding  Musculoskeletal:         General: Normal range of motion  Cervical back: Normal range of motion and neck supple  Skin:     General: Skin is warm and dry  Neurological:      Mental Status: She is alert and oriented to person, place, and time  Cranial Nerves: No cranial nerve deficit  Sensory: No sensory deficit  Vital Signs  ED Triage Vitals [03/21/23 1505]   Temperature Pulse Respirations Blood Pressure SpO2   (!) 97 °F (36 1 °C) 78 18 156/74 98 %      Temp Source Heart Rate Source Patient Position - Orthostatic VS BP Location FiO2 (%)   Temporal Monitor Sitting Left arm --      Pain Score       --           Vitals:    03/21/23 1505   BP: 156/74   Pulse: 78   Patient Position - Orthostatic VS: Sitting         Visual Acuity      ED Medications  Medications - No data to display    Diagnostic Studies  Results Reviewed     Procedure Component Value Units Date/Time    Blood culture #1 [[id number]] Collected: 03/21/23 1524    Lab Status: In process Specimen: Blood from Arm, Left Updated: 03/21/23 1530    Blood culture #2 [[id number]] Collected: 03/21/23 1524    Lab Status: In process Specimen: Blood from Arm, Right Updated: 03/21/23 1530                 No orders to display              Procedures  Procedures         ED Course     notes from [facility] below:   Reach out to patient via her daughter's phone   Discussed the recommendations  Mikael Iglesias the recommendation if the patient can come to emergency room or can go to PCP office and draw the blood for blood culture                                        Medical Decision Making  Patient is asymptomatic clinically hemodynamically stable normal nonfocal neurologic examination in the ED afebrile nontoxic and well-appearing  As per ID recommendations we will repeat blood cultures for now we will hold on any antibiotic therapy or medication changes and advised to follow-up with primary physician on results of this test and will contact her sooner if any changes to care plan need to be made based on results  Patient and family understand instructions and agree and will follow-up as advised return precautions and anticipatory guidance discussed  Amount and/or Complexity of Data Reviewed  Labs: ordered  Decision-making details documented in ED Course  Disposition  Final diagnoses:   Positive blood culture     Time reflects when diagnosis was documented in both MDM as applicable and the Disposition within this note     Time User Action Codes Description Comment    3/21/2023  3:27 PM Jason Welch Add [R78 [id number]] Positive blood culture       ED Disposition     ED Disposition   Discharge    Condition   Stable    Date/Time   Tue Mar 21, 2023  3:05 PM    Comment   Memory Stewart discharge to home/self care                 Follow-up Information     Follow up With Specialties Details Why Contact Info    Balwinder Riggs DO  Schedule an appointment as soon as possible for a visit in 2 days  [address]  [phone]            Discharge Medication List as of 3/21/2023  3:27 PM      CONTINUE these medications which have NOT CHANGED    Details   alendronate (FOSAMAX) 70 mg tablet Take 70 mg by mouth once a week, Starting Tue 7/26/2022, Historical Med      aspirin (ECOTRIN LOW STRENGTH) 81 mg EC tablet Take 81 mg by mouth daily, Historical Med      bisoprolol-hydrochlorothiazide (ZIAC) 10-6 25 MG per tablet Take 1 tablet by mouth daily, Starting Wed 10/12/2022, Historical Med      busPIRone (BUSPAR) 10 mg tablet Take 1 tablet (10 mg total) by mouth 3 (three) times a day, Starting Fri 3/17/2023, Normal      Diclofenac Sodium (VOLTAREN) 1 % Apply topically 4 (four) times a day, Starting Wed 6/8/2022, Historical Med      DULoxetine (CYMBALTA) 60 mg delayed release capsule Take 60 mg by mouth in the morning, Starting Mon 8/29/2022, Historical Med      latanoprost (XALATAN) 0 005 % ophthalmic solution Administer 1 drop to both eyes daily at bedtime, Starting Wed 4/20/2022, Historical Med      rosuvastatin (CRESTOR) 10 MG tablet Take 1 tablet by mouth daily, Starting Thu 10/13/2022, Historical Med      timolol (TIMOPTIC) 0 5 % ophthalmic solution Apply 1 drop to eye 2 (two) times a day, Starting Wed 4/20/2022, Historical Med             No discharge procedures on file      PDMP Review     None          ED Provider  Electronically Signed by           Orion Gambino DO  03/21/23 1494

## 2023-03-22 LAB
BACTERIA BLD CULT: ABNORMAL
GRAM STN SPEC: ABNORMAL

## 2023-03-25 VITALS
RESPIRATION RATE: 18 BRPM | SYSTOLIC BLOOD PRESSURE: 156 MMHG | BODY MASS INDEX: 24.2 KG/M2 | WEIGHT: 128.09 LBS | TEMPERATURE: 97 F | OXYGEN SATURATION: 98 % | HEART RATE: 78 BPM | DIASTOLIC BLOOD PRESSURE: 74 MMHG

## 2023-03-26 LAB
BACTERIA BLD CULT: NORMAL
BACTERIA BLD CULT: NORMAL

## 2023-04-14 ENCOUNTER — APPOINTMENT (INPATIENT)
Dept: MRI IMAGING | Facility: HOSPITAL | Age: 78
End: 2023-04-14

## 2023-04-14 ENCOUNTER — APPOINTMENT (EMERGENCY)
Dept: CT IMAGING | Facility: HOSPITAL | Age: 78
End: 2023-04-14

## 2023-04-14 ENCOUNTER — APPOINTMENT (EMERGENCY)
Dept: RADIOLOGY | Facility: HOSPITAL | Age: 78
End: 2023-04-14

## 2023-04-14 ENCOUNTER — APPOINTMENT (INPATIENT)
Dept: CT IMAGING | Facility: HOSPITAL | Age: 78
End: 2023-04-14

## 2023-04-14 ENCOUNTER — HOSPITAL ENCOUNTER (INPATIENT)
Facility: HOSPITAL | Age: 78
LOS: 6 days | Discharge: NURSING, REHAB OR LONG-TERM CARE | End: 2023-04-20
Attending: EMERGENCY MEDICINE | Admitting: EMERGENCY MEDICINE

## 2023-04-14 DIAGNOSIS — F41.1 GENERALIZED ANXIETY DISORDER: ICD-10-CM

## 2023-04-14 DIAGNOSIS — T14.8XXA BRUISING: ICD-10-CM

## 2023-04-14 DIAGNOSIS — L08.9 INFECTED WOUND: Primary | ICD-10-CM

## 2023-04-14 DIAGNOSIS — R41.0 CONFUSION: ICD-10-CM

## 2023-04-14 DIAGNOSIS — G93.40 ACUTE ENCEPHALOPATHY: ICD-10-CM

## 2023-04-14 DIAGNOSIS — R29.6 RECURRENT FALLS: ICD-10-CM

## 2023-04-14 DIAGNOSIS — T14.8XXA INFECTED WOUND: Primary | ICD-10-CM

## 2023-04-14 PROBLEM — I16.0 HYPERTENSIVE URGENCY: Status: ACTIVE | Noted: 2023-03-16

## 2023-04-14 PROBLEM — Z72.0 TOBACCO ABUSE: Status: ACTIVE | Noted: 2023-04-14

## 2023-04-14 LAB
2HR DELTA HS TROPONIN: -8 NG/L
4HR DELTA HS TROPONIN: -5 NG/L
ABO GROUP BLD: NORMAL
ABO GROUP BLD: NORMAL
ALBUMIN SERPL BCP-MCNC: 3.3 G/DL (ref 3.5–5)
ALP SERPL-CCNC: 49 U/L (ref 34–104)
ALT SERPL W P-5'-P-CCNC: 11 U/L (ref 7–52)
ANION GAP SERPL CALCULATED.3IONS-SCNC: 6 MMOL/L (ref 4–13)
ANION GAP SERPL CALCULATED.3IONS-SCNC: 8 MMOL/L (ref 4–13)
APTT PPP: 24 SECONDS (ref 23–37)
AST SERPL W P-5'-P-CCNC: 23 U/L (ref 13–39)
BACTERIA UR QL AUTO: ABNORMAL /HPF
BASOPHILS # BLD AUTO: 0.03 THOUSANDS/ΜL (ref 0–0.1)
BASOPHILS NFR BLD AUTO: 0 % (ref 0–1)
BILIRUB SERPL-MCNC: 0.74 MG/DL (ref 0.2–1)
BILIRUB UR QL STRIP: ABNORMAL
BLD GP AB SCN SERPL QL: NEGATIVE
BUN SERPL-MCNC: 12 MG/DL (ref 5–25)
BUN SERPL-MCNC: 13 MG/DL (ref 5–25)
CALCIUM ALBUM COR SERPL-MCNC: 10.2 MG/DL (ref 8.3–10.1)
CALCIUM SERPL-MCNC: 9.5 MG/DL (ref 8.4–10.2)
CALCIUM SERPL-MCNC: 9.6 MG/DL (ref 8.4–10.2)
CARDIAC TROPONIN I PNL SERPL HS: 41 NG/L
CARDIAC TROPONIN I PNL SERPL HS: 44 NG/L
CARDIAC TROPONIN I PNL SERPL HS: 48 NG/L
CARDIAC TROPONIN I PNL SERPL HS: 49 NG/L
CHLORIDE SERPL-SCNC: 102 MMOL/L (ref 96–108)
CHLORIDE SERPL-SCNC: 102 MMOL/L (ref 96–108)
CK SERPL-CCNC: 118 U/L (ref 26–192)
CLARITY UR: CLEAR
CO2 SERPL-SCNC: 29 MMOL/L (ref 21–32)
CO2 SERPL-SCNC: 29 MMOL/L (ref 21–32)
COLOR UR: YELLOW
CREAT SERPL-MCNC: 0.59 MG/DL (ref 0.6–1.3)
CREAT SERPL-MCNC: 0.67 MG/DL (ref 0.6–1.3)
EOSINOPHIL # BLD AUTO: 0.1 THOUSAND/ΜL (ref 0–0.61)
EOSINOPHIL NFR BLD AUTO: 1 % (ref 0–6)
ERYTHROCYTE [DISTWIDTH] IN BLOOD BY AUTOMATED COUNT: 12.9 % (ref 11.6–15.1)
GFR SERPL CREATININE-BSD FRML MDRD: 83 ML/MIN/1.73SQ M
GFR SERPL CREATININE-BSD FRML MDRD: 87 ML/MIN/1.73SQ M
GLUCOSE SERPL-MCNC: 105 MG/DL (ref 65–140)
GLUCOSE SERPL-MCNC: 139 MG/DL (ref 65–140)
GLUCOSE SERPL-MCNC: 89 MG/DL (ref 65–140)
GLUCOSE SERPL-MCNC: 98 MG/DL (ref 65–140)
GLUCOSE UR STRIP-MCNC: NEGATIVE MG/DL
HCT VFR BLD AUTO: 41.9 % (ref 34.8–46.1)
HGB BLD-MCNC: 13.6 G/DL (ref 11.5–15.4)
HGB UR QL STRIP.AUTO: ABNORMAL
HYALINE CASTS #/AREA URNS LPF: ABNORMAL /LPF
IMM GRANULOCYTES # BLD AUTO: 0.08 THOUSAND/UL (ref 0–0.2)
IMM GRANULOCYTES NFR BLD AUTO: 1 % (ref 0–2)
INR PPP: 0.97 (ref 0.84–1.19)
KETONES UR STRIP-MCNC: ABNORMAL MG/DL
LACTATE SERPL-SCNC: 1.2 MMOL/L (ref 0.5–2)
LEUKOCYTE ESTERASE UR QL STRIP: NEGATIVE
LYMPHOCYTES # BLD AUTO: 0.9 THOUSANDS/ΜL (ref 0.6–4.47)
LYMPHOCYTES NFR BLD AUTO: 7 % (ref 14–44)
MAGNESIUM SERPL-MCNC: 1.7 MG/DL (ref 1.9–2.7)
MCH RBC QN AUTO: 30.8 PG (ref 26.8–34.3)
MCHC RBC AUTO-ENTMCNC: 32.5 G/DL (ref 31.4–37.4)
MCV RBC AUTO: 95 FL (ref 82–98)
MONOCYTES # BLD AUTO: 0.82 THOUSAND/ΜL (ref 0.17–1.22)
MONOCYTES NFR BLD AUTO: 6 % (ref 4–12)
MUCOUS THREADS UR QL AUTO: ABNORMAL
NEUTROPHILS # BLD AUTO: 11.56 THOUSANDS/ΜL (ref 1.85–7.62)
NEUTS SEG NFR BLD AUTO: 85 % (ref 43–75)
NITRITE UR QL STRIP: NEGATIVE
NON-SQ EPI CELLS URNS QL MICRO: ABNORMAL /HPF
NRBC BLD AUTO-RTO: 0 /100 WBCS
PH UR STRIP.AUTO: 5.5 [PH]
PLATELET # BLD AUTO: 217 THOUSANDS/UL (ref 149–390)
PMV BLD AUTO: 9.6 FL (ref 8.9–12.7)
POTASSIUM SERPL-SCNC: 3.8 MMOL/L (ref 3.5–5.3)
POTASSIUM SERPL-SCNC: 4.4 MMOL/L (ref 3.5–5.3)
PROCALCITONIN SERPL-MCNC: <0.05 NG/ML
PROT SERPL-MCNC: 6.6 G/DL (ref 6.4–8.4)
PROT UR STRIP-MCNC: ABNORMAL MG/DL
PROTHROMBIN TIME: 13 SECONDS (ref 11.6–14.5)
RBC # BLD AUTO: 4.42 MILLION/UL (ref 3.81–5.12)
RBC #/AREA URNS AUTO: ABNORMAL /HPF
RH BLD: POSITIVE
RH BLD: POSITIVE
SODIUM SERPL-SCNC: 137 MMOL/L (ref 135–147)
SODIUM SERPL-SCNC: 139 MMOL/L (ref 135–147)
SP GR UR STRIP.AUTO: >=1.03 (ref 1–1.03)
SPECIMEN EXPIRATION DATE: NORMAL
UROBILINOGEN UR QL STRIP.AUTO: 1 E.U./DL
WBC # BLD AUTO: 13.49 THOUSAND/UL (ref 4.31–10.16)
WBC #/AREA URNS AUTO: ABNORMAL /HPF

## 2023-04-14 RX ORDER — ACETAMINOPHEN 325 MG/1
975 TABLET ORAL ONCE
Status: COMPLETED | OUTPATIENT
Start: 2023-04-14 | End: 2023-04-14

## 2023-04-14 RX ORDER — ASPIRIN 81 MG/1
81 TABLET ORAL DAILY
Status: DISCONTINUED | OUTPATIENT
Start: 2023-04-14 | End: 2023-04-20 | Stop reason: HOSPADM

## 2023-04-14 RX ORDER — CLOPIDOGREL BISULFATE 75 MG/1
300 TABLET ORAL ONCE
Status: COMPLETED | OUTPATIENT
Start: 2023-04-14 | End: 2023-04-14

## 2023-04-14 RX ORDER — BUSPIRONE HYDROCHLORIDE 10 MG/1
10 TABLET ORAL 3 TIMES DAILY
Status: DISCONTINUED | OUTPATIENT
Start: 2023-04-14 | End: 2023-04-20 | Stop reason: HOSPADM

## 2023-04-14 RX ORDER — ASPIRIN 325 MG
325 TABLET ORAL ONCE
Status: COMPLETED | OUTPATIENT
Start: 2023-04-14 | End: 2023-04-14

## 2023-04-14 RX ORDER — ATORVASTATIN CALCIUM 40 MG/1
40 TABLET, FILM COATED ORAL EVERY EVENING
Status: DISCONTINUED | OUTPATIENT
Start: 2023-04-14 | End: 2023-04-20 | Stop reason: HOSPADM

## 2023-04-14 RX ORDER — CEFTRIAXONE 1 G/50ML
1000 INJECTION, SOLUTION INTRAVENOUS ONCE
Status: COMPLETED | OUTPATIENT
Start: 2023-04-14 | End: 2023-04-14

## 2023-04-14 RX ORDER — HYDRALAZINE HYDROCHLORIDE 20 MG/ML
10 INJECTION INTRAMUSCULAR; INTRAVENOUS ONCE
Status: COMPLETED | OUTPATIENT
Start: 2023-04-14 | End: 2023-04-14

## 2023-04-14 RX ORDER — TIMOLOL MALEATE 5 MG/ML
1 SOLUTION/ DROPS OPHTHALMIC 2 TIMES DAILY
Status: DISCONTINUED | OUTPATIENT
Start: 2023-04-14 | End: 2023-04-20 | Stop reason: HOSPADM

## 2023-04-14 RX ORDER — CLOPIDOGREL BISULFATE 75 MG/1
75 TABLET ORAL DAILY
Status: DISCONTINUED | OUTPATIENT
Start: 2023-04-15 | End: 2023-04-17

## 2023-04-14 RX ORDER — CEFTRIAXONE 1 G/50ML
1000 INJECTION, SOLUTION INTRAVENOUS EVERY 24 HOURS
Status: DISCONTINUED | OUTPATIENT
Start: 2023-04-15 | End: 2023-04-17

## 2023-04-14 RX ORDER — ENOXAPARIN SODIUM 100 MG/ML
40 INJECTION SUBCUTANEOUS DAILY
Status: DISCONTINUED | OUTPATIENT
Start: 2023-04-14 | End: 2023-04-14 | Stop reason: SINTOL

## 2023-04-14 RX ORDER — HYDRALAZINE HYDROCHLORIDE 20 MG/ML
10 INJECTION INTRAMUSCULAR; INTRAVENOUS ONCE
Status: DISCONTINUED | OUTPATIENT
Start: 2023-04-14 | End: 2023-04-14

## 2023-04-14 RX ORDER — OLANZAPINE 10 MG/1
2.5 INJECTION, POWDER, LYOPHILIZED, FOR SOLUTION INTRAMUSCULAR ONCE
Status: COMPLETED | OUTPATIENT
Start: 2023-04-14 | End: 2023-04-14

## 2023-04-14 RX ORDER — ACETAMINOPHEN 325 MG/1
650 TABLET ORAL EVERY 6 HOURS PRN
Status: DISCONTINUED | OUTPATIENT
Start: 2023-04-14 | End: 2023-04-20 | Stop reason: HOSPADM

## 2023-04-14 RX ORDER — LATANOPROST 50 UG/ML
1 SOLUTION/ DROPS OPHTHALMIC
Status: DISCONTINUED | OUTPATIENT
Start: 2023-04-14 | End: 2023-04-20 | Stop reason: HOSPADM

## 2023-04-14 RX ORDER — PRAVASTATIN SODIUM 40 MG
40 TABLET ORAL
Status: DISCONTINUED | OUTPATIENT
Start: 2023-04-14 | End: 2023-04-14

## 2023-04-14 RX ORDER — MAGNESIUM SULFATE 1 G/100ML
1 INJECTION INTRAVENOUS ONCE
Status: COMPLETED | OUTPATIENT
Start: 2023-04-14 | End: 2023-04-14

## 2023-04-14 RX ORDER — DULOXETIN HYDROCHLORIDE 60 MG/1
60 CAPSULE, DELAYED RELEASE ORAL DAILY
Status: DISCONTINUED | OUTPATIENT
Start: 2023-04-14 | End: 2023-04-18

## 2023-04-14 RX ADMIN — LATANOPROST 1 DROP: 50 SOLUTION OPHTHALMIC at 20:59

## 2023-04-14 RX ADMIN — BUSPIRONE HYDROCHLORIDE 10 MG: 10 TABLET ORAL at 20:58

## 2023-04-14 RX ADMIN — MAGNESIUM SULFATE HEPTAHYDRATE 1 G: 1 INJECTION, SOLUTION INTRAVENOUS at 10:05

## 2023-04-14 RX ADMIN — ASPIRIN 325 MG ORAL TABLET 325 MG: 325 PILL ORAL at 15:28

## 2023-04-14 RX ADMIN — ATORVASTATIN CALCIUM 40 MG: 40 TABLET, FILM COATED ORAL at 17:36

## 2023-04-14 RX ADMIN — CLOPIDOGREL BISULFATE 300 MG: 75 TABLET ORAL at 17:56

## 2023-04-14 RX ADMIN — CEFTRIAXONE 1000 MG: 1 INJECTION, SOLUTION INTRAVENOUS at 11:21

## 2023-04-14 RX ADMIN — TIMOLOL MALEATE 1 DROP: 5 SOLUTION/ DROPS OPHTHALMIC at 17:37

## 2023-04-14 RX ADMIN — IOHEXOL 85 ML: 350 INJECTION, SOLUTION INTRAVENOUS at 20:19

## 2023-04-14 RX ADMIN — DULOXETINE HYDROCHLORIDE 60 MG: 60 CAPSULE, DELAYED RELEASE ORAL at 12:41

## 2023-04-14 RX ADMIN — ENOXAPARIN SODIUM 40 MG: 40 INJECTION SUBCUTANEOUS at 15:28

## 2023-04-14 RX ADMIN — HYDRALAZINE HYDROCHLORIDE 10 MG: 20 INJECTION INTRAMUSCULAR; INTRAVENOUS at 10:36

## 2023-04-14 RX ADMIN — BUSPIRONE HYDROCHLORIDE 10 MG: 10 TABLET ORAL at 15:28

## 2023-04-14 RX ADMIN — NICOTINE 1 PATCH: 7 PATCH, EXTENDED RELEASE TRANSDERMAL at 15:28

## 2023-04-14 RX ADMIN — ASPIRIN 81 MG: 81 TABLET, COATED ORAL at 15:28

## 2023-04-14 RX ADMIN — OLANZAPINE 2.5 MG: 10 INJECTION, POWDER, FOR SOLUTION INTRAMUSCULAR at 12:30

## 2023-04-14 RX ADMIN — ACETAMINOPHEN 975 MG: 325 TABLET ORAL at 20:58

## 2023-04-14 NOTE — ED PROVIDER NOTES
Emergency Department Trauma Note  Lizeth Cabral 76 y o  female MRN: [id number]  Unit/Bed#: /-01 Encounter: [id number]      Trauma Alert: Trauma Acuity: Trauma Evaluation  Model of Arrival: Mode of Arrival: BLS via Trauma Squad Name and Number: [id number]  Trauma Team: Current Providers  Attending Provider: Gina Grijalva DO  Attending Provider: Alyssa Arreguin MD  Physician Assistant: Usman Sahu PA-C  Registered Nurse: Mirian Conrad RN  Registered Nurse: Li Lee RN  Registered Nurse: Kathleen Garvin RN  Registered Nurse: René Rangel RN  Patient Care Assistant: Selin Kwon  Consultants:     None      History of Present Illness     Chief Complaint:   Chief Complaint   Patient presents with   • Fall     Pt presented to this ED via EMS stating  seen pt sitting on outdoor porch noticing bruising and abrasions to extremities from falling  Pt confused and unable to communicate complaints  Trauma evaluation prealerted  HPI:  Lizeth Cabral is a 76 y o  female who presents with fall  Mechanism:Details of Incident: pt found to be confused with bruising and bleeding on extremities this morning by  on pt's front porch  hx dementia Injury Date: 04/14/23 Injury Time:  (unknown) Injury Occurence Location - [address] [place] [address] Way: [facility]    The patient is a 76year old female, who presents to the ED with the c/o fall  Patient was brought in by EMS due to a  hearing screening from a nearby house in [place] [address]  On investigation the patient was sitting upright on the floor  She is confused and has a history of dementia  Is unaware of how she fell or what has happened  Unknown downtime  Patient lives at home with her  who also has dementia  Was placed in c-collar in route        History provided by:  EMS personnel  History limited by:  Dementia  Fall  Mechanism of injury: fall    Incident location:  Unable to specify  Arrived directly from scene: yes Fall:     Impact surface:  Hard floor  Tetanus status:  Unknown  Prior to arrival data:     Breathing interventions:  None    IV access status:  None    IO access:  None    Fluids administered:  None    Cardiac interventions:  None    Immobilization:  C-collar  Current pain details:     Pain timing:  Constant  Associated symptoms: no abdominal pain, no back pain, no chest pain, no hearing loss, no seizures and no vomiting      Review of Systems   Constitutional: Negative for chills and fever  HENT: Negative for ear pain, hearing loss and sore throat  Eyes: Negative for pain and visual disturbance  Respiratory: Negative for cough, shortness of breath and wheezing  Cardiovascular: Negative for chest pain, palpitations and leg swelling  Gastrointestinal: Negative for abdominal pain and vomiting  Genitourinary: Negative for dysuria and hematuria  Musculoskeletal: Negative for arthralgias and back pain  Skin: Negative for color change and rash  Neurological: Negative for dizziness, seizures and syncope  All other systems reviewed and are negative  Historical Information     Immunizations:   Immunization History   Administered Date(s) Administered   • Tdap 04/10/2023       Past Medical History:   Diagnosis Date   • Dementia ([facility] [address] )    • No abnormality of hip joint detected on examination    • Stroke [facility])      History reviewed  No pertinent family history  History reviewed  No pertinent surgical history  Social History     Tobacco Use   • Smoking status: Every Day     Packs/day: 0 25     Types: Cigarettes   • Smokeless tobacco: Never   Vaping Use   • Vaping Use: Never used   Substance Use Topics   • Alcohol use: Yes     Comment: ocassional   • Drug use: Never     E-Cigarette/Vaping   • E-Cigarette Use Never User      E-Cigarette/Vaping Substances       Family History: non-contributory    Meds/Allergies   Prior to Admission Medications   Prescriptions Last Dose Informant Patient Reported? Taking? DULoxetine (CYMBALTA) 60 mg delayed release capsule   Yes No   Sig: Take 60 mg by mouth in the morning   Diclofenac Sodium (VOLTAREN) 1 %   Yes No   Sig: Apply topically 4 (four) times a day   alendronate (FOSAMAX) 70 mg tablet   Yes No   Sig: Take 70 mg by mouth once a week   aspirin (ECOTRIN LOW STRENGTH) 81 mg EC tablet   Yes No   Sig: Take 81 mg by mouth daily   bisoprolol-hydrochlorothiazide (ZIAC) 10-6 25 MG per tablet   Yes No   Sig: Take 1 tablet by mouth daily   busPIRone (BUSPAR) 10 mg tablet   No No   Sig: Take 1 tablet (10 mg total) by mouth 3 (three) times a day   latanoprost (XALATAN) 0 005 % ophthalmic solution   Yes No   Sig: Administer 1 drop to both eyes daily at bedtime   rosuvastatin (CRESTOR) 10 MG tablet   Yes No   Sig: Take 1 tablet by mouth daily   timolol (TIMOPTIC) 0 5 % ophthalmic solution   Yes No   Sig: Apply 1 drop to eye 2 (two) times a day      Facility-Administered Medications: None       Allergies   Allergen Reactions   • Lorazepam Other (See Comments)     Increased agitation         PHYSICAL EXAM    PE limited by: dementia    Objective   Vitals:   First set: Temperature: 97 9 °F (36 6 °C) (04/14/23 0900)  Pulse: 56 (04/14/23 0900)  Respirations: 20 (04/14/23 0900)  Blood Pressure: (!) 210/85 (04/14/23 0900)  SpO2: 96 % (04/14/23 0857)    Primary Survey:   (A) Airway: normal  (B) Breathing: normal  (C) Circulation: Pulses:   normal  (D) Disabliity:  GCS Total:  15  (E) Expose:  Completed    Secondary Survey: (Click on Physical Exam tab above)  Physical Exam  Vitals and nursing note reviewed  Constitutional:       General: She is not in acute distress  Appearance: She is well-developed  HENT:      Head: Normocephalic and atraumatic  Mouth/Throat:      Mouth: Mucous membranes are dry  Eyes:      Extraocular Movements: Extraocular movements intact  Conjunctiva/sclera: Conjunctivae normal       Pupils: Pupils are equal, round, and reactive to light  Cardiovascular:      Rate and Rhythm: Regular rhythm  Bradycardia present  Pulses: Normal pulses  Heart sounds: No murmur heard  Pulmonary:      Effort: Pulmonary effort is normal  No respiratory distress  Breath sounds: Normal breath sounds  Abdominal:      Palpations: Abdomen is soft  Tenderness: There is no abdominal tenderness  There is no right CVA tenderness or left CVA tenderness  Musculoskeletal:      Cervical back: Normal range of motion and neck supple  Right lower leg: No edema  Left lower leg: No edema  Skin:     General: Skin is warm and dry  Capillary Refill: Capillary refill takes less than 2 seconds  Findings: Bruising present  Comments: Using to left forearm and elbow with skin tear  Right wrist has old healing wound see picture below  Bruising to bilateral knees   Neurological:      General: No focal deficit present  Mental Status: She is alert  She is confused  Comments: Left arm weakness on examination       Psychiatric:         Mood and Affect: Mood normal              Cervical spine cleared by clinical criteria?  No (imaging required)      Invasive Devices     Peripheral Intravenous Line  Duration           Peripheral IV 04/14/23 Left Antecubital <1 day                Lab Results:   Results Reviewed     Procedure Component Value Units Date/Time    HS Troponin I 4hr [[id number]]  (Normal) Collected: 04/14/23 1316    Lab Status: Final result Specimen: Blood from Arm, Right Updated: 04/14/23 1345     hs TnI 4hr 44 ng/L      Delta 4hr hsTnI -5 ng/L     HS Troponin I 2hr [[id number]]  (Normal) Collected: 04/14/23 1121    Lab Status: Final result Specimen: Blood from Hand, Left Updated: 04/14/23 1159     hs TnI 2hr 41 ng/L      Delta 2hr hsTnI -8 ng/L     Urine Microscopic [[id number]]  (Abnormal) Collected: 04/14/23 1003    Lab Status: Final result Specimen: Urine, Straight Cath Updated: 04/14/23 1114     RBC, UA 0-1 /hpf      WBC, UA 1-2 /hpf      Epithelial Cells Occasional /hpf      Bacteria, UA Occasional /hpf      Hyaline Casts, UA 1-2 /lpf      MUCUS THREADS Occasional    Blood culture #2 [[id number]] Collected: 04/14/23 1043    Lab Status:  In process Specimen: Blood from Arm, Right Updated: 04/14/23 1045    UA w Reflex to Microscopic w Reflex to Culture [[id number]]  (Abnormal) Collected: 04/14/23 1003    Lab Status: Final result Specimen: Urine, Straight Cath Updated: 04/14/23 1022     Color, UA Yellow     Clarity, UA Clear     Specific Gravity, UA >=1 030     pH, UA 5 5     Leukocytes, UA Negative     Nitrite, UA Negative     Protein, UA Trace mg/dl      Glucose, UA Negative mg/dl      Ketones, UA Trace mg/dl      Urobilinogen, UA 1 0 E U /dl      Bilirubin, UA Small     Occult Blood, UA Trace-Intact    Procalcitonin [[id number]]  (Normal) Collected: 04/14/23 0916    Lab Status: Final result Specimen: Blood from Arm, Left Updated: 04/14/23 0950     Procalcitonin <0 05 ng/ml     HS Troponin 0hr (reflex protocol) [[id number]]  (Normal) Collected: 04/14/23 0916    Lab Status: Final result Specimen: Blood from Arm, Left Updated: 04/14/23 0949     hs TnI 0hr 49 ng/L     Comprehensive metabolic panel [[id number]]  (Abnormal) Collected: 04/14/23 0916    Lab Status: Final result Specimen: Blood from Arm, Left Updated: 04/14/23 0941     Sodium 137 mmol/L      Potassium 4 4 mmol/L      Chloride 102 mmol/L      CO2 29 mmol/L      ANION GAP 6 mmol/L      BUN 13 mg/dL      Creatinine 0 67 mg/dL      Glucose 139 mg/dL      Calcium 9 6 mg/dL      Corrected Calcium 10 2 mg/dL      AST 23 U/L      ALT 11 U/L      Alkaline Phosphatase 49 U/L      Total Protein 6 6 g/dL      Albumin 3 3 g/dL      Total Bilirubin 0 74 mg/dL      eGFR 83 ml/min/1 73sq m     Narrative:      Prema guidelines for Chronic Kidney Disease (CKD):   •  Stage 1 with normal or high GFR (GFR > 90 mL/min/1 73 square meters)  •  Stage 2 Mild CKD (GFR = 60-89 mL/min/1 73 square meters)  •  Stage 3A Moderate CKD (GFR = 45-59 mL/min/1 73 square meters)  •  Stage 3B Moderate CKD (GFR = 30-44 mL/min/1 73 square meters)  •  Stage 4 Severe CKD (GFR = 15-29 mL/min/1 73 square meters)  •  Stage 5 End Stage CKD (GFR <15 mL/min/1 73 square meters)  Note: GFR calculation is accurate only with a steady state creatinine    CK [[id number]]  (Normal) Collected: 04/14/23 0916    Lab Status: Final result Specimen: Blood from Arm, Left Updated: 04/14/23 0941     Total  U/L     Lactic acid, plasma (w/reflex if result > 2 0) [[id number]]  (Normal) Collected: 04/14/23 0916    Lab Status: Final result Specimen: Blood from Arm, Left Updated: 04/14/23 0941     LACTIC ACID 1 2 mmol/L     Narrative:      Result may be elevated if tourniquet was used during collection      Magnesium [[id number]]  (Abnormal) Collected: 04/14/23 0916    Lab Status: Final result Specimen: Blood from Arm, Left Updated: 04/14/23 0941     Magnesium 1 7 mg/dL     APTT [[id number]]  (Normal) Collected: 04/14/23 0916    Lab Status: Final result Specimen: Blood from Arm, Left Updated: 04/14/23 0941     PTT 24 seconds     Protime-INR [[id number]]  (Normal) Collected: 04/14/23 0916    Lab Status: Final result Specimen: Blood from Arm, Left Updated: 04/14/23 0941     Protime 13 0 seconds      INR 0 97    CBC and differential [[id number]]  (Abnormal) Collected: 04/14/23 0916    Lab Status: Final result Specimen: Blood from Arm, Left Updated: 04/14/23 0926     WBC 13 49 Thousand/uL      RBC 4 42 Million/uL      Hemoglobin 13 6 g/dL      Hematocrit 41 9 %      MCV 95 fL      MCH 30 8 pg      MCHC 32 5 g/dL      RDW 12 9 %      MPV 9 6 fL      Platelets 939 Thousands/uL      nRBC 0 /100 WBCs      Neutrophils Relative 85 %      Immat GRANS % 1 %      Lymphocytes Relative 7 %      Monocytes Relative 6 %      Eosinophils Relative 1 %      Basophils Relative 0 %      Neutrophils Absolute 11 56 Thousands/µL      Immature Grans Absolute 0 08 Thousand/uL      Lymphocytes Absolute 0 90 Thousands/µL      Monocytes Absolute 0 82 Thousand/µL      Eosinophils Absolute 0 10 Thousand/µL      Basophils Absolute 0 03 Thousands/µL     Blood culture #1 [[id number]] Collected: 04/14/23 0916    Lab Status: In process Specimen: Blood from Arm, Left Updated: 04/14/23 5733                 Imaging Studies:   Direct to CT: No  XR knee 4+ vw right injury   Final Result by Merline Bijou, MD (04/14 1012)      No acute osseous abnormality  Stable appearance of the right total knee arthroplasty as in 2020 status post removal of the tibial arthroplasty component  Workstation performed: [id number]         XR knee 4+ vw left injury   Final Result by Merline Bijou, MD (04/14 1012)      No acute osseous abnormality  Stable appearance of the right total knee arthroplasty as in 2020 status post removal of the tibial arthroplasty component  Workstation performed: [id number]         XR hip/pelv 2-3 vws left if performed   Final Result by Kody Cantu MD (04/14 1010)      No acute osseous abnormality  Stable appearance of the left total hip arthroplasty  Workstation performed: [id number]         XR elbow 3+ vw LEFT   Final Result by Merline Bijou, MD (04/14 1014)      No definite acute osseous pathology  Workstation performed: [id number]         XR shoulder 2+ views LEFT   Final Result by Kody Cantu MD (04/14 1013)      No acute osseous abnormality  Workstation performed: [id number]         XR wrist 3+ views LEFT   Final Result by Kody Cantu MD (04/14 1014)      No acute osseous abnormality  Workstation performed: [id number]         TRAUMA - CT head wo contrast   Final Result by Merline Bijou, MD (04/14 6858)      No acute intracranial pathology  In particular, no intracranial hemorrhage or calvarial fracture        No interval change in 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 and chronic microvascular ischemic changes  The study was marked in [place] for immediate notification given trauma designation  Workstation performed: [id number]         TRAUMA - CT spine cervical wo contrast   Final Result by Sherine Nevarez MD (04/14 4744)      No cervical spine fracture or traumatic malalignment  The study was marked in [place] for immediate notification  Workstation performed: [id number]         TRAUMA - CT chest abdomen pelvis wo contrast   Final Result by Sherine Nevarez MD (04/14 1008)      No acute thoracic, abdominal, or pelvic trauma within the aforementioned limitations  Multiple additional findings as above  The study was marked in [place] for immediate notification  Workstation performed: [id number]         XR Trauma chest portable   Final Result by Sherine Nevarez MD (04/14 6991)      No acute pulmonary pathology  Retrocardiac opacity likely representing a large hiatal hernia  No obvious displaced fractures within limitation of supine AP chest technique  Workstation performed: [id number]         XR Trauma pelvis ap only 1 or 2 vw   Final Result by Sherine Nevarez MD (04/14 4948)      No acute osseous abnormality  Stable findings compared to radiographs from 3/16/2023                 Workstation performed: [id number]         CTA stroke alert (head/neck)    (Results Pending)   MRI Inpatient Order    (Results Pending)         Procedures  ECG 12 Lead Documentation Only    Date/Time: 4/14/2023 10:15 AM  Performed by: Masha Larry PA-C  Authorized by: Masha Larry PA-C     Indications / Diagnosis:  Fall  ECG reviewed by me, the ED Provider: yes    Patient location:  ED  Previous ECG:     Previous ECG:  Unavailable  Interpretation:     Interpretation: normal    Rate:     ECG rate:  60    ECG rate assessment: normal    Rhythm:     Rhythm: sinus rhythm               ED Course  ED Course as of 04/14/23 [address] [place]   Fri Apr 14, 2023   0943 Magnesium(!): 1 7   6713 Cervical Collar Clearance: The patient had a CT scan of the cervical spine demonstrating no acute injury  On exam, the patient had no midline point tenderness or paresthesias/numbness/weakness in the extremities  The patient had full range of motion (was then able to flex, extend, and rotate head laterally) without pain  There were no distracting injuries and the patient was not intoxicated  The patient's cervical spine was cleared radiologically and clinically  Cervical collar removed at this time  Michael Martinez PA-C  4/14/2023 9:53 AM        1018 There is an age-indeterminate, however likely chronic, moderate to severe compression deformity of the T6 vertebral body  Although the images are not available for direct comparison, a radiograph of the thoracic spine from 3/15/2018 performed at   [facility] mentions a compression deformity of the T6 vertebral body  There is no retropulsion into the spinal canal   Additional mild, chronic compression deformities are also seen involving the T8, T9 T11 and L1 vertebral bodies      Chronic deformity of the left iliac bone  Chronic bilateral rib fractures  Medical Decision Making  The patient is a 76year old female, who presents to the ED with the c/o fall  Patient was brought in by EMS due to a  hearing screening from a nearby house in [place] [address]  On investigation the patient was sitting upright on the floor  She is confused and has a history of dementia  Is unaware of how she fell or what has happened  Unknown downtime  Patient lives at home with her  who also has dementia  Was placed in c-collar in route  The patient is alert to self and birthdate  Patient is confused to location and time  Patient does have a history of dementia  Upon evaluation in the emergency department her daughter Elena Peace did arrive and was able to give more of a history    She states that her mother has been "cognitively declining within the last month very drastically  The patient has been having very distinct behavioral changes where she has been \"trashing her home\"  Today apparently the living room area was disheveled  Things were broken hours last everywhere  The curtains were ripped down  Patient of note also does not have any caretakers at the home and lives with her  who has dementia  Per Epic note previous visiting nurse noted concern for [place] safety at home due to not being aware of which medication she is to take and not having her pill pack filled appropriately  Patient was seen here approximately 11 days ago and suffered a skin tear to the right wrist   A dressing was placed  The patient did not have this dressing changed since then  The patient also did not provide any wound care for herself  The wound dressing was stuck to her skin and there was purulent dressing and skin breakdown  Patient had bruises in multiple areas  Did not have any acute traumatic injuries but was seen to have multiple chronic healing fractures including bilateral rib fractures and numerous compression fractures of her spine  The patient was discussed with the hospitalist team along with the patient's daughter at bedside  Unable to have the patient return back home safely and therefore the hospitalist team admitted this patient for further medical treatment and nursing home placement    Amount and/or Complexity of Data Reviewed  Independent Historian: caregiver and EMS  Labs: ordered  Decision-making details documented in ED Course  Radiology: ordered  ECG/medicine tests: ordered  Decision-making details documented in ED Course  Risk  Prescription drug management  Decision regarding hospitalization                    Disposition  [facility] Transfer: No  Final diagnoses:   Infected wound   Bruising   Recurrent falls   Confusion     Time reflects when diagnosis was documented in both " MDM as applicable and the Disposition within this note     Time User Action Codes Description Comment    4/14/2023 10:51 AM Priyanka Polanco Add [T14  8XXA,  L08 9] Infected wound     4/14/2023 10:51 AM Priyanka Poles Add [T14  8XXA] Bruising     4/14/2023 10:51 AM Priyanka Polanco Add [R29 6] Recurrent falls     4/14/2023 10:51 AM Priyanka Polanco Add [R41 0] Confusion     4/14/2023  2:32 PM [email or web address] Add [G93 40] Acute encephalopathy       ED Disposition     ED Disposition   Admit    Condition   Stable    Date/Time   Fri Apr 14, 2023 11:03 AM    Comment   Case was discussed with margarita and the patient's admission status was agreed to be Admission Status: inpatient status to the service of Dr Joseph Mitchell   Follow-up Information    None       Current Discharge Medication List      CONTINUE these medications which have NOT CHANGED    Details   alendronate (FOSAMAX) 70 mg tablet Take 70 mg by mouth once a week      aspirin (ECOTRIN LOW STRENGTH) 81 mg EC tablet Take 81 mg by mouth daily      bisoprolol-hydrochlorothiazide (ZIAC) 10-6 25 MG per tablet Take 1 tablet by mouth daily      busPIRone (BUSPAR) 10 mg tablet Take 1 tablet (10 mg total) by mouth 3 (three) times a day  Qty: 90 tablet, Refills: 0    Associated Diagnoses: Vascular dementia ([address] ); Generalized anxiety disorder      Diclofenac Sodium (VOLTAREN) 1 % Apply topically 4 (four) times a day      DULoxetine (CYMBALTA) 60 mg delayed release capsule Take 60 mg by mouth in the morning      latanoprost (XALATAN) 0 005 % ophthalmic solution Administer 1 drop to both eyes daily at bedtime      rosuvastatin (CRESTOR) 10 MG tablet Take 1 tablet by mouth daily      timolol (TIMOPTIC) 0 5 % ophthalmic solution Apply 1 drop to eye 2 (two) times a day           No discharge procedures on file      PDMP Review     None          ED Provider  Electronically Signed by         Michael Martinez PA-C  04/14/23 9847

## 2023-04-14 NOTE — H&P
[address]  H&P  Name: Henrietta Biswas 76 y o  female I MRN: [id number]  Unit/Bed#: -01 I Date of Admission: 4/14/2023   Date of Service: 4/14/2023 I Hospital Day: 0      Assessment/Plan   * Acute encephalopathy  Assessment & Plan  patient was found confused and fallen by a  at home  She is confused not behaving like her usual self with multiple bruises and abrasions over her body  Is very anxious restless and acting out  Per daughter her symptoms started yesterday  Not candidate for tPA due to symptoms starting over 24 hours ago  No prior history of A-fib  Received Zyprexa 2 5 mg IM x1 following which she has now calmed down but also noticed to have weakness on her left arm and left leg with left facial droop  Differential at this point includes acute stroke versus acute metabolic encephalopathy due to worsening dementia  Placed on stroke pathway  NIH stroke scale 9  Given aspirin 325 mg x 1 followed by 81 mg daily  CT brain reviewed  Ordered CTA head and neck and MRI brain and 2D echo  Consult placed to neurology    Tobacco abuse  Assessment & Plan  nicotine patch ordered    Recurrent falls  Assessment & Plan  We will get PT OT eval due to recurrent falls probably requires rehab  abrasions and bruising noted on the body will place on Rocephin 1 g IV daily for now for right arm to prevent cellulitis and also wound care consult    Hypertensive urgency  Assessment & Plan  Presented with elevated blood pressure  Now blood pressure is currently controlled  Allow permissive hypertension to rule out stroke    Generalized anxiety disorder  Assessment & Plan  Continue home meds    May use as needed Zyprexa in case of restlessness currently on a one-to-one observation due to restlessness and fall risk         VTE Prophylaxis: Enoxaparin (Lovenox)  / sequential compression device   Code Status: full  POLST: There is no POLST form on file for this patient (pre-hospital)  Discussion with family: dw daughter    Anticipated Length of Stay:  Patient will be admitted on an Inpatient basis with an anticipated length of stay of  More than 2 midnights  Justification for Hospital Stay: Acute metabolic encephalopathy    Total Time for Visit, including Counseling / Coordination of Care: 60 minutes  Greater than 50% of this total time spent on direct patient counseling and coordination of care  Chief Complaint:   confusion    History of Present Illness:    Citlalli Dang is a 76 y o  female who presents with confusion  Patient was found fallen on the floor by  as per the daughter she has not been acting like herself since yesterday she normally takes care of of her  does have some dementia and is little confused mixed up in the morning but then usually clears up well later on in the day this is not like her at all  In the room she is very restless agitated however noticed to have some weakness on her left arm and left leg and also left facial droop       Review of Systems:    Review of Systems   Constitutional: Positive for appetite change and fatigue  Negative for chills and fever  HENT: Negative for hearing loss, sore throat and trouble swallowing  Eyes: Negative for photophobia, discharge and visual disturbance  Respiratory: Negative for chest tightness and shortness of breath  Cardiovascular: Negative for chest pain and palpitations  Gastrointestinal: Negative for abdominal pain, blood in stool and vomiting  Endocrine: Negative for polydipsia and polyuria  Genitourinary: Negative for difficulty urinating, dysuria, flank pain and hematuria  Musculoskeletal: Positive for gait problem  Negative for back pain  Skin: Negative for rash  Allergic/Immunologic: Negative for environmental allergies and food allergies  Neurological: Positive for weakness  Negative for dizziness, seizures, syncope and headaches     Hematological: Does not bruise/bleed easily  Psychiatric/Behavioral: Positive for behavioral problems and confusion  The patient is nervous/anxious and is hyperactive  All other systems reviewed and are negative  Past Medical and Surgical History:     Past Medical History:   Diagnosis Date   • Dementia ([address] )    • No abnormality of hip joint detected on examination    • Stroke [facility])        History reviewed  No pertinent surgical history  Meds/Allergies:    Prior to Admission medications    Medication Sig Start Date End Date Taking? Authorizing Provider   alendronate (FOSAMAX) 70 mg tablet Take 70 mg by mouth once a week 7/26/22   Historical Provider, MD   aspirin (ECOTRIN LOW STRENGTH) 81 mg EC tablet Take 81 mg by mouth daily    Historical Provider, MD   bisoprolol-hydrochlorothiazide [facility]) 10-6 25 MG per tablet Take 1 tablet by mouth daily 10/12/22   Historical Provider, MD   busPIRone (BUSPAR) 10 mg tablet Take 1 tablet (10 mg total) by mouth 3 (three) times a day 3/17/23   Maria E Polanco MD   Diclofenac Sodium (VOLTAREN) 1 % Apply topically 4 (four) times a day 6/8/22   Historical Provider, MD   DULoxetine (CYMBALTA) 60 mg delayed release capsule Take 60 mg by mouth in the morning 8/29/22   Historical Provider, MD   latanoprost (XALATAN) 0 005 % ophthalmic solution Administer 1 drop to both eyes daily at bedtime 4/20/22   Historical Provider, MD   rosuvastatin (CRESTOR) 10 MG tablet Take 1 tablet by mouth daily 10/13/22   Historical Provider, MD   timolol (TIMOPTIC) 0 5 % ophthalmic solution Apply 1 drop to eye 2 (two) times a day 4/20/22   Historical Provider, MD     I have reviewed home medications with patient personally  Allergies:    Allergies   Allergen Reactions   • Lorazepam Other (See Comments)     Increased agitation         Social History:     Marital Status: /Civil Union     Social History     Substance and Sexual Activity   Alcohol Use Yes    Comment: ocassional     Social History     Tobacco Use "  Smoking Status Every Day   • Packs/day: 0 25   • Types: Cigarettes   Smokeless Tobacco Never     Social History     Substance and Sexual Activity   Drug Use Never       Family History:    Family History   Problem Relation Age of Onset   • Arthritis Mother        Physical Exam:     Vitals:   Blood Pressure: 143/64 (04/14/23 1440)  Pulse: 58 (04/14/23 1440)  Temperature: 98 1 °F (36 7 °C) (04/14/23 1440)  Temp Source: Temporal (04/14/23 0900)  Respirations: 18 (04/14/23 1440)  Height: 5' 1\" (154 9 cm) (04/14/23 0900)  Weight - Scale: 55 2 kg (121 lb 12 8 oz) (04/14/23 0900)  SpO2: 98 % (04/14/23 1440)    Physical Exam  Vitals and nursing note reviewed  Constitutional:       Comments: restless agitated earlier but now more somnolent after receiving Zyprexa   HENT:      Head: Normocephalic and atraumatic  Right Ear: External ear normal       Left Ear: External ear normal       Nose: Nose normal       Mouth/Throat:      Pharynx: Oropharynx is clear  Eyes:      Pupils: Pupils are equal, round, and reactive to light  Cardiovascular:      Rate and Rhythm: Normal rate and regular rhythm  Heart sounds: Normal heart sounds  Pulmonary:      Effort: Pulmonary effort is normal       Breath sounds: Normal breath sounds  Abdominal:      General: Bowel sounds are normal       Palpations: Abdomen is soft  Tenderness: There is no abdominal tenderness  Musculoskeletal:         General: Normal range of motion  Cervical back: Normal range of motion and neck supple  Skin:     General: Skin is warm and dry  Capillary Refill: Capillary refill takes less than 2 seconds  Findings: Bruising present  Comments: abrasions noted on the right forearm   Neurological:      Comments: Left arm and left leg weakness noted only able to lift slightly above gravity  Left facial droop noted    Oriented to person only   Psychiatric:      Comments: Restless And agitated           Additional Data:     Lab " Results: I have personally reviewed pertinent reports  Results from last 7 days   Lab Units 04/14/23  0916   WBC Thousand/uL 13 49*   HEMOGLOBIN g/dL 13 6   HEMATOCRIT % 41 9   PLATELETS Thousands/uL 217   NEUTROS PCT % 85*   LYMPHS PCT % 7*   MONOS PCT % 6   EOS PCT % 1     Results from last 7 days   Lab Units 04/14/23  0916   SODIUM mmol/L 137   POTASSIUM mmol/L 4 4   CHLORIDE mmol/L 102   CO2 mmol/L 29   BUN mg/dL 13   CREATININE mg/dL 0 67   ANION GAP mmol/L 6   CALCIUM mg/dL 9 6   ALBUMIN g/dL 3 3*   TOTAL BILIRUBIN mg/dL 0 74   ALK PHOS U/L 49   ALT U/L 11   AST U/L 23   GLUCOSE RANDOM mg/dL 139     Results from last 7 days   Lab Units 04/14/23  0916   INR  0 97             Results from last 7 days   Lab Units 04/14/23  0916   LACTIC ACID mmol/L 1 2   PROCALCITONIN ng/ml <0 05       Imaging: I have personally reviewed pertinent reports  XR knee 4+ vw right injury   Final Result by Jill Lombardo MD (04/14 1012)      No acute osseous abnormality  Stable appearance of the right total knee arthroplasty as in 2020 status post removal of the tibial arthroplasty component  Workstation performed: [id number]         XR knee 4+ vw left injury   Final Result by Jill Lombardo MD (04/14 1012)      No acute osseous abnormality  Stable appearance of the right total knee arthroplasty as in 2020 status post removal of the tibial arthroplasty component  Workstation performed: [id number]         XR hip/pelv 2-3 vws left if performed   Final Result by Hiral Chong MD (04/14 1010)      No acute osseous abnormality  Stable appearance of the left total hip arthroplasty  Workstation performed: [id number]         XR elbow 3+ vw LEFT   Final Result by Jill Lombardo MD (04/14 1014)      No definite acute osseous pathology               Workstation performed: [id number]         XR shoulder 2+ views LEFT   Final Result by Hiral Chong MD (04/14 1013)      No acute osseous abnormality  Workstation performed: [id number]         XR wrist 3+ views LEFT   Final Result by Leigh Martines MD (04/14 1014)      No acute osseous abnormality  Workstation performed: [id number]         TRAUMA - CT head wo contrast   Final Result by Kenneth Corbin MD (04/14 9876)      No acute intracranial pathology  In particular, no intracranial hemorrhage or calvarial fracture  No interval change in 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 and chronic microvascular ischemic changes  The study was marked in [facility] for immediate notification given trauma designation  Workstation performed: [id number]         TRAUMA - CT spine cervical wo contrast   Final Result by Kenneth Corbin MD (04/14 6250)      No cervical spine fracture or traumatic malalignment  The study was marked in [facility] for immediate notification  Workstation performed: [id number]         TRAUMA - CT chest abdomen pelvis wo contrast   Final Result by Kenenth Corbin MD (04/14 4744)      No acute thoracic, abdominal, or pelvic trauma within the aforementioned limitations  Multiple additional findings as above  The study was marked in [facility] for immediate notification  Workstation performed: [id number]         XR Trauma chest portable   Final Result by Kenneth Corbin MD (04/14 8253)      No acute pulmonary pathology  Retrocardiac opacity likely representing a large hiatal hernia  No obvious displaced fractures within limitation of supine AP chest technique  Workstation performed: [id number]         XR Trauma pelvis ap only 1 or 2 vw   Final Result by Kenneth Corbin MD (04/14 5797)      No acute osseous abnormality  Stable findings compared to radiographs from 3/16/2023                 Workstation performed: [id number]         CTA stroke alert (head/neck)    (Results Pending)   MRI Inpatient Order    (Results Pending)       EKG, Pathology, and Other Studies Reviewed on Admission:   · EKG: sinus rhythm    Allscripts / Epic Records Reviewed: Yes     ** Please Note: This note has been constructed using a voice recognition system   **

## 2023-04-14 NOTE — ASSESSMENT & PLAN NOTE
patient was found confused and fallen by a  at home  She is confused not behaving like her usual self with multiple bruises and abrasions over her body  Is very anxious restless and acting out  Per daughter her symptoms started yesterday  Not candidate for tPA due to symptoms starting over 24 hours ago  No prior history of A-fib  Received Zyprexa 2 5 mg IM x1 following which she has now calmed down but also noticed to have weakness on her left arm and left leg with left facial droop  Differential at this point includes acute stroke versus acute metabolic encephalopathy due to worsening dementia  Placed on stroke pathway  NIH stroke scale 9  Given aspirin 325 mg x 1 followed by 81 mg daily  CT brain reviewed  Ordered CTA head and neck and MRI brain and 2D echo    Consult placed to neurology

## 2023-04-14 NOTE — ASSESSMENT & PLAN NOTE
We will get PT OT eval due to recurrent falls probably requires rehab  abrasions and bruising noted on the body will place on Rocephin 1 g IV daily for now for right arm to prevent cellulitis and also wound care consult

## 2023-04-14 NOTE — ASSESSMENT & PLAN NOTE
Continue home meds    May use as needed Zyprexa in case of restlessness currently on a one-to-one observation due to restlessness and fall risk

## 2023-04-14 NOTE — TELEMEDICINE
REQUIRED DOCUMENTATION:     1  This service was provided via Telemedicine  2  Patient located at [address] [place] [address]  3  TeleMed provider: Jr Rodriguez MD   4  Identify all parties in room with patient during tele consult:  Patient,   5  Patient was then informed that this was a Telemedicine visit and that the exam was being conducted confidentially over secure lines  My office door was closed  No one else was in the room  Patient acknowledged consent and understanding of privacy and security of the Telemedicine visit, and gave us permission to have the assistant stay in the room in order to assist with the history and to conduct the exam   I informed the patient that I have reviewed their record in Epic and presented the opportunity for them to ask any questions regarding the visit today  The patient agreed to participate  Consulted by: Pawan Fofana MD       Assessment and Recommendations:    No new Assessment & Plan notes have been filed under this hospital service since the last note was generated  Service: Neurology    Acute/ subacute RT MCA CVA  Chronic Lt PCA CVA  Etiology embolic, likely cardioembolic considering multiple vascular distribution  Not a TNK/ acute stroke treatment candidate as LKN >24 hrs ago on arrival, as well as core infract in MRI    Would recommend CTA head and neck to evaluate vasculature    Patient on ASA 81 and crestor 10 at home, would recommend to add plavix 300 load followed by 75 daily    SCDs  for DVT prophylaxis, avoid chemical DVT prophylaxis while on DAPT to minimize risk of HC    Echo to look for source of emboli    Tele while admitted, would benefit from loop to look for paroxysmal Afib    PT/OT/ SLP      History/Data Review: This is a 76 y o  female with underlying dementia who presents who presented with confusion, after she was found fallen on the floor by , followed by not been acting like herself since yesterday   She underwent trauma pathyway on "arrival  Neurology is consulted as MRI brain showed acute Rt MCA CVA as well as old Lt PCA CVA  At baseline she does have some dementia and is little confused mixed up in the morning but then usually clears up well later on in the day       Vitals: Blood pressure 151/72, pulse 67, temperature 97 9 °F (36 6 °C), resp  rate 17, height 5' 1\" (1 549 m), weight 55 2 kg (121 lb 12 8 oz), SpO2 98 %  ,Body mass index is 23 01 kg/m²  Lab, Imaging and other studies: I have personally reviewed pertinent reports    , CBC:   Results from last 7 days   Lab Units 04/14/23  0916   WBC Thousand/uL 13 49*   RBC Million/uL 4 42   HEMOGLOBIN g/dL 13 6   HEMATOCRIT % 41 9   MCV fL 95   PLATELETS Thousands/uL 217   , BMP/CMP:   Results from last 7 days   Lab Units 04/14/23  1534 04/14/23  0916   SODIUM mmol/L 139 137   POTASSIUM mmol/L 3 8 4 4   CHLORIDE mmol/L 102 102   CO2 mmol/L 29 29   BUN mg/dL 12 13   CREATININE mg/dL 0 59* 0 67   CALCIUM mg/dL 9 5 9 6   AST U/L  --  23   ALT U/L  --  11   ALK PHOS U/L  --  49   EGFR ml/min/1 73sq m 87 83   , Vitamin B12:   , HgBA1C:   , TSH:   , Coagulation:   Results from last 7 days   Lab Units 04/14/23  0916   INR  0 97   , Lipid Profile:   , Ammonia:   , Urinalysis:   Results from last 7 days   Lab Units 04/14/23  1003   COLOR UA  Yellow   CLARITY UA  Clear   SPEC GRAV UA  >=1 030   PH UA  5 5   LEUKOCYTES UA  Negative   NITRITE UA  Negative   GLUCOSE UA mg/dl Negative   KETONES UA mg/dl Trace*   BILIRUBIN UA  Small*   BLOOD UA  Trace-Intact*   , Drug Screen:   , Medication Drug Levels:       Invalid input(s): CARBAMAZEPINE,  PHENOBARB, LACOSAMIDE, OXCARBAZEPINE,     Available brain Imaging during admission including CT, MRI brains      Current Facility-Administered Medications:   •  aspirin (ECOTRIN LOW STRENGTH) EC tablet 81 mg, 81 mg, Oral, Daily, Loreta Livingston MD, 81 mg at 04/14/23 1528  •  atorvastatin (LIPITOR) tablet 40 mg, 40 mg, Oral, QPM, Loreta Livingston MD  •  busPIRone (BUSPAR) " tablet 10 mg, 10 mg, Oral, TID, Dalila Gutierrez MD, 10 mg at 04/14/23 1528  •  [START ON 4/15/2023] cefTRIAXone (ROCEPHIN) IVPB (premix in dextrose) 1,000 mg 50 mL, 1,000 mg, Intravenous, Q24H, Dalila Gutierrez MD  •  DULoxetine (CYMBALTA) delayed release capsule 60 mg, 60 mg, Oral, Daily, Dalila Gutierrez MD, 60 mg at 04/14/23 1241  •  enoxaparin (LOVENOX) subcutaneous injection 40 mg, 40 mg, Subcutaneous, Daily, Dalila Gutierrez MD, 40 mg at 04/14/23 1528  •  latanoprost (XALATAN) 0 005 % ophthalmic solution 1 drop, 1 drop, Both Eyes, HS, Dalila Gutierrez MD  •  nicotine (NICODERM CQ) 7 mg/24hr TD 24 hr patch 1 patch, 1 patch, Transdermal, Daily, Dalila Gutierrez MD, 1 patch at 04/14/23 1528  •  timolol (TIMOPTIC) 0 5 % ophthalmic solution 1 drop, 1 drop, Both Eyes, BID, Dalila Gutierrez MD    The purposes of this evaluation are for urgent/ emergent neurological assessment and management, while this patient is admitted at a remote hospital location with a very limited in-house neurologist availability  It complements, but does not substitute in-person evaluation by neurologist in hospital, or outpatient neurologist visit  Recommendations for outpatient neurological follow up have yet to be determined

## 2023-04-15 LAB
CHOLEST SERPL-MCNC: 79 MG/DL
GLUCOSE SERPL-MCNC: 107 MG/DL (ref 65–140)
HDLC SERPL-MCNC: 28 MG/DL
LDLC SERPL CALC-MCNC: 31 MG/DL (ref 0–100)
TRIGL SERPL-MCNC: 100 MG/DL

## 2023-04-15 RX ORDER — OLANZAPINE 2.5 MG/1
2.5 TABLET ORAL EVERY 8 HOURS PRN
Status: DISCONTINUED | OUTPATIENT
Start: 2023-04-15 | End: 2023-04-18

## 2023-04-15 RX ADMIN — CLOPIDOGREL BISULFATE 75 MG: 75 TABLET ORAL at 09:23

## 2023-04-15 RX ADMIN — OLANZAPINE 2.5 MG: 2.5 TABLET, FILM COATED ORAL at 14:16

## 2023-04-15 RX ADMIN — BUSPIRONE HYDROCHLORIDE 10 MG: 10 TABLET ORAL at 21:40

## 2023-04-15 RX ADMIN — ASPIRIN 81 MG: 81 TABLET, COATED ORAL at 09:22

## 2023-04-15 RX ADMIN — DULOXETINE HYDROCHLORIDE 60 MG: 60 CAPSULE, DELAYED RELEASE ORAL at 09:22

## 2023-04-15 RX ADMIN — NICOTINE 1 PATCH: 7 PATCH, EXTENDED RELEASE TRANSDERMAL at 09:22

## 2023-04-15 RX ADMIN — BUSPIRONE HYDROCHLORIDE 10 MG: 10 TABLET ORAL at 09:22

## 2023-04-15 RX ADMIN — OLANZAPINE 2.5 MG: 2.5 TABLET, FILM COATED ORAL at 22:59

## 2023-04-15 RX ADMIN — ATORVASTATIN CALCIUM 40 MG: 40 TABLET, FILM COATED ORAL at 17:42

## 2023-04-15 RX ADMIN — BUSPIRONE HYDROCHLORIDE 10 MG: 10 TABLET ORAL at 17:41

## 2023-04-15 RX ADMIN — TIMOLOL MALEATE 1 DROP: 5 SOLUTION/ DROPS OPHTHALMIC at 17:33

## 2023-04-15 RX ADMIN — CEFTRIAXONE 1000 MG: 1 INJECTION, SOLUTION INTRAVENOUS at 10:49

## 2023-04-15 RX ADMIN — LATANOPROST 1 DROP: 50 SOLUTION OPHTHALMIC at 21:41

## 2023-04-15 RX ADMIN — ACETAMINOPHEN 650 MG: 325 TABLET ORAL at 17:41

## 2023-04-15 RX ADMIN — ACETAMINOPHEN 650 MG: 325 TABLET ORAL at 09:23

## 2023-04-15 RX ADMIN — TIMOLOL MALEATE 1 DROP: 5 SOLUTION/ DROPS OPHTHALMIC at 09:08

## 2023-04-15 NOTE — ASSESSMENT & PLAN NOTE
Presented with elevated blood pressure  Now blood pressure is currently controlled    Allow permissive hypertension In the setting of acute stroke

## 2023-04-15 NOTE — OCCUPATIONAL THERAPY NOTE
Occupational Therapy Cancellation Note         Patient Name: Paulette Peña  DXAFG'K Date: 4/15/2023  Problem List  Principal Problem:    Acute encephalopathy  Active Problems:    Generalized anxiety disorder    Hypertensive urgency    Recurrent falls    Tobacco abuse          04/15/23 1117   Note Type   Note type Evaluation; Cancelled Session   Cancel Reasons Other       OT orders received  Chart review completed  Pt admitted to [address] [place] [address] 4/14/2023 with Dx: acute encephalopathy  Spoke with RN, Odell Rubinstein who reports possible new onset stroke symptoms  CT of head pending  Attempted to see Pt at this time, however Pt's son and DIL at bedside, both tearful and requesting that therapy return at a later time  Will continue to follow and address as medically appropriate and as schedule allows         FRANCIS Quiñonez/RENÉ

## 2023-04-15 NOTE — PROGRESS NOTES
[address] [facility]  Progress Note  Name: Phil Murphy  MRN: [id number]  Unit/Bed#: -01 I Date of Admission: 4/14/2023   Date of Service: 4/15/2023 I Hospital Day: 1    Assessment/Plan   * Acute ischemic CVA  Assessment & Plan  patient was found confused and fallen by a  at home  She is confused not behaving like her usual self with multiple bruises and abrasions over her body  Is very anxious restless and acting out  Per daughter her symptoms started yesterday  Not candidate for tPA due to symptoms starting over 24 hours ago  No prior history of A-fib  Received Zyprexa 2 5 mg IM x1 following which she has now calmed down but also noticed to have weakness on her left arm and left leg with left facial droop  Differential at this point includes acute stroke versus acute metabolic encephalopathy due to worsening dementia  Placed on stroke pathway  NIH stroke scale 9  Given aspirin 325 mg x 1 followed by 81 mg daily  CT brain reviewed  MRI of the brain showed   Late/early subacute right posterior MCA distribution infarction involving the right posterior insula and temporoparietal lobes  No significant mass effect   CTA head and neck showedSuspected early infarct within the right posterior aspect of the MCA territory involving the posterior superior temporal lobe and posterior lateral frontal parietal lobes with subtle loss of gray-white differentiation  No hemorrhagic transformation  There is an old infarct within the left occipital lobe/PCA territory  Chronic microangiopathic change within the cerebral hemispheres  Diffusely hypoplastic vertebral arteries bilaterally  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 extending through the hypoglossal canal to the posterior fossa  Atherosclerotic disease of the left carotid bifurcation with only minimal narrowing   there is moderate stenosis of the intracranial internal carotid artery bilaterally at the level of the anterior clinoid     There is a small filling defect present within one of the larger M2 branches on the right within the sylvian fissure, see series 5 image 133 and a possibility of branches extending posteriorly towards the area of suspected ischemia within the right posterior lateral MCA territory suggesting small M2/M3 branch occlusion distally  Because of acute CVA patient was started on dual antiplatelet therapy and maintained on high intensity statin  Continue with serial neuro exams  Close neurology follow-up  PT OT and speech evaluation will be obtained    Tobacco abuse  Assessment & Plan  nicotine patch ordered    Recurrent falls  Assessment & Plan  We will get PT OT eval due to recurrent falls probably requires rehab  abrasions and bruising noted on the body will place on Rocephin 1 g IV daily for now for right arm to prevent cellulitis and also wound care consult    Hypertensive urgency  Assessment & Plan  Presented with elevated blood pressure  Now blood pressure is currently controlled  Allow permissive hypertension In the setting of acute stroke    Generalized anxiety disorder  Assessment & Plan  Continue home meds  May use as needed Zyprexa in case of restlessness currently on a one-to-one observation due to restlessness and fall risk  Continue with home dose of Cymbalta and buspirone  VTE Pharmacologic Prophylaxis: VTE Score: 3 High Risk (Score >/= 5) - Pharmacological DVT Prophylaxis Ordered: enoxaparin (Lovenox)  Sequential Compression Devices Ordered  Patient Centered Rounds: I performed bedside rounds with nursing staff today  Discussions with Specialists or Other Care Team Provider: Discussed with nursing    Education and Discussions with Family / Patient: Updated  (son) at bedside      Total Time Spent on Date of Encounter in care of patient: 55 minutes This time was spent on one or more of the following: performing physical exam; counseling and coordination of care; obtaining or reviewing history; documenting in the medical record; reviewing/ordering tests, medications or procedures; communicating with other healthcare professionals and discussing with patient's family/caregivers  Current Length of Stay: 1 day(s)  Current Patient Status: Inpatient   Certification Statement: The patient will continue to require additional inpatient hospital stay due to Ongoing stroke work-up  Discharge Plan: Anticipate discharge in 48 hrs to rehab facility  Code Status: Level 1 - Full Code    Subjective:   Seen and examined at bedside  Appears restless and agitated  Denies any headache  Reports visual abnormalities  Objective:     Vitals:   Temp (24hrs), Av 6 °F (37 °C), Min:97 9 °F (36 6 °C), Max:99 3 °F (37 4 °C)    Temp:  [97 9 °F (36 6 °C)-99 3 °F (37 4 °C)] 98 8 °F (37 1 °C)  HR:  [54-84] 84  Resp:  [16-20] 16  BP: (102-151)/(53-72) 121/62  SpO2:  [96 %-98 %] 96 %  Body mass index is 23 01 kg/m²  Input and Output Summary (last 24 hours):   No intake or output data in the 24 hours ending 04/15/23 1400    Physical Exam:   Physical Exam  Constitutional:       General: She is in acute distress  Appearance: She is ill-appearing  HENT:      Head: Normocephalic  Nose: Nose normal       Mouth/Throat:      Mouth: Mucous membranes are moist    Eyes:      Extraocular Movements: Extraocular movements intact  Pupils: Pupils are equal, round, and reactive to light  Cardiovascular:      Rate and Rhythm: Normal rate and regular rhythm  Pulses: Normal pulses  Pulmonary:      Effort: Pulmonary effort is normal    Abdominal:      General: Abdomen is flat  Musculoskeletal:      Cervical back: Neck supple  Right lower leg: No edema  Left lower leg: No edema  Skin:     General: Skin is warm  Neurological:      Mental Status: She is alert  Comments: Oriented to self  Very restless and agitated  Has left-sided neglect and diminished strength in the left upper and lower extremity  Also has visual field deficits  Additional Data:     Labs:  Results from last 7 days   Lab Units 04/14/23  0916   WBC Thousand/uL 13 49*   HEMOGLOBIN g/dL 13 6   HEMATOCRIT % 41 9   PLATELETS Thousands/uL 217   NEUTROS PCT % 85*   LYMPHS PCT % 7*   MONOS PCT % 6   EOS PCT % 1     Results from last 7 days   Lab Units 04/14/23  1534 04/14/23  0916   SODIUM mmol/L 139 137   POTASSIUM mmol/L 3 8 4 4   CHLORIDE mmol/L 102 102   CO2 mmol/L 29 29   BUN mg/dL 12 13   CREATININE mg/dL 0 59* 0 67   ANION GAP mmol/L 8 6   CALCIUM mg/dL 9 5 9 6   ALBUMIN g/dL  --  3 3*   TOTAL BILIRUBIN mg/dL  --  0 74   ALK PHOS U/L  --  49   ALT U/L  --  11   AST U/L  --  23   GLUCOSE RANDOM mg/dL 98 139     Results from last 7 days   Lab Units 04/14/23  0916   INR  0 97     Results from last 7 days   Lab Units 04/15/23  0734 04/14/23  2052 04/14/23  1651   POC GLUCOSE mg/dl 107 105 89         Results from last 7 days   Lab Units 04/14/23  0916   LACTIC ACID mmol/L 1 2   PROCALCITONIN ng/ml <0 05       Lines/Drains:  Invasive Devices     Peripheral Intravenous Line  Duration           Peripheral IV 04/14/23 Left Antecubital 1 day                  Telemetry:  Telemetry Orders (From admission, onward)             48 Hour Telemetry Monitoring  Continuous x 48 hours        References:    Telemetry Guidelines   Question:  Reason for 48 Hour Telemetry  Answer:  Acute CVA (<24 hrs old, hemispheric strokes, selected brainstem strokes, cardiac arrhythmias)                 Telemetry Reviewed: Normal Sinus Rhythm  Indication for Continued Telemetry Use: Acute CVA             Imaging: Reviewed radiology reports from this admission including: MRI brain    Recent Cultures (last 7 days):   Results from last 7 days   Lab Units 04/14/23  1043 04/14/23  0916   BLOOD CULTURE  Received in Microbiology Lab  Culture in Progress   Received in Microbiology Lab  Culture in Progress  Last 24 Hours Medication List:   Current Facility-Administered Medications   Medication Dose Route Frequency Provider Last Rate   • acetaminophen  650 mg Oral Q6H PRN RAYSA Dominguez     • aspirin  81 mg Oral Daily Lisy Patterson MD     • atorvastatin  40 mg Oral QPM Lisy Patterson MD     • busPIRone  10 mg Oral TID Lisy Patterson MD     • cefTRIAXone  1,000 mg Intravenous Q24H Lisy Patterson MD 1,000 mg (04/15/23 1049)   • clopidogrel  75 mg Oral Daily Lisy Patterson MD     • DULoxetine  60 mg Oral Daily Lisy Patterson MD     • latanoprost  1 drop Both Eyes HS Lisy Patterson MD     • nicotine  1 patch Transdermal Daily Lisy Patterson MD     • timolol  1 drop Both Eyes BID Lisy Patterson MD          Today, Patient Was Seen By: Junacarlos Garcia MD    **Please Note: This note may have been constructed using a voice recognition system  **

## 2023-04-15 NOTE — ASSESSMENT & PLAN NOTE
patient was found confused and fallen by a  at home  She is confused not behaving like her usual self with multiple bruises and abrasions over her body  Is very anxious restless and acting out  Per daughter her symptoms started yesterday  Not candidate for tPA due to symptoms starting over 24 hours ago  No prior history of A-fib  Received Zyprexa 2 5 mg IM x1 following which she has now calmed down but also noticed to have weakness on her left arm and left leg with left facial droop  Differential at this point includes acute stroke versus acute metabolic encephalopathy due to worsening dementia  Placed on stroke pathway  NIH stroke scale 9  Given aspirin 325 mg x 1 followed by 81 mg daily  CT brain reviewed  MRI of the brain showed   Late/early subacute right posterior MCA distribution infarction involving the right posterior insula and temporoparietal lobes  No significant mass effect   CTA head and neck showedSuspected early infarct within the right posterior aspect of the MCA territory involving the posterior superior temporal lobe and posterior lateral frontal parietal lobes with subtle loss of gray-white differentiation  No hemorrhagic transformation  There is an old infarct within the left occipital lobe/PCA territory  Chronic microangiopathic change within the cerebral hemispheres  Diffusely hypoplastic vertebral arteries bilaterally  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 extending through the hypoglossal canal to the posterior fossa  Atherosclerotic disease of the left carotid bifurcation with only minimal narrowing  there is moderate stenosis of the intracranial internal carotid artery bilaterally at the level of the anterior clinoid     There is a small filling defect present within one of the larger M2 branches on the right within the sylvian fissure, see series 5 image 133 and a possibility of branches extending posteriorly towards the area of suspected ischemia within the right posterior lateral MCA territory suggesting small M2/M3 branch occlusion distally  Because of acute CVA patient was started on dual antiplatelet therapy and maintained on high intensity statin    Continue with serial neuro exams  Close neurology follow-up  PT OT and speech evaluation will be obtained

## 2023-04-15 NOTE — ASSESSMENT & PLAN NOTE
Continue home meds  May use as needed Zyprexa in case of restlessness currently on a one-to-one observation due to restlessness and fall risk  Continue with home dose of Cymbalta and buspirone

## 2023-04-15 NOTE — CASE MANAGEMENT
Case Management Assessment & Discharge Planning Note    Patient name Caleb Castleman  Location /-64 MRN [id number]  : 1945 Date 4/15/2023       Current Admission Date: 2023  Current Admission Diagnosis:Acute ischemic CVA   Patient Active Problem List    Diagnosis Date Noted   • Recurrent falls 2023   • Tobacco abuse 2023   • Acute ischemic CVA 2023   • Community acquired pneumonia of right lower lobe of lung 2023   • Vascular dementia ([facility] [address] ) 2023   • Generalized anxiety disorder 2023   • Hypertensive urgency 2023      LOS (days): 1  Geometric Mean LOS (GMLOS) (days): 0 00  Days to GMLOS:     OBJECTIVE:  PATIENT READMITTED TO HOSPITAL  Risk of Unplanned Readmission Score: 13 06         Current admission status: Inpatient       Preferred Pharmacy:   [address] [place] [address]  Phone: [phone] Fax: [phone]    Primary Care Provider: Nikolai Carter DO    Primary Insurance: MEDICARE  Secondary Insurance: BLUE CROSS    ASSESSMENT:  [address] Proxies    There are no active Health Care Proxies on file         Advance Directives  Does patient have a Health Care POA?: Yes  Does patient have Advance Directives?: Yes  Advance Directives: Power of  for health care  Primary Contact: cynthia Larkin         Readmission Root Cause  30 Day Readmission: Yes  Who directed you to return to the hospital?: Family  Did you understand whom to contact if you had questions or problems?: Yes  Were you able to get your prescriptions filled when you left the hospital?: Yes  Did you take your medications as prescribed?: Yes  Were you able to get to your follow-up appointments?: Yes  During previous admission, was a post-acute recommendation made?: Yes  What post-acute resources were offered?: [facility]  Patient was readmitted due to: acute encephalopathy, stroke  Action Plan: TBD    Patient Information  Admitted from[de-identified] Home  Mental Status: Confused  During Assessment patient was accompanied by: Son, Daughter  Assessment information provided by[de-identified] Son, Daughter  Primary Caregiver: Self  Support Systems: Spouse/significant other, Son, Daughter  [place] of Residence: [facility] do you live in?: [address] [facility] entry access options  Select all that apply : Stairs  Number of steps to enter home  : 1  Do the steps have railings?: No  Type of Current Residence: [place]  In the last 12 months, was there a time when you were not able to pay the mortgage or rent on time?: No  In the last 12 months, how many places have you lived?: 1  In the last 12 months, was there a time when you did not have a steady place to sleep or slept in a shelter (including now)?: No  Homeless/housing insecurity resource given?: N/A  Living Arrangements: Lives w/ Spouse/significant other  Is patient a ?: No    Activities of Daily Living Prior to Admission  Functional Status: Assistance  Completes ADLs independently?: No  Level of ADL dependence: Assistance  Ambulates independently?: No  Level of ambulatory dependence: Assistance  Does patient use assisted devices?: Yes  Assisted Devices (DME) used: Cl Upton (Comment) (shoes with lifts)  Does patient currently own DME?: Yes  What DME does the patient currently own?: Tangela Nava Other (Comment) (shoes with lifts)  Does patient have a history of Outpatient Therapy (PT/OT)?: No  Does the patient have a history of Short-Term Rehab?: No  Does patient have a history of HHC?: Yes ([address])  [facility] ([facility])    Current Home Health Care  Type of Current Home Care Services: Home PT, Home OT, Nurse visit  [address][de-identified] Other (please enter name in comment) ([address])  [facility] Provider[de-identified] PCP    Patient Information Continued  Income Source: SSI/SSD  Does patient have prescription coverage?: Yes  Within the past 12 months, you worried that your food would run out before you got the money to buy more : Never true  Within the past 12 months, the food you bought just didn't last and you didn't have money to get more : Never true  Food insecurity resource given?: N/A  Does patient receive dialysis treatments?: No  Does patient have a history of substance abuse?: No  Does patient have a history of Mental Health Diagnosis?: No         Means of Transportation  Means of Transport to Appts[de-identified] Family transport  In the past 12 months, has lack of transportation kept you from medical appointments or from getting medications?: No  In the past 12 months, has lack of transportation kept you from meetings, work, or from getting things needed for daily living?: No  Was application for public transport provided?: N/A        DISCHARGE DETAILS:    Discharge planning discussed with[de-identified] son, Yaw Finch, and José Miguel Hurt, daughter  Freedom of Choice: Yes     CM contacted family/caregiver?: Yes             Contacts  Patient Contacts: Yaw Finch, son, and José Miguel Hurt, daughter  Relationship to Patient[de-identified] Family  Contact Method: In Person  Reason/Outcome: Continuity of Care, Discharge Planning               CM met with patient, son Dulce Campbell) and daughter Christy Coyle at the bedside, baseline information was obtained  CM discussed the role of CM in helping the patient develop a discharge plan and assist the patient in carry out their plan  Patient was living at home with her spouse who has [facility]/week (6 hours/day M-F, 5 hours/day wkds)  Patient had hip surgery years previous and walks with shoes with lifts due to uneven leg lengths  Patient previously at [address] [place] [address] acute rehab  Patient and spouse had been living independently, caring for one another, with [address] also assisting  Patient and spouse also receive [employer]  Patient active with [facility] since discharge 3/17/23   CM submitted RUPERTO "referral to Casandra Mak  Daughter reported she had patient out in community Thursday evening and dropped patient off at home, with no issues  Daughter was contacted Friday morning regarding patient being found outside home confused and daughter reported home was \"trashed  \"     Patient was confused during CMs visit, with left side deficits and visual deficits   PT/OT to raulito                                                                            "

## 2023-04-15 NOTE — PROGRESS NOTES
Dr Brian Saravia made aware in AM patients vision is diminished and patient is neglecting L side  Patient requiring verbal ques to look to the left and  LUE and MEGAN Saravia at bedside in AM to assess patient  Patient yelling out for family virtual 1:1 encouraged to talk with patient as she seems to calm down when staff is present and reassuring her

## 2023-04-16 LAB
ANION GAP SERPL CALCULATED.3IONS-SCNC: 6 MMOL/L (ref 4–13)
BUN SERPL-MCNC: 18 MG/DL (ref 5–25)
CALCIUM SERPL-MCNC: 8.2 MG/DL (ref 8.4–10.2)
CHLORIDE SERPL-SCNC: 106 MMOL/L (ref 96–108)
CO2 SERPL-SCNC: 26 MMOL/L (ref 21–32)
CREAT SERPL-MCNC: 0.63 MG/DL (ref 0.6–1.3)
EST. AVERAGE GLUCOSE BLD GHB EST-MCNC: 123 MG/DL
GFR SERPL CREATININE-BSD FRML MDRD: 85 ML/MIN/1.73SQ M
GLUCOSE SERPL-MCNC: 108 MG/DL (ref 65–140)
GLUCOSE SERPL-MCNC: 81 MG/DL (ref 65–140)
GLUCOSE SERPL-MCNC: 87 MG/DL (ref 65–140)
HBA1C MFR BLD: 5.9 %
POTASSIUM SERPL-SCNC: 3.7 MMOL/L (ref 3.5–5.3)
SODIUM SERPL-SCNC: 138 MMOL/L (ref 135–147)

## 2023-04-16 RX ADMIN — BUSPIRONE HYDROCHLORIDE 10 MG: 10 TABLET ORAL at 16:06

## 2023-04-16 RX ADMIN — LATANOPROST 1 DROP: 50 SOLUTION OPHTHALMIC at 21:42

## 2023-04-16 RX ADMIN — BUSPIRONE HYDROCHLORIDE 10 MG: 10 TABLET ORAL at 21:41

## 2023-04-16 RX ADMIN — TIMOLOL MALEATE 1 DROP: 5 SOLUTION/ DROPS OPHTHALMIC at 08:47

## 2023-04-16 RX ADMIN — DULOXETINE HYDROCHLORIDE 60 MG: 60 CAPSULE, DELAYED RELEASE ORAL at 08:44

## 2023-04-16 RX ADMIN — ASPIRIN 81 MG: 81 TABLET, COATED ORAL at 08:44

## 2023-04-16 RX ADMIN — TIMOLOL MALEATE 1 DROP: 5 SOLUTION/ DROPS OPHTHALMIC at 17:06

## 2023-04-16 RX ADMIN — CEFTRIAXONE 1000 MG: 1 INJECTION, SOLUTION INTRAVENOUS at 09:23

## 2023-04-16 RX ADMIN — CLOPIDOGREL BISULFATE 75 MG: 75 TABLET ORAL at 08:44

## 2023-04-16 RX ADMIN — BUSPIRONE HYDROCHLORIDE 10 MG: 10 TABLET ORAL at 08:44

## 2023-04-16 RX ADMIN — ATORVASTATIN CALCIUM 40 MG: 40 TABLET, FILM COATED ORAL at 17:06

## 2023-04-16 RX ADMIN — NICOTINE 1 PATCH: 7 PATCH, EXTENDED RELEASE TRANSDERMAL at 08:43

## 2023-04-16 RX ADMIN — OLANZAPINE 2.5 MG: 2.5 TABLET, FILM COATED ORAL at 21:42

## 2023-04-16 RX ADMIN — OLANZAPINE 2.5 MG: 2.5 TABLET, FILM COATED ORAL at 07:52

## 2023-04-16 NOTE — ASSESSMENT & PLAN NOTE
Patient was found confused and fallen by a  at home  She is confused not behaving like her usual self with multiple bruises and abrasions over her body  Is very anxious restless and acting out  Per daughter her symptoms started yesterday  Not candidate for tPA due to symptoms starting over 24 hours ago  No prior history of A-fib  Received Zyprexa 2 5 mg IM x1 following which she has now calmed down but also noticed to have weakness on her left arm and left leg with left facial droop  Differential at this point includes acute stroke versus acute metabolic encephalopathy due to worsening dementia  Placed on stroke pathway  NIH stroke scale 9  Given aspirin 325 mg x 1 followed by 81 mg daily  CT brain reviewed  MRI of the brain showed   Late/early subacute right posterior MCA distribution infarction involving the right posterior insula and temporoparietal lobes  No significant mass effect   CTA head and neck showedSuspected early infarct within the right posterior aspect of the MCA territory involving the posterior superior temporal lobe and posterior lateral frontal parietal lobes with subtle loss of gray-white differentiation  No hemorrhagic transformation  There is an old infarct within the left occipital lobe/PCA territory  Chronic microangiopathic change within the cerebral hemispheres  Diffusely hypoplastic vertebral arteries bilaterally  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 extending through the hypoglossal canal to the posterior fossa  Atherosclerotic disease of the left carotid bifurcation with only minimal narrowing  there is moderate stenosis of the intracranial internal carotid artery bilaterally at the level of the anterior clinoid     There is a small filling defect present within one of the larger M2 branches on the right within the sylvian fissure, see series 5 image 133 and a possibility of branches extending posteriorly towards the area of suspected ischemia within the right posterior lateral MCA territory suggesting small M2/M3 branch occlusion distally  Because of acute CVA patient was started on dual antiplatelet therapy and maintained on high intensity statin  Continue with serial neuro exams  Continues to have left-sided weakness and neglect with visual abnormalities    Remains agitated  Await echocardiogram   Telemetry monitor without any arrhythmia  Close neurology follow-up  PT OT and speech evaluation will be obtained

## 2023-04-16 NOTE — ASSESSMENT & PLAN NOTE
We will get PT OT eval due to recurrent falls probably requires rehab  abrasions and bruising noted on the body  Remains afebrile without leukocytosis  Will discontinue ceftriaxone

## 2023-04-16 NOTE — PLAN OF CARE
Problem: PHYSICAL THERAPY ADULT  Goal: Performs mobility at highest level of function for planned discharge setting  See evaluation for individualized goals  Description: Treatment/Interventions: ADL retraining, Functional transfer training, LE strengthening/ROM, Elevations, Therapeutic exercise, Endurance training, Cognitive reorientation, Patient/family training, Equipment eval/education, Bed mobility, Gait training, Compensatory technique education, Spoke to nursing, Family  Equipment Recommended:  (TBD by Nicole Oliver)       See flowsheet documentation for full assessment, interventions and recommendations  4/16/2023 1454 by Sandra Machado, LUMA  Outcome: Progressing  Note: Prognosis: Good  Problem List: Decreased strength, Decreased endurance, Impaired balance, Decreased mobility, Decreased coordination, Decreased cognition, Impaired judgement, Decreased safety awareness, Impaired vision, Decreased skin integrity  Patient seen for additional treatment post eval with include family education on left side neglect and weakness  Educated son on encouraging patient to look toward L with verbal cues and environmental adaptations such as sitting slightly L of midline  Educated patient and son in LE seated TE including DF/PF, knee flexion/ext and hip flexion actively on the R and AA/PROM on the left  Son instructed on providing verbal as well as tactile cues to promote patient initiation and execution of same  Patient tolerated well  Son verbalized understanding as was without additional questions  Barriers to Discharge: Inaccessible home environment, Decreased caregiver support  Barriers to Discharge Comments: Patient is currently requiring use of mechanical lift for transfers  PT Discharge Recommendation: Post acute rehabilitation services    See flowsheet documentation for full assessment

## 2023-04-16 NOTE — PLAN OF CARE
Problem: PHYSICAL THERAPY ADULT  Goal: Performs mobility at highest level of function for planned discharge setting  See evaluation for individualized goals  Description: Treatment/Interventions: ADL retraining, Functional transfer training, LE strengthening/ROM, Elevations, Therapeutic exercise, Endurance training, Cognitive reorientation, Patient/family training, Equipment eval/education, Bed mobility, Gait training, Compensatory technique education, Spoke to nursing, Family  Equipment Recommended:  (TBD by Lee Lua)       See flowsheet documentation for full assessment, interventions and recommendations  Note: Prognosis: Good  Problem List: Decreased strength, Decreased endurance, Impaired balance, Decreased mobility, Decreased coordination, Decreased cognition, Impaired judgement, Decreased safety awareness, Impaired vision, Decreased skin integrity  Assessment: Pt is a 76 y o  female seen for PT evaluation s/p admission to [address] [place] [address] on 4/14/2023 with Acute encephalopathy  Order placed for PT services  Upon evaluation: Pt is presenting with impaired functional mobility due to decreased strength, decreased endurance, impaired balance, impaired coordination, impaired proprioception, gait deviations, impaired cognition, decreased safety awareness, impaired judgment, visual impairment, fall risk, and impaired skin integrity requiring  max assistance for bed mobility and mechanical conveyance from nursing standpoint for OOB mobility   Pt's clinical presentation is currently unstable/unpredictable given the functional mobility deficits above, especially weakness, decreased skin integrity, decreased endurance, impaired coordination, gait deviations, decreased activity tolerance, decreased functional mobility tolerance, decreased safety awareness, impaired judgement, decreased cognition, and impaired vestibular-occular function, coupled with fall risks as indicated by AM-PAC 6-Clicks: 3/13 as well as hx of falls, impulsivity, impaired balance, impaired coordination, impaired judgement, decreased safety awareness, altered vision, and decreased cognition and combined with medical complications of hypertension , poor blood pressure control, abnormal renal lab values, abnormal H&H, abnormal WBCs, abnormal CBC, impulsivity during admission, agitation, combativeness, fear/retreat, need for input for mobility technique/safety and acute encephalopathy, acute ischemic CVA with L hemiparesis/neglect   Pt's PMHx and comorbidities that may affect physical performance and progress include: Dementia and limited cognition, h/o stroke, anxiety  Personal factors affecting pt at time of IE include: unable to perform caregiver support/tasks, anxiety, impulsivity, inaccessible home environment, step(s) to enter environment, availability as recommended, limited home support, advanced age, behavioral pattern, inability to perform IADLs, inability to perform ADLs, inability to navigate level surfaces without external assistance, inability to ambulate household distances, inability to navigate community distances, limited insight into impairments, decreased initiation and engagement, recent fall(s)/fall history and tobacco use  Pt will benefit from skilled PT services to address deficits as defined above and to maximize level of functional mobility to facilitate return toward PLOF and improved QOL  Rec at  d/c would be PAR  Barriers to Discharge: Inaccessible home environment, Decreased caregiver support  Barriers to Discharge Comments: Patient is currently requiring use of mechanical lift for transfers  PT Discharge Recommendation: Post acute rehabilitation services    See flowsheet documentation for full assessment

## 2023-04-16 NOTE — PHYSICAL THERAPY NOTE
PHYSICAL THERAPY EVALUATION  NAME:  Thomas Coleman: 04/16/23    AGE:   76 y o   Mrn:   [id number]  ADMIT DX:  Infected wound [T14  8XXA, L08 9]  Confusion [R41 0]  Bruising [T14  8XXA]  Recurrent falls [R29 6]  Unspecified multiple injuries, initial encounter [T07  XXXA]    Past Medical History:   Diagnosis Date   • Dementia ([facility] [address] )    • No abnormality of hip joint detected on examination    • Stroke [facility])      Length Of Stay: 2  Performed at least 2 patient identifiers during session: Name and Birthday  PHYSICAL THERAPY EVALUATION :    04/16/23 1258   PT Last Visit   PT Visit Date 04/16/23   Note Type   Note type Evaluation   Pain Assessment   Pain Assessment Tool 0-10   Pain Score No Pain   Restrictions/Precautions   Other Precautions Fall Risk; Chair Alarm; Bed Alarm;Cognitive;Agitated;Combative; Impulsive;1:1;Multiple lines;Visual impairment  (L hemiparesis/L neglect)   Home Living   Type of Home House  (2 LILLY w/o HR)   Home Layout One level;Stairs to enter without rails   Bathroom Shower/Tub Walk-in shower   Bathroom Toilet [address]   [address]  (rollator)   Additional Comments Patient son present at time of eval   Per his reports, patient lives in a one story home with 2 LILLY w/o HR  Patient ocasionally used a rollator at baseline  walk in shower with a shower chair  Sleeps in a standard twin bed  Prior Function   Level of [place] Independent with ADLs; Independent with functional mobility; Needs assistance with IADLS  (MOM's meal program)   Lives With Spouse  (patients spouse has 5-7 hours/per day of aide care)   Receives Help From Family  (patients spouse has 5-7 hours of daily aide care  Family is attempting to get home care for patient as well ( per son)  Dtg, son and DIL live locally and are supportive )   IADLs Family/Friend/Other provides transportation; Family/Friend/Other provides meals; Family/Friend/Other provides medication management  (Per son, "patient occ cooks but is often poorly motivated to do so )   Falls in the last 6 months 1 to 4   Comments Patient lives with he spouse who recieves 5-7 hours daily of aide assistance  Per son, they are working on getting care aides for patient as he states she needs more A  Per son, patients spouse also relies on patient for A when aides are not present   General   Additional Pertinent History Patient has a stroke history   Family/Caregiver Present Yes  (Patients son, Austin Ahr)   Cognition   Overall Cognitive Status Impaired   Arousal/Participation Alert   Orientation Level Oriented to person;Disoriented to time;Oriented to place   Comments Easily distractable, requires constant cueing, L neglect   Subjective   Subjective \"I can't believe I'm here\"   RLE Assessment   RLE Assessment WFL  (Grossly 3+/5)   LLE Assessment   LLE Assessment   (ROM is WFL's  Patient with difficulty following commands for MMT, however is able to demonstrate partial active movement @ hip, knee and ankle ( grossly 2-/5))   Vision-Basic Assessment   Patient Visual Report   (Patient reporting visual deficits including double vision/L neglect)   Light Touch   RLE Light Touch Grossly intact   LLE Light Touch Grossly intact   Bed Mobility   Supine to Sit 3  Moderate assistance   Additional items Assist x 1;Bedrails; Increased time required; Impulsive;Verbal cues;LE management   Sit to Supine 2  Maximal assistance   Additional items Assist x 1;Bedrails; Increased time required; Impulsive;Verbal cues;LE management   Additional Comments Patient supine in bed at start of session with alarm intact and alll needs within reach  Patient completed supine > sit with moderate A x 1 with use of bed enabler, HOB flat  Patient required max verbal and tactile cues for sequencing, maneveuring of LLE, trunk and LE UE management as well as overall safety    Patient was max A x 1 with use of bed enabler for sit > supine for LE and trunk management with max cues and " "constant cue'ing/redirection   Transfers   Sit to Stand 2  Maximal assistance  (with use of quick move)   Additional items Assist x 2; Increased time required; Impulsive;Verbal cues  (with quick move, tactile cues)   Stand to Sit 2  Maximal assistance  (with use of quick move)   Additional items Assist x 2; Increased time required; Impulsive;Verbal cues  (quick move)   Stand pivot 2  Maximal assistance  (with use of quick move)   Additional items Increased time required; Impulsive;Verbal cues; Assist x 2  (use of quick move)   Additional Comments Patient completed STS and SPT with use of quick move/A x 2  Patient required constant verbal and tactile cues for safety, re-direction and to remain focused on task  Increased support/A on L D/T hemiparesis/neglect   Ambulation/Elevation   Distance Ambulation deferred D/T safety as patient is currently requiring use of quick move   Balance   Static Sitting Poor +   Dynamic Sitting Poor   Static Standing Poor   Dynamic Standing   (unable)   Activity Tolerance   Activity Tolerance Patient limited by fatigue   Nurse Made Aware Spoke with AMADO Morales CNA Nancy   Assessment   Prognosis Good   Problem List Decreased strength;Decreased endurance; Impaired balance;Decreased mobility; Decreased coordination;Decreased cognition; Impaired judgement;Decreased safety awareness; Impaired vision;Decreased skin integrity   Barriers to Discharge Inaccessible home environment;Decreased caregiver support   Barriers to Discharge Comments Patient is currently requiring use of mechanical lift for transfers   Goals   Patient Goals \"I want to go home\"   RUST Expiration Date 04/30/23   Plan   Treatment/Interventions ADL retraining;Functional transfer training;LE strengthening/ROM; Elevations; Therapeutic exercise; Endurance training;Cognitive reorientation;Patient/family training;Equipment eval/education; Bed mobility;Gait training; Compensatory technique education;Spoke to nursing;Family   PT Frequency 5-7x/wk " Recommendation   PT Discharge Recommendation Post acute rehabilitation services   Equipment Recommended   (TBD by mauro)   AM-PAC Basic Mobility Inpatient   Turning in Flat Bed Without Bedrails 2   Lying on Back to Sitting on Edge of Flat Bed Without Bedrails 2   Moving Bed to Chair 1   Standing Up From Chair Using Arms 1   Walk in Room 1   Climb 3-5 Stairs With Railing 1   Basic Mobility Inpatient Raw Score 8   Turning Head Towards Sound 3   Follow Simple Instructions 2   Low Function Basic Mobility Raw Score  13   Low Function Basic Mobility Standardized Score  20 14   Highest Level Of Mobility   JH-HLM Goal 3: Sit at edge of bed   JH-HLM Achieved 4: Move to chair/commode   Additional Treatment Session   Start Time 1341   End Time 1359   Treatment Assessment Patient seen for additional treatment post eval with include family education on left side neglect and weakness  Educated son on encouraging patient to look toward L with verbal cues and environmental adaptations such as sitting slightly L of midline  Educated patient and son in LE seated TE including DF/PF, knee flexion/ext and hip flexion actively on the R and AA/PROM on the left  Son instructed on providing verbal as well as tactile cues to promote patient initiation and execution of same  Patient tolerated well  Son verbalized understanding as was without additional questions   Additional Treatment Day 1   End of Consult   Patient Position at End of Consult Bedside chair;Bed/Chair alarm activated; All needs within reach     (Please find full objective findings from PT assessment regarding body systems outlined above)  Assessment: Pt is a 76 y o  female seen for PT evaluation s/p admission to [address] [place] [address] on 4/14/2023 with Acute encephalopathy  Order placed for PT services    Upon evaluation: Pt is presenting with impaired functional mobility due to decreased strength, decreased endurance, impaired balance, impaired coordination, impaired proprioception, gait deviations, impaired cognition, decreased safety awareness, impaired judgment, visual impairment, fall risk, and impaired skin integrity requiring  max assistance for bed mobility and mechanical conveyance from nursing standpoint for OOB mobility  Pt's clinical presentation is currently unstable/unpredictable given the functional mobility deficits above, especially weakness, decreased skin integrity, decreased endurance, impaired coordination, gait deviations, decreased activity tolerance, decreased functional mobility tolerance, decreased safety awareness, impaired judgement, decreased cognition, and impaired vestibular-occular function, coupled with fall risks as indicated by AM-PAC 6-Clicks: 3/31 as well as hx of falls, impulsivity, impaired balance, impaired coordination, impaired judgement, decreased safety awareness, altered vision, and decreased cognition and combined with medical complications of hypertension , poor blood pressure control, abnormal renal lab values, abnormal H&H, abnormal WBCs, abnormal CBC, impulsivity during admission, agitation, combativeness, fear/retreat, need for input for mobility technique/safety and acute encephalopathy, acute ischemic CVA with L hemiparesis/neglect   Pt's PMHx and comorbidities that may affect physical performance and progress include: Dementia and limited cognition, h/o stroke, anxiety   Personal factors affecting pt at time of IE include: unable to perform caregiver support/tasks, anxiety, impulsivity, inaccessible home environment, step(s) to enter environment, availability as recommended, limited home support, advanced age, behavioral pattern, inability to perform IADLs, inability to perform ADLs, inability to navigate level surfaces without external assistance, inability to ambulate household distances, inability to navigate community distances, limited insight into impairments, decreased initiation and engagement, recent fall(s)/fall history and tobacco use  Pt will benefit from continued skilled PT services to address deficits as defined above and to maximize level of functional mobility to facilitate return toward PLOF and improved QOL  From PT/mobility standpoint, recommendation at time of d/c would be post acute rehab (PAR) pending progress in order to reduce fall risk and maximize pt's functional independence and consistency with mobility in order to facilitate return to PLOF  Recommend ther ex next 1-2 sessions and mobility with HW  The patient's AM-PAC Basic Mobility Inpatient Short Form Raw Score is 8  A Raw score of less than or equal to 16 suggests the patient may benefit from discharge to post-acute rehabilitation services  Please also refer to the recommendation of the Physical Therapist for safe discharge planning  Goals: Pt will: Perform bed mobility tasks with consistent min A of 1 to reposition in bed and prepare for transfers  Pt will perform transfers with consistent min A of 1 to decrease risk for falls, improve ease of transfers and improve ease of bed mobility and prepare for ambulation  Pt will ambulate with LRAD for >/= 48' with  consistent min A of 1  to decrease risk for falls, improve activity tolerance, increase Indep in prior living environment, improve gait quality and promote proper use of assistive device and to access home environment  Pt will complete >/= 2 steps with without handrail with consistent min A of 1 to return to home with LILLY  Pt will participate in objective balance assessment to determine baseline fall risk  Pt will increase B LE strength >/= 1/2 MMT grade to facilitate functional mobility        Erica Diaz, PT,MSPT

## 2023-04-16 NOTE — PROGRESS NOTES
[address]  Progress Note  Name: Octavio Dunlap  MRN: [id number]  Unit/Bed#: -01 I Date of Admission: 4/14/2023   Date of Service: 4/16/2023 I Hospital Day: 2  Addendum  Discussed with neurologist on-call patient's worsening neuro symptoms including left-sided neglect and visual loss  Recommended to repeat MRI to rule out new strokes  Continue with stroke pathway  Await echocardiogram   Continue with telemetry monitoring  Assessment/Plan   * Acute ischemic CVA  Assessment & Plan  Patient was found confused and fallen by a  at home  She is confused not behaving like her usual self with multiple bruises and abrasions over her body  Is very anxious restless and acting out  Per daughter her symptoms started yesterday  Not candidate for tPA due to symptoms starting over 24 hours ago  No prior history of A-fib  Received Zyprexa 2 5 mg IM x1 following which she has now calmed down but also noticed to have weakness on her left arm and left leg with left facial droop  Differential at this point includes acute stroke versus acute metabolic encephalopathy due to worsening dementia  Placed on stroke pathway  NIH stroke scale 9  Given aspirin 325 mg x 1 followed by 81 mg daily  CT brain reviewed  MRI of the brain showed   Late/early subacute right posterior MCA distribution infarction involving the right posterior insula and temporoparietal lobes  No significant mass effect   CTA head and neck showedSuspected early infarct within the right posterior aspect of the MCA territory involving the posterior superior temporal lobe and posterior lateral frontal parietal lobes with subtle loss of gray-white differentiation  No hemorrhagic transformation  There is an old infarct within the left occipital lobe/PCA territory  Chronic microangiopathic change within the cerebral hemispheres  Diffusely hypoplastic vertebral arteries bilaterally    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 extending through the hypoglossal canal to the posterior fossa  Atherosclerotic disease of the left carotid bifurcation with only minimal narrowing  there is moderate stenosis of the intracranial internal carotid artery bilaterally at the level of the anterior clinoid     There is a small filling defect present within one of the larger M2 branches on the right within the sylvian fissure, see series 5 image 133 and a possibility of branches extending posteriorly towards the area of suspected ischemia within the right posterior lateral MCA territory suggesting small M2/M3 branch occlusion distally  Because of acute CVA patient was started on dual antiplatelet therapy and maintained on high intensity statin  Continue with serial neuro exams  Continues to have left-sided weakness and neglect with visual abnormalities  Remains agitated  Await echocardiogram   Telemetry monitor without any arrhythmia  Close neurology follow-up  PT OT and speech evaluation will be obtained    Tobacco abuse  Assessment & Plan  nicotine patch ordered    Recurrent falls  Assessment & Plan  We will get PT OT eval due to recurrent falls probably requires rehab  abrasions and bruising noted on the body  Remains afebrile without leukocytosis  Will discontinue ceftriaxone  Hypertensive urgency  Assessment & Plan  Presented with elevated blood pressure  Now blood pressure is currently controlled  Allow permissive hypertension In the setting of acute stroke    Generalized anxiety disorder  Assessment & Plan  Continue home meds  May use as needed Zyprexa in case of restlessness currently on a one-to-one observation due to restlessness and fall risk  Continue with home dose of Cymbalta and buspirone  VTE Pharmacologic Prophylaxis: VTE Score: 3 High Risk (Score >/= 5) - Pharmacological DVT Prophylaxis Ordered: heparin   Sequential Compression Devices Ordered  Patient Centered Rounds: I performed bedside rounds with nursing staff today  Discussions with Specialists or Other Care Team Provider: keo    Education and Discussions with Family / Patient: Updated  (son) via phone  Total Time Spent on Date of Encounter in care of patient: 45 minutes This time was spent on one or more of the following: performing physical exam; counseling and coordination of care; obtaining or reviewing history; documenting in the medical record; reviewing/ordering tests, medications or procedures; communicating with other healthcare professionals and discussing with patient's family/caregivers  Current Length of Stay: 2 day(s)  Current Patient Status: Inpatient   Certification Statement: The patient will continue to require additional inpatient hospital stay due to Ongoing stroke work-up  Discharge Plan: Anticipate discharge in 48-72 hrs to rehab facility  Code Status: Level 1 - Full Code    Subjective:   Seen and examined at bedside  Continues to remain agitated  Denies any headache, continues to have left-sided neck neglect and visual field deficits  No acute events overnight  Objective:     Vitals:   Temp (24hrs), Av 5 °F (36 9 °C), Min:98 1 °F (36 7 °C), Max:99 °F (37 2 °C)    Temp:  [98 1 °F (36 7 °C)-99 °F (37 2 °C)] 98 2 °F (36 8 °C)  HR:  [67-94] 77  Resp:  [16-20] 20  BP: (103-133)/(60-80) 132/61  SpO2:  [96 %-98 %] 96 %  Body mass index is 23 01 kg/m²  Input and Output Summary (last 24 hours): Intake/Output Summary (Last 24 hours) at 2023 1134  Last data filed at 2023 0750  Gross per 24 hour   Intake 480 ml   Output 575 ml   Net -95 ml       Physical Exam:   Physical Exam  Constitutional:       General: She is in acute distress  Appearance: She is ill-appearing  HENT:      Head: Normocephalic        Mouth/Throat:      Mouth: Mucous membranes are moist    Eyes:      Pupils: Pupils are equal, round, and reactive to light  Cardiovascular:      Rate and Rhythm: Normal rate and regular rhythm  Pulses: Normal pulses  Pulmonary:      Effort: Pulmonary effort is normal    Abdominal:      General: Abdomen is flat  Musculoskeletal:      Right lower leg: No edema  Left lower leg: No edema  Neurological:      Comments: Left-sided hemiparesis  Visual field deficit  Oriented to self only   Psychiatric:      Comments: Remains agitated and paranoid         Additional Data:     Labs:  Results from last 7 days   Lab Units 04/14/23  0916   WBC Thousand/uL 13 49*   HEMOGLOBIN g/dL 13 6   HEMATOCRIT % 41 9   PLATELETS Thousands/uL 217   NEUTROS PCT % 85*   LYMPHS PCT % 7*   MONOS PCT % 6   EOS PCT % 1     Results from last 7 days   Lab Units 04/16/23  0503 04/14/23  1534 04/14/23  0916   SODIUM mmol/L 138   < > 137   POTASSIUM mmol/L 3 7   < > 4 4   CHLORIDE mmol/L 106   < > 102   CO2 mmol/L 26   < > 29   BUN mg/dL 18   < > 13   CREATININE mg/dL 0 63   < > 0 67   ANION GAP mmol/L 6   < > 6   CALCIUM mg/dL 8 2*   < > 9 6   ALBUMIN g/dL  --   --  3 3*   TOTAL BILIRUBIN mg/dL  --   --  0 74   ALK PHOS U/L  --   --  49   ALT U/L  --   --  11   AST U/L  --   --  23   GLUCOSE RANDOM mg/dL 81   < > 139    < > = values in this interval not displayed       Results from last 7 days   Lab Units 04/14/23  0916   INR  0 97     Results from last 7 days   Lab Units 04/15/23  0734 04/14/23  2052 04/14/23  1651   POC GLUCOSE mg/dl 107 105 89     Results from last 7 days   Lab Units 04/15/23  0434   HEMOGLOBIN A1C % 5 9*     Results from last 7 days   Lab Units 04/14/23  0916   LACTIC ACID mmol/L 1 2   PROCALCITONIN ng/ml <0 05       Lines/Drains:  Invasive Devices     Peripheral Intravenous Line  Duration           Peripheral IV 04/14/23 Left Antecubital 2 days                  Telemetry:  Telemetry Orders (From admission, onward)             48 Hour Telemetry Monitoring  Continuous x 48 hours        References:    Telemetry Guidelines Question:  Reason for 48 Hour Telemetry  Answer:  Acute CVA (<24 hrs old, hemispheric strokes, selected brainstem strokes, cardiac arrhythmias)                 Telemetry Reviewed: Normal Sinus Rhythm  Indication for Continued Telemetry Use: Acute CVA             Imaging: Reviewed radiology reports from this admission including: MRI brain    Recent Cultures (last 7 days):   Results from last 7 days   Lab Units 04/14/23  1043 04/14/23  0916   BLOOD CULTURE  No Growth at 24 hrs  No Growth at 24 hrs  Last 24 Hours Medication List:   Current Facility-Administered Medications   Medication Dose Route Frequency Provider Last Rate   • acetaminophen  650 mg Oral Q6H PRN RAYSA Dominguez     • aspirin  81 mg Oral Daily Catherine Hines MD     • atorvastatin  40 mg Oral QPM Catherine Hines MD     • busPIRone  10 mg Oral TID Catherine Hines MD     • cefTRIAXone  1,000 mg Intravenous Q24H Catherine Hines MD 1,000 mg (04/16/23 2457)   • clopidogrel  75 mg Oral Daily Catherine Hines MD     • DULoxetine  60 mg Oral Daily Catherine Hines MD     • latanoprost  1 drop Both Eyes HS Catherine Hines MD     • nicotine  1 patch Transdermal Daily Catherine Hines MD     • OLANZapine  2 5 mg Oral Q8H PRN Adolph Rush MD     • timolol  1 drop Both Eyes BID Catherine Hines MD          Today, Patient Was Seen By: Adolph Rush MD    **Please Note: This note may have been constructed using a voice recognition system  **

## 2023-04-16 NOTE — PLAN OF CARE
Problem: PAIN - ADULT  Goal: Verbalizes/displays adequate comfort level or baseline comfort level  Description: Interventions:  - Encourage patient to monitor pain and request assistance  - Assess pain using appropriate pain scale  - Administer analgesics based on type and severity of pain and evaluate response  - Implement non-pharmacological measures as appropriate and evaluate response  - Consider cultural and social influences on pain and pain management  - Notify physician/advanced practitioner if interventions unsuccessful or patient reports new pain  Outcome: Progressing     Problem: INFECTION - ADULT  Goal: Absence or prevention of progression during hospitalization  Description: INTERVENTIONS:  - Assess and monitor for signs and symptoms of infection  - Monitor lab/diagnostic results  - Monitor all insertion sites, i e  indwelling lines, tubes, and drains  - Monitor endotracheal if appropriate and nasal secretions for changes in amount and color  - [place] appropriate cooling/warming therapies per order  - Administer medications as ordered  - Instruct and encourage patient and family to use good hand hygiene technique  - Identify and instruct in appropriate isolation precautions for identified infection/condition  Outcome: Progressing  Goal: Absence of fever/infection during neutropenic period  Description: INTERVENTIONS:  - Monitor WBC    Outcome: Progressing     Problem: SAFETY ADULT  Goal: Patient will remain free of falls  Description: INTERVENTIONS:  - Educate patient/family on patient safety including physical limitations  - Instruct patient to call for assistance with activity   - Consult OT/PT to assist with strengthening/mobility   - Keep Call bell within reach  - Keep bed low and locked with side rails adjusted as appropriate  - Keep care items and personal belongings within reach  - Initiate and maintain comfort rounds  - Make Fall Risk Sign visible to staff  - Offer Toileting every 2 Hours, in advance of need  - Initiate/Maintain bed alarm  - Obtain necessary fall risk management equipment: 1:1  - Apply yellow socks and bracelet for high fall risk patients  - Consider moving patient to room near nurses station  Outcome: Progressing  Goal: Maintain or return to baseline ADL function  Description: INTERVENTIONS:  -  Assess patient's ability to carry out ADLs; assess patient's baseline for ADL function and identify physical deficits which impact ability to perform ADLs (bathing, care of mouth/teeth, toileting, grooming, dressing, etc )  - Assess/evaluate cause of self-care deficits   - Assess range of motion  - Assess patient's mobility; develop plan if impaired  - Assess patient's need for assistive devices and provide as appropriate  - Encourage maximum independence but intervene and supervise when necessary  - Involve family in performance of ADLs  - Assess for home care needs following discharge   - Consider OT consult to assist with ADL evaluation and planning for discharge  - Provide patient education as appropriate  Outcome: Progressing  Goal: Maintains/Returns to pre admission functional level  Description: INTERVENTIONS:  - Perform BMAT or MOVE assessment daily    - Set and communicate daily mobility goal to care team and patient/family/caregiver  - Collaborate with rehabilitation services on mobility goals if consulted  - Perform Range of Motion 3 times a day  - Reposition patient every 2 hours    - Out of bed to chair 2 times a day   - Out of bed for meals 2 times a day  - Out of bed for toileting  - Record patient progress and toleration of activity level   Outcome: Progressing     Problem: DISCHARGE PLANNING  Goal: Discharge to home or other facility with appropriate resources  Description: INTERVENTIONS:  - Identify barriers to discharge w/patient and caregiver  - Arrange for needed discharge resources and transportation as appropriate  - Identify discharge learning needs (meds, wound care, etc )  - Arrange for interpretive services to assist at discharge as needed  - Refer to Case Management Department for coordinating discharge planning if the patient needs post-hospital services based on physician/advanced practitioner order or complex needs related to functional status, cognitive ability, or social support system  Outcome: Progressing     Problem: Knowledge Deficit  Goal: Patient/family/caregiver demonstrates understanding of disease process, treatment plan, medications, and discharge instructions  Description: Complete learning assessment and assess knowledge base  Interventions:  - Provide teaching at level of understanding  - Provide teaching via preferred learning methods  Outcome: Progressing     Problem: MOBILITY - ADULT  Goal: Maintain or return to baseline ADL function  Description: INTERVENTIONS:  -  Assess patient's ability to carry out ADLs; assess patient's baseline for ADL function and identify physical deficits which impact ability to perform ADLs (bathing, care of mouth/teeth, toileting, grooming, dressing, etc )  - Assess/evaluate cause of self-care deficits   - Assess range of motion  - Assess patient's mobility; develop plan if impaired  - Assess patient's need for assistive devices and provide as appropriate  - Encourage maximum independence but intervene and supervise when necessary  - Involve family in performance of ADLs  - Assess for home care needs following discharge   - Consider OT consult to assist with ADL evaluation and planning for discharge  - Provide patient education as appropriate  Outcome: Progressing  Goal: Maintains/Returns to pre admission functional level  Description: INTERVENTIONS:  - Perform BMAT or MOVE assessment daily    - Set and communicate daily mobility goal to care team and patient/family/caregiver  - Collaborate with rehabilitation services on mobility goals if consulted  - Perform Range of Motion 3 times a day    - Reposition patient every 2 hours  - Out of bed to chair 2 times a day   - Out of bed for meals 2 times a day  - Out of bed for toileting  - Record patient progress and toleration of activity level   Outcome: Progressing     Problem: Nutrition/Hydration-ADULT  Goal: Nutrient/Hydration intake appropriate for improving, restoring or maintaining nutritional needs  Description: Monitor and assess patient's nutrition/hydration status for malnutrition  Collaborate with interdisciplinary team and initiate plan and interventions as ordered  Monitor patient's weight and dietary intake as ordered or per policy  Utilize nutrition screening tool and intervene as necessary  Determine patient's food preferences and provide high-protein, high-caloric foods as appropriate       INTERVENTIONS:  - Monitor oral intake, urinary output, labs, and treatment plans  - Assess nutrition and hydration status and recommend course of action  - Evaluate amount of meals eaten  - Assist patient with eating if necessary   - Allow adequate time for meals  - Recommend/ encourage appropriate diets, oral nutritional supplements, and vitamin/mineral supplements  - Order, calculate, and assess calorie counts as needed  - Recommend, monitor, and adjust tube feedings and TPN/PPN based on assessed needs  - Assess need for intravenous fluids  - Provide specific nutrition/hydration education as appropriate  - Include patient/family/caregiver in decisions related to nutrition  Outcome: Progressing     Problem: Prexisting or High Potential for Compromised Skin Integrity  Goal: Skin integrity is maintained or improved  Description: INTERVENTIONS:  - Identify patients at risk for skin breakdown  - Assess and monitor skin integrity  - Assess and monitor nutrition and hydration status  - Monitor labs   - Assess for incontinence   - Turn and reposition patient  - Assist with mobility/ambulation  - Relieve pressure over bony prominences  - Avoid friction and shearing  - Provide appropriate hygiene as needed including keeping skin clean and dry  - Evaluate need for skin moisturizer/barrier cream  - Collaborate with interdisciplinary team   - Patient/family teaching  - Consider wound care consult   Outcome: Progressing     Problem: Neurological Deficit  Goal: Neurological status is stable or improving  Description: Interventions:  - Monitor and assess patient's level of consciousness, motor function, sensory function, and level of assistance needed for ADLs  - Monitor and report changes from baseline  Collaborate with interdisciplinary team to initiate plan and implement interventions as ordered  - Provide and maintain a safe environment  - Consider seizure precautions  - Consider fall precautions  - Consider aspiration precautions  - Consider bleeding precautions  Outcome: Progressing     Problem: Activity Intolerance/Impaired Mobility  Goal: Mobility/activity is maintained at optimum level for patient  Description: Interventions:  - Assess and monitor patient  barriers to mobility and need for assistive/adaptive devices  - Assess patient's emotional response to limitations  - Collaborate with interdisciplinary team and initiate plans and interventions as ordered  - Encourage independent activity per ability   - Maintain proper body alignment  - Perform active/passive rom as tolerated/ordered  - Plan activities to conserve energy   - Turn patient as appropriate  Outcome: Progressing     Problem: Communication Impairment  Goal: Ability to express needs and understand communication  Description: Assess patient's communication skills and ability to understand information  Patient will demonstrate use of effective communication techniques, alternative methods of communication and understanding even if not able to speak  - Encourage communication and provide alternate methods of communication as needed  - Collaborate with case management/ for discharge needs    - Include patient/family/caregiver in decisions related to communication  Outcome: Progressing     Problem: Potential for Aspiration  Goal: Non-ventilated patient's risk of aspiration is minimized  Description: Assess and monitor vital signs, respiratory status, and labs (WBC)  Monitor for signs of aspiration (tachypnea, cough, rales, wheezing, cyanosis, fever)  - Assess and monitor patient's ability to swallow  - Place patient up in chair to eat if possible  - HOB up at 90 degrees to eat if unable to get patient up into chair   - Supervise patient during oral intake  - Instruct patient/ family to take small bites  - Instruct patient/ family to take small single sips when taking liquids  - Follow patient-specific strategies generated by speech pathologist   Outcome: Progressing  Goal: Ventilated patient's risk of aspiration is minimized  Description: Assess and monitor vital signs, respiratory status, airway cuff pressure, and labs (WBC)  Monitor for signs of aspiration (tachypnea, cough, rales, wheezing, cyanosis, fever)  - Elevate head of bed 30 degrees if patient has tube feeding   - Monitor tube feeding  Outcome: Progressing     Problem: Nutrition  Goal: Nutrition/Hydration status is improving  Description: Monitor and assess patient's nutrition/hydration status for malnutrition (ex- brittle hair, bruises, dry skin, pale skin and conjunctiva, muscle wasting, smooth red tongue, and disorientation)  Collaborate with interdisciplinary team and initiate plan and interventions as ordered  Monitor patient's weight and dietary intake as ordered or per policy  Utilize nutrition screening tool and intervene per policy  Determine patient's food preferences and provide high-protein, high-caloric foods as appropriate  - Assist patient with eating   - Allow adequate time for meals   - Encourage patient to take dietary supplement as ordered    - Collaborate with clinical nutritionist   - Include patient/family/caregiver in decisions related to nutrition    Outcome: Progressing

## 2023-04-17 ENCOUNTER — APPOINTMENT (INPATIENT)
Dept: NON INVASIVE DIAGNOSTICS | Facility: HOSPITAL | Age: 78
End: 2023-04-17

## 2023-04-17 ENCOUNTER — APPOINTMENT (INPATIENT)
Dept: MRI IMAGING | Facility: HOSPITAL | Age: 78
End: 2023-04-17

## 2023-04-17 LAB
ATRIAL RATE: 60 BPM
GLUCOSE SERPL-MCNC: 96 MG/DL (ref 65–140)
P AXIS: 63 DEGREES
PLATELET # BLD AUTO: 251 THOUSANDS/UL (ref 149–390)
PMV BLD AUTO: 9.8 FL (ref 8.9–12.7)
PR INTERVAL: 156 MS
QRS AXIS: 75 DEGREES
QRSD INTERVAL: 80 MS
QT INTERVAL: 430 MS
QTC INTERVAL: 430 MS
T WAVE AXIS: 14 DEGREES
VENTRICULAR RATE: 60 BPM

## 2023-04-17 RX ORDER — OLANZAPINE 2.5 MG/1
5 TABLET ORAL ONCE
Status: COMPLETED | OUTPATIENT
Start: 2023-04-17 | End: 2023-04-17

## 2023-04-17 RX ORDER — ENOXAPARIN SODIUM 100 MG/ML
30 INJECTION SUBCUTANEOUS
Status: DISCONTINUED | OUTPATIENT
Start: 2023-04-18 | End: 2023-04-20 | Stop reason: HOSPADM

## 2023-04-17 RX ADMIN — DULOXETINE HYDROCHLORIDE 60 MG: 60 CAPSULE, DELAYED RELEASE ORAL at 08:32

## 2023-04-17 RX ADMIN — BUSPIRONE HYDROCHLORIDE 10 MG: 10 TABLET ORAL at 08:32

## 2023-04-17 RX ADMIN — TIMOLOL MALEATE 1 DROP: 5 SOLUTION/ DROPS OPHTHALMIC at 17:53

## 2023-04-17 RX ADMIN — TIMOLOL MALEATE 1 DROP: 5 SOLUTION/ DROPS OPHTHALMIC at 08:33

## 2023-04-17 RX ADMIN — BUSPIRONE HYDROCHLORIDE 10 MG: 10 TABLET ORAL at 21:46

## 2023-04-17 RX ADMIN — NICOTINE 1 PATCH: 7 PATCH, EXTENDED RELEASE TRANSDERMAL at 08:33

## 2023-04-17 RX ADMIN — CLOPIDOGREL BISULFATE 75 MG: 75 TABLET ORAL at 08:32

## 2023-04-17 RX ADMIN — BUSPIRONE HYDROCHLORIDE 10 MG: 10 TABLET ORAL at 17:47

## 2023-04-17 RX ADMIN — ATORVASTATIN CALCIUM 40 MG: 40 TABLET, FILM COATED ORAL at 17:48

## 2023-04-17 RX ADMIN — LATANOPROST 1 DROP: 50 SOLUTION OPHTHALMIC at 21:45

## 2023-04-17 RX ADMIN — CEFTRIAXONE 1000 MG: 1 INJECTION, SOLUTION INTRAVENOUS at 10:48

## 2023-04-17 RX ADMIN — OLANZAPINE 5 MG: 2.5 TABLET, FILM COATED ORAL at 02:07

## 2023-04-17 RX ADMIN — OLANZAPINE 2.5 MG: 2.5 TABLET, FILM COATED ORAL at 20:33

## 2023-04-17 RX ADMIN — ASPIRIN 81 MG: 81 TABLET, COATED ORAL at 08:32

## 2023-04-17 NOTE — PROGRESS NOTES
[address]  Progress Note  Name: Mariangel Charles  MRN: [id number]  Unit/Bed#: -01 I Date of Admission: 4/14/2023   Date of Service: 4/17/2023 I Hospital Day: 3    Assessment/Plan   * Acute ischemic CVA  Assessment & Plan  Patient was found confused and fallen by a  at home  Not candidate for tPA due to symptoms starting over 24 hours ago  No prior history of A-fib  Received Zyprexa 2 5 mg IM x1 following which she has now calmed down but also noticed to have weakness on her left arm and left leg with left facial droop  Placed on stroke pathway  NIH stroke scale 9  · Given aspirin 325 mg x 1 followed by 81 mg daily  · CT brain reviewed  · MRI of the brain showed :Late/early subacute right posterior MCA distribution infarction involving the right posterior insula and temporoparietal lobes  No significant mass effect   · CTA head and neck showedSuspected early infarct within the right posterior aspect of the MCA territory involving the posterior superior temporal lobe and posterior lateral frontal parietal lobes with subtle loss of gray-white differentiation  No hemorrhagic transformation  There is an old infarct within the left occipital lobe/PCA territory  Chronic microangiopathic change within the cerebral hemispheres  Diffusely hypoplastic vertebral arteries bilaterally  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 extending through the hypoglossal canal to the posterior fossa  Atherosclerotic disease of the left carotid bifurcation with only minimal narrowing  there is moderate stenosis of the intracranial internal carotid artery bilaterally at the level of the anterior clinoid     There is a small filling defect present within one of the larger M2 branches on the right within the sylvian fissure, see series 5 image 133 and a possibility of branches extending posteriorly towards the area of suspected ischemia within the right posterior lateral MCA territory suggesting small M2/M3 branch occlusion distally  · S/B Neurology :Patient was started on dual antiplatelet therapy and maintained on high intensity statin  · Continue with serial neuro exams  Continues to have left-sided weakness and neglect with visual abnormalities  Remains agitated  Had worsening left-sided weakness over the weekend  Discussed with neurologist on-call who recommended repeat MRI  · Repeat MRI today shows  :Evolving right middle cerebral artery infarct compared to 4/14/2023  Increasing vasogenic edema and mild sulcal effacement  ·  Await echocardiogram   Telemetry monitor without any arrhythmia  · Close neurology follow-up  · PT OT and speech evaluationappreciated   Tobacco abuse  Assessment & Plan  nicotine patch ordered    Recurrent falls  Assessment & Plan   PT OT eval appreciated in the setting of acute stroke  Recommend short-term rehab  Hypertensive urgency  Assessment & Plan  Presented with elevated blood pressure     Allow permissive hypertension In the setting of acute stroke    Generalized anxiety disorder  Assessment & Plan  Continue home meds  May use as needed Zyprexa in case of restlessness currently on a one-to-one observation due to restlessness and fall risk  Continue with home dose of Cymbalta and buspirone  VTE Pharmacologic Prophylaxis: VTE Score: 3 High Risk (Score >/= 5) - Pharmacological DVT Prophylaxis Ordered: enoxaparin (Lovenox)  Sequential Compression Devices Ordered  Patient Centered Rounds: I performed bedside rounds with nursing staff today  Discussions with Specialists or Other Care Team Provider: Discussed with neurologist    Education and Discussions with Family / Patient: Updated  (son) via phone      Total Time Spent on Date of Encounter in care of patient: 45 minutes This time was spent on one or more of the following: performing physical exam; counseling and coordination of care; obtaining or reviewing history; documenting in the medical record; reviewing/ordering tests, medications or procedures; communicating with other healthcare professionals and discussing with patient's family/caregivers  Current Length of Stay: 3 day(s)  Current Patient Status: Inpatient   Certification Statement: The patient will continue to require additional inpatient hospital stay due to Ongoing stroke work-up  Discharge Plan: Anticipate discharge in 24-48 hrs to rehab facility  Code Status: Level 1 - Full Code    Subjective:   Seen and examined at bedside  Less agitated today  No acute events overnight  Continues to have left-sided weakness  Noted to have difficulty swallowing pills and was made n p o  pending speech evaluation  Subsequently upon speech evaluation they recommended puréed diet with nectar thick liquid  Pulm continue to maintain on strict aspiration precautions    Objective:     Vitals:   Temp (24hrs), Av 7 °F (37 1 °C), Min:97 2 °F (36 2 °C), Max:100 °F (37 8 °C)    Temp:  [97 2 °F (36 2 °C)-100 °F (37 8 °C)] 99 °F (37 2 °C)  HR:  [65-91] 78  Resp:  [17-22] 18  BP: (109-158)/(47-74) 125/69  SpO2:  [94 %-97 %] 95 %  Body mass index is 23 01 kg/m²  Input and Output Summary (last 24 hours): Intake/Output Summary (Last 24 hours) at 2023 1225  Last data filed at 2023 1848  Gross per 24 hour   Intake 380 ml   Output 350 ml   Net 30 ml       Physical Exam:   Physical Exam  Constitutional:       Appearance: She is ill-appearing  HENT:      Head: Normocephalic  Right Ear: Tympanic membrane normal       Nose: Nose normal       Mouth/Throat:      Mouth: Mucous membranes are moist    Eyes:      Pupils: Pupils are equal, round, and reactive to light  Cardiovascular:      Rate and Rhythm: Normal rate and regular rhythm  Pulses: Normal pulses  Abdominal:      General: Abdomen is flat   Bowel sounds are normal       Palpations: Abdomen is soft  Musculoskeletal:      Cervical back: Neck supple  Right lower leg: No edema  Left lower leg: No edema  Skin:     General: Skin is warm  Comments: Multiple skin abrasions noted   Neurological:      Mental Status: She is alert  Cranial Nerves: Cranial nerve deficit present  Motor: Weakness present  Coordination: Coordination abnormal       Comments: Left-sided hemiparesis and visual field deficits   Psychiatric:      Comments: Intermittent agitation         Additional Data:     Labs:  Results from last 7 days   Lab Units 04/14/23  0916   WBC Thousand/uL 13 49*   HEMOGLOBIN g/dL 13 6   HEMATOCRIT % 41 9   PLATELETS Thousands/uL 217   NEUTROS PCT % 85*   LYMPHS PCT % 7*   MONOS PCT % 6   EOS PCT % 1     Results from last 7 days   Lab Units 04/16/23  0503 04/14/23  1534 04/14/23  0916   SODIUM mmol/L 138   < > 137   POTASSIUM mmol/L 3 7   < > 4 4   CHLORIDE mmol/L 106   < > 102   CO2 mmol/L 26   < > 29   BUN mg/dL 18   < > 13   CREATININE mg/dL 0 63   < > 0 67   ANION GAP mmol/L 6   < > 6   CALCIUM mg/dL 8 2*   < > 9 6   ALBUMIN g/dL  --   --  3 3*   TOTAL BILIRUBIN mg/dL  --   --  0 74   ALK PHOS U/L  --   --  49   ALT U/L  --   --  11   AST U/L  --   --  23   GLUCOSE RANDOM mg/dL 81   < > 139    < > = values in this interval not displayed       Results from last 7 days   Lab Units 04/14/23  0916   INR  0 97     Results from last 7 days   Lab Units 04/17/23  0711 04/16/23  2050 04/16/23  1610 04/15/23  0734 04/14/23  2052 04/14/23  1651   POC GLUCOSE mg/dl 96 87 108 107 105 89     Results from last 7 days   Lab Units 04/15/23  0434   HEMOGLOBIN A1C % 5 9*     Results from last 7 days   Lab Units 04/14/23  0916   LACTIC ACID mmol/L 1 2   PROCALCITONIN ng/ml <0 05       Lines/Drains:  Invasive Devices     Peripheral Intravenous Line  Duration           Peripheral IV 04/14/23 Left Antecubital 3 days                  Telemetry:  Telemetry Orders (From admission, onward) 48 Hour Telemetry Monitoring  Continuous x 48 hours        References:    Telemetry Guidelines   Question:  Reason for 48 Hour Telemetry  Answer:  Acute CVA (<24 hrs old, hemispheric strokes, selected brainstem strokes, cardiac arrhythmias)                 Telemetry Reviewed: Normal Sinus Rhythm  Indication for Continued Telemetry Use: Acute CVA             Imaging: Reviewed radiology reports from this admission including: MRI brain    Recent Cultures (last 7 days):   Results from last 7 days   Lab Units 04/14/23  1043 04/14/23  0916   BLOOD CULTURE  No Growth at 48 hrs  No Growth at 48 hrs  Last 24 Hours Medication List:   Current Facility-Administered Medications   Medication Dose Route Frequency Provider Last Rate   • acetaminophen  650 mg Oral Q6H PRN RAYSA Dominguez     • aspirin  81 mg Oral Daily Catarino Kumar MD     • atorvastatin  40 mg Oral QPM Catarino Kumar MD     • busPIRone  10 mg Oral TID Catarino Kumar MD     • cefTRIAXone  1,000 mg Intravenous Q24H Catarino Kumar MD 1,000 mg (04/17/23 1048)   • clopidogrel  75 mg Oral Daily Catarino Kumar MD     • DULoxetine  60 mg Oral Daily Catarino Kumar MD     • latanoprost  1 drop Both Eyes HS Catarino Kumar MD     • nicotine  1 patch Transdermal Daily Catarino Kumar MD     • OLANZapine  2 5 mg Oral Q8H PRN Francia Moreno MD     • timolol  1 drop Both Eyes BID Catarino Kumar MD          Today, Patient Was Seen By: Francia Moreno MD    **Please Note: This note may have been constructed using a voice recognition system  **

## 2023-04-17 NOTE — TELEMEDICINE
"Quick Note - Neurology   Ayaka Santiago 76 y o  female MRN: [id number]  Unit/Bed#: -01 Encounter: [id number]      Assessment/Plan     * Acute ischemic CVA  Assessment & Plan  Cherylene Skeens is a 27-year-old woman who is experienced an embolic stroke of undetermined source   -Await 2D echocardiogram  -Suggest gentle hydration with normal saline  -Continue aspirin and atorvastatin  -Recommend discontinuing clopidogrel at this time as dual antiplatelet therapy is not indicated with a stroke of this size in the early acute setting/in the absence of severe/symptomatic atherosclerosis  -Please initiate DVT prophylaxis today  -She will need ongoing monitoring for atrial fibrillation  -PT/OT/SP  -At this point would target normotension  -Stat head CT for any significant change in examination          Ayaka Santiago will need follow up in in 6 weeks with neurovascular attending or advance practitioner  She will not require outpatient neurological testing  Updated/Reviewed Data:   I personally reviewed her MRI  This shows significant extension of the area of ischemia but related to the same artery along with some cerebral edema/brain compression  This is likely the cause for her clinical worsening  Agree that this most likely represents a central embolic stroke of unclear etiology  Vitals: Blood pressure 116/77, pulse 80, temperature 99 1 °F (37 3 °C), temperature source Temporal, resp  rate 18, height 5' 1\" (1 549 m), weight 55 2 kg (121 lb 12 8 oz), SpO2 96 %  ,Body mass index is 23 01 kg/m²      Lab, Imaging and other studies:   CBC:   Results from last 7 days   Lab Units 04/14/23  0916   WBC Thousand/uL 13 49*   RBC Million/uL 4 42   HEMOGLOBIN g/dL 13 6   HEMATOCRIT % 41 9   MCV fL 95   PLATELETS Thousands/uL 217   , BMP/CMP:   Results from last 7 days   Lab Units 04/16/23  0503 04/14/23  1534 04/14/23  0916   SODIUM mmol/L 138 139 137   POTASSIUM mmol/L 3 7 3 8 4 4   CHLORIDE mmol/L 106 102 102   CO2 mmol/L 26 " 29 29   BUN mg/dL 18 12 13   CREATININE mg/dL 0 63 0 59* 0 67   CALCIUM mg/dL 8 2* 9 5 9 6   AST U/L  --   --  23   ALT U/L  --   --  11   ALK PHOS U/L  --   --  49   EGFR ml/min/1 73sq m 85 87 83   , HgBA1C:   Results from last 7 days   Lab Units 04/15/23  0434   HEMOGLOBIN A1C % 5 9*   , Lipid Profile:   Results from last 7 days   Lab Units 04/15/23  0434   HDL mg/dL 28*   LDL CALC mg/dL 31   TRIGLYCERIDES mg/dL 100         Current Facility-Administered Medications:   •  acetaminophen (TYLENOL) tablet 650 mg, 650 mg, Oral, Q6H PRN, RAYSA Dominguez, 650 mg at 04/15/23 1741  •  aspirin (ECOTRIN LOW STRENGTH) EC tablet 81 mg, 81 mg, Oral, Daily, Sharifa Freeman MD, 81 mg at 04/17/23 2063  •  atorvastatin (LIPITOR) tablet 40 mg, 40 mg, Oral, QPM, Sharifa Freeman MD, 40 mg at 04/16/23 1706  •  busPIRone (BUSPAR) tablet 10 mg, 10 mg, Oral, TID, Sharifa Freeman MD, 10 mg at 04/17/23 8730  •  clopidogrel (PLAVIX) tablet 75 mg, 75 mg, Oral, Daily, Sharifa Freeman MD, 75 mg at 04/17/23 8997  •  DULoxetine (CYMBALTA) delayed release capsule 60 mg, 60 mg, Oral, Daily, Sharifa Freeman MD, 60 mg at 04/17/23 0315  •  latanoprost (XALATAN) 0 005 % ophthalmic solution 1 drop, 1 drop, Both Eyes, HS, Sharifa Freeman MD, 1 drop at 04/16/23 2142  •  nicotine (NICODERM CQ) 7 mg/24hr TD 24 hr patch 1 patch, 1 patch, Transdermal, Daily, Sharifa Freeman MD, 1 patch at 04/17/23 7797  •  OLANZapine (ZyPREXA) tablet 2 5 mg, 2 5 mg, Oral, Q8H PRN, Barbara Grove MD, 2 5 mg at 04/16/23 2142  •  timolol (TIMOPTIC) 0 5 % ophthalmic solution 1 drop, 1 drop, Both Eyes, BID, Sharifa Freeman MD, 1 drop at 04/17/23 8750    I have spent a total time of 12 minutes on 04/17/23 in caring for this patient including Diagnostic results, Documenting in the medical record, Reviewing / ordering tests, medicine, procedures   and Communicating with other healthcare professionals

## 2023-04-17 NOTE — OCCUPATIONAL THERAPY NOTE
"    Occupational Therapy Evaluation     Patient Name: Ayaka Santiago  XMAEI'Z Date: 4/17/2023  Problem List  Principal Problem:    Acute ischemic CVA  Active Problems:    Generalized anxiety disorder    Hypertensive urgency    Recurrent falls    Tobacco abuse    Past Medical History  Past Medical History:   Diagnosis Date    Dementia ([address] )     No abnormality of hip joint detected on examination     Stroke [facility])      Past Surgical History  History reviewed  No pertinent surgical history  04/17/23 1326   Note Type   Note type Evaluation   Pain Assessment   Pain Assessment Tool 0-10   Pain Score No Pain   Restrictions/Precautions   Other Precautions Chair Alarm; Bed Alarm; Fall Risk;1:1  (significant L neglect)   Home Living   Additional Comments Pt poor historian unable to give home living set-up or PLOF  Per PT report, son reporting pt lives with  in a 1 SH with 2 LILLY  Occassional rollator use at baseline  Prior Function   Lives With Spouse   Comments Per PT report, son reporting pt (I) with ADLs, assist with IADLs  Pt spouse with dementia and has caregivers 5-7 hours/day but pt assists caregiving for spouse when caregivers not present  Subjective   Subjective \"Where is my family? You're holding me hostage here and not letting them see me\"   ADL   UB Dressing Assistance 2  Maximal Assistance   UB Dressing Deficit Setup;Verbal cueing;Supervision/safety; Increased time to complete; Thread LUE;Pull over head;Pull around back;Pull down in back   LB Dressing Assistance 2  Maximal Assistance   LB Dressing Deficit Setup; Requires assistive device for steadying;Verbal cueing;Supervision/safety; Increased time to complete; Don/doff L sock; Thread RLE into underwear; Thread LLE into underwear;Pull up over hips   Toileting Assistance  1  Total Assistance   Toileting Deficit Setup;Verbal cueing;Supervison/safety; Increased time to complete   Additional Comments UB/LB ADLs @ Max A d/t significant L-sided neglect   " Toileting @ Total A  Pt required Total A for pericare and to don brief while supine in bed  Bed Mobility   Rolling R 2  Maximal assistance   Additional items Assist x 1; Increased time required;Verbal cues   Rolling L 2  Maximal assistance   Additional items Assist x 1; Increased time required;Verbal cues   Supine to Sit 2  Maximal assistance   Additional items Assist x 1; Increased time required;Verbal cues   Sit to Supine 2  Maximal assistance   Additional items Assist x 1; Increased time required;Verbal cues   Additional Comments Pt supine in bed at beginning of session  Pt able to roll L/R for pericare and CM @ Max A x1  Supine to sit @ Max A x1  Pt able to maintain seated at EOB for approximately 5 minutes with SBA  After attempt to stand, sit to supine @ Max A x1  Pt supine in bed at end of session with bed alarm intact, call bell within reach and all needs met  Transfers   Sit to Stand 4  Minimal assistance   Additional items Assist x 1; Increased time required;Verbal cues   Stand to Sit 4  Minimal assistance   Additional items Assist x 1; Increased time required;Verbal cues   Stand pivot Unable to assess   Additional Comments STS from EOB to RW @ Min A using RUE to pull up on RW  Pt able to maintain standing for approximately 30 seconds with RUE support on RW and L lateral lean  Unsafe to advance steps at this time d/t significant L-sided neglect     Balance   Static Sitting Poor +   Dynamic Sitting Poor   Static Standing Poor   Activity Tolerance   Activity Tolerance Other (Comment)  (Treatment limited secondary to L-sided neglect)   Nurse Made Aware Spoke with TRUONG López   RUE Assessment   RUE Assessment WFL  (Good RUE noted and demonstrated throughout; pt is R-hand dominant)   LUE Assessment   LUE Assessment X   Hand Function   Gross Motor Coordination Impaired  (RUE intact)   Fine Motor Coordination Impaired  (Difficulty with finger-to-nose tests on RUE with pt undershooting target, presumibly with visual deficits and pt with difficulty finding midline)   Hand Function Comments LUE completely flaccid with no  strength or movement noted  Slight LUE shoulder elevation noted but unable to replicate movement  Sensation   Light Touch Severe deficits in the LUE   Additional Comments Pt with no sensation noted on LUE, able to vocalize sensation on L posterior shoulder   Vision - Complex Assessment   Visual Screen Results Left sided silvano-inattention   Additional Comments Difficult to fully assess vision d/t significant deficits noted  Pt able to track in the right and lower quadrants but was unable to track any further left than midline despite significant cueing  Pt eyes with difficulty teaming at times  Perception   Inattention/Neglect Cues to attend to left side of body;Cues to attend left visual field;Cues to maintain midline in sitting;Cues to maintain midline in standing   Perseveration Perseverates during conversation  (Constant redirection to task required as pt perseverating on where abouts of family)   Cognition   Overall Cognitive Status Impaired   Arousal/Participation Alert; Responsive; Cooperative   Attention Difficulty attending to directions   Orientation Level Oriented to person;Oriented to place; Disoriented to time;Disoriented to situation   Memory Decreased short term memory;Decreased recall of recent events   Following Commands Follows one step commands with increased time or repetition   Assessment   Limitation Decreased ADL status; Decreased UE ROM; Decreased UE strength;Decreased Safe judgement during ADL;Decreased cognition;Decreased endurance;Decreased sensation;Visual deficit; Decreased fine motor control;Decreased self-care trans;Decreased high-level ADLs; Non-func L UE   Prognosis Fair   Assessment Pt is a 76 y o  female, [facility] d/t experiencing confusion  Dx: acute ischemic CVA  Pt with PMHx impacting their performance during ADL tasks, including: dementia and stroke   Prior to admission to the hospital Pt was performing ADLs without physical assistance  IADLs with physical assistance  Functional transfers/ambulation without physical assistance  Cognitive status was PTA was Intact  OT order placed to assess Pt's ADLs, cognitive status, and performance during functional tasks in order to maximize safety and independence while making most appropriate d/c recommendations  Pt's clinical presentation is currently evolving given new onset deficits that effect Pt's occupational performance and ability to safely return to PLOF including decrease LUE function, decrease activity tolerance, decrease standing balance, decrease sitting balance, decrease performance during ADL tasks, decrease cognition, decrease safety awareness , decrease generalized strength, decrease activity engagement, decrease performance during functional transfers, decrease sensation, decrease FM control, decrease GM control and high fall risk combined with medical complications of abnormal CBC and need for input for mobility technique/safety  Personal factors affecting Pt at time of initial evaluation include: unable to perform caregiver support/tasks, step(s) to enter environment, multi-level environment, advanced age, inability to perform IADLs, inability to perform ADLs, inability to ambulate household distances, inability to navigate community distances, limited insight into impairments and recent fall(s)/fall history  Pt will benefit from continued skilled OT services to address deficits as defined above and to maximize level independence/participation during ADLs and functional tasks to facilitate return toward PLOF and improved quality of life  From an occupational therapy standpoint, recommendation at time of d/c would be post acute rehabilitation services  Plan   Treatment Interventions ADL retraining;Visual perceptual retraining;Functional transfer training;UE strengthening/ROM; Endurance training;Patient/family training;Cognitive reorientation;Equipment evaluation/education; Fine motor coordination activities; Neuromuscular reeducation; Compensatory technique education;Continued evaluation;UE splinting;Cardiac education; Energy conservation; Activityengagement   Goal Expiration Date 05/01/23   OT Frequency 3-5x/wk   Recommendation   OT Discharge Recommendation Post acute rehabilitation services   [facility] Daily Activity Inpatient   Lower Body Dressing 1   Bathing 1   Toileting 1   Upper Body Dressing 2   Grooming 2   Eating 2   Daily Activity Raw Score 9   Turning Head Towards Sound 2   Follow Simple Instructions 2   Low Function Daily Activity Raw Score 13   Low Function Daily Activity Standardized Score  23 16   AM-PAC Applied Cognition Inpatient   Following a Speech/Presentation 2   Understanding Ordinary Conversation 3   Taking Medications 1   Remembering Where Things Are Placed or Put Away 1   Remembering List of 4-5 Errands 1   Taking Care of Complicated Tasks 1   Applied Cognition Raw Score 9   Applied Cognition Standardized Score 22 48     The patient's raw score on the AM-PAC Daily Activity Inpatient Short Form is 9  A raw score of less than 19 suggests the patient may benefit from discharge to post-acute rehabilitation services  Please refer to the recommendation of the Occupational Therapist for safe discharge planning  Pt goals to be met by 5/1/23    Pt will demonstrate ability to complete grooming/hygiene tasks @ Min A after set-up  Pt will demonstrate ability to complete supine<>sit @ Min A in order to increase safety and independence during ADL tasks  Pt will demonstrate ability to complete UB ADLs including washing/dressing @ Min A in order to increase performance and participation during meaningful tasks  Pt will demonstrate ability to complete LB dressing @ Mod A in order to increase safety and independence during meaningful tasks     Pt will demonstrate ability to complete toileting tasks including CM and pericare @ Mod A in order to increase safety and independence during meaningful tasks  Pt will demonstrate ability to complete EOB, chair, toilet/commode transfers @ Mod A in order to increase performance and participation during functional tasks  Pt will demonstrate ability to stand for 3-5 minutes while maintaining Fair + balance with use of LRAD for UB support PRN  Pt will attend to continued cognitive assessments 100% of the time in order to provide most appropriate d/c recommendations  Pt will identify 3 areas of interest/hobbies and 1 intervention on how to incorporate into daily life in order to increase interaction with environment and peers as well as increase participation in meaningful tasks  Pt will demonstrate 100% carryover of BUE HEP in order to increase BUE MS and increase performance during functional tasks upon d/c home      Migue Burger, OTR/L

## 2023-04-17 NOTE — DISCHARGE INSTR - DIET
Puree/ nectar thick liquids; no straws  Meds crushed in puree  Full feeding assistance    Aspiration precautions

## 2023-04-17 NOTE — PROGRESS NOTES
Dr Carlos Eduardo Hatch notified via tiger text during neuro check at 1226 due to absent vision on both right and left eyes  Patient did not flinch when hand moved quickly to her eyes  Patient does turn her head to the right to follow staffs voice  Patient continues to demonstrate Left side neglect, LUE flaccid and minimum movement in LLE

## 2023-04-17 NOTE — ASSESSMENT & PLAN NOTE
Patient was found confused and fallen by a  at home  Not candidate for tPA due to symptoms starting over 24 hours ago  No prior history of A-fib  Received Zyprexa 2 5 mg IM x1 following which she has now calmed down but also noticed to have weakness on her left arm and left leg with left facial droop  Placed on stroke pathway  NIH stroke scale 9  · Given aspirin 325 mg x 1 followed by 81 mg daily  · CT brain reviewed  · MRI of the brain showed :Late/early subacute right posterior MCA distribution infarction involving the right posterior insula and temporoparietal lobes  No significant mass effect   · CTA head and neck showedSuspected early infarct within the right posterior aspect of the MCA territory involving the posterior superior temporal lobe and posterior lateral frontal parietal lobes with subtle loss of gray-white differentiation  No hemorrhagic transformation  There is an old infarct within the left occipital lobe/PCA territory  Chronic microangiopathic change within the cerebral hemispheres  Diffusely hypoplastic vertebral arteries bilaterally  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 extending through the hypoglossal canal to the posterior fossa  Atherosclerotic disease of the left carotid bifurcation with only minimal narrowing  there is moderate stenosis of the intracranial internal carotid artery bilaterally at the level of the anterior clinoid     There is a small filling defect present within one of the larger M2 branches on the right within the sylvian fissure, see series 5 image 133 and a possibility of branches extending posteriorly towards the area of suspected ischemia within the right posterior lateral MCA territory suggesting small M2/M3 branch occlusion distally  · S/B Neurology :Patient was started on dual antiplatelet therapy and maintained on high intensity statin    · Continue with serial neuro exams  Continues to have left-sided weakness and neglect with visual abnormalities  Remains agitated  Had worsening left-sided weakness over the weekend  Discussed with neurologist on-call who recommended repeat MRI  · Repeat MRI today shows  :Evolving right middle cerebral artery infarct compared to 4/14/2023  Increasing vasogenic edema and mild sulcal effacement  ·  Await echocardiogram   Telemetry monitor without any arrhythmia  · Close neurology follow-up  · PT OT and speech evaluationappreciated

## 2023-04-17 NOTE — ASSESSMENT & PLAN NOTE
Presented with elevated blood pressure       Allow permissive hypertension In the setting of acute stroke

## 2023-04-17 NOTE — PLAN OF CARE
Problem: OCCUPATIONAL THERAPY ADULT  Goal: Performs self-care activities at highest level of function for planned discharge setting  See evaluation for individualized goals  Description: Treatment Interventions: ADL retraining, Visual perceptual retraining, Functional transfer training, UE strengthening/ROM, Endurance training, Patient/family training, Cognitive reorientation, Equipment evaluation/education, Fine motor coordination activities, Neuromuscular reeducation, Compensatory technique education, Continued evaluation, UE splinting, Cardiac education, Energy conservation, Activityengagement          See flowsheet documentation for full assessment, interventions and recommendations  Note: Limitation: Decreased ADL status, Decreased UE ROM, Decreased UE strength, Decreased Safe judgement during ADL, Decreased cognition, Decreased endurance, Decreased sensation, Visual deficit, Decreased fine motor control, Decreased self-care trans, Decreased high-level ADLs, Non-func L UE  Prognosis: Fair  Assessment: Pt is a 76 y o  female, admitted to [address] [place] [address] 4/14/2023 d/t experiencing confusion  Dx: acute ischemic CVA  Pt with PMHx impacting their performance during ADL tasks, including: dementia and stroke  Prior to admission to the hospital Pt was performing ADLs without physical assistance  IADLs with physical assistance  Functional transfers/ambulation without physical assistance  Cognitive status was PTA was Intact  OT order placed to assess Pt's ADLs, cognitive status, and performance during functional tasks in order to maximize safety and independence while making most appropriate d/c recommendations   Pt's clinical presentation is currently evolving given new onset deficits that effect Pt's occupational performance and ability to safely return to OF including decrease LUE function, decrease activity tolerance, decrease standing balance, decrease sitting balance, decrease performance during ADL tasks, decrease cognition, decrease safety awareness , decrease generalized strength, decrease activity engagement, decrease performance during functional transfers, decrease sensation, decrease FM control, decrease GM control and high fall risk combined with medical complications of abnormal CBC and need for input for mobility technique/safety  Personal factors affecting Pt at time of initial evaluation include: unable to perform caregiver support/tasks, step(s) to enter environment, multi-level environment, advanced age, inability to perform IADLs, inability to perform ADLs, inability to ambulate household distances, inability to navigate community distances, limited insight into impairments and recent fall(s)/fall history  Pt will benefit from continued skilled OT services to address deficits as defined above and to maximize level independence/participation during ADLs and functional tasks to facilitate return toward PLOF and improved quality of life  From an occupational therapy standpoint, recommendation at time of d/c would be post acute rehabilitation services       OT Discharge Recommendation: Post acute rehabilitation services

## 2023-04-17 NOTE — SPEECH THERAPY NOTE
Speech Language/Pathology  Speech-Language Pathology Bedside Swallow Evaluation      Patient Name: Pooja Simms    UBSUQ'H Date: 4/17/2023     Summary   Consult received secondary to stroke pathway and recent increase in coughing  Made NPO by RN until seen by SLP  Pt admitted w/ late/early subacute right posterior MCA CVA  Spoke w/ RN Kelsea Heredia, reports worsened symptoms and repeat MRI showed evolving R middle cerebral artery infarct  Pt seen upright in bed, requires constant verbal/tactile stimuli for alertness or will immediately begin snoring  Oral motor exam reveals right facial droop and moderate-severely decreased strength/ROM w/ right labial and lingual musculature  Speech is slurred, see speech/lang eval below for more information  PO trials completed including thin, nectar thick and puree textures  Pt presents w/ moderate oral dysphagia characterized by impaired bolus retrieval d/t right weakness and right anterior loss of liquids  Prolonged oral manipulation w/ min oral residual in right buccal cavity  Solids not trialed secondary to pt unable to follow commands for not talking w/ food in mouth  Suspected pharyngeal phase dysphagia characterized by decreased hyolaryngeal elevation, multiple swallows w/ thin liquids and overt coughing w/ thins via spoon, cup and nectar thick by straw  Recommend puree diet w/ nectar thick liquids; no straws  Meds crushed in puree  Aspiration precautions, assistance w/ meals   SLP will continue to follow  ?need for VBS depending on s/s w/ thin liquids    Risk/s for Aspiration: Moderate-High s/p CVA w/ altered mental status     Recommended Diet: puree/level 1 diet and nectar thick liquids   Recommended Form of Meds: crushed with puree   Aspiration precautions and swallowing strategies: upright posture, only feed when fully alert, slow rate of feeding and small bites/sips  Other Recommendations: Continue frequent oral care,         Current Medical Status  Pooja Simms is a 76 y o  female who presents with confusion  Patient was found fallen on the floor by  as per the daughter she has not been acting like herself since yesterday she normally takes care of of her  does have some dementia and is little confused mixed up in the morning but then usually clears up well later on in the day this is not like her at all  In the room she is very restless agitated however noticed to have some weakness on her left arm and left leg and also left facial droop       Current Precautions:  Fall  Aspiration      Allergies:  No known food allergies    Past medical history:  Please see H&P for details    Special Studies:  MRI Brain:  Limited evaluation of the brain due to early termination of the examination and significant motion artifact  Late/early subacute right posterior MCA distribution infarction involving the right posterior insula and temporoparietal lobes  No significant mass effect  Chronic left PCA territory infarction involving the left occipital lobe  Generalized cerebral volume loss  Repeat MRI 4/17:  Evolving right middle cerebral artery infarct compared to 4/14/2023  Increasing vasogenic edema and mild sulcal effacement  Social/Education/Vocational Hx:  Pt lives with family    Swallow Information   Current Risks for Dysphagia & Aspiration: CVA  Current Symptoms/Concerns: coughing with po  Current Diet: NPO   Baseline Diet: regular diet and thin liquids      Baseline Assessment   Behavior/Cognition: lethargic and waxing and waning arousal level  Speech/Language Status: able to follow commands inconsistently  Patient Positioning: upright in bed  Pain Status/Interventions/Response to Interventions:  No report of or nonverbal indications of pain         Swallow Mechanism Exam  Facial: right facial droop  Labial: decreased ROM right side  Lingual: decreased strength right side  Velum: unable to visualize  Mandible: decreased ROM right side  Dentition: limited dentition  Vocal quality:clear/adequate   Volitional Cough: strong/productive   Respiratory Status: on RA      Consistencies Assessed and Performance   Consistencies Administered: thin liquids, nectar thick and puree    Oral Stage: moderate  Impaired bolus retrieval and anterior loss of thin liquids via right side  Manipulation was slow with the materials administered today  Bolus formation and transfer were slow with min oral residual in right buccal cavity  Suspected  reduced oral control w/ thin liquids and nectars by straw    Pharyngeal Stage: suspected  Swallow Mechanics:  Swallowing initiation appeared mildly delayed w/ poor coordination  Laryngeal rise was palpated and judged to be sluggish  Multiple swallows noted w/ each bolus, worse w/ thins  Immediate coughing w/ thins via spoon and cup and nectar thick by straw    Esophageal Concerns: none reported    Summary and Recommendations (see above)    Results Reviewed with: patient, RN and MD     Treatment Recommended: Dysphagia therapy, ?need for VBS     Frequency of treatment: 3-5x/week    Patient Stated Goal: Go to rehab and get stronger    Dysphagia LTG  -Patient will demonstrate safe and effective oral intake (without overt s/s significant oral/pharyngeal dysphagia including s/s penetration or aspiration) for the highest appropriate diet level       Short Term Goals:  -Pt will tolerate Dysphagia 1/pureed diet and nectar thick  with no significant s/s oral or pharyngeal dysphagia across 1-3 diagnostic session/s    -Patient will tolerate trials of upgraded food and/or liquid texture with no significant s/s of oral or pharyngeal dysphagia including aspiration across 1-3 diagnostic sessions     -Patient will comply with a Video/Modified Barium Swallow study for more complete assessment of swallowing anatomy/physiology/aspiration risk and to assess efficacy of treatment techniques so as to best guide treatment plan    Re: Oral strength, ROM, coordination    -Patient will complete daily oral motor exercise to increase labial strength, range of motion and coordination with min cues to 90% effectiveness to strengthen oral musculature and prevent food or liquid spillage from the oral cavity/maximize oral control with no cues needed    Re: Mastication  -Patient will demonstrate adequate mastication to safely consume the  least restrictive diet with no verbal, visual or tactile cues needed  Re: Compensatory Strategies  -Patient will demonstrate independent use of recommended safe swallowing strategies during a clinician assessed meal across 1-3 diagnostic sessions     - Patient’s caregiver will demonstrate adherence to recommended diet, as well as application of aspiration precautions and compensatory strategies  Speech Therapy Prognosis   Prognosis: fair    Prognosis Considerations: age, medical status and cognitive status        Speech/Language Evaluation      Patient Name: Lucien Cortes      Summary   Pt presents w/ mildly impaired receptive language characterized by reductions in higher level language comprehension  Expressive language appears grossly intact in conversation and responsive naming  Object naming is impaired but more likely d/t suspected visual issues than anomia/paraphasia  Suspected cognitive impairments characterized by impaired orientation and impaired STM  Pt tangential and repetitive in conversation, frequently asking for son and daughter but could recall she had a stroke  Moderate dysarthria noted (flaccid vs mixed) in isolation and conversation  Suspected deficits in visual perception- pt w/ obvious left neglect and right gaze preference  Difficulty tracking and sustaining midline gaze  Unable to track to the left at all  Recommendation:  Continued speech therapy focusing mainly on speech and cognition, preferably in acute rehab setting vs SNF        Therapy Prognosis: Fair to good  Prognosis considerations: language grossly intact   Treatment Recommended/Frequency: 5x/week        Current Medical:       See above    General Cognitive Status:  Poor alertness level; requiring frequent verbal/tactile stimuli to maintain alertness   Somewhat oriented to repetitive/verbose    Auditory Comprehension:  Body part ID: intact; able to identify left side  Left vs Right Body part: intact  One step commands: intact on own body; impaired for environment  Personal y/n ?'s: intact  Simple y/n ?'s: intact  Complex y/n ?'s: impaired- 2/5  Comprehension of Conversation: impaired     Reading Comprehension:  Simple direction following: impaired d/t suspected visual deficits      Speech Mechanism Examination:   See above    Oral Expression:  Auto Sequences:   Count Forwards: intact  Count Backwards: intact  Days[de-identified] intact   Months intact   Automatic Social Utterances[de-identified] impaired  Confrontation Naming: impaired, suspect d/t visual deficits rather than anomia  Responsive Naming: intact  Conversation: impaired  Able to make basic needs known:  impaired    Written Expression:  Did not test, requires further assessment    Motor Speech:  Dysarthria: +flaccid vs mixed   Imprecise artic: present   Rate: rapid rate which decreased intelligibility   Nasality: N/A   Breath support: intact   Volume: intact  Apraxia: none noted       Orientation:  Person: intact  Place: [facility], [facility]: intact w/ binary choice   City: impaired  Time of Day: impaired  Month: impaired; off by one  PRASANTH: impaired  Year:  Impaired; off by one  Reason for hospitalization: intact    Cognitive -linguistic skills:  Impaired   Needs further evaluation      Also noted:  Distractable  Inattention  Gaze preference to the right; unable to track to the left  Perseveration  Tangential  Yamilet Coburn  /partially aware of deficits:    Results discussed with:  Patient, RN and Malcolm Licea 85 CCC-SLP  4/17/2023

## 2023-04-17 NOTE — ASSESSMENT & PLAN NOTE
Florecita Whalen is a 25-year-old woman who is experienced an embolic stroke of undetermined source   -Await 2D echocardiogram  -Suggest gentle hydration with normal saline  -Continue aspirin and atorvastatin  -Recommend discontinuing clopidogrel at this time as dual antiplatelet therapy is not indicated with a stroke of this size in the early acute setting/in the absence of severe/symptomatic atherosclerosis  -Please initiate DVT prophylaxis today  -She will need ongoing monitoring for atrial fibrillation  -PT/OT/SP  -At this point would target normotension  -Stat head CT for any significant change in examination

## 2023-04-18 ENCOUNTER — APPOINTMENT (INPATIENT)
Dept: NON INVASIVE DIAGNOSTICS | Facility: HOSPITAL | Age: 78
End: 2023-04-18

## 2023-04-18 LAB
ANION GAP SERPL CALCULATED.3IONS-SCNC: 4 MMOL/L (ref 4–13)
AORTIC ROOT: 3 CM
AORTIC VALVE MEAN VELOCITY: 26.4 M/S
APICAL FOUR CHAMBER EJECTION FRACTION: 66 %
AV AREA BY CONTINUOUS VTI: 1.2 CM2
AV AREA PEAK VELOCITY: 1 CM2
AV LVOT MEAN GRADIENT: 5 MMHG
AV LVOT PEAK GRADIENT: 8 MMHG
AV MEAN GRADIENT: 31 MMHG
AV PEAK GRADIENT: 49 MMHG
AV REGURGITATION PRESSURE HALF TIME: 941 MS
BASOPHILS # BLD AUTO: 0.02 THOUSANDS/ΜL (ref 0–0.1)
BASOPHILS NFR BLD AUTO: 0 % (ref 0–1)
BUN SERPL-MCNC: 13 MG/DL (ref 5–25)
CALCIUM SERPL-MCNC: 8.2 MG/DL (ref 8.4–10.2)
CHLORIDE SERPL-SCNC: 106 MMOL/L (ref 96–108)
CO2 SERPL-SCNC: 27 MMOL/L (ref 21–32)
CREAT SERPL-MCNC: 0.74 MG/DL (ref 0.6–1.3)
DOP CALC AO PEAK VEL: 3.5 M/S
DOP CALC AO VTI: 65.38 CM
DOP CALC LVOT DIAMETER: 1.8 CM
DOP CALC LVOT PEAK VEL VTI: 31.53 CM
DOP CALC LVOT PEAK VEL: 1.38 M/S
DOP CALC LVOT STROKE INDEX: 50.3 ML/M2
DOP CALC LVOT STROKE VOLUME: 77
DOP CALC MV VTI: 47.38 CM
E WAVE DECELERATION TIME: 303 MS
E/A RATIO: 0.74
EOSINOPHIL # BLD AUTO: 0.2 THOUSAND/ΜL (ref 0–0.61)
EOSINOPHIL NFR BLD AUTO: 3 % (ref 0–6)
ERYTHROCYTE [DISTWIDTH] IN BLOOD BY AUTOMATED COUNT: 13.3 % (ref 11.6–15.1)
FRACTIONAL SHORTENING: 30 (ref 28–44)
GFR SERPL CREATININE-BSD FRML MDRD: 77 ML/MIN/1.73SQ M
GLUCOSE SERPL-MCNC: 101 MG/DL (ref 65–140)
HCT VFR BLD AUTO: 37.5 % (ref 34.8–46.1)
HGB BLD-MCNC: 12.2 G/DL (ref 11.5–15.4)
IMM GRANULOCYTES # BLD AUTO: 0.02 THOUSAND/UL (ref 0–0.2)
IMM GRANULOCYTES NFR BLD AUTO: 0 % (ref 0–2)
INTERVENTRICULAR SEPTUM IN DIASTOLE (PARASTERNAL SHORT AXIS VIEW): 1.4 CM
INTERVENTRICULAR SEPTUM: 1.4 CM (ref 0.6–1.1)
IVC: 10 MM
LAAS-AP2: 10.6 CM2
LAAS-AP4: 18.7 CM2
LEFT ATRIUM SIZE: 3 CM
LEFT ATRIUM VOLUME INDEX (MOD BIPLANE): 25.5
LEFT INTERNAL DIMENSION IN SYSTOLE: 2.1 CM (ref 2.1–4)
LEFT VENTRICLE DIASTOLIC VOLUME (MOD BIPLANE): 21 ML
LEFT VENTRICLE SYSTOLIC VOLUME (MOD BIPLANE): 6 ML
LEFT VENTRICULAR INTERNAL DIMENSION IN DIASTOLE: 3 CM (ref 3.5–6)
LEFT VENTRICULAR POSTERIOR WALL IN END DIASTOLE: 1.3 CM
LEFT VENTRICULAR STROKE VOLUME: 20 ML
LV EF: 70 %
LVSV (TEICH): 20 ML
LYMPHOCYTES # BLD AUTO: 1.04 THOUSANDS/ΜL (ref 0.6–4.47)
LYMPHOCYTES NFR BLD AUTO: 17 % (ref 14–44)
MCH RBC QN AUTO: 31.1 PG (ref 26.8–34.3)
MCHC RBC AUTO-ENTMCNC: 32.5 G/DL (ref 31.4–37.4)
MCV RBC AUTO: 96 FL (ref 82–98)
MONOCYTES # BLD AUTO: 0.62 THOUSAND/ΜL (ref 0.17–1.22)
MONOCYTES NFR BLD AUTO: 10 % (ref 4–12)
MV E'TISSUE VEL-LAT: 3 CM/S
MV E'TISSUE VEL-SEP: 3 CM/S
MV MEAN GRADIENT: 3 MMHG
MV PEAK A VEL: 1.6 M/S
MV PEAK E VEL: 118 CM/S
MV PEAK GRADIENT: 9 MMHG
MV STENOSIS PRESSURE HALF TIME: 88 MS
MV VALVE AREA P 1/2 METHOD: 2.5
NEUTROPHILS # BLD AUTO: 4.21 THOUSANDS/ΜL (ref 1.85–7.62)
NEUTS SEG NFR BLD AUTO: 70 % (ref 43–75)
NRBC BLD AUTO-RTO: 0 /100 WBCS
PLATELET # BLD AUTO: 234 THOUSANDS/UL (ref 149–390)
PMV BLD AUTO: 9.6 FL (ref 8.9–12.7)
POTASSIUM SERPL-SCNC: 3.8 MMOL/L (ref 3.5–5.3)
RA PRESSURE ESTIMATED: 3 MMHG
RBC # BLD AUTO: 3.92 MILLION/UL (ref 3.81–5.12)
SL CV AV DECELERATION TIME RETROGRADE: 3246 MS
SL CV AV PEAK GRADIENT RETROGRADE: 36 MMHG
SL CV LEFT ATRIUM LENGTH A2C: 4.1 CM
SL CV LV EF: 70
SL CV PED ECHO LEFT VENTRICLE DIASTOLIC VOLUME (MOD BIPLANE) 2D: 34 ML
SL CV PED ECHO LEFT VENTRICLE SYSTOLIC VOLUME (MOD BIPLANE) 2D: 14 ML
SODIUM SERPL-SCNC: 137 MMOL/L (ref 135–147)
TRICUSPID ANNULAR PLANE SYSTOLIC EXCURSION: 1.3 CM
WBC # BLD AUTO: 6.11 THOUSAND/UL (ref 4.31–10.16)

## 2023-04-18 RX ORDER — CLONAZEPAM 0.5 MG/1
0.25 TABLET ORAL 2 TIMES DAILY PRN
Status: DISCONTINUED | OUTPATIENT
Start: 2023-04-18 | End: 2023-04-20 | Stop reason: HOSPADM

## 2023-04-18 RX ORDER — SERTRALINE HYDROCHLORIDE 100 MG/1
100 TABLET, FILM COATED ORAL DAILY
Status: DISCONTINUED | OUTPATIENT
Start: 2023-04-21 | End: 2023-04-20 | Stop reason: HOSPADM

## 2023-04-18 RX ORDER — DULOXETIN HYDROCHLORIDE 30 MG/1
30 CAPSULE, DELAYED RELEASE ORAL DAILY
Status: DISCONTINUED | OUTPATIENT
Start: 2023-04-19 | End: 2023-04-20

## 2023-04-18 RX ORDER — QUETIAPINE FUMARATE 25 MG/1
12.5 TABLET, FILM COATED ORAL 2 TIMES DAILY
Status: DISCONTINUED | OUTPATIENT
Start: 2023-04-18 | End: 2023-04-20 | Stop reason: HOSPADM

## 2023-04-18 RX ORDER — HALOPERIDOL 1 MG/1
2 TABLET ORAL 2 TIMES DAILY PRN
Status: DISCONTINUED | OUTPATIENT
Start: 2023-04-18 | End: 2023-04-19

## 2023-04-18 RX ADMIN — DULOXETINE HYDROCHLORIDE 60 MG: 60 CAPSULE, DELAYED RELEASE ORAL at 08:28

## 2023-04-18 RX ADMIN — ENOXAPARIN SODIUM 30 MG: 30 INJECTION SUBCUTANEOUS at 08:28

## 2023-04-18 RX ADMIN — BUSPIRONE HYDROCHLORIDE 10 MG: 10 TABLET ORAL at 21:18

## 2023-04-18 RX ADMIN — QUETIAPINE FUMARATE 12.5 MG: 25 TABLET ORAL at 17:53

## 2023-04-18 RX ADMIN — HALOPERIDOL 2 MG: 1 TABLET ORAL at 19:23

## 2023-04-18 RX ADMIN — BUSPIRONE HYDROCHLORIDE 10 MG: 10 TABLET ORAL at 15:52

## 2023-04-18 RX ADMIN — TIMOLOL MALEATE 1 DROP: 5 SOLUTION/ DROPS OPHTHALMIC at 17:54

## 2023-04-18 RX ADMIN — OLANZAPINE 2.5 MG: 2.5 TABLET, FILM COATED ORAL at 13:00

## 2023-04-18 RX ADMIN — LATANOPROST 1 DROP: 50 SOLUTION OPHTHALMIC at 21:18

## 2023-04-18 RX ADMIN — TIMOLOL MALEATE 1 DROP: 5 SOLUTION/ DROPS OPHTHALMIC at 08:29

## 2023-04-18 RX ADMIN — NICOTINE 1 PATCH: 7 PATCH, EXTENDED RELEASE TRANSDERMAL at 08:28

## 2023-04-18 RX ADMIN — BUSPIRONE HYDROCHLORIDE 10 MG: 10 TABLET ORAL at 08:28

## 2023-04-18 RX ADMIN — ASPIRIN 81 MG: 81 TABLET, COATED ORAL at 08:28

## 2023-04-18 RX ADMIN — ATORVASTATIN CALCIUM 40 MG: 40 TABLET, FILM COATED ORAL at 17:53

## 2023-04-18 RX ADMIN — SERTRALINE HYDROCHLORIDE 50 MG: 50 TABLET, FILM COATED ORAL at 14:23

## 2023-04-18 NOTE — ASSESSMENT & PLAN NOTE
Continue home meds  May use as needed Zyprexa in case of restlessness currently on a one-to-one observation due to restlessness and fall risk  will discont and observe  Continue with home dose of Cymbalta and buspirone

## 2023-04-18 NOTE — ASSESSMENT & PLAN NOTE
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 involving the right posterior insula and temporoparietal lobes  No significant mass effect   · CTA head and neck showedSuspected early infarct within the right posterior aspect of the MCA territory involving the posterior superior temporal lobe and posterior lateral frontal parietal lobes with subtle loss of gray-white differentiation  No hemorrhagic transformation  There is an old infarct within the left occipital lobe/PCA territory  Chronic microangiopathic change within the cerebral hemispheres  Diffusely hypoplastic vertebral arteries bilaterally  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 extending through the hypoglossal canal to the posterior fossa  Atherosclerotic disease of the left carotid bifurcation with only minimal narrowing  there is moderate stenosis of the intracranial internal carotid artery bilaterally at the level of the anterior clinoid     There is a small filling defect present within one of the larger M2 branches on the right within the sylvian fissure, see series 5 image 133 and a possibility of branches extending posteriorly towards the area of suspected ischemia within the right posterior lateral MCA territory suggesting small M2/M3 branch occlusion distally    · S/B Neurology :Patient was started on dual antiplatelet therapy and maintained on high intensity statin  · Continue with serial neuro exams  Continues to have left-sided weakness and neglect with visual abnormalities  Remains agitated  Had worsening left-sided weakness over the weekend  Discussed with neurologist on-call who recommended repeat MRI  · Repeat MRI today shows  :Evolving right middle cerebral artery infarct compared to 4/14/2023  Increasing vasogenic edema and mild sulcal effacement  · 2 d echocardiogram normal ef   Moderate aortic stenosis  No PFO  telemetry monitor without any arrhythmia  · PT OT and speech evaluation appreciated   · Goals of care discussion with family today  Recommend acute/subacute rehab overall prognosis is guarded  code status dnr/dni  · zio patch/loop recorder on discharge  no further brain imaging

## 2023-04-18 NOTE — PLAN OF CARE
Problem: PHYSICAL THERAPY ADULT  Goal: Performs mobility at highest level of function for planned discharge setting  See evaluation for individualized goals  Description: Treatment/Interventions: ADL retraining, Functional transfer training, LE strengthening/ROM, Elevations, Therapeutic exercise, Endurance training, Cognitive reorientation, Patient/family training, Equipment eval/education, Bed mobility, Gait training, Compensatory technique education, Spoke to nursing, Family  Equipment Recommended:  (TBD by Jordana Marie)       See flowsheet documentation for full assessment, interventions and recommendations  Outcome: Progressing  Note: Prognosis: Good  Problem List: Decreased strength, Decreased range of motion, Decreased endurance, Impaired balance, Decreased mobility, Decreased coordination, Decreased cognition, Impaired judgement, Decreased safety awareness, Impaired vision, Decreased skin integrity  Assessment: Pt seen for PT treatment session this date with interventions consisting of bed mobility tasks, transfer training, seated TE BLE with muscle facilitation LLE, neuromuscular re-education of movement to improve dynamic sitting balance and proprioceptive techniques, gait training, and education provided as needed for safety and direction to improve functional mobility, safety awareness, and activity tolerance  Pt agreeable to PT treatment session upon arrival, pt found resting in bed  At end of session, pt left sitting OOB in recliner with BLE elevated, LUE elevated on pillow with chair alarm activated, and with all needs in reach  In comparison to previous session, pt with improvements in active ROM performed in LLE and ability to transfer OOB and ambulate a short distance with physical assistance  Continue to recommend STR at time of d/c in order to maximize pt's functional independence and safety w/ mobility  Pt continues to be functioning below baseline level   PT will continue to see pt while here in order to address the deficits listed above and provide interventions consistent w/ POC in effort to achieve STGs  Barriers to Discharge: Inaccessible home environment, Decreased caregiver support  Barriers to Discharge Comments: Patient is currently requiring use of mechanical lift for transfers  PT Discharge Recommendation: Post acute rehabilitation services    See flowsheet documentation for full assessment

## 2023-04-18 NOTE — OCCUPATIONAL THERAPY NOTE
"  Occupational Therapy Progress Note     Patient Name: Citlalli Dang  [id number]'I Date: 4/18/2023  Problem List  Principal Problem:    Acute ischemic CVA  Active Problems:    Generalized anxiety disorder    Hypertensive urgency    Recurrent falls    Tobacco abuse       04/18/23 1000   Note Type   Note Type Treatment   Pain Assessment   Pain Assessment Tool 0-10   Pain Score No Pain   Restrictions/Precautions   Other Precautions Cognitive; Bed Alarm; Chair Alarm; Fall Risk;Visual impairment   Bed Mobility   Supine to Sit 3  Moderate assistance   Additional items Assist x 1; Increased time required;Verbal cues;HOB elevated   Additional Comments Pt supine in bed at beginning of session  Supine to sit @ Mod A x1  Pt able to maintain seated at EOB for approximately 20 minutes, requiring SBA during static sitting and Min-Max A during dynamic LE exercises  Transfers   Sit to Stand 4  Minimal assistance   Additional items Assist x 2; Increased time required;Verbal cues   Stand to Sit 4  Minimal assistance   Additional items Assist x 2; Increased time required;Verbal cues   Stand pivot 2  Maximal assistance   Additional items Assist x 2; Increased time required;Verbal cues   Additional Comments LUE placed into sling for transfers to avoid hanging and further injury during transfers  STS from EOB with HHA @ Min A x2  SPT to recliner chair @ Max A x2  STS from recliner chair @ Steadying assist  Pt able to complete 6' functional mobility with HHA @ Max A x2 with chair follow for safety  Pt seated OOB in chair at end of session with chair alarm intact, call bell within reach and all needs met  Exercise Tools   Exercise Tools Yes  (Pt demonstrated slight L grasp this session, given stress ball and educated on squeezing throughout the day to facilitate grasp  Pt also with noted improved LUE sensation this session )   Subjective   Subjective \"how long do you think until I'm better? \"   Cognition   Overall Cognitive Status Impaired " Arousal/Participation Alert; Responsive; Cooperative   Attention Attends with cues to redirect   Orientation Level Oriented to person;Oriented to place   Memory Decreased short term memory;Decreased recall of recent events;Decreased recall of precautions   Following Commands Follows one step commands with increased time or repetition   Comments Pt with continued perseveration over family  Nohemy Kuo, present and able to provide encouragement throughout  Pt easily distracted with significantly decreased attention requiring constant cues to redirect to task  Pt ultimately able to follow through commands once attention was sustained  Vision   Vision Comments Pt continues with tracking deficits and is only able to track to right side and mid line but was able to compensate throughout session by turning her head towards left side with cueing to bring awareness to L side  DaughterNohemy, educated on sitting on L side of patient to facilitate L-sided awareness  Pt continuing to neglect L side but aware of L side more than last session  Activity Tolerance   Activity Tolerance Other (Comment)  (Treatment limited by cognitive and visual deficits)   Medical Staff Made Aware Spoke with PTA Reji Britton and AMADO Jones   Assessment   Assessment Pt completed OT tx session #1 focused on functional mobility, neuromuscular re-education, and continued evaluation of impairments  Pt alert and agreeable to participate  PT/OT co-treat completed d/t significant mobility deficits and safety concerns  Pt demonstrated improvements in participation, L-sided attention, command following, and functional mobility this session but continues to be limited by flaccid LUE and attention deficits  Pt currently completing UB ADLs @ Max A, LB ADLs @ Total A, and functional mobility with RW @ Max A x2   Pt demonstrating Good participation and Good potential to achieve goals but is currently demonstrating deficits in decrease LUE function, decrease activity tolerance, decrease standing balance, decrease sitting balance, decrease performance during ADL tasks, decrease cognition, decrease safety awareness , increase impulsiveness, decrease generalized strength, decrease activity engagement and decrease performance during functional transfers  Continue to recommend post acute rehabilitation services upon discharge to increase safety and independence in ADL tasks and functional mobility  Plan   Treatment Interventions ADL retraining;Visual perceptual retraining;Functional transfer training; Endurance training;Patient/family training   Goal Expiration Date 05/01/23   OT Treatment Day 1   OT Frequency 3-5x/wk   Recommendation   OT Discharge Recommendation Post acute rehabilitation services   AM-[facility] Daily Activity Inpatient   Lower Body Dressing 1   Bathing 1   Toileting 1   Upper Body Dressing 2   Grooming 2   Eating 2   Daily Activity Raw Score 9   Turning Head Towards Sound 2   Follow Simple Instructions 2   Low Function Daily Activity Raw Score 13   Low Function Daily Activity Standardized Score  23 16   AM-[facility] Applied Cognition Inpatient   Following a Speech/Presentation 2   Understanding Ordinary Conversation 3   Taking Medications 1   Remembering Where Things Are Placed or Put Away 1   Remembering List of 4-5 Errands 1   Taking Care of Complicated Tasks 1   Applied Cognition Raw Score 9   Applied Cognition Standardized Score 22 48     The patient's raw score on the AM-[facility] Daily Activity Inpatient Short Form is 9  A raw score of less than 19 suggests the patient may benefit from discharge to post-acute rehabilitation services  Please refer to the recommendation of the Occupational Therapist for safe discharge planning  Pt goals to be met by 5/1/23     1  Pt will demonstrate ability to complete grooming/hygiene tasks @ Min A after set-up    2  Pt will demonstrate ability to complete supine<>sit @ Min A in order to increase safety and independence during ADL tasks   3  Pt will demonstrate ability to complete UB ADLs including washing/dressing @ Min A in order to increase performance and participation during meaningful tasks  4  Pt will demonstrate ability to complete LB dressing @ Mod A in order to increase safety and independence during meaningful tasks  5  Pt will demonstrate ability to complete toileting tasks including CM and pericare @ Mod A in order to increase safety and independence during meaningful tasks  6  Pt will demonstrate ability to complete EOB, chair, toilet/commode transfers @ Mod A in order to increase performance and participation during functional tasks  7  Pt will demonstrate ability to stand for 3-5 minutes while maintaining Fair + balance with use of LRAD for UB support PRN  8  Pt will attend to continued cognitive assessments 100% of the time in order to provide most appropriate d/c recommendations  9  Pt will identify 3 areas of interest/hobbies and 1 intervention on how to incorporate into daily life in order to increase interaction with environment and peers as well as increase participation in meaningful tasks  10  Pt will demonstrate 100% carryover of BUE HEP in order to increase BUE MS and increase performance during functional tasks upon d/c home      FRANCIS Cesar/L

## 2023-04-18 NOTE — NURSING NOTE
Patient became combative with this PCA around 19:45 when attempting to re-attach telemetry monitoring  Patient scratched PCA's arm and smacked the PCA 2 times  RN notified

## 2023-04-18 NOTE — ASSESSMENT & PLAN NOTE
Presented with elevated blood pressure       Allow permissive hypertension In the setting of acute stroke  Blood pressure is back to normal without any medications

## 2023-04-18 NOTE — PLAN OF CARE
Problem: PAIN - ADULT  Goal: Verbalizes/displays adequate comfort level or baseline comfort level  Description: Interventions:  - Encourage patient to monitor pain and request assistance  - Assess pain using appropriate pain scale  - Administer analgesics based on type and severity of pain and evaluate response  - Implement non-pharmacological measures as appropriate and evaluate response  - Consider cultural and social influences on pain and pain management  - Notify physician/advanced practitioner if interventions unsuccessful or patient reports new pain  Outcome: Progressing     Problem: INFECTION - ADULT  Goal: Absence or prevention of progression during hospitalization  Description: INTERVENTIONS:  - Assess and monitor for signs and symptoms of infection  - Monitor lab/diagnostic results  - Monitor all insertion sites, i e  indwelling lines, tubes, and drains  - Monitor endotracheal if appropriate and nasal secretions for changes in amount and color  - Evangeline appropriate cooling/warming therapies per order  - Administer medications as ordered  - Instruct and encourage patient and family to use good hand hygiene technique  - Identify and instruct in appropriate isolation precautions for identified infection/condition  Outcome: Progressing  Goal: Absence of fever/infection during neutropenic period  Description: INTERVENTIONS:  - Monitor WBC    Outcome: Progressing     Problem: SAFETY ADULT  Goal: Patient will remain free of falls  Description: INTERVENTIONS:  - Educate patient/family on patient safety including physical limitations  - Instruct patient to call for assistance with activity   - Consult OT/PT to assist with strengthening/mobility   - Keep Call bell within reach  - Keep bed low and locked with side rails adjusted as appropriate  - Keep care items and personal belongings within reach  - Initiate and maintain comfort rounds  - Make Fall Risk Sign visible to staff  - Offer Toileting every 2 Hours, in advance of need  - Initiate/Maintain bedalarm  - Obtain necessary fall risk management equipment: 1:1  - Apply yellow socks and bracelet for high fall risk patients  - Consider moving patient to room near nurses station  Outcome: Progressing  Goal: Maintain or return to baseline ADL function  Description: INTERVENTIONS:  -  Assess patient's ability to carry out ADLs; assess patient's baseline for ADL function and identify physical deficits which impact ability to perform ADLs (bathing, care of mouth/teeth, toileting, grooming, dressing, etc )  - Assess/evaluate cause of self-care deficits   - Assess range of motion  - Assess patient's mobility; develop plan if impaired  - Assess patient's need for assistive devices and provide as appropriate  - Encourage maximum independence but intervene and supervise when necessary  - Involve family in performance of ADLs  - Assess for home care needs following discharge   - Consider OT consult to assist with ADL evaluation and planning for discharge  - Provide patient education as appropriate  Outcome: Progressing  Goal: Maintains/Returns to pre admission functional level  Description: INTERVENTIONS:  - Perform BMAT or MOVE assessment daily    - Set and communicate daily mobility goal to care team and patient/family/caregiver  - Collaborate with rehabilitation services on mobility goals if consulted  - Perform Range of Motion 3 times a day  - Reposition patient every 2 hours    - Out of bed to chair 3 times a day   - Out of bed for meals 3 times a day  - Out of bed for toileting  - Record patient progress and toleration of activity level   Outcome: Progressing     Problem: DISCHARGE PLANNING  Goal: Discharge to home or other facility with appropriate resources  Description: INTERVENTIONS:  - Identify barriers to discharge w/patient and caregiver  - Arrange for needed discharge resources and transportation as appropriate  - Identify discharge learning needs (meds, wound care, etc )  - Arrange for interpretive services to assist at discharge as needed  - Refer to Case Management Department for coordinating discharge planning if the patient needs post-hospital services based on physician/advanced practitioner order or complex needs related to functional status, cognitive ability, or social support system  Outcome: Progressing     Problem: Knowledge Deficit  Goal: Patient/family/caregiver demonstrates understanding of disease process, treatment plan, medications, and discharge instructions  Description: Complete learning assessment and assess knowledge base  Interventions:  - Provide teaching at level of understanding  - Provide teaching via preferred learning methods  Outcome: Progressing     Problem: MOBILITY - ADULT  Goal: Maintain or return to baseline ADL function  Description: INTERVENTIONS:  -  Assess patient's ability to carry out ADLs; assess patient's baseline for ADL function and identify physical deficits which impact ability to perform ADLs (bathing, care of mouth/teeth, toileting, grooming, dressing, etc )  - Assess/evaluate cause of self-care deficits   - Assess range of motion  - Assess patient's mobility; develop plan if impaired  - Assess patient's need for assistive devices and provide as appropriate  - Encourage maximum independence but intervene and supervise when necessary  - Involve family in performance of ADLs  - Assess for home care needs following discharge   - Consider OT consult to assist with ADL evaluation and planning for discharge  - Provide patient education as appropriate  Outcome: Progressing  Goal: Maintains/Returns to pre admission functional level  Description: INTERVENTIONS:  - Perform BMAT or MOVE assessment daily    - Set and communicate daily mobility goal to care team and patient/family/caregiver  - Collaborate with rehabilitation services on mobility goals if consulted  - Perform Range of Motion 3 times a day    - Reposition patient every 2 hours   - Out of bed to chair 3 times a day   - Out of bed for meals 3 times a day  - Out of bed for toileting  - Record patient progress and toleration of activity level   Outcome: Progressing     Problem: Nutrition/Hydration-ADULT  Goal: Nutrient/Hydration intake appropriate for improving, restoring or maintaining nutritional needs  Description: Monitor and assess patient's nutrition/hydration status for malnutrition  Collaborate with interdisciplinary team and initiate plan and interventions as ordered  Monitor patient's weight and dietary intake as ordered or per policy  Utilize nutrition screening tool and intervene as necessary  Determine patient's food preferences and provide high-protein, high-caloric foods as appropriate       INTERVENTIONS:  - Monitor oral intake, urinary output, labs, and treatment plans  - Assess nutrition and hydration status and recommend course of action  - Evaluate amount of meals eaten  - Assist patient with eating if necessary   - Allow adequate time for meals  - Recommend/ encourage appropriate diets, oral nutritional supplements, and vitamin/mineral supplements  - Order, calculate, and assess calorie counts as needed  - Recommend, monitor, and adjust tube feedings and TPN/PPN based on assessed needs  - Assess need for intravenous fluids  - Provide specific nutrition/hydration education as appropriate  - Include patient/family/caregiver in decisions related to nutrition  Outcome: Progressing     Problem: Prexisting or High Potential for Compromised Skin Integrity  Goal: Skin integrity is maintained or improved  Description: INTERVENTIONS:  - Identify patients at risk for skin breakdown  - Assess and monitor skin integrity  - Assess and monitor nutrition and hydration status  - Monitor labs   - Assess for incontinence   - Turn and reposition patient  - Assist with mobility/ambulation  - Relieve pressure over bony prominences  - Avoid friction and shearing  - Provide appropriate hygiene as needed including keeping skin clean and dry  - Evaluate need for skin moisturizer/barrier cream  - Collaborate with interdisciplinary team   - Patient/family teaching  - Consider wound care consult   Outcome: Progressing     Problem: Neurological Deficit  Goal: Neurological status is stable or improving  Description: Interventions:  - Monitor and assess patient's level of consciousness, motor function, sensory function, and level of assistance needed for ADLs  - Monitor and report changes from baseline  Collaborate with interdisciplinary team to initiate plan and implement interventions as ordered  - Provide and maintain a safe environment  - Consider seizure precautions  - Consider fall precautions  - Consider aspiration precautions  - Consider bleeding precautions  Outcome: Progressing     Problem: Activity Intolerance/Impaired Mobility  Goal: Mobility/activity is maintained at optimum level for patient  Description: Interventions:  - Assess and monitor patient  barriers to mobility and need for assistive/adaptive devices  - Assess patient's emotional response to limitations  - Collaborate with interdisciplinary team and initiate plans and interventions as ordered  - Encourage independent activity per ability   - Maintain proper body alignment  - Perform active/passive rom as tolerated/ordered  - Plan activities to conserve energy   - Turn patient as appropriate  Outcome: Progressing     Problem: Communication Impairment  Goal: Ability to express needs and understand communication  Description: Assess patient's communication skills and ability to understand information  Patient will demonstrate use of effective communication techniques, alternative methods of communication and understanding even if not able to speak  - Encourage communication and provide alternate methods of communication as needed  - Collaborate with case management/ for discharge needs    - Include patient/family/caregiver in decisions related to communication  Outcome: Progressing     Problem: Potential for Aspiration  Goal: Non-ventilated patient's risk of aspiration is minimized  Description: Assess and monitor vital signs, respiratory status, and labs (WBC)  Monitor for signs of aspiration (tachypnea, cough, rales, wheezing, cyanosis, fever)  - Assess and monitor patient's ability to swallow  - Place patient up in chair to eat if possible  - HOB up at 90 degrees to eat if unable to get patient up into chair   - Supervise patient during oral intake  - Instruct patient/ family to take small bites  - Instruct patient/ family to take small single sips when taking liquids  - Follow patient-specific strategies generated by speech pathologist   Outcome: Progressing  Goal: Ventilated patient's risk of aspiration is minimized  Description: Assess and monitor vital signs, respiratory status, airway cuff pressure, and labs (WBC)  Monitor for signs of aspiration (tachypnea, cough, rales, wheezing, cyanosis, fever)  - Elevate head of bed 30 degrees if patient has tube feeding   - Monitor tube feeding  Outcome: Progressing     Problem: Nutrition  Goal: Nutrition/Hydration status is improving  Description: Monitor and assess patient's nutrition/hydration status for malnutrition (ex- brittle hair, bruises, dry skin, pale skin and conjunctiva, muscle wasting, smooth red tongue, and disorientation)  Collaborate with interdisciplinary team and initiate plan and interventions as ordered  Monitor patient's weight and dietary intake as ordered or per policy  Utilize nutrition screening tool and intervene per policy  Determine patient's food preferences and provide high-protein, high-caloric foods as appropriate  - Assist patient with eating   - Allow adequate time for meals   - Encourage patient to take dietary supplement as ordered    - Collaborate with clinical nutritionist   - Include patient/family/caregiver in decisions related to nutrition    Outcome: Progressing

## 2023-04-18 NOTE — PLAN OF CARE
Problem: SAFETY ADULT  Goal: Patient will remain free of falls  Description: INTERVENTIONS:  - Educate patient/family on patient safety including physical limitations  - Instruct patient to call for assistance with activity   - Consult OT/PT to assist with strengthening/mobility   - Keep Call bell within reach  - Keep bed low and locked with side rails adjusted as appropriate  - Keep care items and personal belongings within reach  - Initiate and maintain comfort rounds  - Make Fall Risk Sign visible to staff  Problem: PAIN - ADULT  Goal: Verbalizes/displays adequate comfort level or baseline comfort level  Description: Interventions:  - Encourage patient to monitor pain and request assistance  - Assess pain using appropriate pain scale  - Administer analgesics based on type and severity of pain and evaluate response  - Implement non-pharmacological measures as appropriate and evaluate response  - Consider cultural and social influences on pain and pain management  - Notify physician/advanced practitioner if interventions unsuccessful or patient reports new pain  4/17/2023 2245 by Glenny Mena RN  Outcome: Not Progressing  4/17/2023 2245 by Glenny Mena RN  Outcome: Not Progressing     Problem: INFECTION - ADULT  Goal: Absence or prevention of progression during hospitalization  Description: INTERVENTIONS:  - Assess and monitor for signs and symptoms of infection  - Monitor lab/diagnostic results  - Monitor all insertion sites, i e  indwelling lines, tubes, and drains  - Monitor endotracheal if appropriate and nasal secretions for changes in amount and color  - [place] appropriate cooling/warming therapies per order  - Administer medications as ordered  - Instruct and encourage patient and family to use good hand hygiene technique  - Identify and instruct in appropriate isolation precautions for identified infection/condition  Outcome: Not Progressing  Goal: Absence of fever/infection during neutropenic period  Description: INTERVENTIONS:  - Monitor WBC    Outcome: Not Progressing     Problem: SAFETY ADULT  Goal: Maintain or return to baseline ADL function  Description: INTERVENTIONS:  -  Assess patient's ability to carry out ADLs; assess patient's baseline for ADL function and identify physical deficits which impact ability to perform ADLs (bathing, care of mouth/teeth, toileting, grooming, dressing, etc )  - Assess/evaluate cause of self-care deficits   - Assess range of motion  - Assess patient's mobility; develop plan if impaired  - Assess patient's need for assistive devices and provide as appropriate  - Encourage maximum independence but intervene and supervise when necessary  - Involve family in performance of ADLs  - Assess for home care needs following discharge   - Consider OT consult to assist with ADL evaluation and planning for discharge  - Provide patient education as appropriate  4/17/2023 2245 by Yousuf Renner RN  Outcome: Not Progressing  4/17/2023 2245 by Yousuf Renner RN  Outcome: Not Progressing  Goal: Maintains/Returns to pre admission functional level  Description: INTERVENTIONS:  - Perform BMAT or MOVE assessment daily    - Set and communicate daily mobility goal to care team and patient/family/caregiver     - Collaborate with rehabilitation services on mobility goals if consulted  - Out of bed for toileting  - Record patient progress and toleration of activity level   4/17/2023 2245 by Yousuf Renner RN  Outcome: Not Progressing  4/17/2023 2245 by Yousuf Renner RN  Outcome: Not Progressing     Problem: DISCHARGE PLANNING  Goal: Discharge to home or other facility with appropriate resources  Description: INTERVENTIONS:  - Identify barriers to discharge w/patient and caregiver  - Arrange for needed discharge resources and transportation as appropriate  - Identify discharge learning needs (meds, wound care, etc )  - Arrange for interpretive services to assist at discharge as needed  - Refer to Case Management Department for coordinating discharge planning if the patient needs post-hospital services based on physician/advanced practitioner order or complex needs related to functional status, cognitive ability, or social support system  4/17/2023 2245 by Kary Castaneda RN  Outcome: Not Progressing  4/17/2023 2245 by Kary Castaneda RN  Outcome: Not Progressing     Problem: Knowledge Deficit  Goal: Patient/family/caregiver demonstrates understanding of disease process, treatment plan, medications, and discharge instructions  Description: Complete learning assessment and assess knowledge base    Interventions:  - Provide teaching at level of understanding  - Provide teaching via preferred learning methods  4/17/2023 2245 by Kary Castaneda RN  Outcome: Not Progressing  4/17/2023 2245 by Kary Castaneda RN  Outcome: Not Progressing     Problem: MOBILITY - ADULT  Goal: Maintain or return to baseline ADL function  Description: INTERVENTIONS:  -  Assess patient's ability to carry out ADLs; assess patient's baseline for ADL function and identify physical deficits which impact ability to perform ADLs (bathing, care of mouth/teeth, toileting, grooming, dressing, etc )  - Assess/evaluate cause of self-care deficits   - Assess range of motion  - Assess patient's mobility; develop plan if impaired  - Assess patient's need for assistive devices and provide as appropriate  - Encourage maximum independence but intervene and supervise when necessary  - Involve family in performance of ADLs  - Assess for home care needs following discharge   - Consider OT consult to assist with ADL evaluation and planning for discharge  - Provide patient education as appropriate  4/17/2023 2245 by Kary Castaneda RN  Outcome: Not Progressing  4/17/2023 2245 by Kary Castaneda RN  Outcome: Not Progressing  Goal: Maintains/Returns to pre admission functional level  Description: INTERVENTIONS:  - Perform BMAT or MOVE assessment daily    - Set and communicate daily mobility goal to care team and patient/family/caregiver  - Collaborate with rehabilitation services on mobility goals if consulted  - Out of bed for toileting  - Record patient progress and toleration of activity level   4/17/2023 2245 by Emre Rodríguez RN  Outcome: Not Progressing  4/17/2023 2245 by Emre Rodríguez RN  Outcome: Not Progressing     Problem: Nutrition/Hydration-ADULT  Goal: Nutrient/Hydration intake appropriate for improving, restoring or maintaining nutritional needs  Description: Monitor and assess patient's nutrition/hydration status for malnutrition  Collaborate with interdisciplinary team and initiate plan and interventions as ordered  Monitor patient's weight and dietary intake as ordered or per policy  Utilize nutrition screening tool and intervene as necessary  Determine patient's food preferences and provide high-protein, high-caloric foods as appropriate       INTERVENTIONS:  - Monitor oral intake, urinary output, labs, and treatment plans  - Assess nutrition and hydration status and recommend course of action  - Evaluate amount of meals eaten  - Assist patient with eating if necessary   - Allow adequate time for meals  - Recommend/ encourage appropriate diets, oral nutritional supplements, and vitamin/mineral supplements  - Order, calculate, and assess calorie counts as needed  - Recommend, monitor, and adjust tube feedings and TPN/PPN based on assessed needs  - Assess need for intravenous fluids  - Provide specific nutrition/hydration education as appropriate  - Include patient/family/caregiver in decisions related to nutrition  4/17/2023 2245 by Emre Rodríguez RN  Outcome: Not Progressing  4/17/2023 2245 by Emre Rodríguez RN  Outcome: Not Progressing     Problem: Prexisting or High Potential for Compromised Skin Integrity  Goal: Skin integrity is maintained or improved  Description: INTERVENTIONS:  - Identify patients at risk for skin breakdown  - Assess and monitor skin integrity  - Assess and monitor nutrition and hydration status  - Monitor labs   - Assess for incontinence   - Turn and reposition patient  - Assist with mobility/ambulation  - Relieve pressure over bony prominences  - Avoid friction and shearing  - Provide appropriate hygiene as needed including keeping skin clean and dry  - Evaluate need for skin moisturizer/barrier cream  - Collaborate with interdisciplinary team   - Patient/family teaching  - Consider wound care consult   Outcome: Not Progressing     Problem: Neurological Deficit  Goal: Neurological status is stable or improving  Description: Interventions:  - Monitor and assess patient's level of consciousness, motor function, sensory function, and level of assistance needed for ADLs  - Monitor and report changes from baseline  Collaborate with interdisciplinary team to initiate plan and implement interventions as ordered  - Provide and maintain a safe environment  - Consider seizure precautions  - Consider fall precautions  - Consider aspiration precautions  - Consider bleeding precautions  4/17/2023 2245 by Aggie Ritchie RN  Outcome: Not Progressing  4/17/2023 2245 by Aggie Ritchie RN  Outcome: Not Progressing     Problem: Activity Intolerance/Impaired Mobility  Goal: Mobility/activity is maintained at optimum level for patient  Description: Interventions:  - Assess and monitor patient  barriers to mobility and need for assistive/adaptive devices  - Assess patient's emotional response to limitations  - Collaborate with interdisciplinary team and initiate plans and interventions as ordered  - Encourage independent activity per ability   - Maintain proper body alignment  - Perform active/passive rom as tolerated/ordered    - Plan activities to conserve energy   - Turn patient as appropriate  Outcome: Not Progressing     Problem: Communication Impairment  Goal: Ability to express needs and understand communication  Description: Assess patient's communication skills and ability to understand information  Patient will demonstrate use of effective communication techniques, alternative methods of communication and understanding even if not able to speak  - Encourage communication and provide alternate methods of communication as needed  - Collaborate with case management/ for discharge needs  - Include patient/family/caregiver in decisions related to communication  Outcome: Not Progressing     Problem: Potential for Aspiration  Goal: Non-ventilated patient's risk of aspiration is minimized  Description: Assess and monitor vital signs, respiratory status, and labs (WBC)  Monitor for signs of aspiration (tachypnea, cough, rales, wheezing, cyanosis, fever)  - Assess and monitor patient's ability to swallow  - Place patient up in chair to eat if possible  - HOB up at 90 degrees to eat if unable to get patient up into chair   - Supervise patient during oral intake  - Instruct patient/ family to take small bites  - Instruct patient/ family to take small single sips when taking liquids  - Follow patient-specific strategies generated by speech pathologist   Outcome: Not Progressing  Goal: Ventilated patient's risk of aspiration is minimized  Description: Assess and monitor vital signs, respiratory status, airway cuff pressure, and labs (WBC)  Monitor for signs of aspiration (tachypnea, cough, rales, wheezing, cyanosis, fever)  - Elevate head of bed 30 degrees if patient has tube feeding   - Monitor tube feeding  Outcome: Not Progressing     Problem: Nutrition  Goal: Nutrition/Hydration status is improving  Description: Monitor and assess patient's nutrition/hydration status for malnutrition (ex- brittle hair, bruises, dry skin, pale skin and conjunctiva, muscle wasting, smooth red tongue, and disorientation)   Collaborate with interdisciplinary team and initiate plan and interventions as ordered  Monitor patient's weight and dietary intake as ordered or per policy  Utilize nutrition screening tool and intervene per policy  Determine patient's food preferences and provide high-protein, high-caloric foods as appropriate  - Assist patient with eating   - Allow adequate time for meals   - Encourage patient to take dietary supplement as ordered  - Collaborate with clinical nutritionist   - Include patient/family/caregiver in decisions related to nutrition    Outcome: Not Progressing     - Apply yellow socks and bracelet for high fall risk patients  - Consider moving patient to room near nurses station  4/17/2023 2245 by Yousuf Renner, RN  Outcome: Progressing  4/17/2023 2245 by Yousuf Renner, RN  Outcome: Not Progressing

## 2023-04-18 NOTE — ASSESSMENT & PLAN NOTE
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 involving the right posterior insula and temporoparietal lobes  No significant mass effect   · CTA head and neck showedSuspected early infarct within the right posterior aspect of the MCA territory involving the posterior superior temporal lobe and posterior lateral frontal parietal lobes with subtle loss of gray-white differentiation  No hemorrhagic transformation  There is an old infarct within the left occipital lobe/PCA territory  Chronic microangiopathic change within the cerebral hemispheres  Diffusely hypoplastic vertebral arteries bilaterally  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 extending through the hypoglossal canal to the posterior fossa  Atherosclerotic disease of the left carotid bifurcation with only minimal narrowing  there is moderate stenosis of the intracranial internal carotid artery bilaterally at the level of the anterior clinoid     There is a small filling defect present within one of the larger M2 branches on the right within the sylvian fissure, see series 5 image 133 and a possibility of branches extending posteriorly towards the area of suspected ischemia within the right posterior lateral MCA territory suggesting small M2/M3 branch occlusion distally    · S/B Neurology :Patient was started on dual antiplatelet therapy and maintained on high intensity statin  · Continue with serial neuro exams  Continues to have left-sided weakness and neglect with visual abnormalities  Remains agitated  Had worsening left-sided weakness over the weekend  Discussed with neurologist on-call who recommended repeat MRI  · Repeat MRI today shows  :Evolving right middle cerebral artery infarct compared to 4/14/2023  Increasing vasogenic edema and mild sulcal effacement  · 2 d echocardiogram normal ef   Moderate aortic stenosis  No PFO  telemetry monitor without any arrhythmia  · PT OT and speech evaluation appreciated   · Goals of care discussion with family today  Recommend subacute rehab overall prognosis is guarded  code status dnr/dni

## 2023-04-18 NOTE — PHYSICAL THERAPY NOTE
"   PHYSICAL THERAPY TREATMENT NOTE  NAME:  Nikos Yi  DATE: 04/18/23    Length Of Stay: 4  Performed at least 2 patient identifiers during session: Name and ID bracelet    TREATMENT FLOWSHEET:    04/18/23 1043   PT Last Visit   PT Visit Date 04/18/23   Note Type   Note Type Treatment   Pain Assessment   Pain Assessment Tool 0-10   Pain Score No Pain   Restrictions/Precautions   Weight Bearing Precautions Per Order No   Other Precautions Impulsive;Cognitive; Chair Alarm; Bed Alarm; Fall Risk;Visual impairment  (L hemiparesis and L visual field neglect)   General   Chart Reviewed Yes   Response to Previous Treatment Patient unable to report, no changes reported from family or staff   Family/Caregiver Present Yes  (daughter)   Cognition   Overall Cognitive Status Impaired   Arousal/Participation Alert; Cooperative   Attention Attends with cues to redirect   Orientation Level Oriented to place;Oriented to person   Memory Decreased recall of precautions;Decreased short term memory;Decreased recall of recent events   Following Commands Follows one step commands with increased time or repetition   Comments very easily distracted but able to be redirected   Subjective   Subjective \"Where is Gissell Campo, where did she go? \"   Bed Mobility   Supine to Sit 3  Moderate assistance   Additional items Assist x 1;HOB elevated; Bedrails; Increased time required;Verbal cues;LE management  (UB management)   Additional Comments Pt  tolerated sitting at EOB x 20 mins to perform BLE ther ex and dynamic sitting balance activities  Initially requiring max Ax1 to maintain upright sitting and progressing to SBA  Staff performing activities to L side of patient to encourage and improve L sided visual field  Transfers   Sit to Stand 4  Minimal assistance   Additional items Increased time required;Verbal cues; Assist x 1  (LUE supported by sling and HHAX2 with step by step instruction required)   Stand to Sit 4  Minimal assistance   Additional " items Assist x 1   Stand pivot 2  Maximal assistance   Additional items Assist x 2; Increased time required;Verbal cues  (HHAx2 with assistance with LLE management)   Additional Comments Pt  with L lateral lean and 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  Pts daughter stated that she will bring in pt s special shoes to help correct   Ambulation/Elevation   Gait pattern L Foot drag;Poor UE support;L Knee Marek; Improper Weight shift; Forward Flexion;Decreased foot clearance;Decreased L stance;Shuffling; Short stride; Step to;Excessively slow; Ataxia; Decreased heel strike;Decreased toe off  (LLE shorter than RLE)   Gait Assistance 2  Maximal assist   Additional items Assist x 2;Verbal cues; Tactile cues   Assistive Device None  (HHAx2 with axillary support with LUE in sling)   Distance 6ft   Ambulation/Elevation Additional Comments Pt  required assistance to advance and plant LLE along with step by step instruction  Pt  was able to initiate advancement but due to leg length discrepency pt  had difficulty maintaining placement without assistance  Blocking of L knee initially required but was able to maintain TKE after 3rd step  Balance   Static Sitting Poor +   Dynamic Sitting Poor   Static Standing Poor   Dynamic Standing Poor -   Ambulatory Zero   Endurance Deficit   Endurance Deficit Yes   Activity Tolerance   Activity Tolerance Patient limited by fatigue   Nurse Made Aware RN aware   Exercises   Hip Flexion Sitting;10 reps;AROM; Right;AAROM; Left   Hip Abduction Sitting;10 reps;AROM; Bilateral  (Required resisting of opposite extremity to obtain AROM in LLE)   Hip Adduction Sitting;10 reps;AROM; Bilateral  (Required resisting of opposite extremity to obtain AROM in LLE)   Knee AROM Long Arc Quad Sitting;15 reps;AROM; Bilateral  (facilitation of quads required initially to LLE)   Ankle Pumps Sitting;10 reps;AROM; Right;AAROM; Left   Marching Sitting;10 reps;AROM; Right;AAROM; Left  (facilitation of LLE hip flexors)   Neuro re-ed dynamic sitting balance activities performed at EOB supported by STEFANY and max Ax1 to CGAx1 to maintain upright sitting posture due to L latreral and posterior lean inbcluding multidirectional weightshifting, reaching, and finding objects in pt s L visual field   Assessment   Prognosis Good   Problem List Decreased strength;Decreased range of motion;Decreased endurance; Impaired balance;Decreased mobility; Decreased coordination;Decreased cognition; Impaired judgement;Decreased safety awareness; Impaired vision;Decreased skin integrity   Goals   Patient Goals to go to a good place that will help her move   PT Treatment Day 1   Plan   Treatment/Interventions Functional transfer training;LE strengthening/ROM; Therapeutic exercise; Endurance training;Cognitive reorientation;Patient/family training;Equipment eval/education; Bed mobility;Gait training; Compensatory technique education;Spoke to nursing;OT;Family   Progress Progressing toward goals   PT Frequency 5-7x/wk   Recommendation   PT Discharge Recommendation Post acute rehabilitation services   AM-PAC Basic Mobility Inpatient   Turning in Flat Bed Without Bedrails 2   Lying on Back to Sitting on Edge of Flat Bed Without Bedrails 2   Moving Bed to Chair 1   Standing Up From Chair Using Arms 2   Walk in Room 1   Climb 3-5 Stairs With Railing 1   Basic Mobility Inpatient Raw Score 9   Turning Head Towards Sound 3   Follow Simple Instructions 2   Low Function Basic Mobility Raw Score  14   Low Function Basic Mobility Standardized Score  22 01   Highest Level Of Mobility   JH-HLM Goal 3: Sit at edge of bed   JH-HLM Achieved 6: Walk 10 steps or more       The patient's AM-PAC Basic Mobility Inpatient Short Form Raw Score is 9  A raw score less than 16 suggests the patient may benefit from discharge to post-acute rehabilitation services  Please also refer to the recommendation of the Physical Therapist for safe discharge planning      Pt seen for PT treatment session this date with interventions consisting of bed mobility tasks, transfer training, seated TE BLE with muscle facilitation LLE, neuromuscular re-education of movement to improve dynamic sitting balance and proprioceptive techniques, gait training, and education provided as needed for safety and direction to improve functional mobility, safety awareness, and activity tolerance  Pt agreeable to PT treatment session upon arrival, pt found resting in bed  At end of session, pt left sitting OOB in recliner with BLE elevated, LUE elevated on pillow with chair alarm activated, and with all needs in reach  In comparison to previous session, pt with improvements in active ROM performed in LLE and ability to transfer OOB and ambulate a short distance with physical assistance  Continue to recommend STR at time of d/c in order to maximize pt's functional independence and safety w/ mobility  Pt continues to be functioning below baseline level  PT will continue to see pt while here in order to address the deficits listed above and provide interventions consistent w/ POC in effort to achieve STGs      [place], [place]

## 2023-04-18 NOTE — WOUND OSTOMY CARE
Consult Note - Wound   Joycelyn Vera 76 y o  female MRN: [id number]  Unit/Bed#: -01 Encounter: [id number]        History and Present Illness:  Pt found down outside of home, unwitnessed unknown down time  No areas of DTI noted on assessment  DTI may develop due to unknown down time, DTI may evolve to stage 3 4 or unstageable pressure injury  Seen today for initial wound consult  Admitted with acute ischemic CVA  Pt restless confused  Attempted skin assessment several times  When able to assess, pt was cooperative and no escalating behavior  Asked permission to assess her sacrum and buttocks, she followed direction and turned independently in response to question  Calm and not resistive to care, able to redress upper extremity wounds and assess heels  Conversation confused with word finding difficulty  PMH recurrent falls vascular dementia  Assessment Findings:   1)Bilateral buttocks and sacrum intact  2)Bilateral heels intact  3)Right wrist  type 3 skin tear  Wound bed 75% beefy red and 25% yellow slough  Dermagran and Allevyn foam applied,   4)Left arm skin tear wound bed 100% pink intact     No induration, fluctuance, odor, warmth/temperature differences, redness, or purulence noted to the above noted wounds and skin areas assessed  New dressings applied per orders listed below  Patient tolerated well- no s/s of non-verbal pain or discomfort observed during the encounter  RN aware of plan of care  See flow sheets for more detailed assessment findings  Wound care will continue to follow  Skin care plans:  1-Hydraguard to bilateral sacrum, buttock and heels BID and PRN  2-Elevate heels to offload pressure  3-Ehob cushion in chair when out of bed  4-Moisturize skin daily with skin nourishing cream   5-Turn/reposition q2h or when medically stable for pressure re-distribution on skin     6-Cleanse right anterior arm skin tear with normal saline, apply dermagran to wound bed cover with Allevyn bordered foam or dsd, change every other day and prn soil or dislodgment  7-Cleanse left anterior arm skin tear with normal saline, apply xeroform gauze cover with dsd and wrap with kerlix,changed every other day and prn soil or dislodgement      Wounds:  Wound 04/14/23 Skin Tear Wrist Posterior;Right (Active)   Wound Image   04/18/23 1428   Wound Description Beefy red 04/18/23 1428   Ashely-wound Assessment Intact 04/18/23 1428   Wound Length (cm) 9 cm 04/18/23 1428   Wound Width (cm) 3 cm 04/18/23 1428   Wound Depth (cm) 0 1 cm 04/18/23 1428   Wound Surface Area (cm^2) 27 cm^2 04/18/23 1428   Wound Volume (cm^3) 2 7 cm^3 04/18/23 1428   Calculated Wound Volume (cm^3) 2 7 cm^3 04/18/23 1428   Drainage Amount None 04/18/23 1428   Non-staged Wound Description Partial thickness 04/18/23 1428   Treatments Site care 04/18/23 1428   Dressing Dermagran gauze; Foam, Silicon (eg  Allevyn, etc) 04/18/23 1428   Dressing Changed Changed 04/18/23 1428   Patient Tolerance Tolerated well 04/18/23 1428   Dressing Status Clean;Dry; Intact 04/18/23 1428       Wound 04/14/23 Skin Tear Arm Left;Posterior;Proximal;Inferior (Active)   Wound Image   04/18/23 1436   Wound Description Intact; Beefy red 04/18/23 1436   Ashely-wound Assessment Clean;Dry; Intact 04/18/23 1436   Wound Length (cm) 0 5 cm 04/18/23 1436   Wound Width (cm) 0 5 cm 04/18/23 1436   Wound Depth (cm) 0 1 cm 04/18/23 1436   Wound Surface Area (cm^2) 0 25 cm^2 04/18/23 1436   Wound Volume (cm^3) 0 025 cm^3 04/18/23 1436   Calculated Wound Volume (cm^3) 0 03 cm^3 04/18/23 1436   Drainage Amount None 04/18/23 1436   Non-staged Wound Description Partial thickness 04/18/23 1436   Treatments Site care 04/18/23 1436   Dressing Dry dressing;Xeroform 04/18/23 1436   Dressing Changed Changed 04/18/23 1436   Patient Tolerance Tolerated well 04/18/23 1436   Dressing Status Clean;Dry; Intact 04/18/23 1436       Call or tigertext with any questions  Wound Care will continue to follow    Stephon Elizabeth Betzy Vilchis BSN RN

## 2023-04-18 NOTE — PROGRESS NOTES
[address] [facility]  Progress Note  Name: Brooks Beasley  MRN: [id number]  Unit/Bed#: -01 I Date of Admission: 4/14/2023   Date of Service: 4/18/2023 I Hospital Day: 4    Assessment/Plan   * Acute ischemic CVA  Assessment & Plan  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 involving the right posterior insula and temporoparietal lobes  No significant mass effect   · CTA head and neck showedSuspected early infarct within the right posterior aspect of the MCA territory involving the posterior superior temporal lobe and posterior lateral frontal parietal lobes with subtle loss of gray-white differentiation  No hemorrhagic transformation  There is an old infarct within the left occipital lobe/PCA territory  Chronic microangiopathic change within the cerebral hemispheres  Diffusely hypoplastic vertebral arteries bilaterally  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 extending through the hypoglossal canal to the posterior fossa  Atherosclerotic disease of the left carotid bifurcation with only minimal narrowing  there is moderate stenosis of the intracranial internal carotid artery bilaterally at the level of the anterior clinoid     There is a small filling defect present within one of the larger M2 branches on the right within the sylvian fissure, see series 5 image 133 and a possibility of branches extending posteriorly towards the area of suspected ischemia within the right posterior lateral MCA territory suggesting small M2/M3 branch occlusion distally  · S/B Neurology :Patient was started on dual antiplatelet therapy and maintained on high intensity statin  · Continue with serial neuro exams  Continues to have left-sided weakness and neglect with visual abnormalities  Remains agitated  Had worsening left-sided weakness over the weekend  Discussed with neurologist on-call who recommended repeat MRI  · Repeat MRI today shows  :Evolving right middle cerebral artery infarct compared to 4/14/2023  Increasing vasogenic edema and mild sulcal effacement  · 2 d echocardiogram normal ef   Moderate aortic stenosis  No PFO  telemetry monitor without any arrhythmia  · PT OT and speech evaluation appreciated   · Goals of care discussion with family today  Recommend acute/subacute rehab overall prognosis is guarded  code status dnr/dni  · zio patch/loop recorder on discharge  no further brain imaging     Tobacco abuse  Assessment & Plan  nicotine patch ordered    Recurrent falls  Assessment & Plan   PT OT eval appreciated in the setting of acute stroke  Recommend rehab  Hypertensive urgency  Assessment & Plan  Presented with elevated blood pressure     Allow permissive hypertension In the setting of acute stroke  Blood pressure is back to normal without any medications    Generalized anxiety disorder  Assessment & Plan  Continue home meds  May use as needed Zyprexa in case of restlessness currently on a one-to-one observation due to restlessness and fall risk  will discont and observe  Continue with home dose of Cymbalta and buspirone  VTE Pharmacologic Prophylaxis:   Pharmacologic: Enoxaparin (Lovenox)  Mechanical VTE Prophylaxis in Place: Yes    Patient Centered Rounds: I have performed bedside rounds with nursing staff today      Discussions with Specialists or Other Care Team Provider: none    Education and Discussions with Family / Patient: dw patient and daughter    Time Spent for Care: 45 minutes  More than 50% of total time spent on counseling and coordination of care as described above  Current Length of Stay: 4 day(s)    Current Patient Status: Inpatient   Certification Statement: The patient will continue to require additional inpatient hospital stay due to acute stroke    Discharge Plan: plan for subacute rehab    Code Status: Level 3 - DNAR and DNI      Subjective:   Patient denies any complaints at this time however she is restless but redirectable at this time  We will discontinue virtual observation and evaluate  Appears to be ignoring her left side    Objective:     Vitals:   Temp (24hrs), Av 9 °F (37 2 °C), Min:98 1 °F (36 7 °C), Max:99 3 °F (37 4 °C)    Temp:  [98 1 °F (36 7 °C)-99 3 °F (37 4 °C)] 98 1 °F (36 7 °C)  HR:  [66-80] 79  Resp:  [18-20] 19  BP: (101-138)/(59-83) 118/83  SpO2:  [96 %-98 %] 96 %  Body mass index is 22 86 kg/m²  Input and Output Summary (last 24 hours): Intake/Output Summary (Last 24 hours) at 2023 1201  Last data filed at 2023 1159  Gross per 24 hour   Intake 476 ml   Output 425 ml   Net 51 ml       Physical Exam:     Physical Exam  Vitals and nursing note reviewed  HENT:      Head: Normocephalic and atraumatic  Right Ear: External ear normal       Left Ear: External ear normal       Nose: Nose normal       Mouth/Throat:      Pharynx: Oropharynx is clear  Cardiovascular:      Rate and Rhythm: Normal rate and regular rhythm  Heart sounds: Normal heart sounds  Pulmonary:      Effort: Pulmonary effort is normal       Breath sounds: Normal breath sounds  Abdominal:      General: Bowel sounds are normal       Palpations: Abdomen is soft  Tenderness: There is no abdominal tenderness  Musculoskeletal:      Cervical back: Normal range of motion and neck supple  Skin:     General: Skin is warm and dry        Capillary Refill: Capillary refill takes less than 2 seconds  Neurological:      Mental Status: She is alert  Comments: Oriented to self only  left arm and left leg weakness noted with limited effort against gravity   Psychiatric:      Comments: Anxious and restless           Additional Data:     Labs:    Results from last 7 days   Lab Units 04/18/23  0442   WBC Thousand/uL 6 11   HEMOGLOBIN g/dL 12 2   HEMATOCRIT % 37 5   PLATELETS Thousands/uL 234   NEUTROS PCT % 70   LYMPHS PCT % 17   MONOS PCT % 10   EOS PCT % 3     Results from last 7 days   Lab Units 04/18/23  0442 04/14/23  1534 04/14/23  0916   SODIUM mmol/L 137   < > 137   POTASSIUM mmol/L 3 8   < > 4 4   CHLORIDE mmol/L 106   < > 102   CO2 mmol/L 27   < > 29   BUN mg/dL 13   < > 13   CREATININE mg/dL 0 74   < > 0 67   ANION GAP mmol/L 4   < > 6   CALCIUM mg/dL 8 2*   < > 9 6   ALBUMIN g/dL  --   --  3 3*   TOTAL BILIRUBIN mg/dL  --   --  0 74   ALK PHOS U/L  --   --  49   ALT U/L  --   --  11   AST U/L  --   --  23   GLUCOSE RANDOM mg/dL 101   < > 139    < > = values in this interval not displayed  Results from last 7 days   Lab Units 04/14/23  0916   INR  0 97     Results from last 7 days   Lab Units 04/17/23  0711 04/16/23  2050 04/16/23  1610 04/15/23  0734 04/14/23  2052 04/14/23  1651   POC GLUCOSE mg/dl 96 87 108 107 105 89     Results from last 7 days   Lab Units 04/15/23  0434   HEMOGLOBIN A1C % 5 9*     Results from last 7 days   Lab Units 04/14/23  0916   LACTIC ACID mmol/L 1 2   PROCALCITONIN ng/ml <0 05           * I Have Reviewed All Lab Data Listed Above  * Additional Pertinent Lab Tests Reviewed: [facility] Admission Reviewed    Imaging:    Imaging Reports Reviewed Today Include: mri brain,cta head and neck  Imaging Personally Reviewed by Myself Includes:  Mri brain    Recent Cultures (last 7 days):     Results from last 7 days   Lab Units 04/14/23  1043 04/14/23  0916   BLOOD CULTURE  No Growth at 72 hrs  No Growth at 72 hrs  Last 24 Hours Medication List:   Current Facility-Administered Medications   Medication Dose Route Frequency Provider Last Rate   • acetaminophen  650 mg Oral Q6H PRN RAYSA Dominguez     • aspirin  81 mg Oral Daily Dami García MD     • atorvastatin  40 mg Oral QPM Dami García MD     • busPIRone  10 mg Oral TID Dami García MD     • DULoxetine  60 mg Oral Daily Dami García MD     • enoxaparin  30 mg Subcutaneous Q24H Casimiro Moreno MD     • latanoprost  1 drop Both Eyes HS Dami García MD     • nicotine  1 patch Transdermal Daily Dami García MD     • OLANZapine  2 5 mg Oral Q8H PRN Dioni Handy MD     • timolol  1 drop Both Eyes BID Dami García MD          Today, Patient Was Seen By: Dami García MD    ** Please Note: Dictation voice to text software may have been used in the creation of this document   **

## 2023-04-18 NOTE — PLAN OF CARE
Problem: OCCUPATIONAL THERAPY ADULT  Goal: Performs self-care activities at highest level of function for planned discharge setting  See evaluation for individualized goals  Description: Treatment Interventions: ADL retraining, Visual perceptual retraining, Functional transfer training, UE strengthening/ROM, Endurance training, Patient/family training, Cognitive reorientation, Equipment evaluation/education, Fine motor coordination activities, Neuromuscular reeducation, Compensatory technique education, Continued evaluation, UE splinting, Cardiac education, Energy conservation, Activityengagement          See flowsheet documentation for full assessment, interventions and recommendations  Outcome: Progressing  Note: Limitation: Decreased ADL status, Decreased UE ROM, Decreased UE strength, Decreased Safe judgement during ADL, Decreased cognition, Decreased endurance, Decreased sensation, Visual deficit, Decreased fine motor control, Decreased self-care trans, Decreased high-level ADLs, Non-func L UE  Prognosis: Fair  Assessment: Pt completed OT tx session #1 focused on functional mobility, neuromuscular re-education, and continued evaluation of impairments  Pt alert and agreeable to participate  PT/OT co-treat completed d/t significant mobility deficits and safety concerns  Pt demonstrated improvements in participation, L-sided attention, command following, and functional mobility this session but continues to be limited by flaccid LUE and attention deficits  Pt currently completing UB ADLs @ Max A, LB ADLs @ Total A, and functional mobility with RW @ Max A x2   Pt demonstrating Good participation and Good potential to achieve goals but is currently demonstrating deficits in decrease LUE function, decrease activity tolerance, decrease standing balance, decrease sitting balance, decrease performance during ADL tasks, decrease cognition, decrease safety awareness , increase impulsiveness, decrease generalized strength, decrease activity engagement and decrease performance during functional transfers  Continue to recommend post acute rehabilitation services upon discharge to increase safety and independence in ADL tasks and functional mobility       OT Discharge Recommendation: Post acute rehabilitation services

## 2023-04-18 NOTE — CONSULTS
TeleConsultation - [address] 76 y o  female MRN: [id number]  Unit/Bed#: -01 Encounter: [id number]        REQUIRED DOCUMENTATION:     1  This service was provided via Telemedicine  2  Provider located at [place]  3  TeleMed provider: Kelly Cortes MD   4  Identify all parties in room with patient during tele consult:  Nurse  5  Patient was then informed that this was a Telemedicine visit and that the exam was being conducted confidentially over secure lines  My office door was closed  No one else was in the room  Patient acknowledged consent and understanding of privacy and security of the Telemedicine visit, and gave us permission to have the assistant stay in the room in order to assist with the history and to conduct the exam   I informed the patient that I have reviewed their record in Epic and presented the opportunity for them to ask any questions regarding the visit today  The patient agreed to participate  Assessment/Plan     Principal Problem:    Acute ischemic CVA  Active Problems:    Generalized anxiety disorder    Hypertensive urgency    Recurrent falls    Tobacco abuse    Assessment:    Major Depressive Disorder, recurrent, severe with psychotic features  Generalized Anxiety Disorder  Dementia, Vascular Type    Treatment Plan:      Planned Medication Changes:      Recommend to switch her Cymbalta to Zoloft for depression and anxiety  Cymbalta can decrease to 30 mg for 3 days and zoloft can be added 50 mg daily and after 3 days Cymbalta can be discontinued and Zoloft can be increased to 100 mg daily  Continue with BuSpar  Add clonazepam 0 25 mg oral disintegrating tablet every 12 hours as needed for severe anxiety while she is in the hospital and on fall precaution  Add Seroquel 12 5 mg p o  twice daily with plan to titrate up to 25 mg twice daily gradually  Switch zyprexa to Haldol 2 mg every 8 hours as needed for agitation only       I discussed with son regarding medication changes and he is agreeing with the changes  Continue with one-to-one level of observation for patient's safety  Reconsult psychiatry as needed  Current Medications:     Current Facility-Administered Medications   Medication Dose Route Frequency Provider Last Rate   • acetaminophen  650 mg Oral Q6H PRN RAYSA Dominguez     • aspirin  81 mg Oral Daily Chris Pena MD     • atorvastatin  40 mg Oral QPM Chris Pena MD     • busPIRone  10 mg Oral TID Chris Pena MD     • DULoxetine  60 mg Oral Daily Chris Pena MD     • enoxaparin  30 mg Subcutaneous Q24H Casimiro Moreno MD     • latanoprost  1 drop Both Eyes HS Chris Pena MD     • nicotine  1 patch Transdermal Daily Chris Pena MD     • OLANZapine  2 5 mg Oral Q8H PRN Katherine Sanchez MD     • timolol  1 drop Both Eyes BID Chris Pena MD         Risks / Benefits of Treatment:    Patient does not verbalize understanding at this time and will require further explanation  Inpatient consult to Psychiatry  Consult performed by: Theresa Gomez MD  Consult ordered by: Chris Pena MD        Physician Requesting Consult: Chris Pena MD  Principal Problem:Acute encephalopathy    Reason for Consult: worsening anxiety      History of Present Illness      Patient is a 76 y o  female with a history of Major Depressive Disorder, Generalized Anxiety Disorder and Dementia, Vascular Type who was admitted to the medical service on 4/14/2023 due to CVA  Patient is a poor historian and during the evaluation, she has been on and off looking for her  or son  She reported feeling increased anxiety, she stated that she has a fear that she is going to die here and she cannot sit still, constantly looking for family members so she can go home  She denied any suicidal or homicidal ideation  At times she was irritable, but no agitation was observed during the evaluation  She was confused, oriented to person only    She was not fully cooperative during the evaluation  She gave him permission so the physician can talk to the son  Most of the information was obtained from the son  As per son she has multiple mini stroke, and this episode is more severe  Son reported over the past 6 to 7 months they have seen that she has been struggling with her memory, at times forget to turn off of the burner  She was able to drive down the street to her daughter and was able to return back  Right now in the last few days she has been increasingly getting confused and disoriented and was wandering outside in the cold weather  She has a history of major depression and generalized anxiety disorder and she was taking Cymbalta and the [facility]  No recent or past history of suicidal ideation was reported  During the evaluation patient has paranoid delusions and was stating that  In the hospital is trying to harm her and she does not feel safe in the hospital and according to her son she has these paranoid delusions about not feeling safe around other people for the last few months now       Psychiatric Review Of Systems:     Sleep changes: yes  appetite changes: no  weight changes: no  anxiety/panic: yes  fabby: no  guilty/hopeless: yes  self injurious behavior/risky behavior: no    Historical Information     Past Psychiatric History:     Psychiatric Hospitalizations: No history of past inpatient psychiatric admissions Outpatient Treatment History: Psychotropic medication were prescribe by patient's PCP Suicide Attempts:  No History of Suicidal attempt reported History of self-harm: No History of self injurious behavior was reported Violence History:    Substance Abuse History:    E-Cigarette/Vaping   • E-Cigarette Use Never User       E-Cigarette/Vaping Substances     Family Psychiatric History:     Psychiatric Illness:  unknown        Social History       Social History     Socioeconomic History   • Marital status: /Civil Union     Spouse name: Not on file   • Number of children: Not on file   • Years of education: Not on file   • Highest education level: Not on file   Occupational History   • Not on file   Tobacco Use   • Smoking status: Every Day     Packs/day: 0 25     Types: Cigarettes   • Smokeless tobacco: Never   Vaping Use   • Vaping Use: Never used   Substance and Sexual Activity   • Alcohol use: Yes     Comment: ocassional   • Drug use: Never   • Sexual activity: Not on file   Other Topics Concern   • Not on file   Social History Narrative   • Not on file     Social Determinants of Health     Financial Resource Strain: Not on file   Food Insecurity: No Food Insecurity   • Worried About Running Out of Food in the Last Year: Never true   • Ran Out of Food in the Last Year: Never true   Transportation Needs: No Transportation Needs   • Lack of Transportation (Medical): No   • Lack of Transportation (Non-Medical):  No   Physical Activity: Not on file   Stress: Not on file   Social Connections: Not on file   Intimate Partner Violence: Not on file   Housing Stability: Low Risk    • Unable to Pay for Housing in the Last Year: No   • Number of Places Lived in the Last Year: 1   • Unstable Housing in the Last Year: No         Traumatic History:     Abuse: Unknown      Past Medical History:    Past Medical History:   Diagnosis Date   • Dementia ([facility] [address] )    • No abnormality of hip joint detected on examination    • Stroke [facility])           Meds/Allergies     Allergies   Allergen Reactions   • Lorazepam Other (See Comments)     Increased agitation       all current active meds have been reviewed    Medical Review Of Systems:    Review of Systems      Objective     Vital signs in last 24 hours:  Temp:  [98 1 °F (36 7 °C)-99 3 °F (37 4 °C)] 98 1 °F (36 7 °C)  HR:  [66-80] 79  Resp:  [18-20] 19  BP: (101-138)/(59-83) 118/83      Intake/Output Summary (Last 24 hours) at 4/18/2023 1240  Last data filed at 4/18/2023 1159  Gross per 24 hour   Intake 476 ml   Output 425 ml   Net 51 ml       Mental Status Evaluation[de-identified]    Appearance age appropriate   Behavior appears anxious, uncooperative   Speech normal rate, normal volume, normal pitch   Mood anxious, irritable   Affect appropriate   Thought Processes disorganized, circumstantial   Associations blocking, perseveration   Thought Content paranoid ideation   Perceptual Disturbances: no auditory hallucinations, no visual hallucinations   Abnormal Thoughts  Risk Potential Suicidal ideation - None  Homicidal ideation - None  Potential for aggression - No   Orientation oriented to person   Memory patient does not answer   Consciousness alert and awake   Attention Span Concentration Span attention span and concentration are age appropriate   Intellect appears to be of average intelligence   Insight poor   Judgement poor   Muscle Strength and  Gait No assessed   Motor Activity no abnormal movements   Language no difficulty naming common objects, no difficulty repeating a phrase, no difficulty writing a sentence   Fund of Knowledge adequate knowledge of current events  adequate fund of knowledge regarding past history  adequate fund of knowledge regarding vocabulary                Lab Results: I have personally reviewed all pertinent laboratory/tests results       Most Recent Labs:   Lab Results   Component Value Date    WBC 6 11 04/18/2023    RBC 3 92 04/18/2023    HGB 12 2 04/18/2023    HCT 37 5 04/18/2023     04/18/2023    RDW 13 3 04/18/2023    NEUTROABS 4 21 04/18/2023    SODIUM 137 04/18/2023    K 3 8 04/18/2023     04/18/2023    CO2 27 04/18/2023    BUN 13 04/18/2023    CREATININE 0 74 04/18/2023    CALCIUM 8 2 (L) 04/18/2023    AST 23 04/14/2023    ALT 11 04/14/2023    ALKPHOS 49 04/14/2023    TP 6 6 04/14/2023    TBILI 0 74 04/14/2023    CHOLESTEROL 79 04/15/2023    TRIG 100 04/15/2023    HDL 28 (L) 04/15/2023    LDLCALC 31 04/15/2023       Imaging Studies: CTA head and neck w wo contrast    Result Date: 4/15/2023  Narrative: CTA NECK AND BRAIN WITH AND WITHOUT CONTRAST INDICATION: Neuro deficit, acute, stroke suspected COMPARISON:   CT brain dated 4/14/2023  No prior CT angiography  TECHNIQUE:  Routine CT imaging of the Brain without contrast   Post contrast imaging was performed after administration of iodinated contrast through the neck and brain  Post contrast axial 0 625 mm images timed to opacify the arterial system  3D rendering was performed on an independent workstation  MIP reconstructions performed  Coronal reconstructions were performed of the noncontrast portion of the brain  Radiation dose length product (DLP) for this visit:  1157 mGy-cm   This examination, like all CT scans performed in the [facility], was performed utilizing techniques to minimize radiation dose exposure, including the use of iterative reconstruction and automated exposure control  IV Contrast:  85 mL of iohexol (OMNIPAQUE)  IMAGE QUALITY:   Diagnostic FINDINGS: NONCONTRAST BRAIN PARENCHYMA:  There is a large old infarct identified within the left occipital lobe extending anteriorly into the posterior medial occipital lobe  This is unchanged  Additional areas of decreased attenuation scattered within the cerebral white matter consistent with chronic microangiopathy also stable  Subtle loss of gray-white differentiation is seen within the right posterior lateral MCA territory involving the superior temporal gyrus and posterior lateral frontal parietal region, see series 2 image 25  This may represent early ischemia  Grossly unremarkable posterior fossa  Noncontrast imaging of the vasculature demonstrates moderate calcification of the left vertebral artery and intracranial internal carotid arteries  VENTRICLES AND EXTRA-AXIAL SPACES:  Ex vacuo dilatation of the atria, occipital horn and temporal horn of the left lateral ventricle as a result of the adjacent old infarct  No intraventricular hemorrhage  VISUALIZED ORBITS: Normal visualized orbits  PARANASAL SINUSES: Normal visualized paranasal sinuses  CERVICAL VASCULATURE AORTIC ARCH AND GREAT VESSELS:  Mild atherosclerotic calcification of the aortic arch  Atherosclerotic disease extends into the left subclavian at its origin with calcified and noncalcified plaque formation extending into the region of the thyrocervical  trunk  There is stenosis of the left subclavian, slightly greater than 50%  There is atherosclerotic change of the right subclavian origin from the brachiocephalic with mild narrowing  RIGHT VERTEBRAL ARTERY CERVICAL SEGMENT:  Right vertebral artery is diffusely hypoplastic from its origin throughout the neck  The vessel is patent to the skull base  LEFT VERTEBRAL ARTERY CERVICAL SEGMENT:  The left vertebral artery is also markedly hypoplastic  Proximal aspect of the vessel is difficult to visualize during a portion of the V1 segment as a result of beam hardening artifact from adjacent venous contrast   In addition a portion of the V2 segment is markedly attenuated  However, the vessel does appear patent to the skull base RIGHT EXTRACRANIAL CAROTID SEGMENT:  Normal caliber common carotid artery  Normal bifurcation and cervical internal carotid artery  No stenosis or dissection  LEFT EXTRACRANIAL CAROTID SEGMENT:  Atherosclerotic disease of the distal common carotid artery and proximal cervical internal carotid artery with moderate calcification  Only minimal stenosis of the internal carotid artery  The internal carotid artery  bifurcates just distal to its origin within the neck with a true internal carotid artery extending to the carotid canal and the 2nd vessel coursing through the hypoglossal canal on the left to the vertebrobasilar junction  This is a developmental/anatomic variation  NASCET criteria was used to determine the degree of internal carotid artery diameter stenosis   INTRACRANIAL VASCULATURE INTERNAL CAROTID ARTERIES: There is moderate calcification of the intracranial internal carotid artery bilaterally with moderate stenosis at the level of the anterior clinoid, see series 4 images 73 through 75  Vessels are patent to the ICA terminus  ANTERIOR CIRCULATION:  Symmetric A1 segments and anterior cerebral arteries with normal enhancement  Normal anterior communicating artery  MIDDLE CEREBRAL ARTERY CIRCULATION:  The right M1 segment is patent  One of the larger M2 branches demonstrates a small filling defect within the midportion of the sylvian fissure  Distal to this the vessel appears mildly attenuated  There does appear  to be a possibility of vessels within the area of decreased attenuation and loss of gray-white differentiation described above within the posterior superior temporal lobe and posterior lateral frontal lobe  There appears to be occlusion of at least one  small distal M2/M3 branch  The left M1 segment and middle cerebral artery branches are unremarkable  DISTAL VERTEBRAL ARTERIES:  Both distal vertebral arteries are severely hypoplastic as they extend through the foramen magnum  However, they do appear to be patent to the vertebrobasilar junction  As described above there 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 and extending through the hypoglossal canal into the posterior fossa  BASILAR ARTERY:  Normal appearance of the basilar artery from earlier arising from the persistent hypoglossal artery  Normal superior cerebellar arteries  POSTERIOR CEREBRAL ARTERIES: Unremarkable right posterior cerebral artery, the left is hypoplastic and attenuated, appearing to occlude distally, corresponding to the old infarct within the left occipital lobe  Normal posterior communicating arteries  VENOUS STRUCTURES:  Normal  NON VASCULAR ANATOMY BONY STRUCTURES:  No acute osseous abnormality  SOFT TISSUES OF THE NECK:  Unremarkable   THORACIC INLET:  Normal      Impression: "Suspected early infarct within the right posterior aspect of the MCA territory involving the posterior superior temporal lobe and posterior lateral frontal parietal lobes with subtle loss of gray-white differentiation  No hemorrhagic transformation  There is an old infarct within the left occipital lobe/PCA territory  Chronic microangiopathic change within the cerebral hemispheres  Diffusely hypoplastic vertebral arteries bilaterally  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 extending through the hypoglossal canal to the posterior fossa  Atherosclerotic disease of the left carotid bifurcation with only minimal narrowing  Intracranially there is moderate stenosis of the intracranial internal carotid artery bilaterally at the level of the anterior clinoid     There is a small filling defect present within one of the larger M2 branches on the right within the sylvian fissure, see series 5 image 133 and a possibility of branches extending posteriorly towards the area of suspected ischemia within the right posterior lateral MCA territory suggesting small M2/M3 branch occlusion distally  Please note this examination was performed at 8:16 PM on 4/14/2023 but is just now presented to me for evaluation on 4/15/2023 at 7:30 AM  This examination was marked \"immediate notification\" in Epic in order to begin the standard process by which the radiology reading room liaison alerts the referring practitioner  Workstation performed: [id number]     TRAUMA - CT chest abdomen pelvis wo contrast    Result Date: 4/14/2023  Narrative: CT CHEST, ABDOMEN AND PELVIS WITHOUT IV CONTRAST INDICATION:   trauma  COMPARISON:  Radiographs of the right hip/pelvis from 3/16/2023  TECHNIQUE: CT examination of the chest, abdomen and pelvis was performed without intravenous contrast  Multiplanar 2D reformatted images were created from the source data   3D reconstructions of the bony " thorax were performed in order to improve sensitivity of evaluation for rib fractures  Radiation dose length product (DLP) for this visit:  825 mGy-cm   This examination, like all CT scans performed in the [facility], was performed utilizing techniques to minimize radiation dose exposure, including the use of iterative reconstruction and automated exposure control  Enteric contrast was not administered  FINDINGS: Evaluation of visceral structures and vasculature is limited by lack of intravenous contrast   Evaluation is further limited by streak artifact from patient's arms which could not be placed over head  CHEST BRONCHOPULMONARY:  Clear central airways  Mild linear opacities mostly in the lower lobes likely areas of atelectasis or scarring  There is mild subpleural reticulation, mostly in the dependent portions of the lower lobes  PLEURA:  No pleural effusions  No pneumothorax  HEART/GREAT VESSELS: The heart is mildly enlarged  Trace pericardial effusion  Ascending aorta at the upper limits of normal  Main pulmonary artery dilated up to 3 2 cm, suggestive of pulmonary arterial hypertension  MEDIASTINUM/LYMPH NODES:  No axillary or mediastinal lymphadenopathy  Evaluation for hilar lymphadenopathy is limited without IV contrast  There is a large hiatal hernia containing the majority of the stomach  CHEST WALL AND LOWER NECK: No hyperdense subcutaneous or intramuscular fluid collections to suggest hematoma  Incidental discovery of one or more thyroid nodule(s) measuring less than 1 5 cm and without suspicious features is noted in this patient who is above 28years old; according to guidelines published in the February 2015 white paper on incidental thyroid nodules in the Journal of the [address] [place] [address] of Radiology [facility]), no further evaluation is recommended  ABDOMEN LIVER/BILIARY TREE:  Unremarkable unenhanced appearance of the liver  No biliary ductal dilation   GALLBLADDER:  The patient is status post cholecystectomy  SPLEEN: Unremarkable unenhanced appearance  PANCREAS:  Unremarkable unenhanced appearance  No dilation of the main pancreatic duct  ADRENAL GLANDS:  Unremarkable unenhanced appearance  KIDNEYS/URETERS:  Several punctate intrarenal calculi bilaterally  No ureteral calculi  No hydronephrosis  A hypodense lesion is seen in the lower pole of the left kidney which cannot be accurately assessed without IV contrast, however statistically likely represents a cyst  STOMACH AND BOWEL:  Evaluation of the gastrointestinal tract is somewhat limited by underdistention and lack of oral contrast  No bowel obstruction or convincing inflammation  APPENDIX:  The appendix is not visualized, however there are no secondary findings of appendicitis  ABDOMINOPELVIC CAVITY:  No ascites or pneumoperitoneum  No hyperdense abdominal or pelvic fluid collections to suggest hematoma  LYMPH NODES: No abdominal or pelvis lymphadenopathy  VESSELS: Normal caliber aorta  Scattered atherosclerotic calcifications in the abdominal aorta and its major branches  There are severe calcifications of the superior mesenteric artery resulting in severe stenosis  PELVIS REPRODUCTIVE ORGANS:  Grossly unremarkable unenhanced CT appearance of the uterus  URINARY BLADDER:  The urinary bladder is underdistended, limiting evaluation  Evaluation is further limited by streak artifact and patient's left hip arthroplasty  ABDOMINAL WALL/INGUINAL REGIONS: No ventral abdominal wall hernia  MUSCULOSKELETAL: No acute fractures  There is an age-indeterminate, however likely chronic, moderate to severe compression deformity of the T6 vertebral body  Although the images are not available for direct comparison, a radiograph of the thoracic spine from 3/15/2018 performed at [facility] mentions a compression deformity of the T6 vertebral body    There is no retropulsion into the spinal canal   Additional mild, chronic compression deformities are also seen involving the T8, T9 T11 and L1 vertebral bodies  Chronic deformity of the left iliac bone  Chronic bilateral rib fractures  No focal aggressive osseous lesions  Several subcentimeter densely sclerotic lesions are seen in the right iliac bone and the T4 vertebral body, statistically likely bone islands  Degenerative changes and dextroscoliosis of the spine  The patient is status post left total hip arthroplasty  The acetabular and femoral components are in good alignment  A fractured cerclage wire is seen adjacent to the left greater trochanter as on the prior study  Evaluation of the surrounding structures is limited by streak artifact  There is extensive heterotopic bone formation around the left hip joint  There is heterotopic bone formation around the right hip joint as well  A tunnel from a prior intramedullary keshia is seen in the partially imaged right femur  There is generalized osteopenia  No hyperdense subcutaneous or intramuscular fluid collections to suggest hematoma  Impression: No acute thoracic, abdominal, or pelvic trauma within the aforementioned limitations  Multiple additional findings as above  The study was marked in [facility] for immediate notification  Workstation performed: [id number]     XR Trauma chest portable    Result Date: 4/14/2023  Narrative: CHEST INDICATION:   TRAUMA  Patient with dementia found on the floor  COMPARISON:  Chest radiograph from 3/16/2023  EXAM PERFORMED/VIEWS:  XR CHEST PORTABLE FINDINGS: The lungs are clear  There is a retrocardiac opacity likely representing a large hiatal hernia  No pleural effusions  No evidence of pneumothorax  Cardiac silhouette not accurately accessed on this projection  No obvious displaced fractures within limitation of supine AP chest technique  Impression: No acute pulmonary pathology  Retrocardiac opacity likely representing a large hiatal hernia   No obvious displaced fractures within limitation of supine AP chest technique  Workstation performed: [id number]     XR shoulder 2+ views LEFT    Result Date: 4/14/2023  Narrative: LEFT SHOULDER INDICATION:   Fall  COMPARISON:  Chest radiograph 4/14/2023  VIEWS:  XR SHOULDER 2+ VW LEFT FINDINGS: There is no acute fracture or dislocation  No significant degenerative changes  No lytic or blastic osseous lesion  Soft tissues are unremarkable  Impression: No acute osseous abnormality  Workstation performed: [id number]     XR elbow 3+ vw LEFT    Result Date: 4/14/2023  Narrative: LEFT ELBOW INDICATION:   fall  COMPARISON:  None  VIEWS:  XR ELBOW 3+ VW LEFT Images: 4 FINDINGS: Evaluation is somewhat limited by suboptimal lateral view  No fractures, dislocations, or definite evidence for elbow joint effusion  Preserved joint spaces with smooth articular margins  No destructive osseous lesions or periosteal reaction  Unremarkable soft tissues  Impression: No definite acute osseous pathology  Workstation performed: [id number]     XR wrist 3+ views LEFT    Result Date: 4/14/2023  Narrative: LEFT WRIST INDICATION:   Fall  COMPARISON:  None VIEWS:  XR WRIST 3+ VW LEFT Images: 3 FINDINGS: Diffuse osteopenia  There is no acute fracture or dislocation  Degenerative changes of the 1st metacarpophalangeal joint  No lytic or blastic osseous lesion  There are atherosclerotic calcifications  Soft tissues are otherwise unremarkable  Impression: No acute osseous abnormality  Workstation performed: [id number]     XR hip/pelv 2-3 vws left if performed    Result Date: 4/14/2023  Narrative: LEFT HIP INDICATION:   Fall  COMPARISON:  Pelvis radiograph 4/14/2023 9:09 AM  VIEWS:  XR HIP/PELV 2-3 VWS LEFT  W PELVIS IF PERFORMED Images: 2 FINDINGS: There is no acute fracture or dislocation  Stable appearance of the left total hip arthroplasty with a fractured cerclage wire along the greater trochanter and extensive heterotopic ossification surrounding the left hip  No lytic or blastic osseous lesion  Vascular atherosclerotic calcifications  Impression: No acute osseous abnormality  Stable appearance of the left total hip arthroplasty  Workstation performed: [id number]     XR knee 4+ vw left injury    Result Date: 4/14/2023  Narrative: BILATERAL KNEES INDICATION:   fall  COMPARISON:  Outside radiographs of the right knee from 9/25/2020  VIEWS:  XR KNEE 4+ VW RIGHT INJURY, XR KNEE 4+ VW LEFT INJURY For 2 radiographs of the right knee and 4 radiographs of the left knee  FINDINGS: No fractures or dislocations  No suprapatellar joint effusions  The patient is status post right total knee arthroplasty and patellar resurfacing  The femoral component is within normal limits with no periprosthetic lucencies     The tibial component has been removed as on the study from 2020  Mild tricompartmental degenerative changes of the left knee  No lytic or sclerotic osseous lesions  Vascular calcifications are noted in both visualized lower extremities  Impression: No acute osseous abnormality  Stable appearance of the right total knee arthroplasty as in 2020 status post removal of the tibial arthroplasty component  Workstation performed: [id number]     XR knee 4+ vw right injury    Result Date: 4/14/2023  Narrative: BILATERAL KNEES INDICATION:   fall  COMPARISON:  Outside radiographs of the right knee from 9/25/2020  VIEWS:  XR KNEE 4+ VW RIGHT INJURY, XR KNEE 4+ VW LEFT INJURY For 2 radiographs of the right knee and 4 radiographs of the left knee  FINDINGS: No fractures or dislocations  No suprapatellar joint effusions  The patient is status post right total knee arthroplasty and patellar resurfacing  The femoral component is within normal limits with no periprosthetic lucencies     The tibial component has been removed as on the study from 2020  Mild tricompartmental degenerative changes of the left knee  No lytic or sclerotic osseous lesions  Vascular calcifications are noted in both visualized lower extremities  Impression: No acute osseous abnormality  Stable appearance of the right total knee arthroplasty as in 2020 status post removal of the tibial arthroplasty component  Workstation performed: [id number]     TRAUMA - CT head wo contrast    Result Date: 4/14/2023  Narrative: CT BRAIN - WITHOUT CONTRAST INDICATION:   TRAUMA  Patient with dementia found on the floor  COMPARISON:  CT of the head from 3/16/2023  TECHNIQUE:  CT examination of the brain was performed  Multiplanar 2D reformatted images were created from the source data  Radiation dose length product (DLP) for this visit:  820 mGy-cm   This examination, like all CT scans performed in the [facility], was performed utilizing techniques to minimize radiation dose exposure, including the use of iterative reconstruction and automated exposure control  IMAGE QUALITY:  Diagnostic  FINDINGS: PARENCHYMA AND EXTRA-AXIAL SPACES:  Decreased attenuation is noted in periventricular and subcortical white matter,  statistically most likely representing mild microangiopathic changes  No evidence of acute infarction  An area of encephalomalacia is seen in the left occipital lobe as on the prior study compatible with a chronic infarct  No mass effect or midline shift  No acute parenchymal hemorrhage  No extra-axial fluid collections  VENTRICLES:  Normal for the patient's age  VISUALIZED ORBITS AND PARANASAL SINUSES:  The patient is status post bilateral cataract surgery  The visualized orbits are otherwise unremarkable  Clear paranasal sinuses  CALVARIUM AND EXTRACRANIAL SOFT TISSUES:  No calvarial fracture  Impression: No acute intracranial pathology  In particular, no intracranial hemorrhage or calvarial fracture  No interval change in 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 and chronic microvascular ischemic changes  The study was marked in [facility] for immediate notification given trauma designation    Workstation performed: [id number] TRAUMA - CT spine cervical wo contrast    Result Date: 4/14/2023  Narrative: CT CERVICAL SPINE - WITHOUT CONTRAST INDICATION:   TRAUMA  COMPARISON:  None  TECHNIQUE:  CT examination of the cervical spine was performed without intravenous contrast   Contiguous axial images were obtained  Multiplanar 2D reformatted images were created from the source data  Radiation dose length product (DLP) for this visit:  344 mGy-cm   This examination, like all CT scans performed in the [facility], was performed utilizing techniques to minimize radiation dose exposure, including the use of iterative reconstruction and automated exposure control  IMAGE QUALITY:  Evaluation somewhat limited by motion artifact  FINDINGS: ALIGNMENT:  No traumatic subluxation  There is straightening of the normal cervical lordosis, likely secondary to positioning or muscle spasm and grade 1 anterolisthesis of C3 on C4  VERTEBRAE:  No fracture  DEGENERATIVE CHANGES:  Mild to moderate multilevel cervical degenerative changes are noted without critical central canal stenosis  PREVERTEBRAL AND PARASPINAL SOFT TISSUES: Unremarkable THORACIC INLET:  Please refer to the concurrent chest CT report for thoracic inlet findings  Impression: No cervical spine fracture or traumatic malalignment  The study was marked in [facility] for immediate notification  Workstation performed: [id number]     MRI brain wo contrast    Result Date: 4/17/2023  Narrative: MRI BRAIN WITHOUT CONTRAST INDICATION:  acute ischemic CVA  COMPARISON:  4/14/2023 MR and CT TECHNIQUE:  Multiplanar/multisequence MRI of the brain was performed without administration of contrast  IV Contrast:  Not administered IMAGE QUALITY:  Motion degrades images  FINDINGS: BRAIN PARENCHYMA:  Persistent and increased restricted diffusion in the right parietal lobe, perisylvian temporal lobe and insula  T2 and FLAIR hyperintensity and mild, new sulcal effacement   No hemorrhage, extra-axial fluid collections, or midline shift  Mild, similar focal patchy periventricular and subcortical white matter foci of abnormal T2 and FLAIR hyperintensity are nonspecific, but usually secondary to changes of microangiopathy  Chronic left posterior cerebral artery/occipital and old left basal ganglia lacunar infarcts  VENTRICLES:  Overall stable size and configuration, within normal limits for age  Mild, new mass effect on the right lateral ventricle atria  SELLA AND PITUITARY GLAND:  Within normal limits  ORBITS:  Within normal limits  PARANASAL SINUSES:  Well aerated  VASCULATURE:  Skull base flow voids are grossly preserved  CALVARIUM AND SKULL BASE:  Within normal limits  Mastoid air cells clear  EXTRACRANIAL SOFT TISSUES:  Within normal limits  Impression: Evolving right middle cerebral artery infarct compared to 4/14/2023  Increasing vasogenic edema and mild sulcal effacement  Workstation performed: [id number]     XR Trauma pelvis ap only 1 or 2 vw    Result Date: 4/14/2023  Narrative: PELVIS INDICATION:   TRAUMA  Patient with dementia found on the floor  COMPARISON:  Pelvic radiograph from 3/16/2023  VIEWS:  XR PELVIS AP ONLY 1 OR 2 VW FINDINGS: There is no fracture or dislocation  The pelvic and obturator rings are intact  The patient is status post left total hip arthroplasty  The acetabular and femoral components are in good alignment  A fractured cerclage wire projects over the region of the left greater trochanter as on the prior radiograph  Extensive heterotopic ossification seen around both hip joints  Stable deformity of the left iliac bone  Bilateral SI joints and lower lumbar spine are unremarkable  There are no definite lytic or sclerotic osseous lesions  Extensive vascular calcifications are seen  Impression: No acute osseous abnormality  Stable findings compared to radiographs from 3/16/2023   Workstation performed: [id number]     MRI follow up neuro    Result Date: 4/14/2023  Narrative: Study: MRI brain  INDICATION: Confusion, fall  Patient was not a candidate for TPA has symptoms started over 24 hours ago  History of atrial fibrillation  Evaluate for infarction versus acute metabolic encephalopathy due to worsening dementia  NIH stroke scale on admission was 9 with reported history of left arm and leg weakness and facial droop  COMPARISON: Head CT from earlier the same day, performed at 9:20 AM  TECHNIQUE: Axial diffusion and sagittal T1 FLAIR imaging was attempted  Patient was unable to complete the standard protocol for brain imaging  IMAGE QUALITY: Significant motion degradation  FINDINGS: There is restricted diffusion within the right posterior insula, perisylvian region, right posterior and superior temporal gyrus extending to the lateral parietal cortex and deep and periventricular right cerebral white matter  There is no significant mass effect on the ventricular system  Mild ipsilateral sulcal crowding is suspected when comparing to the contralateral hemisphere no midline shift or herniation  Chronic left PCA distribution infarction involving the left occipital lobe  There is generalized parenchymal volume loss  Impression: Limited evaluation of the brain due to early termination of the examination and significant motion artifact  Late/early subacute right posterior MCA distribution infarction involving the right posterior insula and temporoparietal lobes  No significant mass effect  Chronic left PCA territory infarction involving the left occipital lobe  Generalized cerebral volume loss  I personally discussed this study with Sarita Greene on 4/14/2023 at 3:35 PM  Workstation performed: [id number]     Echo complete w/ contrast if indicated    Result Date: 4/18/2023  Narrative: •  Left Ventricle: Left ventricular cavity size is normal  Wall thickness is moderately increased  There is moderate concentric hypertrophy   The left ventricular ejection fraction is 62%  Systolic function is hyperdynamic  Wall motion is normal  Diastolic function is mildly abnormal, consistent with grade I (abnormal) relaxation  •  Aortic Valve: The leaflets are severely calcified  There is mild regurgitation  There is moderate stenosis  DI: 0 4 •  Mitral Valve: There is moderate annular calcification  There is mild stenosis  •  Atrial Septum: No patent foramen ovale detected using saline contrast injection at rest      EKG/Pathology/Other Studies:   Lab Results   Component Value Date    VENTRATE 60 04/14/2023    ATRIALRATE 60 04/14/2023    PRINT 156 04/14/2023    QRSDINT 80 04/14/2023    QTINT 430 04/14/2023    QTCINT 430 04/14/2023    PAXIS 63 04/14/2023    QRSAXIS 75 04/14/2023    TWAVEAXIS 14 04/14/2023        Code Status: Level 3 - DNAR and DNI  Advance Directive and Living Will:      Power of :    POLST:      Screenings:    1  Nutrition Screening  Nutrition Assessment (completed by Staff): Nutrition  Feeding: Total assist  Diet Type: Dysphagia I  Appetite: Fair    2   Pain Screening  Pain Screening: Pain Assessment  Pain Assessment Tool: 0-10  Pain Score: 0  Pain Location/Orientation: Orientation: Right, Location: Hip  Pain Onset/Description: Onset: Ongoing  Patient's Stated Pain Goal: No pain  Hospital Pain Intervention(s): Medication (See MAR), Repositioned  Pain Rating: FLACC (Rest) - Face: Occasional grimace or frown, withdrawn, disinterested, appears sad or worried  Pain Rating: FLACC (Rest) - Legs: Normal position or relaxed  Pain Rating: FLACC (Rest) - Activity: Squirming, shifting back and forth, tense, mildly agitated (eg  head back and forth, aggression), shallow/splinting respirations, intermittent sighs  Pain Rating: FLACC (Rest) - Cry: Moans or whimpers, occasional complaint, occasional verbal outburst or grunt  Pain Rating: FLACC (Rest) - Consolability: Reassured by occasional touching, hugging or being talked to, distractable  Score: FLACC (Rest): 4  Pain Rating: FLACC (Activity) - Face: Occasional grimace or frown, withdrawn, disinterested, appears sad or worried  Pain Rating: FLACC (Activity) - Legs: Uneasy, restless, tense, occassional tremors  Pain Rating: FLACC (Activity) - Activity: Squirming, shifting back and forth, tense, mildly agitated (eg  head back and forth, aggression), shallow/splinting respirations, intermittent sighs  Pain Rating: FLACC (Activity) - Cry: Moans or whimpers, occasional complaint, occasional verbal outburst or grunt  Pain Rating: FLACC (Activity) - Consolability: Reassured by occasional touching, hugging or being talked to, distractable  Score: FLACC (Activity): 5    3   Suicide Screening  ED Crisis Suicide Risk Assessment:

## 2023-04-18 NOTE — ASSESSMENT & PLAN NOTE
Patient has episodes of restlessness and anxiety especially when she is alone  Discussed with psychiatry yesterday  Continue BuSpar 10 mg 3 times daily added Seroquel 12 5 mg twice daily and Zoloft 50 mg daily  Taper off Cymbalta over the next 3 days  Continue Zyprexa and placed on low-dose Haldol  Monitor for sedation    Off one-to-one observation for the last 24 hours

## 2023-04-19 LAB
ATRIAL RATE: 67 BPM
BACTERIA BLD CULT: NORMAL
BACTERIA BLD CULT: NORMAL
P AXIS: 38 DEGREES
PR INTERVAL: 152 MS
QRS AXIS: 71 DEGREES
QRSD INTERVAL: 82 MS
QT INTERVAL: 430 MS
QTC INTERVAL: 454 MS
T WAVE AXIS: -62 DEGREES
VENTRICULAR RATE: 67 BPM

## 2023-04-19 RX ORDER — HALOPERIDOL 1 MG/1
0.5 TABLET ORAL 2 TIMES DAILY PRN
Status: DISCONTINUED | OUTPATIENT
Start: 2023-04-19 | End: 2023-04-20 | Stop reason: HOSPADM

## 2023-04-19 RX ADMIN — QUETIAPINE FUMARATE 12.5 MG: 25 TABLET ORAL at 17:30

## 2023-04-19 RX ADMIN — QUETIAPINE FUMARATE 12.5 MG: 25 TABLET ORAL at 10:48

## 2023-04-19 RX ADMIN — ATORVASTATIN CALCIUM 40 MG: 40 TABLET, FILM COATED ORAL at 17:31

## 2023-04-19 RX ADMIN — TIMOLOL MALEATE 1 DROP: 5 SOLUTION/ DROPS OPHTHALMIC at 10:51

## 2023-04-19 RX ADMIN — SERTRALINE HYDROCHLORIDE 50 MG: 50 TABLET, FILM COATED ORAL at 10:48

## 2023-04-19 RX ADMIN — BUSPIRONE HYDROCHLORIDE 10 MG: 10 TABLET ORAL at 10:48

## 2023-04-19 RX ADMIN — BUSPIRONE HYDROCHLORIDE 10 MG: 10 TABLET ORAL at 22:12

## 2023-04-19 RX ADMIN — TIMOLOL MALEATE 1 DROP: 5 SOLUTION/ DROPS OPHTHALMIC at 17:34

## 2023-04-19 RX ADMIN — NICOTINE 1 PATCH: 7 PATCH, EXTENDED RELEASE TRANSDERMAL at 10:47

## 2023-04-19 RX ADMIN — ENOXAPARIN SODIUM 30 MG: 30 INJECTION SUBCUTANEOUS at 10:45

## 2023-04-19 RX ADMIN — BUSPIRONE HYDROCHLORIDE 10 MG: 10 TABLET ORAL at 17:31

## 2023-04-19 RX ADMIN — DULOXETINE HYDROCHLORIDE 30 MG: 30 CAPSULE, DELAYED RELEASE ORAL at 10:48

## 2023-04-19 RX ADMIN — ASPIRIN 81 MG: 81 TABLET, COATED ORAL at 10:48

## 2023-04-19 RX ADMIN — LATANOPROST 1 DROP: 50 SOLUTION OPHTHALMIC at 22:13

## 2023-04-19 NOTE — PHYSICAL THERAPY NOTE
PHYSICAL THERAPY NOTE          Patient Name: Manisha HAYES'S Date: 4/19/2023  PT treatment attempted x2 this morning but upon entering room patient asleep and unable to be fully aroused  Patient opened eyes for a second and then fell right back to sleep with multiple attempts made to awaken patient  PT treatment cancelled for today and will continue to offer PT treatment as ordered and progress as tolerated when patient is alert enough to participate safely    Laurie Mcclelland [employer], [place]

## 2023-04-19 NOTE — CASE MANAGEMENT
Case Management Discharge Planning Note    Patient name Manisha King  Location /-23 MRN [id number]  : 1945 Date 2023       Current Admission Date: 2023  Current Admission Diagnosis:Acute ischemic CVA   Patient Active Problem List    Diagnosis Date Noted   • Recurrent falls 2023   • Tobacco abuse 2023   • Acute ischemic CVA 2023   • Community acquired pneumonia of right lower lobe of lung 2023   • Vascular dementia ([address] ) 2023   • Generalized anxiety disorder 2023   • Hypertensive urgency 2023      LOS (days): 5  Geometric Mean LOS (GMLOS) (days): 4 50  Days to GMLOS:-0 7     OBJECTIVE:  Risk of Unplanned Readmission Score: 18 88         Current admission status: Inpatient   Preferred Pharmacy:   [address] [place] [address]  Phone: [phone] Fax: [phone]    Primary Care Provider: Lesa Herring DO    Primary Insurance: MEDICARE  Secondary Insurance: BLUE CROSS    DISCHARGE DETAILS:     CM called pt's son to discuss post discharge options  Pt's son stated that he would prefer that the patient stay more locally for rehab  [facility] was chosen and reserved in [place]

## 2023-04-19 NOTE — PROGRESS NOTES
[address]  Progress Note  Name: Carma Apgar  MRN: [id number]  Unit/Bed#: -01 I Date of Admission: 2023   Date of Service: 2023 I Hospital Day: 5    Assessment/Plan   * Acute ischemic CVA  Assessment & Plan  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 involving the right posterior insula and temporoparietal lobes  No significant mass effect   · CTA head and neck showedSuspected early infarct within the right posterior aspect of the MCA territory involving the posterior superior temporal lobe and posterior lateral frontal parietal lobes with subtle loss of gray-white differentiation  No hemorrhagic transformation  There is an old infarct within the left occipital lobe/PCA territory  Chronic microangiopathic change within the cerebral hemispheres  Diffusely hypoplastic vertebral arteries bilaterally  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 extending through the hypoglossal canal to the posterior fossa  Atherosclerotic disease of the left carotid bifurcation with only minimal narrowing  there is moderate stenosis of the intracranial internal carotid artery bilaterally at the level of the anterior clinoid     There is a small filling defect present within one of the larger M2 branches on the right within the sylvian fissure, see series 5 image 133 and a possibility of branches extending posteriorly towards the area of suspected ischemia within the right posterior lateral MCA territory suggesting small M2/M3 branch occlusion distally  · S/B Neurology :Patient was started on dual antiplatelet therapy and maintained on high intensity statin  · Continue with serial neuro exams  Continues to have left-sided weakness and neglect with visual abnormalities  Remains agitated  Had worsening left-sided weakness over the weekend  Discussed with neurologist on-call who recommended repeat MRI  · Repeat MRI today shows  :Evolving right middle cerebral artery infarct compared to 4/14/2023  Increasing vasogenic edema and mild sulcal effacement  · 2 d echocardiogram normal ef   Moderate aortic stenosis  No PFO  telemetry monitor without any arrhythmia  · PT OT and speech evaluation appreciated   · Goals of care discussion with family today  Recommend acute/subacute rehab overall prognosis is guarded  code status dnr/dni  · zio patch/loop recorder on discharge  no further brain imaging     Tobacco abuse  Assessment & Plan  nicotine patch ordered    Recurrent falls  Assessment & Plan   PT OT eval appreciated in the setting of acute stroke  Recommend rehab  Hypertensive urgency  Assessment & Plan  Presented with elevated blood pressure     Allow permissive hypertension In the setting of acute stroke  Blood pressure is back to normal without any medications    Generalized anxiety disorder  Assessment & Plan  Patient has episodes of restlessness and anxiety especially when she is alone  Discussed with psychiatry yesterday  Continue BuSpar 10 mg 3 times daily added Seroquel 12 5 mg twice daily and Zoloft 50 mg daily  Taper off Cymbalta over the next 3 days  Continue Zyprexa and placed on low-dose Haldol  Monitor for sedation    Off one-to-one observation for the last 24 hours         VTE Pharmacologic Prophylaxis:   Pharmacologic: Enoxaparin (Lovenox)  Mechanical VTE Prophylaxis in Place: Yes    Patient Centered Rounds: I have performed bedside rounds with nursing staff today  Discussions with Specialists or Other Care Team Provider: Discussed with psychiatry and case management    Education and Discussions with Family / Patient: Discussed with patient and family    Time Spent for Care: 30 minutes  More than 50% of total time spent on counseling and coordination of care as described above  Current Length of Stay: 5 day(s)    Current Patient Status: Inpatient   Certification Statement: The patient will continue to require additional inpatient hospital stay due to Acute ischemic stroke    Discharge Plan: plan for rehab    Code Status: Level 3 - DNAR and DNI      Subjective:   Patient received Haldol yesterday evening secondary to agitation and is a little somnolent this morning  Dose of Haldol decreased and further monitor for now  No arrhythmia noted on telemetry monitoring in the last 24 hours    Objective:     Vitals:   Temp (24hrs), Av 6 °F (37 °C), Min:97 9 °F (36 6 °C), Max:99 1 °F (37 3 °C)    Temp:  [97 9 °F (36 6 °C)-99 1 °F (37 3 °C)] 98 1 °F (36 7 °C)  HR:  [62-80] 71  Resp:  [16-22] 18  BP: (106-152)/(45-78) 152/55  SpO2:  [95 %-98 %] 95 %  Body mass index is 22 86 kg/m²  Input and Output Summary (last 24 hours): Intake/Output Summary (Last 24 hours) at 2023 1047  Last data filed at 2023 1830  Gross per 24 hour   Intake 120 ml   Output 400 ml   Net -280 ml       Physical Exam:     Physical Exam  Vitals and nursing note reviewed  Constitutional:       Comments: Somnolent but arousable   HENT:      Head: Normocephalic and atraumatic  Right Ear: External ear normal       Left Ear: External ear normal       Nose: Nose normal       Mouth/Throat:      Pharynx: Oropharynx is clear  Eyes:      Pupils: Pupils are equal, round, and reactive to light  Cardiovascular:      Rate and Rhythm: Normal rate and regular rhythm  Heart sounds: Normal heart sounds     Pulmonary: Effort: Pulmonary effort is normal       Breath sounds: Normal breath sounds  Abdominal:      General: Bowel sounds are normal       Palpations: Abdomen is soft  Tenderness: There is no abdominal tenderness  Musculoskeletal:      Cervical back: Normal range of motion and neck supple  Skin:     General: Skin is warm and dry  Capillary Refill: Capillary refill takes less than 2 seconds  Neurological:      Comments: Somnolent  Oriented to person only  Weakness noted on the left arm and left leg   Psychiatric:      Comments: Psychomotor slowing noted today           Additional Data:     Labs:    Results from last 7 days   Lab Units 04/18/23  0442   WBC Thousand/uL 6 11   HEMOGLOBIN g/dL 12 2   HEMATOCRIT % 37 5   PLATELETS Thousands/uL 234   NEUTROS PCT % 70   LYMPHS PCT % 17   MONOS PCT % 10   EOS PCT % 3     Results from last 7 days   Lab Units 04/18/23  0442 04/14/23  1534 04/14/23  0916   SODIUM mmol/L 137   < > 137   POTASSIUM mmol/L 3 8   < > 4 4   CHLORIDE mmol/L 106   < > 102   CO2 mmol/L 27   < > 29   BUN mg/dL 13   < > 13   CREATININE mg/dL 0 74   < > 0 67   ANION GAP mmol/L 4   < > 6   CALCIUM mg/dL 8 2*   < > 9 6   ALBUMIN g/dL  --   --  3 3*   TOTAL BILIRUBIN mg/dL  --   --  0 74   ALK PHOS U/L  --   --  49   ALT U/L  --   --  11   AST U/L  --   --  23   GLUCOSE RANDOM mg/dL 101   < > 139    < > = values in this interval not displayed  Results from last 7 days   Lab Units 04/14/23  0916   INR  0 97     Results from last 7 days   Lab Units 04/17/23  0711 04/16/23 2050 04/16/23  1610 04/15/23  0734 04/14/23  2052 04/14/23  1651   POC GLUCOSE mg/dl 96 87 108 107 105 89     Results from last 7 days   Lab Units 04/15/23  0434   HEMOGLOBIN A1C % 5 9*     Results from last 7 days   Lab Units 04/14/23  0916   LACTIC ACID mmol/L 1 2   PROCALCITONIN ng/ml <0 05           * I Have Reviewed All Lab Data Listed Above  * Additional Pertinent Lab Tests Reviewed:  Seymour Mccullough Admission Reviewed    Imaging:    Imaging Reports Reviewed Today Include: none  Imaging Personally Reviewed by Myself Includes:  none    Recent Cultures (last 7 days):     Results from last 7 days   Lab Units 04/14/23  1043 04/14/23  0916   BLOOD CULTURE  No Growth After 4 Days  No Growth After 4 Days  Last 24 Hours Medication List:   Current Facility-Administered Medications   Medication Dose Route Frequency Provider Last Rate   • acetaminophen  650 mg Oral Q6H PRN RAYSA Dominguez     • aspirin  81 mg Oral Daily Roney Mann MD     • atorvastatin  40 mg Oral QPM Roney Mann MD     • busPIRone  10 mg Oral TID Roney Mann MD     • clonazePAM  0 25 mg Oral BID PRN Roney Mann MD     • DULoxetine  30 mg Oral Daily Roney Mann MD     • enoxaparin  30 mg Subcutaneous Q24H Josh Kirkland MD     • haloperidol  0 5 mg Oral BID PRN Roney Mann MD     • latanoprost  1 drop Both Eyes HS Roney Mann MD     • nicotine  1 patch Transdermal Daily Roney Mann MD     • QUEtiapine  12 5 mg Oral BID Roney Mann MD     • [START ON 4/21/2023] sertraline  100 mg Oral Daily Roney Mann MD     • sertraline  50 mg Oral Daily Roney Mann MD     • timolol  1 drop Both Eyes BID Roney Mann MD          Today, Patient Was Seen By: Roney Mann MD    ** Please Note: Dictation voice to text software may have been used in the creation of this document   **

## 2023-04-20 VITALS
HEIGHT: 61 IN | DIASTOLIC BLOOD PRESSURE: 55 MMHG | TEMPERATURE: 98.2 F | SYSTOLIC BLOOD PRESSURE: 123 MMHG | BODY MASS INDEX: 22.84 KG/M2 | OXYGEN SATURATION: 98 % | HEART RATE: 79 BPM | WEIGHT: 121 LBS | RESPIRATION RATE: 16 BRPM

## 2023-04-20 LAB
ANION GAP SERPL CALCULATED.3IONS-SCNC: 6 MMOL/L (ref 4–13)
BUN SERPL-MCNC: 12 MG/DL (ref 5–25)
CALCIUM SERPL-MCNC: 8.5 MG/DL (ref 8.4–10.2)
CHLORIDE SERPL-SCNC: 107 MMOL/L (ref 96–108)
CO2 SERPL-SCNC: 25 MMOL/L (ref 21–32)
CREAT SERPL-MCNC: 0.56 MG/DL (ref 0.6–1.3)
GFR SERPL CREATININE-BSD FRML MDRD: 88 ML/MIN/1.73SQ M
GLUCOSE SERPL-MCNC: 90 MG/DL (ref 65–140)
POTASSIUM SERPL-SCNC: 3.8 MMOL/L (ref 3.5–5.3)
SARS-COV-2 RNA RESP QL NAA+PROBE: NEGATIVE
SODIUM SERPL-SCNC: 138 MMOL/L (ref 135–147)

## 2023-04-20 RX ORDER — ATORVASTATIN CALCIUM 40 MG/1
40 TABLET, FILM COATED ORAL EVERY EVENING
Refills: 0
Start: 2023-04-20

## 2023-04-20 RX ORDER — QUETIAPINE FUMARATE 25 MG/1
12.5 TABLET, FILM COATED ORAL 2 TIMES DAILY
Refills: 0
Start: 2023-04-20

## 2023-04-20 RX ORDER — CLONAZEPAM 0.5 MG/1
0.25 TABLET ORAL 2 TIMES DAILY PRN
Qty: 10 TABLET | Refills: 0 | Status: SHIPPED | OUTPATIENT
Start: 2023-04-20 | End: 2023-04-30

## 2023-04-20 RX ORDER — SERTRALINE HYDROCHLORIDE 100 MG/1
100 TABLET, FILM COATED ORAL DAILY
Refills: 0
Start: 2023-04-21

## 2023-04-20 RX ORDER — QUETIAPINE FUMARATE 25 MG/1
25 TABLET, FILM COATED ORAL 2 TIMES DAILY
Refills: 0
Start: 2023-04-20 | End: 2023-04-20 | Stop reason: SDUPTHER

## 2023-04-20 RX ORDER — CLONAZEPAM 0.5 MG/1
0.25 TABLET ORAL 2 TIMES DAILY PRN
Qty: 10 TABLET | Refills: 0 | Status: SHIPPED | OUTPATIENT
Start: 2023-04-20 | End: 2023-04-20 | Stop reason: SDUPTHER

## 2023-04-20 RX ORDER — ACETAMINOPHEN 325 MG/1
650 TABLET ORAL EVERY 6 HOURS PRN
Refills: 0
Start: 2023-04-20

## 2023-04-20 RX ADMIN — ASPIRIN 81 MG: 81 TABLET, COATED ORAL at 09:16

## 2023-04-20 RX ADMIN — TIMOLOL MALEATE 1 DROP: 5 SOLUTION/ DROPS OPHTHALMIC at 09:24

## 2023-04-20 RX ADMIN — ENOXAPARIN SODIUM 30 MG: 30 INJECTION SUBCUTANEOUS at 09:17

## 2023-04-20 RX ADMIN — ACETAMINOPHEN 650 MG: 325 TABLET ORAL at 09:16

## 2023-04-20 RX ADMIN — SERTRALINE HYDROCHLORIDE 50 MG: 50 TABLET, FILM COATED ORAL at 09:16

## 2023-04-20 RX ADMIN — NICOTINE 1 PATCH: 7 PATCH, EXTENDED RELEASE TRANSDERMAL at 09:16

## 2023-04-20 RX ADMIN — BUSPIRONE HYDROCHLORIDE 10 MG: 10 TABLET ORAL at 09:16

## 2023-04-20 RX ADMIN — QUETIAPINE FUMARATE 12.5 MG: 25 TABLET ORAL at 09:17

## 2023-04-20 RX ADMIN — DULOXETINE HYDROCHLORIDE 30 MG: 30 CAPSULE, DELAYED RELEASE ORAL at 09:16

## 2023-04-20 NOTE — DISCHARGE SUMMARY
[address]  Discharge- Victorino Leader 1945, 76 y o  female MRN: [id number]  Unit/Bed#: MS [facility] Encounter: [id number]  Primary Care Provider: Leslie Mcdonald DO   Date and time admitted to hospital: 4/14/2023  8:55 AM    * Acute ischemic CVA  Assessment & Plan  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 involving the right posterior insula and temporoparietal lobes  No significant mass effect   · CTA head and neck showedSuspected early infarct within the right posterior aspect of the MCA territory involving the posterior superior temporal lobe and posterior lateral frontal parietal lobes with subtle loss of gray-white differentiation  No hemorrhagic transformation  There is an old infarct within the left occipital lobe/PCA territory  Chronic microangiopathic change within the cerebral hemispheres  Diffusely hypoplastic vertebral arteries bilaterally  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 extending through the hypoglossal canal to the posterior fossa  Atherosclerotic disease of the left carotid bifurcation with only minimal narrowing  there is moderate stenosis of the intracranial internal carotid artery bilaterally at the level of the anterior clinoid     There is a small filling defect present within one of the larger M2 branches on the right within the sylvian fissure, see series 5 image 133 and a possibility of branches extending posteriorly towards the area of suspected ischemia within the right posterior lateral MCA territory suggesting small M2/M3 branch occlusion distally  · S/B Neurology :Patient was started on dual antiplatelet therapy and maintained on high intensity statin  · Continue with serial neuro exams  Continues to have left-sided weakness and neglect with visual abnormalities  Remains agitated  Had worsening left-sided weakness over the weekend  Discussed with neurologist on-call who recommended repeat MRI  · Repeat MRI today shows  :Evolving right middle cerebral artery infarct compared to 2023  Increasing vasogenic edema and mild sulcal effacement  · 2 d echocardiogram normal ef   Moderate aortic stenosis  No PFO  telemetry monitor without any arrhythmia  · PT OT and speech evaluation appreciated   · Goals of care discussion with family today  Recommend acute/subacute rehab overall prognosis is guarded  code status dnr/dni  · zio patch/loop recorder on discharge  no further brain imaging   · Consult palliative care on discharge    Tobacco abuse  Assessment & Plan  nicotine patch ordered    Recurrent falls  Assessment & Plan   PT OT eval appreciated in the setting of acute stroke  Recommend rehab  Hypertensive urgency  Assessment & Plan  Presented with elevated blood pressure     Allow permissive hypertension In the setting of acute stroke  Blood pressure is back to normal without any medications    Generalized anxiety disorder  Assessment & Plan  Patient has episodes of restlessness and anxiety especially when she is alone  Discussed with psychiatry yesterday  Continue BuSpar 10 mg 3 times daily added Seroquel 12 5 mg twice daily and Zoloft 50 mg daily and raised to 100 mg daily   Tapered off Cymbalta   Needs outpt psych follow up      Discharging Physician / Practitioner: Akash Hatch MD  PCP: Diogenes Willoughby DO  Admission Date:   Admission Orders (From "admission, onward)     Ordered        04/14/23 [address] [place] [address]  Once                      Discharge Date: 04/20/23    Medical Problems     Resolved Problems  Date Reviewed: 4/20/2023   None         Consultations During Hospital Stay:  · neuro    Procedures Performed:   · none    Significant Findings / Test Results:   CTA head and neck w wo contrast    Result Date: 4/15/2023  Impression: Suspected early infarct within the right posterior aspect of the MCA territory involving the posterior superior temporal lobe and posterior lateral frontal parietal lobes with subtle loss of gray-white differentiation  No hemorrhagic transformation  There is an old infarct within the left occipital lobe/PCA territory  Chronic microangiopathic change within the cerebral hemispheres  Diffusely hypoplastic vertebral arteries bilaterally  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 extending through the hypoglossal canal to the posterior fossa  Atherosclerotic disease of the left carotid bifurcation with only minimal narrowing  Intracranially there is moderate stenosis of the intracranial internal carotid artery bilaterally at the level of the anterior clinoid     There is a small filling defect present within one of the larger M2 branches on the right within the sylvian fissure, see series 5 image 133 and a possibility of branches extending posteriorly towards the area of suspected ischemia within the right posterior lateral MCA territory suggesting small M2/M3 branch occlusion distally  Please note this examination was performed at 8:16 PM on 4/14/2023 but is just now presented to me for evaluation on 4/15/2023 at 7:30 AM  This examination was marked \"immediate notification\" in Epic in order to begin the standard process by which the radiology reading room liaison alerts the referring practitioner    Workstation performed: [id number]     TRAUMA - CT chest " abdomen pelvis wo contrast    Result Date: 4/14/2023  Impression: No acute thoracic, abdominal, or pelvic trauma within the aforementioned limitations  Multiple additional findings as above  The study was marked in [facility] for immediate notification  Workstation performed: [id number]     XR Trauma chest portable    Result Date: 4/14/2023  Impression: No acute pulmonary pathology  Retrocardiac opacity likely representing a large hiatal hernia  No obvious displaced fractures within limitation of supine AP chest technique  Workstation performed: [id number]     XR shoulder 2+ views LEFT    Result Date: 4/14/2023  Impression: No acute osseous abnormality  Workstation performed: [id number]     XR elbow 3+ vw LEFT    Result Date: 4/14/2023  Impression: No definite acute osseous pathology  Workstation performed: [id number]     XR wrist 3+ views LEFT    Result Date: 4/14/2023  Impression: No acute osseous abnormality  Workstation performed: [id number]     XR hip/pelv 2-3 vws left if performed    Result Date: 4/14/2023  Impression: No acute osseous abnormality  Stable appearance of the left total hip arthroplasty  Workstation performed: [id number]     XR knee 4+ vw left injury    Result Date: 4/14/2023  Impression: No acute osseous abnormality  Stable appearance of the right total knee arthroplasty as in 2020 status post removal of the tibial arthroplasty component  Workstation performed: [id number]     XR knee 4+ vw right injury    Result Date: 4/14/2023  Impression: No acute osseous abnormality  Stable appearance of the right total knee arthroplasty as in 2020 status post removal of the tibial arthroplasty component  Workstation performed: [id number]     TRAUMA - CT head wo contrast    Result Date: 4/14/2023  Impression: No acute intracranial pathology  In particular, no intracranial hemorrhage or calvarial fracture  No interval change in 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 and chronic microvascular ischemic changes   The study was marked in EPIC for immediate notification given trauma designation  Workstation performed: [id number]     TRAUMA - CT spine cervical wo contrast    Result Date: 4/14/2023  Impression: No cervical spine fracture or traumatic malalignment  The study was marked in [facility] for immediate notification  Workstation performed: [id number]     MRI brain wo contrast    Result Date: 4/17/2023  Impression: Evolving right middle cerebral artery infarct compared to 4/14/2023  Increasing vasogenic edema and mild sulcal effacement  Workstation performed: [id number]     XR Trauma pelvis ap only 1 or 2 vw    Result Date: 4/14/2023  Impression: No acute osseous abnormality  Stable findings compared to radiographs from 3/16/2023  Workstation performed: [id number]     MRI follow up neuro    Result Date: 4/14/2023  Impression: Limited evaluation of the brain due to early termination of the examination and significant motion artifact  Late/early subacute right posterior MCA distribution infarction involving the right posterior insula and temporoparietal lobes  No significant mass effect  Chronic left PCA territory infarction involving the left occipital lobe  Generalized cerebral volume loss  I personally discussed this study with Claire Isaacs on 4/14/2023 at 3:35 PM  Workstation performed: [id number]     Incidental Findings:   · none    Test Results Pending at Discharge (will require follow up):   · none     Outpatient Tests Requested:  · Cardiology follow up    Complications:  none    Reason for Admission: Acute stroke    Hospital Course:     Manisha King is a 76 y o  female patient who originally presented to the hospital on 4/14/2023 due to acute stroke  Patient presented with fall and confusion and found to have acute left-sided weakness and found to have MCA territory stroke  Patient had worsening anxiety restlessness agitation during her hospital course initially and is getting a little bit better with medication adjustment    Is "being sent to subacute rehab  Needs outpatient cardiology follow-up for Zio patch monitoring  No A-fib noted during her hospital stay telemetry monitoring  Prognosis is guarded  Palliative care eval suggested on discharge    Please see above list of diagnoses and related plan for additional information  Condition at Discharge: fair     Discharge Day Visit / Exam:     Subjective: Patient is anxious to go to rehab but otherwise following directions  Denies any chest pain or shortness of breath  Vitals: Blood Pressure: 140/63 (04/20/23 0828)  Pulse: 75 (04/20/23 0828)  Temperature: 97 7 °F (36 5 °C) (04/20/23 0828)  Temp Source: Temporal (04/19/23 1626)  Respirations: 18 (04/20/23 0828)  Height: 5' 1\" (154 9 cm) (04/18/23 0720)  Weight - Scale: 54 9 kg (121 lb) (04/18/23 0720)  SpO2: 97 % (04/20/23 0828)  Exam:   Physical Exam  Vitals and nursing note reviewed  Constitutional:       Appearance: Normal appearance  HENT:      Head: Normocephalic and atraumatic  Right Ear: External ear normal       Left Ear: External ear normal       Nose: Nose normal       Mouth/Throat:      Pharynx: Oropharynx is clear  Eyes:      Pupils: Pupils are equal, round, and reactive to light  Cardiovascular:      Rate and Rhythm: Normal rate and regular rhythm  Heart sounds: Normal heart sounds  Pulmonary:      Effort: Pulmonary effort is normal       Breath sounds: Normal breath sounds  Abdominal:      General: Bowel sounds are normal       Palpations: Abdomen is soft  Tenderness: There is no abdominal tenderness  Musculoskeletal:      Cervical back: Normal range of motion and neck supple  Comments: Weakness of left arm and left leg noted   Skin:     General: Skin is warm and dry  Capillary Refill: Capillary refill takes less than 2 seconds  Neurological:      Mental Status: She is alert        Comments: Oriented to person only   Psychiatric:      Comments: anxious         Discussion with " Family: dw son    Discharge instructions/Information to patient and family:   See after visit summary for information provided to patient and family  Provisions for Follow-Up Care:  See after visit summary for information related to follow-up care and any pertinent home health orders  Disposition:     [facility] to [facility]:   · Not Applicable to this Patient - Not Applicable to this Patient    Planned Readmission: none     Discharge Statement:  I spent 35 minutes discharging the patient  This time was spent on the day of discharge  I had direct contact with the patient on the day of discharge  Greater than 50% of the total time was spent examining patient, answering all patient questions, arranging and discussing plan of care with patient as well as directly providing post-discharge instructions  Additional time then spent on discharge activities  Discharge Medications:  See after visit summary for reconciled discharge medications provided to patient and family        ** Please Note: This note has been constructed using a voice recognition system **

## 2023-04-20 NOTE — PROGRESS NOTES
Weight stable at this time  Noting variable meal completions, RN reports this is dependent upon pt's mood, dinner is typically the worst meal for her intake  Dysphagia diet per SLP, no additional diet restrictions at this time  Will order ensure pudding BID at B and L to promote adequate kcal/PRO intake

## 2023-04-20 NOTE — ASSESSMENT & PLAN NOTE
Patient has episodes of restlessness and anxiety especially when she is alone  Discussed with psychiatry yesterday  Continue BuSpar 10 mg 3 times daily added Seroquel 12 5 mg twice daily and Zoloft 50 mg daily and raised to 100 mg daily   Tapered off Cymbalta   Needs outpt psych follow up

## 2023-04-20 NOTE — CASE MANAGEMENT
Case Management Discharge Planning Note    Patient name [facility]  Location /-13 MRN [id number]  : 1945 Date 2023       Current Admission Date: 2023  Current Admission Diagnosis:Acute ischemic CVA   Patient Active Problem List    Diagnosis Date Noted   • Recurrent falls 2023   • Tobacco abuse 2023   • Acute ischemic CVA 2023   • Community acquired pneumonia of right lower lobe of lung 2023   • Vascular dementia ([address] ) 2023   • Generalized anxiety disorder 2023   • Hypertensive urgency 2023      LOS (days): 6  Geometric Mean LOS (GMLOS) (days): 4 50  Days to GMLOS:-1 5     OBJECTIVE:  Risk of Unplanned Readmission Score: 16 63         Current admission status: Inpatient   Preferred Pharmacy:   [address] [place] [address]  Phone: [phone] Fax: [phone]    Primary Care Provider: Jamee Crabtree DO    Primary Insurance: MEDICARE  Secondary Insurance: BLUE CROSS    DISCHARGE DETAILS:      CM was unaware of discharge today  CM called and spoke with the pt's son, reviewed DC IMM with patient's son and informed him that the patient can stay an additional 4 hours for reconsidering appealing the discharge as the medicare rights were review on the day of discharge  Pt's son verbalized understanding and feels the pt is ready to go to [facility] and does not intend to stay 4 hours to reconsider  Transportation booked through [address]  Awaiting acceptance        [facility] accepted for 1300                                                                          IMM Given (Date):: 23  IMM Given to[de-identified] Family  Family notified[de-identified] Jennifer Clarke (son)       [address], [address] : [place], [address]  Receiving Facility/Agency Phone Number: Phone: [phone]  Facility/Agency Fax Number: Fax: [phone]

## 2023-04-20 NOTE — PLAN OF CARE
Problem: PAIN - ADULT  Goal: Verbalizes/displays adequate comfort level or baseline comfort level  Description: Interventions:  - Encourage patient to monitor pain and request assistance  - Assess pain using appropriate pain scale  - Administer analgesics based on type and severity of pain and evaluate response  - Implement non-pharmacological measures as appropriate and evaluate response  - Consider cultural and social influences on pain and pain management  - Notify physician/advanced practitioner if interventions unsuccessful or patient reports new pain  Outcome: Progressing     Problem: INFECTION - ADULT  Goal: Absence or prevention of progression during hospitalization  Description: INTERVENTIONS:  - Assess and monitor for signs and symptoms of infection  - Monitor lab/diagnostic results  - Monitor all insertion sites, i e  indwelling lines, tubes, and drains  - Monitor endotracheal if appropriate and nasal secretions for changes in amount and color  - [place] appropriate cooling/warming therapies per order  - Administer medications as ordered  - Instruct and encourage patient and family to use good hand hygiene technique  - Identify and instruct in appropriate isolation precautions for identified infection/condition  Outcome: Progressing  Goal: Absence of fever/infection during neutropenic period  Description: INTERVENTIONS:  - Monitor WBC    Outcome: Progressing     Problem: SAFETY ADULT  Goal: Patient will remain free of falls  Description: INTERVENTIONS:  - Educate patient/family on patient safety including physical limitations  - Instruct patient to call for assistance with activity   - Consult OT/PT to assist with strengthening/mobility   - Keep Call bell within reach  - Keep bed low and locked with side rails adjusted as appropriate  - Keep care items and personal belongings within reach  - Initiate and maintain comfort rounds  - Make Fall Risk Sign visible to staff  - Offer Toileting every 2 Hours, in advance of need  - Initiate/Maintain bedalarm  - Obtain necessary fall risk management equipment  - Apply yellow socks and bracelet for high fall risk patients  - Consider moving patient to room near nurses station  Outcome: Progressing  Goal: Maintain or return to baseline ADL function  Description: INTERVENTIONS:  -  Assess patient's ability to carry out ADLs; assess patient's baseline for ADL function and identify physical deficits which impact ability to perform ADLs (bathing, care of mouth/teeth, toileting, grooming, dressing, etc )  - Assess/evaluate cause of self-care deficits   - Assess range of motion  - Assess patient's mobility; develop plan if impaired  - Assess patient's need for assistive devices and provide as appropriate  - Encourage maximum independence but intervene and supervise when necessary  - Involve family in performance of ADLs  - Assess for home care needs following discharge   - Consider OT consult to assist with ADL evaluation and planning for discharge  - Provide patient education as appropriate  Outcome: Progressing  Goal: Maintains/Returns to pre admission functional level  Description: INTERVENTIONS:  - Perform BMAT or MOVE assessment daily    - Set and communicate daily mobility goal to care team and patient/family/caregiver  - Collaborate with rehabilitation services on mobility goals if consulted  - Perform Range of Motion 3 times a day  - Reposition patient every 2 hours    - Dangle patient 3 times a day  - Stand patient 3 times a day  - Ambulate patient 3 times a day  - Out of bed to chair 3 times a day   - Out of bed for meals 3 times a day  - Out of bed for toileting  - Record patient progress and toleration of activity level   Outcome: Progressing     Problem: DISCHARGE PLANNING  Goal: Discharge to home or other facility with appropriate resources  Description: INTERVENTIONS:  - Identify barriers to discharge w/patient and caregiver  - Arrange for needed discharge resources and transportation as appropriate  - Identify discharge learning needs (meds, wound care, etc )  - Arrange for interpretive services to assist at discharge as needed  - Refer to Case Management Department for coordinating discharge planning if the patient needs post-hospital services based on physician/advanced practitioner order or complex needs related to functional status, cognitive ability, or social support system  Outcome: Progressing     Problem: Knowledge Deficit  Goal: Patient/family/caregiver demonstrates understanding of disease process, treatment plan, medications, and discharge instructions  Description: Complete learning assessment and assess knowledge base  Interventions:  - Provide teaching at level of understanding  - Provide teaching via preferred learning methods  Outcome: Progressing     Problem: MOBILITY - ADULT  Goal: Maintain or return to baseline ADL function  Description: INTERVENTIONS:  -  Assess patient's ability to carry out ADLs; assess patient's baseline for ADL function and identify physical deficits which impact ability to perform ADLs (bathing, care of mouth/teeth, toileting, grooming, dressing, etc )  - Assess/evaluate cause of self-care deficits   - Assess range of motion  - Assess patient's mobility; develop plan if impaired  - Assess patient's need for assistive devices and provide as appropriate  - Encourage maximum independence but intervene and supervise when necessary  - Involve family in performance of ADLs  - Assess for home care needs following discharge   - Consider OT consult to assist with ADL evaluation and planning for discharge  - Provide patient education as appropriate  Outcome: Progressing  Goal: Maintains/Returns to pre admission functional level  Description: INTERVENTIONS:  - Perform BMAT or MOVE assessment daily    - Set and communicate daily mobility goal to care team and patient/family/caregiver     - Collaborate with rehabilitation services on mobility goals if consulted  - Perform Range of Motion 3 times a day  - Reposition patient every 2 hours  - Dangle patient 3 times a day  - Stand patient 3 times a day  - Ambulate patient 3 times a day  - Out of bed to chair 3 times a day   - Out of bed for meals 3 times a day  - Out of bed for toileting  - Record patient progress and toleration of activity level   Outcome: Progressing     Problem: Nutrition/Hydration-ADULT  Goal: Nutrient/Hydration intake appropriate for improving, restoring or maintaining nutritional needs  Description: Monitor and assess patient's nutrition/hydration status for malnutrition  Collaborate with interdisciplinary team and initiate plan and interventions as ordered  Monitor patient's weight and dietary intake as ordered or per policy  Utilize nutrition screening tool and intervene as necessary  Determine patient's food preferences and provide high-protein, high-caloric foods as appropriate       INTERVENTIONS:  - Monitor oral intake, urinary output, labs, and treatment plans  - Assess nutrition and hydration status and recommend course of action  - Evaluate amount of meals eaten  - Assist patient with eating if necessary   - Allow adequate time for meals  - Recommend/ encourage appropriate diets, oral nutritional supplements, and vitamin/mineral supplements  - Order, calculate, and assess calorie counts as needed  - Recommend, monitor, and adjust tube feedings and TPN/PPN based on assessed needs  - Assess need for intravenous fluids  - Provide specific nutrition/hydration education as appropriate  - Include patient/family/caregiver in decisions related to nutrition  Outcome: Progressing     Problem: Prexisting or High Potential for Compromised Skin Integrity  Goal: Skin integrity is maintained or improved  Description: INTERVENTIONS:  - Identify patients at risk for skin breakdown  - Assess and monitor skin integrity  - Assess and monitor nutrition and hydration status  - Monitor labs   - Assess for incontinence   - Turn and reposition patient  - Assist with mobility/ambulation  - Relieve pressure over bony prominences  - Avoid friction and shearing  - Provide appropriate hygiene as needed including keeping skin clean and dry  - Evaluate need for skin moisturizer/barrier cream  - Collaborate with interdisciplinary team   - Patient/family teaching  - Consider wound care consult   Outcome: Progressing     Problem: Neurological Deficit  Goal: Neurological status is stable or improving  Description: Interventions:  - Monitor and assess patient's level of consciousness, motor function, sensory function, and level of assistance needed for ADLs  - Monitor and report changes from baseline  Collaborate with interdisciplinary team to initiate plan and implement interventions as ordered  - Provide and maintain a safe environment  - Consider seizure precautions  - Consider fall precautions  - Consider aspiration precautions  - Consider bleeding precautions  Outcome: Progressing     Problem: Activity Intolerance/Impaired Mobility  Goal: Mobility/activity is maintained at optimum level for patient  Description: Interventions:  - Assess and monitor patient  barriers to mobility and need for assistive/adaptive devices  - Assess patient's emotional response to limitations  - Collaborate with interdisciplinary team and initiate plans and interventions as ordered  - Encourage independent activity per ability   - Maintain proper body alignment  - Perform active/passive rom as tolerated/ordered  - Plan activities to conserve energy   - Turn patient as appropriate  Outcome: Progressing     Problem: Communication Impairment  Goal: Ability to express needs and understand communication  Description: Assess patient's communication skills and ability to understand information    Patient will demonstrate use of effective communication techniques, alternative methods of communication and understanding even if not able to speak  - Encourage communication and provide alternate methods of communication as needed  - Collaborate with case management/ for discharge needs  - Include patient/family/caregiver in decisions related to communication  Outcome: Progressing     Problem: Potential for Aspiration  Goal: Non-ventilated patient's risk of aspiration is minimized  Description: Assess and monitor vital signs, respiratory status, and labs (WBC)  Monitor for signs of aspiration (tachypnea, cough, rales, wheezing, cyanosis, fever)  - Assess and monitor patient's ability to swallow  - Place patient up in chair to eat if possible  - HOB up at 90 degrees to eat if unable to get patient up into chair   - Supervise patient during oral intake  - Instruct patient/ family to take small bites  - Instruct patient/ family to take small single sips when taking liquids  - Follow patient-specific strategies generated by speech pathologist   Outcome: Progressing  Goal: Ventilated patient's risk of aspiration is minimized  Description: Assess and monitor vital signs, respiratory status, airway cuff pressure, and labs (WBC)  Monitor for signs of aspiration (tachypnea, cough, rales, wheezing, cyanosis, fever)  - Elevate head of bed 30 degrees if patient has tube feeding   - Monitor tube feeding  Outcome: Progressing     Problem: Nutrition  Goal: Nutrition/Hydration status is improving  Description: Monitor and assess patient's nutrition/hydration status for malnutrition (ex- brittle hair, bruises, dry skin, pale skin and conjunctiva, muscle wasting, smooth red tongue, and disorientation)  Collaborate with interdisciplinary team and initiate plan and interventions as ordered  Monitor patient's weight and dietary intake as ordered or per policy  Utilize nutrition screening tool and intervene per policy  Determine patient's food preferences and provide high-protein, high-caloric foods as appropriate       - Assist patient with eating   - Allow adequate time for meals   - Encourage patient to take dietary supplement as ordered  - Collaborate with clinical nutritionist   - Include patient/family/caregiver in decisions related to nutrition    Outcome: Progressing

## 2023-04-20 NOTE — NURSING NOTE
Patients discharge instructions sent with transport company  Report called to [address] at [facility]  Patient left with all personal belongings

## 2023-04-20 NOTE — PHYSICAL THERAPY NOTE
"   PHYSICAL THERAPY TREATMENT NOTE  NAME:  Lucien Cortes  DATE: 04/20/23    Length Of Stay: 6  Performed at least 2 patient identifiers during session: Name and ID bracelet    TREATMENT FLOWSHEET:    04/20/23 0949   PT Last Visit   PT Visit Date 04/20/23   Note Type   Note Type Treatment   Pain Assessment   Pain Assessment Tool 0-10   Pain Score No Pain   Restrictions/Precautions   Weight Bearing Precautions Per Order No   Braces or Orthoses Sling  (for LUE during mobility but pt  removing it and refusing to wear it)   Other Precautions Agitated;Cognitive; Chair Alarm; Bed Alarm; Fall Risk;Impulsive;Visual impairment  (L visual field neglect and L hemiparesis)   General   Chart Reviewed Yes   Response to Previous Treatment Patient unable to report, no changes reported from family or staff   Family/Caregiver Present No   Cognition   Overall Cognitive Status Impaired   Arousal/Participation Alert; Responsive   Attention Difficulty dividing attention   Orientation Level Oriented to person   Memory Decreased recall of precautions;Decreased recall of recent events;Decreased short term memory;Decreased recall of biographical information;Decreased long term memory   Following Commands Follows one step commands inconsistently   Comments Pt  was very easily distracted and fixated on her families where abouts and if she could go home  Subjective   Subjective \"Am I being held hostage in this place? Where is [place] Diez  and my mother, how many minutes will they be here? Oh God, my savior, you know I pray to you every night, please get my family to me  \"   Bed Mobility   Rolling R 3  Moderate assistance   Additional items Assist x 1;HOB elevated; Bedrails; Increased time required;Verbal cues;LE management   Rolling L 3  Moderate assistance   Additional items Assist x 1;HOB elevated; Bedrails; Increased time required;Verbal cues;LE management   Supine to Sit 3  Moderate assistance   Additional items Assist x 1;HOB " elevated; Bedrails; Increased time required;Verbal cues;LE management  (UB management)   Additional Comments Pt  tolerated sitting at EOB to perform dynamic sitting balance activities   Transfers   Sit to Stand 4  Minimal assistance   Additional items Assist x 1; Increased time required;Verbal cues  (HHA)   Stand to Sit 4  Minimal assistance   Additional items Assist x 1; Increased time required;Verbal cues  (HHAx1)   Stand pivot 2  Maximal assistance   Additional items Assist x 1; Armrests; Increased time required;Verbal cues  (HHAx1)   Toilet transfer 2  Maximal assistance   Additional items Assist x 1; Armrests; Increased time required;Verbal cues; Commode  (HHAX1)   Additional Comments Pt  required step by step instruction for safety and direction  Increased time required due to constant re-direction required due to pt s fixation on family and God to the point of agitation at times  Ambulation/Elevation   Gait pattern Improper Weight shift;Decreased foot clearance;Narrow SHWETA;L Hemiparesis;L Foot drag;Poor UE support; Forward Flexion; Ataxia; Step to;Excessively slow; Short stride;Decreased heel strike;Decreased toe off; Inconsistent chikis   Gait Assistance 2  Maximal assist   Additional items Assist x 2  (HHA x2 with LUE support and occational assistance to advance LLE forward and assistance to keep foot planted on floor to advance RLE)   Assistive Device None  (HHAX2)   Distance 12ft and 3 ft x2   Ambulation/Elevation Additional Comments Significant leg length discrepency, LLE shorted that RLE    Pt  does have cusom fit shoes per family but they are not present in hospital   Balance   Static Sitting Fair -   Dynamic Sitting Poor +   Static Standing Poor   Dynamic Standing Poor -   Ambulatory Zero   Endurance Deficit   Endurance Deficit Yes   Endurance Deficit Description cognitive deficits and fatigue   Activity Tolerance   Activity Tolerance Patient limited by fatigue   Nurse Made Aware RN aware   Exercises   Quad Sets Sitting;10 reps;AROM; Right;AAROM; Left   Heelslides Sitting;10 reps;AROM; Right;AAROM; Left   Hip Flexion Sitting;10 reps;AROM; Right;AAROM; Left   Hip Abduction Sitting;10 reps;AROM; Bilateral  (resisting of RLE to get Active movement in LLE)   Hip Adduction Sitting;10 reps;AROM; Bilateral  (resisting of RLE to get Active movement in LLE)   Knee AROM Long Arc Quad Sitting;15 reps;AROM; Bilateral   Ankle Pumps Sitting;10 reps;AROM; Right;AAROM; Left   Marching Sitting;10 reps;AROM; Right;AAROM; Left   Neuro re-ed dynamic sitting balance activities performed at EOB with min Ax1-SBA to maintain sitting balance including multidirectional weight shifting, reaching iwth RUE, and proprioceptive techniques to help pt  to know where LUE/LLE are in space and to imprve L sided visual neglect   Assessment   Prognosis Good   Problem List Decreased range of motion;Decreased strength;Decreased endurance;Decreased mobility; Impaired balance;Decreased coordination;Decreased cognition; Impaired judgement;Decreased safety awareness; Impaired vision; Impaired tone;Decreased skin integrity   Goals   Patient Goals to see her family   PT Treatment Day 2   Plan   Treatment/Interventions Functional transfer training;LE strengthening/ROM; Therapeutic exercise; Endurance training;Cognitive reorientation;Patient/family training;Equipment eval/education; Bed mobility;Gait training; Compensatory technique education;Spoke to nursing;OT   Progress Slow progress, cognitive deficits   PT Frequency 5-7x/wk   Recommendation   PT Discharge Recommendation Post acute rehabilitation services   Additional Comments OTR aware and present for safety during ambulation due to medical complexity   AM-PAC Basic Mobility Inpatient   Turning in Flat Bed Without Bedrails 2   Lying on Back to Sitting on Edge of Flat Bed Without Bedrails 1   Moving Bed to Chair 2   Standing Up From Chair Using Arms 3   Walk in Room 1   Climb 3-5 Stairs With Railing 1   Basic Mobility Inpatient Raw Score 10   Turning Head Towards Sound 3   Follow Simple Instructions 2   Low Function Basic Mobility Raw Score  15   Low Function Basic Mobility Standardized Score  23 9   Highest Level Of Mobility   -[facility] Goal 4: Move to chair/commode   -[facility] Achieved 6: Walk 10 steps or more       The patient's AM-PAC Basic Mobility Inpatient Short Form Raw Score is 10  A raw score less than 16 suggests the patient may benefit from discharge to post-acute rehabilitation services  Please also refer to the recommendation of the Physical Therapist for safe discharge planning  Pt seen for PT treatment session this date with interventions consisting of bed mobility tasks, transfer training, neuromuscular re-education of movement performed to improve dynamic sitting balance, gait training, toilet transfers, increased time required for constant redirection through out session, and education provided as needed for safety and direction to improve functional mobility, safety awareness, and activity tolerance  Pt agreeable to PT treatment session upon arrival, pt found resting in bed  At end of session, pt left sitting OOB in recliner with BLE elevated, chair alarm activated, and with all needs in reach  In comparison to previous session, pt with improvements in ambulation distance, increased Active movement in LUE/LLE, and sitting balance   Continue to recommend STR at time of d/c in order to maximize pt's functional independence and safety w/ mobility  Pt continues to be functioning below baseline level  PT will continue to see pt while here in order to address the deficits listed above and provide interventions consistent w/ POC in effort to achieve STGs      Ricky Christy [place], [place]

## 2023-04-20 NOTE — OCCUPATIONAL THERAPY NOTE
Occupational Therapy Progress Note     Patient Name: Kristi Vasquez  AEZUF'Y Date: 4/20/2023  Problem List  Principal Problem:    Acute ischemic CVA  Active Problems:    Generalized anxiety disorder    Hypertensive urgency    Recurrent falls    Tobacco abuse        04/20/23 2841   Note Type   Note Type Treatment   Pain Assessment   Pain Assessment Tool 0-10   Pain Score No Pain   Restrictions/Precautions   Other Precautions Cognitive; Chair Alarm; Bed Alarm; Fall Risk;Visual impairment  (L sided weakness/neglect)   ADL   Grooming Assistance 5  Supervision/Setup   Grooming Deficit Setup;Verbal cueing;Supervision/safety; Increased time to complete;Brushing hair   Grooming Comments Pt brushed hair while sitting in recliner with SUP  Cuing required to maintain attention to task and sequencing  Toileting Assistance  1  Total Assistance   Toileting Deficit Setup;Verbal cueing;Supervison/safety; Increased time to complete;Clothing management up;Clothing management down;Perineal hygiene   Toileting Comments Pt completing toileting on bsc with totalA  Assistance required for thoroughness during hygiene and balance while standing  Bed Mobility   Additional Comments Pt OOB on BSC upon therapist arrival and sitting in recliner following session  Bed mobility not assessed  Transfers   Sit to Stand 4  Minimal assistance   Additional items Assist x 1; Increased time required;Verbal cues   Stand to Sit 4  Minimal assistance   Additional items Assist x 1; Increased time required;Verbal cues   Stand pivot 2  Maximal assistance   Additional items Assist x 2; Increased time required;Verbal cues   Toilet transfer 2  Maximal assistance   Additional items Assist x 2; Increased time required;Verbal cues; Commode   Additional Comments Pt completed all functional transfers with HHA  Pt refusing to keep on L shoulder sling during transfers  LUE supported by therapist  Pt completed STS transfers with Mando x1 and max cuing   Pt requiring maxA x2 to SPT and BSC for safe weight shifting, sequencing, movement of LLE and balance  Toilet Transfers   Toilet Transfer From [employer] Transfer Type To and from   Toilet Transfer to Standard bedside commode   Toilet Transfer Technique Stand pivot   Toilet Transfers Maximal assistance  (x2)   Toilet Transfers Comments Pt completing BSC transfer with maxA x2 with HHA and max cuing for sequencing  Therapeutic Exercise - ROM   UE-ROM Yes   ROM- Right Upper Extremities   R Shoulder AROM; Flexion; Extension   R Elbow AROM;Elbow flexion;Elbow extension   R Position Seated   RUE ROM Comment Pt completing AROM RUE shoulder and elbow while sitting in recliner  ROM - Left Upper Extremities    L Shoulder AROM; Extension;Flexion   L Elbow AAROM;Elbow flexion;Elbow extension   L Wrist AAROM;Wrist flexion;Wrist extension   L Hand AAROM; Thumb; Index finger; Long finger;Little finger;Ring finger   L Position Seated   LUE ROM Comment Pt attempting to lift LUE at shoulder with ~25% ROM  AAROM elbow and wrist with 25% AROM and hand with minimal flexion movement  WFL PROM to achieve full ROM  Cognition   Overall Cognitive Status Impaired   Arousal/Participation Alert   Attention Difficulty attending to directions   Orientation Level Oriented to person;Disoriented to place; Disoriented to time;Disoriented to situation   Memory Decreased recall of recent events;Decreased recall of precautions;Decreased short term memory;Decreased recall of biographical information;Decreased long term memory   Following Commands Follows one step commands inconsistently   Comments Pt agitated and perseverating on seeing family throughout session  Pt yelling and swatting at therapist when agitated  Pt about to be consoled for short period of time before becoming upset again  Vision   Vision Comments Pt continues with L sided neglect but able to move head towards sound on L side with cuing     Activity Tolerance   Activity Tolerance Other (Comment)  (Limited by level of cognition)   Medical Staff Made Aware RN; Denita VERGARA   Assessment   Assessment Pt seen for OT treatment day 2  Pt willing and able to participate in treatment focused on functional transfers, ADLs and BUE ROM  Pt perseverating on seeing family and becoming agitated throughout session due to such  Pt completing STS functional transfers with Mando x1 with HHA  Pt completing SPT and BSC transfers with maxA x2 with HHA  Pt requiring DEP for toileting on BSC  Based on functional abilities, pt demo ability to complete UB ADLs with maxA and LB ADLs with totalA  Pt presenting with increased movement in LUE since previous session  Cognition barrier to participation this session  Pt requires additional OT services to increase strength/ROM, endurance, sitting/standing balance/tolerance and cognition for improved safety during ADLs and functional transfers  Discharge recommendation remains PAR  Pt sitting in recliner with chair alarm activated, call bell placed within reach and all needs met following session  Plan   Treatment Interventions ADL retraining;Functional transfer training;UE strengthening/ROM; Endurance training;Patient/family training;Neuromuscular reeducation; Energy conservation; Activityengagement   Goal Expiration Date 05/01/23   OT Treatment Day 2   OT Frequency 3-5x/wk   Recommendation   OT Discharge Recommendation Post acute rehabilitation services   AM-PAC Daily Activity Inpatient   Lower Body Dressing 1   Bathing 1   Toileting 1   Upper Body Dressing 2   Grooming 2   Eating 2   Daily Activity Raw Score 9   Turning Head Towards Sound 2   Follow Simple Instructions 2   Low Function Daily Activity Raw Score 13   Low Function Daily Activity Standardized Score  23 16   AM-PAC Applied Cognition Inpatient   Following a Speech/Presentation 2   Understanding Ordinary Conversation 3   Taking Medications 1   Remembering Where Things Are Placed or Put Away 1   Remembering List of 4-5 Errands 1   Taking Care of Complicated Tasks 1   Applied Cognition Raw Score 9   Applied Cognition Standardized Score 22 48     Pt goals to be met by 5/1/23     Pt will demonstrate ability to complete grooming/hygiene tasks @ Min A after set-up  Pt will demonstrate ability to complete supine<>sit @ Min A in order to increase safety and independence during ADL tasks  Pt will demonstrate ability to complete UB ADLs including washing/dressing @ Min A in order to increase performance and participation during meaningful tasks  Pt will demonstrate ability to complete LB dressing @ Mod A in order to increase safety and independence during meaningful tasks  Pt will demonstrate ability to complete toileting tasks including CM and pericare @ Mod A in order to increase safety and independence during meaningful tasks  Pt will demonstrate ability to complete EOB, chair, toilet/commode transfers @ Mod A in order to increase performance and participation during functional tasks  Pt will demonstrate ability to stand for 3-5 minutes while maintaining Fair + balance with use of LRAD for UB support PRN  Pt will attend to continued cognitive assessments 100% of the time in order to provide most appropriate d/c recommendations  Pt will identify 3 areas of interest/hobbies and 1 intervention on how to incorporate into daily life in order to increase interaction with environment and peers as well as increase participation in meaningful tasks  Pt will demonstrate 100% carryover of BUE HEP in order to increase BUE MS and increase performance during functional tasks upon d/c home       FRANCIS Barnard/RENÉ

## 2023-04-20 NOTE — ASSESSMENT & PLAN NOTE
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 involving the right posterior insula and temporoparietal lobes  No significant mass effect   · CTA head and neck showedSuspected early infarct within the right posterior aspect of the MCA territory involving the posterior superior temporal lobe and posterior lateral frontal parietal lobes with subtle loss of gray-white differentiation  No hemorrhagic transformation  There is an old infarct within the left occipital lobe/PCA territory  Chronic microangiopathic change within the cerebral hemispheres  Diffusely hypoplastic vertebral arteries bilaterally  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 extending through the hypoglossal canal to the posterior fossa  Atherosclerotic disease of the left carotid bifurcation with only minimal narrowing  there is moderate stenosis of the intracranial internal carotid artery bilaterally at the level of the anterior clinoid     There is a small filling defect present within one of the larger M2 branches on the right within the sylvian fissure, see series 5 image 133 and a possibility of branches extending posteriorly towards the area of suspected ischemia within the right posterior lateral MCA territory suggesting small M2/M3 branch occlusion distally    · S/B Neurology :Patient was started on dual antiplatelet therapy and maintained on high intensity statin  · Continue with serial neuro exams  Continues to have left-sided weakness and neglect with visual abnormalities  Remains agitated  Had worsening left-sided weakness over the weekend  Discussed with neurologist on-call who recommended repeat MRI  · Repeat MRI today shows  :Evolving right middle cerebral artery infarct compared to 4/14/2023  Increasing vasogenic edema and mild sulcal effacement  · 2 d echocardiogram normal ef   Moderate aortic stenosis  No PFO  telemetry monitor without any arrhythmia  · PT OT and speech evaluation appreciated   · Goals of care discussion with family today  Recommend acute/subacute rehab overall prognosis is guarded  code status dnr/dni  · zio patch/loop recorder on discharge  no further brain imaging   · Consult palliative care on discharge

## 2023-04-20 NOTE — SPEECH THERAPY NOTE
Speech Language/Pathology    Speech/Language Pathology Progress Note    Patient Name: Zahra Kay  [facility]'S Date: 4/20/2023      Subjective:  Pt emotionally labile t/o session, perseverative on her love for God and wanting to see family  Pt does not recall family visiting her yesterday    Objective:  Assess diet tolerance/ability to upgrade diet  Currently on puree/ nectar thick liquids, no straws  Assessment:  Seen w/ breakfast meal  Pt w/ limited intake d/t cognition and emotional lability  Requires frequent redirection  Requires full assistance for self feeding d/t visual deficits (left inattention)  Pt w/ +bolus retrieval from spoon, required verbal cues for bolus manipulation and clearance d/t verbosity  Mild oral residual on lingual surface post swallow  Solid upgrade trials not completed at this time d/t cognitive status/ inattention to task  Swallow noted to be prompt, delayed cough x1 w/ nectars via cup  Pt not oriented to place or time during session  Plan/Recommendations:  Recommend to continue puree/nectar thick liquids  Pt will benefit from continued SLP services for dysphagia and cognition s/p Malclom Alicea 85 CCC-SLP  4/20/2023

## 2023-04-24 ENCOUNTER — APPOINTMENT (EMERGENCY)
Dept: RADIOLOGY | Facility: HOSPITAL | Age: 78
End: 2023-04-24

## 2023-04-24 ENCOUNTER — HOSPITAL ENCOUNTER (EMERGENCY)
Facility: HOSPITAL | Age: 78
Discharge: HOME | End: 2023-04-24
Attending: EMERGENCY MEDICINE

## 2023-04-24 ENCOUNTER — APPOINTMENT (EMERGENCY)
Dept: CT IMAGING | Facility: HOSPITAL | Age: 78
End: 2023-04-24

## 2023-04-24 VITALS
RESPIRATION RATE: 16 BRPM | WEIGHT: 123.24 LBS | DIASTOLIC BLOOD PRESSURE: 75 MMHG | BODY MASS INDEX: 23.29 KG/M2 | TEMPERATURE: 97.9 F | OXYGEN SATURATION: 98 % | SYSTOLIC BLOOD PRESSURE: 173 MMHG | HEART RATE: 80 BPM

## 2023-04-24 DIAGNOSIS — M25.552 PAIN OF LEFT HIP: Primary | ICD-10-CM

## 2023-04-24 LAB
ALBUMIN SERPL BCP-MCNC: 2.9 G/DL (ref 3.5–5)
ALP SERPL-CCNC: 42 U/L (ref 34–104)
ALT SERPL W P-5'-P-CCNC: 14 U/L (ref 7–52)
ANION GAP SERPL CALCULATED.3IONS-SCNC: 5 MMOL/L (ref 4–13)
APTT PPP: 27 SECONDS (ref 23–37)
AST SERPL W P-5'-P-CCNC: 28 U/L (ref 13–39)
BASOPHILS # BLD AUTO: 0 THOUSANDS/ΜL (ref 0–0.1)
BASOPHILS NFR BLD AUTO: 0 % (ref 0–1)
BILIRUB SERPL-MCNC: 1.07 MG/DL (ref 0.2–1)
BUN SERPL-MCNC: 10 MG/DL (ref 5–25)
CALCIUM ALBUM COR SERPL-MCNC: 9.2 MG/DL (ref 8.3–10.1)
CALCIUM SERPL-MCNC: 8.3 MG/DL (ref 8.4–10.2)
CARDIAC TROPONIN I PNL SERPL HS: 35 NG/L
CHLORIDE SERPL-SCNC: 104 MMOL/L (ref 96–108)
CK SERPL-CCNC: 73 U/L (ref 26–192)
CO2 SERPL-SCNC: 27 MMOL/L (ref 21–32)
CREAT SERPL-MCNC: 0.59 MG/DL (ref 0.6–1.3)
EOSINOPHIL # BLD AUTO: 0.1 THOUSAND/ΜL (ref 0–0.61)
EOSINOPHIL NFR BLD AUTO: 2 % (ref 0–6)
ERYTHROCYTE [DISTWIDTH] IN BLOOD BY AUTOMATED COUNT: 12.8 % (ref 11.6–15.1)
GFR SERPL CREATININE-BSD FRML MDRD: 87 ML/MIN/1.73SQ M
GLUCOSE SERPL-MCNC: 135 MG/DL (ref 65–140)
HCT VFR BLD AUTO: 33.3 % (ref 34.8–46.1)
HGB BLD-MCNC: 10.9 G/DL (ref 11.5–15.4)
IMM GRANULOCYTES # BLD AUTO: 0.01 THOUSAND/UL (ref 0–0.2)
IMM GRANULOCYTES NFR BLD AUTO: 0 % (ref 0–2)
INR PPP: 1.03 (ref 0.84–1.19)
LYMPHOCYTES # BLD AUTO: 0.68 THOUSANDS/ΜL (ref 0.6–4.47)
LYMPHOCYTES NFR BLD AUTO: 16 % (ref 14–44)
MCH RBC QN AUTO: 30.6 PG (ref 26.8–34.3)
MCHC RBC AUTO-ENTMCNC: 32.7 G/DL (ref 31.4–37.4)
MCV RBC AUTO: 94 FL (ref 82–98)
MONOCYTES # BLD AUTO: 0.48 THOUSAND/ΜL (ref 0.17–1.22)
MONOCYTES NFR BLD AUTO: 11 % (ref 4–12)
NEUTROPHILS # BLD AUTO: 2.93 THOUSANDS/ΜL (ref 1.85–7.62)
NEUTS SEG NFR BLD AUTO: 71 % (ref 43–75)
NRBC BLD AUTO-RTO: 0 /100 WBCS
PLATELET # BLD AUTO: 306 THOUSANDS/UL (ref 149–390)
PMV BLD AUTO: 9.1 FL (ref 8.9–12.7)
POTASSIUM SERPL-SCNC: 3.5 MMOL/L (ref 3.5–5.3)
PROT SERPL-MCNC: 6.1 G/DL (ref 6.4–8.4)
PROTHROMBIN TIME: 13.6 SECONDS (ref 11.6–14.5)
RBC # BLD AUTO: 3.56 MILLION/UL (ref 3.81–5.12)
SODIUM SERPL-SCNC: 136 MMOL/L (ref 135–147)
WBC # BLD AUTO: 4.2 THOUSAND/UL (ref 4.31–10.16)

## 2023-04-24 RX ORDER — ZIPRASIDONE MESYLATE 20 MG/ML
10 INJECTION, POWDER, LYOPHILIZED, FOR SOLUTION INTRAMUSCULAR ONCE
Status: COMPLETED | OUTPATIENT
Start: 2023-04-24 | End: 2023-04-24

## 2023-04-24 RX ADMIN — IOHEXOL 100 ML: 350 INJECTION, SOLUTION INTRAVENOUS at 13:23

## 2023-04-24 RX ADMIN — ZIPRASIDONE MESYLATE 10 MG: 20 INJECTION, POWDER, LYOPHILIZED, FOR SOLUTION INTRAMUSCULAR at 13:29

## 2023-04-24 NOTE — ED ATTENDING ATTESTATION
4/24/2023  IAndrea MD, saw and evaluated the patient  I have discussed the patient with the resident/non-physician practitioner and agree with the resident's/non-physician practitioner's findings, Plan of Care, and MDM as documented in the resident's/non-physician practitioner's note, except where noted  All available labs and Radiology studies were reviewed  I was present for key portions of any procedure(s) performed by the resident/non-physician practitioner and I was immediately available to provide assistance  At this point I agree with the current assessment done in the Emergency Department  I have conducted an independent evaluation of this patient a history and physical is as follows:    ED Course     Complains of intermittent rectal bleeding over the last few days  Today had dark red blood clot  On exam the patient is awake alert  Lungs are clear to auscultation  Abdomen soft and nontender good bowel sounds      Critical Care Time  Procedures

## 2023-04-24 NOTE — ED PROVIDER NOTES
Emergency Department Trauma Note  Caleb Castleman 76 y o  female MRN: [id number]  Unit/Bed#: ED 08/ED 08 Encounter: [id number]      Trauma Alert: Trauma Acuity: Trauma Evaluation  Model of Arrival:   via    Trauma Team: Current Providers  Attending Provider: Elisabet Becerra MD  Registered Nurse: Rich Pepe RN  Consultants: None      History of Present Illness     Chief Complaint:   Chief Complaint   Patient presents with   • Hip Pain     Per EMS, [facility] reports she fell out of a low bed this am and found on the floor  Left hip pain  HPI:  Caleb Castleman is a 76 y o  female who presents with fall  Mechanism:Details of Incident: pt presented from [address] [employer] [employer] [place] and rehab via EMS c/o left hip pain after unwitnessed fall out of bed today  +asa, unknown headstrike/loc Injury Date: 04/24/23 Injury Time: 1141 Injury Occurence Location - [address]: rob    From nursing home  Found on the floor  Complains of left hip pain  Takes an aspirin daily  Patient is not able to give a great history  History provided by:  Patient, nursing home and EMS personnel  History limited by: History of dementia   used: No    Hip Pain  Location:  Left hip pain  Quality:  Achy  Severity:  Mild  Onset quality:  Sudden  Duration: Started this morning  Timing:  Constant  Progression:  Unchanged  Chronicity:  New  Context:  Fell out of bed  Complains of left hip pain  Relieved by:  Nothing  Worsened by: Movement and palpation  Ineffective treatments:  None tried  Associated symptoms: no abdominal pain, no chest pain, no cough, no diarrhea, no ear pain, no fever, no headaches, no nausea, no rash, no shortness of breath, no sore throat, no vomiting and no wheezing      Review of Systems   Constitutional: Negative for chills and fever  HENT: Negative for ear pain, hearing loss, sore throat, trouble swallowing and voice change  Eyes: Negative for pain and discharge  Respiratory: Negative for cough, shortness of breath and wheezing  Cardiovascular: Negative for chest pain and palpitations  Gastrointestinal: Negative for abdominal pain, blood in stool, constipation, diarrhea, nausea and vomiting  Genitourinary: Negative for dysuria, flank pain, frequency and hematuria  Musculoskeletal: Positive for arthralgias  Negative for joint swelling, neck pain and neck stiffness  Skin: Negative for rash and wound  Neurological: Negative for dizziness, seizures, syncope, facial asymmetry and headaches  Psychiatric/Behavioral: Negative for hallucinations, self-injury and suicidal ideas  All other systems reviewed and are negative  Historical Information     Immunizations:   Immunization History   Administered Date(s) Administered   • Tdap 04/10/2023       Past Medical History:   Diagnosis Date   • Dementia ([facility] [address] )    • No abnormality of hip joint detected on examination    • Stroke [facility])        Family History   Problem Relation Age of Onset   • Arthritis Mother      History reviewed  No pertinent surgical history  Social History     Tobacco Use   • Smoking status: Every Day     Packs/day: 0 25     Types: Cigarettes   • Smokeless tobacco: Never   Vaping Use   • Vaping Use: Never used   Substance Use Topics   • Alcohol use: Yes     Comment: ocassional   • Drug use: Never     E-Cigarette/Vaping   • E-Cigarette Use Never User      E-Cigarette/Vaping Substances       Family History: Noncontributory    Meds/Allergies   Prior to Admission Medications   Prescriptions Last Dose Informant Patient Reported? Taking?    QUEtiapine (SEROquel) 25 mg tablet   No No   Sig: Take 0 5 tablets (12 5 mg total) by mouth 2 (two) times a day   acetaminophen (TYLENOL) 325 mg tablet   No No   Sig: Take 2 tablets (650 mg total) by mouth every 6 (six) hours as needed for mild pain, headaches or fever   aspirin (ECOTRIN LOW STRENGTH) 81 mg EC tablet   Yes No   Sig: Take 81 mg by mouth daily atorvastatin (LIPITOR) 40 mg tablet   No No   Sig: Take 1 tablet (40 mg total) by mouth every evening   busPIRone (BUSPAR) 10 mg tablet   No No   Sig: Take 1 tablet (10 mg total) by mouth 3 (three) times a day   clonazePAM (KlonoPIN) 0 5 mg tablet   No No   Sig: Take 0 5 tablets (0 25 mg total) by mouth 2 (two) times a day as needed for anxiety for up to 10 days   latanoprost (XALATAN) 0 005 % ophthalmic solution   Yes No   Sig: Administer 1 drop to both eyes daily at bedtime   nicotine (NICODERM CQ) 7 mg/24hr TD 24 hr patch   No No   Sig: Place 1 patch on the skin over 24 hours daily Do not start before April 21, 2023  sertraline (ZOLOFT) 100 mg tablet   No No   Sig: Take 1 tablet (100 mg total) by mouth daily Do not start before April 21, 2023  timolol (TIMOPTIC) 0 5 % ophthalmic solution   Yes No   Sig: Apply 1 drop to eye 2 (two) times a day      Facility-Administered Medications: None       Allergies   Allergen Reactions   • Lorazepam Other (See Comments)     Increased agitation         PHYSICAL EXAM    PE limited by: Dementia    Objective   Vitals:   First set: Temperature: 97 9 °F (36 6 °C) (04/24/23 1240)  Pulse: 85 (04/24/23 1240)  Respirations: 18 (04/24/23 1240)  Blood Pressure: 144/80 (04/24/23 1240)  SpO2: 97 % (04/24/23 1240)    Primary Survey:   (A) Airway: Intact  (B) Breathing: Spontaneous  (C) Circulation: Pulses: Radial pulses 2+ bilaterally  (D) Disabliity: GCS 15  (E) Expose: Done    Secondary Survey: (Click on Physical Exam tab above)  Physical Exam  Vitals and nursing note reviewed  Constitutional:       General: She is not in acute distress  Appearance: She is well-developed  HENT:      Head: Normocephalic and atraumatic  Right Ear: External ear normal       Left Ear: External ear normal    Eyes:      General: No scleral icterus  Right eye: No discharge  Left eye: No discharge  Extraocular Movements: Extraocular movements intact        Conjunctiva/sclera: Conjunctivae normal    Cardiovascular:      Rate and Rhythm: Normal rate and regular rhythm  Heart sounds: Normal heart sounds  No murmur heard  Pulmonary:      Effort: Pulmonary effort is normal       Breath sounds: Normal breath sounds  No wheezing or rales  Abdominal:      General: Bowel sounds are normal  There is no distension  Palpations: Abdomen is soft  Tenderness: There is no abdominal tenderness  There is no guarding or rebound  Musculoskeletal:         General: No deformity  Cervical back: Normal range of motion and neck supple  Comments: Left hip is tender to palpation  Skin:     General: Skin is warm and dry  Findings: No rash  Neurological:      General: No focal deficit present  Mental Status: She is alert and oriented to person, place, and time  Cranial Nerves: No cranial nerve deficit  Psychiatric:         Mood and Affect: Mood normal          Behavior: Behavior normal          Thought Content: Thought content normal          Judgment: Judgment normal          Cervical spine cleared by clinical criteria?   No    Invasive Devices     Peripheral Intravenous Line  Duration           Peripheral IV 04/24/23 Right Antecubital <1 day                Lab Results:   Results Reviewed     Procedure Component Value Units Date/Time    HS Troponin 0hr (reflex protocol) [[id number]]  (Normal) Collected: 04/24/23 1258    Lab Status: Final result Specimen: Blood from Arm, Right Updated: 04/24/23 1327     hs TnI 0hr 35 ng/L     Protime-INR [[id number]]  (Normal) Collected: 04/24/23 1258    Lab Status: Final result Specimen: Blood from Arm, Right Updated: 04/24/23 1319     Protime 13 6 seconds      INR 1 03    APTT [[id number]]  (Normal) Collected: 04/24/23 1258    Lab Status: Final result Specimen: Blood from Arm, Right Updated: 04/24/23 1319     PTT 27 seconds     Comprehensive metabolic panel [[id number]]  (Abnormal) Collected: 04/24/23 1258    Lab Status: Final result Specimen: Blood from Arm, Right Updated: 04/24/23 1319     Sodium 136 mmol/L      Potassium 3 5 mmol/L      Chloride 104 mmol/L      CO2 27 mmol/L      ANION GAP 5 mmol/L      BUN 10 mg/dL      Creatinine 0 59 mg/dL      Glucose 135 mg/dL      Calcium 8 3 mg/dL      Corrected Calcium 9 2 mg/dL      AST 28 U/L      ALT 14 U/L      Alkaline Phosphatase 42 U/L      Total Protein 6 1 g/dL      Albumin 2 9 g/dL      Total Bilirubin 1 07 mg/dL      eGFR 87 ml/min/1 73sq m     Narrative:      National Kid[employer] Disease Foundation guidelines for Chronic Kidney Disease (CKD):   •  Stage 1 with normal or high GFR (GFR > 90 mL/min/1 73 square meters)  •  Stage 2 Mild CKD (GFR = 60-89 mL/min/1 73 square meters)  •  Stage 3A Moderate CKD (GFR = 45-59 mL/min/1 73 square meters)  •  Stage 3B Moderate CKD (GFR = 30-44 mL/min/1 73 square meters)  •  Stage 4 Severe CKD (GFR = 15-29 mL/min/1 73 square meters)  •  Stage 5 End Stage CKD (GFR <15 mL/min/1 73 square meters)  Note: GFR calculation is accurate only with a steady state creatinine    CK [[id number]]  (Normal) Collected: 04/24/23 1258    Lab Status: Final result Specimen: Blood from Arm, Right Updated: 04/24/23 1319     Total CK 73 U/L     CBC and differential [[id number]]  (Abnormal) Collected: 04/24/23 1258    Lab Status: Final result Specimen: Blood from Arm, Right Updated: 04/24/23 1305     WBC 4 20 Thousand/uL      RBC 3 56 Million/uL      Hemoglobin 10 9 g/dL      Hematocrit 33 3 %      MCV 94 fL      MCH 30 6 pg      MCHC 32 7 g/dL      RDW 12 8 %      MPV 9 1 fL      Platelets 856 Thousands/uL      nRBC 0 /100 WBCs      Neutrophils Relative 71 %      Immat GRANS % 0 %      Lymphocytes Relative 16 %      Monocytes Relative 11 %      Eosinophils Relative 2 %      Basophils Relative 0 %      Neutrophils Absolute 2 93 Thousands/µL      Immature Grans Absolute 0 01 Thousand/uL      Lymphocytes Absolute 0 68 Thousands/µL      Monocytes Absolute 0 48 Thousand/µL      Eosinophils Absolute 0 10 Thousand/µL      Basophils Absolute 0 00 Thousands/µL                  Imaging Studies:   Direct to CT: Yes  XR femur 2 views LEFT   ED Interpretation by Cristela Thompson MD (04/24 1407)   No fracture      XR tibia fibula 2 views LEFT   ED Interpretation by Cristela Thompson MD (04/24 1407)   No fracture      TRAUMA - CT head wo contrast   Final Result by Navdeep June MD (04/24 133)   No acute intracranial hemorrhage seen      Subacute infarct in the posterior right parietal region with mild mass effect on the right occipital horn, this corresponds to the diffusion restriction seen on the previous study of April 17, 2023      Chronic infarct left occipital region with area of encephalomalacia         Workstation performed: [id number]         TRAUMA - CT spine cervical wo contrast   Final Result by Navdeep June MD (04/24 1343)      No acute compression collapse of the vertebra             Workstation performed: [id number]         TRAUMA - CT chest abdomen pelvis w contrast   Final Result by Navdeep June MD (04/24 2876)   No solid visceral injury seen   No pleural effusion or pneumothorax               Workstation performed: [id number]         XR Trauma chest portable   Final Result by Bridgette Vegas MD (04/24 1259)      No acute cardiopulmonary disease  Workstation performed: [id number]         XR Trauma pelvis ap only 1 or 2 vw   Final Result by Bridgette Vegas MD (04/24 1306)      No acute osseous abnormality  Left total hip arthroplasty, incompletely visualized  Other findings as noted above, stable        Workstation performed: [id number]               Procedures  ECG 12 Lead Documentation Only    Date/Time: 4/24/2023 12:44 PM  Performed by: Cristela Thompson MD  Authorized by: Cristela Thompson MD     ECG reviewed by me, the ED Provider: yes    Patient location:  ED  Rate:     ECG rate:  80  Rhythm:     Rhythm: sinus rhythm    Ectopy:     Ectopy: none QRS:     QRS axis:  Normal             ED Course  ED Course as of 04/24/23 1431   Mon Apr 24, 2023   1331 hs TnI 0hr: 35  Troponins when she was here 10 days ago were in the 40s    1348 Cervical Collar Clearance: The patient had a CT scan of the cervical spine demonstrating no acute injury  On exam, the patient had no midline point tenderness or paresthesias/numbness/weakness in the extremities  The patient had full range of motion (was then able to flex, extend, and rotate head laterally) without pain  There were no distracting injuries and the patient was not intoxicated  The patient's cervical spine was cleared radiologically and clinically  Cervical collar removed at this time  Eva Dyson MD  4/24/2023 1:48 PM        [phone] Discussed with daughter about lab and CAT scan results  Made aware of subacute stroke which corresponds to her stroke from last week  Medical Decision Making  Amount and/or Complexity of Data Reviewed  Independent Historian: EMS  External Data Reviewed: labs  Labs: ordered  Decision-making details documented in ED Course  Radiology: ordered and independent interpretation performed  Decision-making details documented in ED Course  ECG/medicine tests: ordered and independent interpretation performed  Decision-making details documented in ED Course  Risk  OTC drugs  Prescription drug management  Decision regarding hospitalization  Disposition  [facility] Transfer: No  Final diagnoses:   Pain of left hip     Time reflects when diagnosis was documented in both MDM as applicable and the Disposition within this note     Time User Action Codes Description Comment    4/24/2023  2:30 PM Meme Clarke Add [M85 019] Pain of left hip       ED Disposition     ED Disposition   Discharge    Condition   Stable    Date/Time   Mon Apr 24, 2023  2:30 PM    Comment   Modesta Burkett discharge to home/self care                 Follow-up Information Follow up With Specialties Details Why [address], DO  Call in 1 day  [address] [address]  [phone]          Patient's Medications   Discharge Prescriptions    No medications on file     No discharge procedures on file      PDMP Review     None          ED Provider  Electronically Signed by         Herbie Al MD  04/24/23 3330

## 2023-04-30 LAB
ATRIAL RATE: 84 BPM
P AXIS: 72 DEGREES
PR INTERVAL: 154 MS
QRS AXIS: 70 DEGREES
QRSD INTERVAL: 102 MS
QT INTERVAL: 398 MS
QTC INTERVAL: 470 MS
T WAVE AXIS: 51 DEGREES
VENTRICULAR RATE: 84 BPM

## 2023-05-08 ENCOUNTER — TELEPHONE (OUTPATIENT)
Dept: CARDIOLOGY CLINIC | Facility: CLINIC | Age: 78
End: 2023-05-08

## 2023-05-09 ENCOUNTER — TELEPHONE (OUTPATIENT)
Dept: PSYCHIATRY | Facility: CLINIC | Age: 78
End: 2023-05-09

## 2023-05-09 NOTE — TELEPHONE ENCOUNTER
A message was  Left for pt to return call to intake dept  Pt will be added to wait list at that time

## 2023-05-12 ENCOUNTER — TELEPHONE (OUTPATIENT)
Dept: CARDIOLOGY CLINIC | Facility: CLINIC | Age: 78
End: 2023-05-12

## 2023-05-12 ENCOUNTER — TELEPHONE (OUTPATIENT)
Dept: NEUROLOGY | Facility: CLINIC | Age: 78
End: 2023-05-12

## 2023-05-12 NOTE — TELEPHONE ENCOUNTER
1ST ATTEMPT,     Called Facility spoke to  Pj Lindsay who transferred me to the  no answer lvm  [facility]     DISCHARGED = 4/20/2023  [address] [facility]  [phone]    Vandanageetha Osman will need follow up in in 6 weeks with neurovascular attending or advance practitioner  She will not require outpatient neurological testing

## 2023-05-15 PROBLEM — J18.9 COMMUNITY ACQUIRED PNEUMONIA OF RIGHT LOWER LOBE OF LUNG: Status: RESOLVED | Noted: 2023-03-16 | Resolved: 2023-05-15

## 2023-05-28 ENCOUNTER — APPOINTMENT (EMERGENCY)
Dept: CT IMAGING | Facility: HOSPITAL | Age: 78
DRG: 884 | End: 2023-05-28
Payer: MEDICARE

## 2023-05-28 ENCOUNTER — HOSPITAL ENCOUNTER (INPATIENT)
Facility: HOSPITAL | Age: 78
LOS: 17 days | DRG: 884 | End: 2023-06-14
Attending: EMERGENCY MEDICINE | Admitting: HOSPITALIST
Payer: MEDICARE

## 2023-05-28 DIAGNOSIS — F22 PARANOIA (PSYCHOSIS) (HCC): ICD-10-CM

## 2023-05-28 DIAGNOSIS — T14.91XA SUICIDAL BEHAVIOR WITH ATTEMPTED SELF-INJURY (HCC): ICD-10-CM

## 2023-05-28 DIAGNOSIS — Z65.9 SOCIAL PROBLEM: ICD-10-CM

## 2023-05-28 DIAGNOSIS — F03.90 DEMENTIA (HCC): ICD-10-CM

## 2023-05-28 DIAGNOSIS — F03.C11 SEVERE DEMENTIA WITH AGITATION, UNSPECIFIED DEMENTIA TYPE (HCC): Primary | ICD-10-CM

## 2023-05-28 DIAGNOSIS — Z00.8 MEDICAL CLEARANCE FOR PSYCHIATRIC ADMISSION: ICD-10-CM

## 2023-05-28 PROBLEM — F01.C2: Status: ACTIVE | Noted: 2023-03-16

## 2023-05-28 LAB
ALBUMIN SERPL BCP-MCNC: 2.9 G/DL (ref 3.5–5)
ALP SERPL-CCNC: 45 U/L (ref 34–104)
ALT SERPL W P-5'-P-CCNC: 10 U/L (ref 7–52)
ANION GAP SERPL CALCULATED.3IONS-SCNC: 7 MMOL/L (ref 4–13)
AST SERPL W P-5'-P-CCNC: 23 U/L (ref 13–39)
BASOPHILS # BLD AUTO: 0.01 THOUSANDS/ÂΜL (ref 0–0.1)
BASOPHILS NFR BLD AUTO: 0 % (ref 0–1)
BILIRUB SERPL-MCNC: 0.54 MG/DL (ref 0.2–1)
BILIRUB UR QL STRIP: ABNORMAL
BUN SERPL-MCNC: 13 MG/DL (ref 5–25)
CALCIUM ALBUM COR SERPL-MCNC: 9.6 MG/DL (ref 8.3–10.1)
CALCIUM SERPL-MCNC: 8.7 MG/DL (ref 8.4–10.2)
CHLORIDE SERPL-SCNC: 106 MMOL/L (ref 96–108)
CLARITY UR: CLEAR
CO2 SERPL-SCNC: 27 MMOL/L (ref 21–32)
COLOR UR: YELLOW
CREAT SERPL-MCNC: 0.62 MG/DL (ref 0.6–1.3)
EOSINOPHIL # BLD AUTO: 0.17 THOUSAND/ÂΜL (ref 0–0.61)
EOSINOPHIL NFR BLD AUTO: 2 % (ref 0–6)
ERYTHROCYTE [DISTWIDTH] IN BLOOD BY AUTOMATED COUNT: 15.9 % (ref 11.6–15.1)
GFR SERPL CREATININE-BSD FRML MDRD: 86 ML/MIN/1.73SQ M
GLUCOSE SERPL-MCNC: 93 MG/DL (ref 65–140)
GLUCOSE UR STRIP-MCNC: NEGATIVE MG/DL
HCT VFR BLD AUTO: 37 % (ref 34.8–46.1)
HGB BLD-MCNC: 11.6 G/DL (ref 11.5–15.4)
HGB UR QL STRIP.AUTO: NEGATIVE
IMM GRANULOCYTES # BLD AUTO: 0.05 THOUSAND/UL (ref 0–0.2)
IMM GRANULOCYTES NFR BLD AUTO: 1 % (ref 0–2)
KETONES UR STRIP-MCNC: NEGATIVE MG/DL
LEUKOCYTE ESTERASE UR QL STRIP: NEGATIVE
LYMPHOCYTES # BLD AUTO: 0.92 THOUSANDS/ÂΜL (ref 0.6–4.47)
LYMPHOCYTES NFR BLD AUTO: 9 % (ref 14–44)
MCH RBC QN AUTO: 30.8 PG (ref 26.8–34.3)
MCHC RBC AUTO-ENTMCNC: 31.4 G/DL (ref 31.4–37.4)
MCV RBC AUTO: 98 FL (ref 82–98)
MONOCYTES # BLD AUTO: 0.8 THOUSAND/ÂΜL (ref 0.17–1.22)
MONOCYTES NFR BLD AUTO: 8 % (ref 4–12)
NEUTROPHILS # BLD AUTO: 8.09 THOUSANDS/ÂΜL (ref 1.85–7.62)
NEUTS SEG NFR BLD AUTO: 80 % (ref 43–75)
NITRITE UR QL STRIP: NEGATIVE
NRBC BLD AUTO-RTO: 0 /100 WBCS
PH UR STRIP.AUTO: 7 [PH]
PLATELET # BLD AUTO: 194 THOUSANDS/UL (ref 149–390)
PMV BLD AUTO: 9.8 FL (ref 8.9–12.7)
POTASSIUM SERPL-SCNC: 3.7 MMOL/L (ref 3.5–5.3)
PROT SERPL-MCNC: 6.3 G/DL (ref 6.4–8.4)
PROT UR STRIP-MCNC: NEGATIVE MG/DL
RBC # BLD AUTO: 3.77 MILLION/UL (ref 3.81–5.12)
SODIUM SERPL-SCNC: 140 MMOL/L (ref 135–147)
SP GR UR STRIP.AUTO: 1.02 (ref 1–1.03)
UROBILINOGEN UR QL STRIP.AUTO: 4 E.U./DL
WBC # BLD AUTO: 10.04 THOUSAND/UL (ref 4.31–10.16)

## 2023-05-28 PROCEDURE — 99285 EMERGENCY DEPT VISIT HI MDM: CPT | Performed by: EMERGENCY MEDICINE

## 2023-05-28 PROCEDURE — 80053 COMPREHEN METABOLIC PANEL: CPT | Performed by: EMERGENCY MEDICINE

## 2023-05-28 PROCEDURE — 96374 THER/PROPH/DIAG INJ IV PUSH: CPT

## 2023-05-28 PROCEDURE — 70450 CT HEAD/BRAIN W/O DYE: CPT

## 2023-05-28 PROCEDURE — 99223 1ST HOSP IP/OBS HIGH 75: CPT | Performed by: NURSE PRACTITIONER

## 2023-05-28 PROCEDURE — 99285 EMERGENCY DEPT VISIT HI MDM: CPT

## 2023-05-28 PROCEDURE — 96376 TX/PRO/DX INJ SAME DRUG ADON: CPT

## 2023-05-28 PROCEDURE — 96372 THER/PROPH/DIAG INJ SC/IM: CPT

## 2023-05-28 PROCEDURE — G1004 CDSM NDSC: HCPCS

## 2023-05-28 PROCEDURE — 36415 COLL VENOUS BLD VENIPUNCTURE: CPT | Performed by: EMERGENCY MEDICINE

## 2023-05-28 PROCEDURE — 81003 URINALYSIS AUTO W/O SCOPE: CPT | Performed by: EMERGENCY MEDICINE

## 2023-05-28 PROCEDURE — 85025 COMPLETE CBC W/AUTO DIFF WBC: CPT | Performed by: EMERGENCY MEDICINE

## 2023-05-28 RX ORDER — QUETIAPINE FUMARATE 25 MG/1
25 TABLET, FILM COATED ORAL 3 TIMES DAILY PRN
Status: DISCONTINUED | OUTPATIENT
Start: 2023-05-28 | End: 2023-05-31

## 2023-05-28 RX ORDER — SERTRALINE HYDROCHLORIDE 100 MG/1
100 TABLET, FILM COATED ORAL DAILY
Status: DISCONTINUED | OUTPATIENT
Start: 2023-05-29 | End: 2023-06-14 | Stop reason: HOSPADM

## 2023-05-28 RX ORDER — BUSPIRONE HYDROCHLORIDE 10 MG/1
10 TABLET ORAL 3 TIMES DAILY
Status: DISCONTINUED | OUTPATIENT
Start: 2023-05-28 | End: 2023-05-31

## 2023-05-28 RX ORDER — ACETAMINOPHEN 325 MG/1
650 TABLET ORAL EVERY 6 HOURS PRN
Status: DISCONTINUED | OUTPATIENT
Start: 2023-05-28 | End: 2023-05-31

## 2023-05-28 RX ORDER — TIMOLOL MALEATE 5 MG/ML
1 SOLUTION/ DROPS OPHTHALMIC 2 TIMES DAILY
Status: DISCONTINUED | OUTPATIENT
Start: 2023-05-29 | End: 2023-06-14 | Stop reason: HOSPADM

## 2023-05-28 RX ORDER — FAMOTIDINE 20 MG/1
20 TABLET, FILM COATED ORAL 2 TIMES DAILY
COMMUNITY
End: 2023-06-19

## 2023-05-28 RX ORDER — FAMOTIDINE 20 MG/1
20 TABLET, FILM COATED ORAL DAILY
Status: DISCONTINUED | OUTPATIENT
Start: 2023-05-29 | End: 2023-06-14 | Stop reason: HOSPADM

## 2023-05-28 RX ORDER — MIDAZOLAM HYDROCHLORIDE 2 MG/2ML
2 INJECTION, SOLUTION INTRAMUSCULAR; INTRAVENOUS ONCE
Status: COMPLETED | OUTPATIENT
Start: 2023-05-28 | End: 2023-05-28

## 2023-05-28 RX ORDER — QUETIAPINE FUMARATE 25 MG/1
25 TABLET, FILM COATED ORAL 3 TIMES DAILY PRN
COMMUNITY
End: 2023-06-19

## 2023-05-28 RX ORDER — HALOPERIDOL 5 MG/ML
5 INJECTION INTRAMUSCULAR ONCE
Status: COMPLETED | OUTPATIENT
Start: 2023-05-28 | End: 2023-05-28

## 2023-05-28 RX ORDER — ACETAMINOPHEN 325 MG/1
975 TABLET ORAL 2 TIMES DAILY
Status: DISCONTINUED | OUTPATIENT
Start: 2023-05-28 | End: 2023-05-31

## 2023-05-28 RX ORDER — HALOPERIDOL 5 MG/ML
2.5 INJECTION INTRAMUSCULAR ONCE
Status: DISCONTINUED | OUTPATIENT
Start: 2023-05-28 | End: 2023-05-28

## 2023-05-28 RX ORDER — DIPHENHYDRAMINE HYDROCHLORIDE 50 MG/ML
25 INJECTION INTRAMUSCULAR; INTRAVENOUS ONCE
Status: DISCONTINUED | OUTPATIENT
Start: 2023-05-28 | End: 2023-05-28

## 2023-05-28 RX ORDER — LATANOPROST 50 UG/ML
1 SOLUTION/ DROPS OPHTHALMIC
Status: DISCONTINUED | OUTPATIENT
Start: 2023-05-29 | End: 2023-06-14 | Stop reason: HOSPADM

## 2023-05-28 RX ORDER — ATORVASTATIN CALCIUM 40 MG/1
40 TABLET, FILM COATED ORAL EVERY EVENING
Status: DISCONTINUED | OUTPATIENT
Start: 2023-05-28 | End: 2023-06-14 | Stop reason: HOSPADM

## 2023-05-28 RX ORDER — QUETIAPINE FUMARATE 25 MG/1
50 TABLET, FILM COATED ORAL 2 TIMES DAILY
Status: DISCONTINUED | OUTPATIENT
Start: 2023-05-29 | End: 2023-05-31

## 2023-05-28 RX ORDER — OLANZAPINE 10 MG/1
10 INJECTION, POWDER, LYOPHILIZED, FOR SOLUTION INTRAMUSCULAR ONCE
Status: COMPLETED | OUTPATIENT
Start: 2023-05-28 | End: 2023-05-28

## 2023-05-28 RX ORDER — VALPROIC ACID 250 MG/5ML
250 SOLUTION ORAL 2 TIMES DAILY
Status: DISCONTINUED | OUTPATIENT
Start: 2023-05-29 | End: 2023-06-03

## 2023-05-28 RX ORDER — VALPROIC ACID 250 MG/5ML
250 SOLUTION ORAL 2 TIMES DAILY
COMMUNITY
End: 2023-06-19

## 2023-05-28 RX ORDER — ENOXAPARIN SODIUM 100 MG/ML
40 INJECTION SUBCUTANEOUS DAILY
Status: DISCONTINUED | OUTPATIENT
Start: 2023-05-29 | End: 2023-06-14 | Stop reason: HOSPADM

## 2023-05-28 RX ORDER — CLONAZEPAM 0.5 MG/1
0.25 TABLET ORAL 2 TIMES DAILY PRN
Status: DISCONTINUED | OUTPATIENT
Start: 2023-05-28 | End: 2023-06-02

## 2023-05-28 RX ORDER — DIPHENHYDRAMINE HYDROCHLORIDE 50 MG/ML
25 INJECTION INTRAMUSCULAR; INTRAVENOUS ONCE
Status: COMPLETED | OUTPATIENT
Start: 2023-05-28 | End: 2023-05-28

## 2023-05-28 RX ADMIN — DIPHENHYDRAMINE HYDROCHLORIDE 25 MG: 50 INJECTION, SOLUTION INTRAMUSCULAR; INTRAVENOUS at 18:15

## 2023-05-28 RX ADMIN — HALOPERIDOL LACTATE 5 MG: 5 INJECTION, SOLUTION INTRAMUSCULAR at 18:15

## 2023-05-28 RX ADMIN — MIDAZOLAM 2 MG: 1 INJECTION INTRAMUSCULAR; INTRAVENOUS at 19:45

## 2023-05-28 RX ADMIN — MIDAZOLAM 2 MG: 1 INJECTION INTRAMUSCULAR; INTRAVENOUS at 19:02

## 2023-05-28 RX ADMIN — OLANZAPINE 10 MG: 10 INJECTION, POWDER, FOR SOLUTION INTRAMUSCULAR at 21:42

## 2023-05-28 NOTE — ED PROVIDER NOTES
History  Chief Complaint   Patient presents with   • Headache     Headache resulted in 911 call from family  Pt dc'ed from [facility] earlier today  Pt screaming in triage  Pt has advanced dementia  Son in waiting room  76 yr old female presents for evaluation from home  Patient has advanced dementia and was discharged from [facility] facility today  As per son, patient complaining of a headache  Prior to Admission Medications   Prescriptions Last Dose Informant Patient Reported? Taking? QUEtiapine (SEROquel) 25 mg tablet   No No   Sig: Take 0 5 tablets (12 5 mg total) by mouth 2 (two) times a day   acetaminophen (TYLENOL) 325 mg tablet   No No   Sig: Take 2 tablets (650 mg total) by mouth every 6 (six) hours as needed for mild pain, headaches or fever   aspirin (ECOTRIN LOW STRENGTH) 81 mg EC tablet  Self Yes No   Sig: Take 81 mg by mouth daily   atorvastatin (LIPITOR) 40 mg tablet   No No   Sig: Take 1 tablet (40 mg total) by mouth every evening   busPIRone (BUSPAR) 10 mg tablet   No No   Sig: Take 1 tablet (10 mg total) by mouth 3 (three) times a day   clonazePAM (KlonoPIN) 0 5 mg tablet   No No   Sig: Take 0 5 tablets (0 25 mg total) by mouth 2 (two) times a day as needed for anxiety for up to 10 days   latanoprost (XALATAN) 0 005 % ophthalmic solution   Yes No   Sig: Administer 1 drop to both eyes daily at bedtime   nicotine (NICODERM CQ) 7 mg/24hr TD 24 hr patch   No No   Sig: Place 1 patch on the skin over 24 hours daily Do not start before April 21, 2023  sertraline (ZOLOFT) 100 mg tablet   No No   Sig: Take 1 tablet (100 mg total) by mouth daily Do not start before April 21, 2023     timolol (TIMOPTIC) 0 5 % ophthalmic solution   Yes No   Sig: Apply 1 drop to eye 2 (two) times a day      Facility-Administered Medications: None       Past Medical History:   Diagnosis Date   • Dementia ([address] )    • No abnormality of hip joint detected on examination    • Stroke [facility])        No past surgical history on file     Family History   Problem Relation Age of Onset   • Arthritis Mother      I have reviewed and agree with the history as documented  E-Cigarette/Vaping   • E-Cigarette Use Never User      E-Cigarette/Vaping Substances     Social History     Tobacco Use   • Smoking status: Every Day     Packs/day: 0 25     Types: Cigarettes   • Smokeless tobacco: Never   Vaping Use   • Vaping Use: Never used   Substance Use Topics   • Alcohol use: Yes     Comment: ocassional   • Drug use: Never       Review of Systems   Unable to perform ROS: Dementia       Physical Exam  Physical Exam  Vitals and nursing note reviewed  Constitutional:       General: She is in acute distress  Appearance: Normal appearance  She is well-developed  She is not ill-appearing, toxic-appearing or diaphoretic  HENT:      Head: Normocephalic and atraumatic  Nose: Nose normal       Mouth/Throat:      Mouth: Mucous membranes are dry  Eyes:      Extraocular Movements: Extraocular movements intact  Conjunctiva/sclera: Conjunctivae normal    Cardiovascular:      Rate and Rhythm: Normal rate and regular rhythm  Pulses: Normal pulses  Heart sounds: Normal heart sounds  No murmur heard  Pulmonary:      Effort: Pulmonary effort is normal  No respiratory distress  Breath sounds: Normal breath sounds  No stridor  Abdominal:      General: Abdomen is flat  There is no distension  Palpations: Abdomen is soft  There is no mass  Tenderness: There is no abdominal tenderness  Hernia: No hernia is present  Musculoskeletal:         General: No swelling, tenderness, deformity or signs of injury  Normal range of motion  Cervical back: Normal range of motion and neck supple  Right lower leg: No edema  Left lower leg: No edema  Skin:     General: Skin is warm and dry  Capillary Refill: Capillary refill takes less than 2 seconds  Neurological:      Mental Status: She is alert        Comments: dementia   Psychiatric:      Comments: Psychosis vs dementia: patient screaming uncontrollably, begging to die, tangential, hard to re-direct         Vital Signs  ED Triage Vitals [05/28/23 1803]   Temperature Pulse Respirations Blood Pressure SpO2   97 9 °F (36 6 °C) 79 18 (!) 177/81 93 %      Temp Source Heart Rate Source Patient Position - Orthostatic VS BP Location FiO2 (%)   Temporal Monitor Lying Left arm --      Pain Score       --           Vitals:    05/28/23 2030 05/28/23 2045 05/28/23 2100 05/28/23 2115   BP: (!) 173/72  155/75    Pulse: 77 75 77 75   Patient Position - Orthostatic VS:             Visual Acuity  Visual Acuity    Flowsheet Row Most Recent Value   L Pupil Size (mm) 3   R Pupil Size (mm) 3          ED Medications  Medications   haloperidol lactate (HALDOL) injection 5 mg (5 mg Intramuscular Given 5/28/23 1815)   diphenhydrAMINE (BENADRYL) injection 25 mg (25 mg Intramuscular Given 5/28/23 1815)   midazolam (VERSED) injection 2 mg (2 mg Intravenous Given 5/28/23 1902)   midazolam (VERSED) injection 2 mg (2 mg Intravenous Given 5/28/23 1945)       Diagnostic Studies  Results Reviewed     Procedure Component Value Units Date/Time    Comprehensive metabolic panel [[id number]]  (Abnormal) Collected: 05/28/23 1817    Lab Status: Final result Specimen: Blood from Arm, Right Updated: 05/28/23 1839     Sodium 140 mmol/L      Potassium 3 7 mmol/L      Chloride 106 mmol/L      CO2 27 mmol/L      ANION GAP 7 mmol/L      BUN 13 mg/dL      Creatinine 0 62 mg/dL      Glucose 93 mg/dL      Calcium 8 7 mg/dL      Corrected Calcium 9 6 mg/dL      AST 23 U/L      ALT 10 U/L      Alkaline Phosphatase 45 U/L      Total Protein 6 3 g/dL      Albumin 2 9 g/dL      Total Bilirubin 0 54 mg/dL      eGFR 86 ml/min/1 73sq m     Narrative:      [place] guidelines for Chronic Kidney Disease (CKD):   •  Stage 1 with normal or high GFR (GFR > 90 mL/min/1 73 square meters)  •  Stage 2 Mild CKD (GFR = 60-89 mL/min/1 73 square meters)  •  Stage 3A Moderate CKD (GFR = 45-59 mL/min/1 73 square meters)  •  Stage 3B Moderate CKD (GFR = 30-44 mL/min/1 73 square meters)  •  Stage 4 Severe CKD (GFR = 15-29 mL/min/1 73 square meters)  •  Stage 5 End Stage CKD (GFR <15 mL/min/1 73 square meters)  Note: GFR calculation is accurate only with a steady state creatinine    UA w Reflex to Microscopic w Reflex to Culture [[id number]]  (Abnormal) Collected: 05/28/23 1825    Lab Status: Final result Specimen: Urine, Straight Cath Updated: 05/28/23 1834     Color, UA Yellow     Clarity, UA Clear     Specific [place], UA 1 020     pH, UA 7 0     Leukocytes, UA Negative     Nitrite, UA Negative     Protein, UA Negative mg/dl      Glucose, UA Negative mg/dl      Ketones, UA Negative mg/dl      Urobilinogen, UA 4 0 E U /dl      Bilirubin, UA Small     Occult Blood, UA Negative    CBC and differential [[id number]]  (Abnormal) Collected: 05/28/23 1817    Lab Status: Final result Specimen: Blood from Arm, Right Updated: 05/28/23 1822     WBC 10 04 Thousand/uL      RBC 3 77 Million/uL      Hemoglobin 11 6 g/dL      Hematocrit 37 0 %      MCV 98 fL      MCH 30 8 pg      MCHC 31 4 g/dL      RDW 15 9 %      MPV 9 8 fL      Platelets 123 Thousands/uL      nRBC 0 /100 WBCs      Neutrophils Relative 80 %      Immat GRANS % 1 %      Lymphocytes Relative 9 %      Monocytes Relative 8 %      Eosinophils Relative 2 %      Basophils Relative 0 %      Neutrophils Absolute 8 09 Thousands/µL      Immature Grans Absolute 0 05 Thousand/uL      Lymphocytes Absolute 0 92 Thousands/µL      Monocytes Absolute 0 80 Thousand/µL      Eosinophils Absolute 0 17 Thousand/µL      Basophils Absolute 0 01 Thousands/µL                  CT head without contrast   Final Result by Joycelyn Bosch MD (05/28 2017)   Chronic ischemic changes as above  No acute intracranial abnormality                    Workstation performed: [id number] Procedures  Procedures         ED Course  ED Course as of 05/28/23 2124   Gricelda Rita May 28, 2023   2124 Patient had no acute findings on her work-up in the ED  I discussed this case with the admitting physician  Patient will be brought in as a social admit because of her advanced dementia in an effort to find placement for her on a dementia unit  MDM    Disposition  Final diagnoses:   Dementia [facility])   Social problem     Time reflects when diagnosis was documented in both MDM as applicable and the Disposition within this note     Time User Action Codes Description Comment    5/28/2023  9:15 PM Magnolia Canales Add [F03  C11] Severe dementia with agitation, unspecified dementia type ([facility] [address] )     5/28/2023  9:17 PM Sabino Brock Add [F03 90] Dementia ([facility] [address] )     5/28/2023  9:17 PM Sabino Brock Add [Z65 9] Social problem       ED Disposition     ED Disposition   Admit    Condition   Stable    Date/Time   Sun May 28, 2023  9:17 PM    Comment   --         Follow-up Information    None         Patient's Medications   Discharge Prescriptions    No medications on file       No discharge procedures on file      PDMP Review     None          ED Provider  Electronically Signed by           Emma Quinn DO  05/28/23 2124

## 2023-05-28 NOTE — ED NOTES
Patient transported to [facility], [address] [place] [address]  05/28/23 1921

## 2023-05-28 NOTE — ED NOTES
Upon arrival, pt confused, yelling, swatting at staff  Pt is not easily re-directed        Jeanne Amin RN  05/28/23 1956

## 2023-05-29 PROBLEM — Z86.73 STATUS POST CVA: Status: ACTIVE | Noted: 2023-05-29

## 2023-05-29 PROCEDURE — 99232 SBSQ HOSP IP/OBS MODERATE 35: CPT | Performed by: HOSPITALIST

## 2023-05-29 PROCEDURE — 92610 EVALUATE SWALLOWING FUNCTION: CPT

## 2023-05-29 RX ADMIN — VALPROIC ACID 250 MG: 250 SOLUTION ORAL at 17:00

## 2023-05-29 RX ADMIN — BUSPIRONE HYDROCHLORIDE 10 MG: 10 TABLET ORAL at 10:59

## 2023-05-29 RX ADMIN — ACETAMINOPHEN 975 MG: 325 TABLET ORAL at 10:59

## 2023-05-29 RX ADMIN — TIMOLOL MALEATE 1 DROP: 5 SOLUTION OPHTHALMIC at 10:27

## 2023-05-29 RX ADMIN — SERTRALINE 100 MG: 100 TABLET, FILM COATED ORAL at 11:03

## 2023-05-29 RX ADMIN — FAMOTIDINE 20 MG: 20 TABLET ORAL at 11:00

## 2023-05-29 RX ADMIN — BUSPIRONE HYDROCHLORIDE 10 MG: 10 TABLET ORAL at 21:02

## 2023-05-29 RX ADMIN — QUETIAPINE FUMARATE 50 MG: 25 TABLET ORAL at 17:00

## 2023-05-29 RX ADMIN — LATANOPROST 1 DROP: 50 SOLUTION OPHTHALMIC at 21:02

## 2023-05-29 RX ADMIN — TIMOLOL MALEATE 1 DROP: 5 SOLUTION OPHTHALMIC at 17:00

## 2023-05-29 RX ADMIN — QUETIAPINE FUMARATE 25 MG: 25 TABLET ORAL at 21:02

## 2023-05-29 RX ADMIN — ACETAMINOPHEN 975 MG: 325 TABLET ORAL at 21:02

## 2023-05-29 RX ADMIN — QUETIAPINE FUMARATE 50 MG: 25 TABLET ORAL at 10:59

## 2023-05-29 RX ADMIN — VALPROIC ACID 250 MG: 250 SOLUTION ORAL at 11:01

## 2023-05-29 RX ADMIN — ATORVASTATIN CALCIUM 40 MG: 40 TABLET, FILM COATED ORAL at 17:00

## 2023-05-29 RX ADMIN — BUSPIRONE HYDROCHLORIDE 10 MG: 10 TABLET ORAL at 16:57

## 2023-05-29 RX ADMIN — ASPIRIN 81 MG: 81 TABLET, COATED ORAL at 11:00

## 2023-05-29 RX ADMIN — ENOXAPARIN SODIUM 40 MG: 40 INJECTION SUBCUTANEOUS at 10:29

## 2023-05-29 NOTE — ASSESSMENT & PLAN NOTE
· Patient discharged from outside hospital earlier in day and 5/28 after admission for vomiting  · Evaluated by psychiatry as she was transferred from the behavioral health unit to [facility] for the vomiting, son was requesting hospice and took the patient home with 24/7 care provided by family while awaiting services to be arranged  Review of the discharge summary the internal medicine team and psychiatry team voiced their concerns regarding the son taking the patient home but he was adamant  · Patient was brought to this facility on the same day as discharge from previous facility    Son stated to ER staff he cannot care for patient at home  · Admitted to the medical service for safe disposition pending case management evaluation for inpatient placement  · Continue PTA medications Zoloft, Seroquel, Depakene, BuSpar  · Extreme agitation and noncooperative with staff in ED -now sedated at time of admission after receiving Versed and Zyprexa in ED  · Continue to monitor  · Psychiatry consult placed for assistance with placement

## 2023-05-29 NOTE — SPEECH THERAPY NOTE
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, [facility] stay  Pt currently noted w/ multiple diet orders including mechanical soft and puree w/ nectar thick liquids  Hospital records indicate pt was d/c from [facility] on puree and thin liquids- esophogram completed which noted laryngeal penetration but no aspiration  Full report not available in care everywhere  Pt seen during lunch meal  Pt upright in bed being fed by PCA, encouraged to self feed  Pt somewhat lethargic but per PCA was previously alert and yelling  Therefore limited assessment d/t lethargy  Pt presents w/ moderate-severe oral dysphagia characterized by impaired bolus retrieval via straw, anterior loss via right side of liquids and limited oral agility  Suspected posterior spillage w/ trials of thins and nectar thick via cup  +cough response w/ thins via straw and nectar via cup  Suspect largely d/t lethargy rather than true dysphagia  Recommend puree and nectar thick via spoon for now  Meds crushed in puree; full feed  Aspiration precautions    Risk/s for Aspiration: Moderate-High d/t mental status     Recommended Diet: puree/level 1 diet and nectar thick liquids via spoon  Recommended Form of Meds: crushed with puree   Aspiration precautions and swallowing strategies: upright posture, only feed when fully alert, slow rate of feeding, liquids by teaspoon only and alternating bites and sips  Other Recommendations: Continue frequent oral care        Current Medical Status  Lawanda Romano is a 76 y o  female with a PMH of major depressive disorder, generalized anxiety disorder, vascular dementia who was ADL independent prior to 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    Patient now with left upper and lower extremity weakness, dysphagia and was discharged from inpatient hospitalization to SNF  Unfortunately at the SNF her behaviors were uncontrollable and she verbalized suicidal ideation prompting transfer to [facility] inpatient geropsych unit  While in the geropsych unit she developed nausea and vomiting, suspected gastric outlet obstruction and was transferred to [facility] for surgical intervention  She did not require surgical intervention just bowel decompression with an NG tube and improved  During the hospitalization she was evaluated by psychiatry [facility] son Quincy Gray requested to take her home with 24/7 home care  Patient was discharged against the recommendations of psychiatry and internal medicine home with her son ( per review of discharge summary from [address] [place] [address] [address]) on 5/28  The son then brought the patient to this facility later that evening after the patient was uncontrollable in the home  Patient's son states he cannot care for her at home  Patient was not found to have any acute medical reason for her agitation with a normal urinalysis, normal serum laboratories, CT head no acute abnormality  Presented to the medical service for safe disposition as family is requesting not to take patient home  Current Precautions:  Fall  Aspiration      Allergies:  No known food allergies    Past medical history:  Please see H&P for details    Special Studies:  CT Head:  Chronic ischemic changes as above  No acute intracranial abnormality  CXR from [facility]:  Findings: Portable AP erect view of the chest without previous chest x-rays   available for comparison demonstrates obscuration of the left hemidiaphragm   consistent left lower lobe infiltrate/atelectasis  Accompanying left-sided   pleural effusion cannot be excluded  Right infrahilar infiltrate is evident as   well   Minimal blunting of the right costophrenic angle ? small right-sided pleural effusion versus pleural thickening  The heart size is within normal   limits  Uncoiling of thoracic aorta is noted  Degenerative changes seen   involving the thoracic spine  Calcific tendinitis left shoulder is evident      Residual contrast is seen within the colon none  The patient is status post   cholecystectomy  Social/Education/Vocational Hx:  Pt lives was at [facility] prior to [facility] psych stay, then home w/ son Maria Esther Castellanor for ~24 hrs until he brought her to ED    Swallow Information   Current Risks for Dysphagia & Aspiration: known history of dysphagia, AMS and decreased alertness  Current Symptoms/Concerns: coughing with po  Current Diet: mechanically altered/level 2 diet and nectar thick liquids   Baseline Diet: puree/level 1 diet and thin liquids      Baseline Assessment   Behavior/Cognition: lethargic  Speech/Language Status: not able to to follow commands  Patient Positioning: upright in bed  Pain Status/Interventions/Response to Interventions:   No report of or nonverbal indications of pain  Swallow Mechanism Exam  Facial: symmetrical  Labial: unable to test 2/2 limited command following  Lingual: unable to test 2/2 limited command following  Velum: unable to visualize  Mandible: unable to test 2/2 limited command following  Dentition: limited dentition  Vocal quality:hoarse   Volitional Cough: strong/productive   Respiratory Status: on RA    Consistencies Assessed and Performance   Consistencies Administered: thin liquids, nectar thick, honey thick and puree    Oral Stage: moderate-severe  Impaired bolus retrieval via straw, anterior loss via right side of liquids and limited oral agility  Suspected posterior spillage w/ trials of thins and nectar thick via cup  Pharyngeal Stage: suspected  +cough response w/ thins via straw and nectar via cup  Suspect largely d/t lethargy rather than true dysphagia      Esophageal Concerns: none reported    Summary and Recommendations (see above)    Results Reviewed with: patient, RN and MD     Treatment Recommended: Dysphagia therapy     Frequency of treatment: 2-3x/week    Dysphagia LTG  -Patient will demonstrate safe and effective oral intake (without overt s/s significant oral/pharyngeal dysphagia including s/s penetration or aspiration) for the highest appropriate diet level  Short Term Goals:  -Pt will tolerate Dysphagia 1/pureed diet and nectar thickwith no significant s/s oral or pharyngeal dysphagia across 1-3 diagnostic session/s    -Patient will tolerate trials of upgraded food and/or liquid texture with no significant s/s of oral or pharyngeal dysphagia including aspiration across 1-3 diagnostic sessions     Re: Compensatory Strategies  - Patient’s caregiver will demonstrate adherence to recommended diet, as well as application of aspiration precautions and compensatory strategies        Speech Therapy Prognosis   Prognosis: poor    Prognosis Considerations: medical status, cognitive status and therapeutic potential     Malcolm Navas 85 CCC-SLP  5/29/2023

## 2023-05-29 NOTE — ASSESSMENT & PLAN NOTE
· 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 and dysphagia  · Was discharged to SNF then sent to inpatient geropsych, unclear extent of physical rehabilitation patient received or was amenable to participate  · Continue puréed diet with nectar thick liquids  · Appreciate PT/OT/case management  · Continue aspirin and statin

## 2023-05-29 NOTE — ED NOTES
Fran txt sent to [facility] charge nurse  Pt transported to unit by house supervisor, Lynn Allan and Gretchen [place]  Pt has no s/s of distress, VS stable        Jeanne Amin RN  05/28/23 [id number]

## 2023-05-29 NOTE — PHYSICAL THERAPY NOTE
PHYSICAL THERAPY NOTE          Patient Name: Cece DUMONTMAJ'U Date: 5/29/2023 05/29/23 1135   Note Type   Note type Evaluation; Cancelled Session   Cancel Reasons Medical status       Received order for PT consult  Chart reviewed  Patient admitted with severe vascular dementia with psychotic disturbance  Spoke with RN, Nato Olguin, who reports patient is not appropriate to participate in therapy services this date  Per RN, patient received Zyprexa in ED  Will hold PT this date and see patient as medically appropriate at a later time      Shena Blake, PT,DPT

## 2023-05-29 NOTE — ASSESSMENT & PLAN NOTE
· Patient discharged from outside hospital earlier in day and 5/28 after admission for vomiting  · Evaluated by psychiatry as she was transferred from the behavioral health unit to [facility] for the vomiting, son was requesting hospice and took the patient home with 24/7 care provided by family while awaiting services to be arranged  Review of the discharge summary the internal medicine team and psychiatry team voiced their concerns regarding the son taking the patient home but he was adamant  · Patient was brought to this facility on the same day as discharge from previous facility    Son stated to ER staff he cannot care for patient at home  · Admitted to the medical service for safe disposition pending case management evaluation for inpatient placement  · Continue PTA medications Zoloft, Seroquel, Depakene, BuSpar  · Extreme agitation and noncooperative with staff in ED, was given Versed and Zyprexa in ED  · She has as needed medications for agitation, has not required yet -- nursing states she mostly has been sleeping but does wake with stimulation and is a bit agressive  · Continue to monitor  · Psychiatry consult placed for assistance with placement

## 2023-05-29 NOTE — PROGRESS NOTES
[address]  Progress Note  Name: Kyaw Sanford  MRN: [id number]  Unit/Bed#: -01 I Date of Admission: 5/28/2023   Date of Service: 5/29/2023 I Hospital Day: 1    Assessment/Plan   Status post CVA  Assessment & Plan  · 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 and dysphagia  · Was discharged to SNF then sent to inpatient geropsych, unclear extent of physical rehabilitation patient received or was amenable to participate  · Continue puréed diet with nectar thick liquids  · Appreciate PT/OT/case management  · Continue aspirin and statin    * Severe vascular dementia with psychotic disturbance ([facility] [address] )  Assessment & Plan  · Patient discharged from outside hospital earlier in day and 5/28 after admission for vomiting  · Evaluated by psychiatry as she was transferred from the behavioral health unit to [facility] for the vomiting, son was requesting hospice and took the patient home with 24/7 care provided by family while awaiting services to be arranged  Review of the discharge summary the internal medicine team and psychiatry team voiced their concerns regarding the son taking the patient home but he was adamant  · Patient was brought to this facility on the same day as discharge from previous facility    Son stated to ER staff he cannot care for patient at home  · Admitted to the medical service for safe disposition pending case management evaluation for inpatient placement  · Continue PTA medications Zoloft, Seroquel, Depakene, BuSpar  · Extreme agitation and noncooperative with staff in ED, was given Versed and Zyprexa in ED  · She has as needed medications for agitation, has not required yet -- nursing states she mostly has been sleeping but does wake with stimulation and is a bit agressive  · Continue to monitor  · Psychiatry consult placed for assistance with placement               VTE Pharmacologic Prophylaxis:   High Risk (Score >/= 5) - Pharmacological DVT Prophylaxis Ordered: enoxaparin (Lovenox)  Sequential Compression Devices Ordered  Patient Centered Rounds: I performed bedside rounds with nursing staff today  Discussions with Specialists or Other Care Team Provider: none    Education and Discussions with Family / Patient: Updated  (son) via phone  Total Time Spent on Date of Encounter in care of patient: 35 minutes This time was spent on one or more of the following: performing physical exam; counseling and coordination of care; obtaining or reviewing history; documenting in the medical record; reviewing/ordering tests, medications or procedures; communicating with other healthcare professionals and discussing with patient's family/caregivers  Current Length of Stay: 1 day(s)  Current Patient Status: Inpatient   Certification Statement: The patient will continue to require additional inpatient hospital stay due to need for placement  Discharge Plan: Anticipate discharge in 24-48 hrs to Dementia SNF    Code Status: Level 3 - DNAR and DNI    Subjective:   Patient has been mostly somnolent, wakes but falls back asleep, unable to provide any significant subjective information    Objective:     Vitals:   Temp (24hrs), Av 9 °F (36 6 °C), Min:97 9 °F (36 6 °C), Max:97 9 °F (36 6 °C)    Temp:  [97 9 °F (36 6 °C)] 97 9 °F (36 6 °C)  HR:  [75-86] 79  Resp:  [18-24] 24  BP: (136-195)/(68-81) 136/73  SpO2:  [91 %-99 %] 97 %  Body mass index is 23 58 kg/m²  Input and Output Summary (last 24 hours):   No intake or output data in the 24 hours ending 23 1251    Physical Exam:   Physical Exam  Constitutional:       Comments: Somnolent, appears chronically ill   HENT:      Head: Normocephalic  Mouth/Throat:      Mouth: Mucous membranes are dry  Eyes:      Conjunctiva/sclera: Conjunctivae normal    Cardiovascular:      Rate and Rhythm: Regular rhythm  Heart sounds: No murmur heard    Pulmonary: Breath sounds: No wheezing  Comments: Reduced breath sounds b/l  Abdominal:      General: Abdomen is flat  Palpations: Abdomen is soft  Musculoskeletal:         General: No signs of injury  Skin:     General: Skin is warm and dry     Neurological:      Comments: Disoriented, somnolent and wakes to stimulation but falls back asleep, doesn't answer questions          Additional Data:     Labs:  Results from last 7 days   Lab Units 05/28/23  1817   EOS PCT % 2   HEMATOCRIT % 37 0   HEMOGLOBIN g/dL 11 6   LYMPHS PCT % 9*   MONOS PCT % 8   NEUTROS PCT % 80*   PLATELETS Thousands/uL 194   WBC Thousand/uL 10 04     Results from last 7 days   Lab Units 05/28/23  1817   ANION GAP mmol/L 7   ALBUMIN g/dL 2 9*   ALK PHOS U/L 45   ALT U/L 10   AST U/L 23   BUN mg/dL 13   CALCIUM mg/dL 8 7   CHLORIDE mmol/L 106   CO2 mmol/L 27   CREATININE mg/dL 0 62   GLUCOSE RANDOM mg/dL 93   POTASSIUM mmol/L 3 7   SODIUM mmol/L 140   TOTAL BILIRUBIN mg/dL 0 54                       Lines/Drains:  Invasive Devices     Peripheral Intravenous Line  Duration           Peripheral IV 05/28/23 Left Antecubital <1 day                      Imaging: Reviewed radiology reports from this admission including: CT head    Recent Cultures (last 7 days):         Last 24 Hours Medication List:   Current Facility-Administered Medications   Medication Dose Route Frequency Provider Last Rate   • acetaminophen  650 mg Oral Q6H PRN Magnolia S Parker, CRNP     • acetaminophen  975 mg Oral BID Magnolia S Parker, CRNP     • aspirin  81 mg Oral Daily Magnolia S Parker, CRNP     • atorvastatin  40 mg Oral QPM Magnolia S Parker, CRNP     • busPIRone  10 mg Oral TID Magnolia S Parker, CRNP     • clonazePAM  0 25 mg Oral BID PRN Magnolia S Parker, CRNP     • enoxaparin  40 mg Subcutaneous Daily Magnolia S Parker, CRNP     • famotidine  20 mg Oral Daily Magnolia S Parker, CRNP     • latanoprost  1 drop Both Eyes HS Magnolia S Parker, CRNP     • QUEtiapine  25 mg Oral TID PRN Magnolia S Parker, CRNP • QUEtiapine  50 mg Oral BID Magnolia S Parker, CRNP     • sertraline  100 mg Oral Daily Magnolia S Parker, CRNP     • timolol  1 drop Both Eyes BID Magnolia S Parker, CRNP     • valproic acid  250 mg Oral BID Magnolia S Parker, CRNP          Today, Patient Was Seen By: Kristen Jauregui DO    **Please Note: This note may have been constructed using a voice recognition system  **

## 2023-05-29 NOTE — H&P
[address]  H&P  Name: Artur Flowers 76 y o  female I MRN: [id number]  Unit/Bed#: -01 I Date of Admission: 5/28/2023   Date of Service: 5/29/2023 I Hospital Day: 1      Assessment/Plan   * Severe vascular dementia with psychotic disturbance [facility])  Assessment & Plan  · Patient discharged from outside hospital earlier in day and 5/28 after admission for vomiting  · Evaluated by psychiatry as she was transferred from the behavioral health unit to [facility] for the vomiting, son was requesting hospice and took the patient home with 24/7 care provided by family while awaiting services to be arranged  Review of the discharge summary the internal medicine team and psychiatry team voiced their concerns regarding the son taking the patient home but he was adamant  · Patient was brought to this facility on the same day as discharge from previous facility  Son stated to ER staff he cannot care for patient at home  · Admitted to the medical service for safe disposition pending case management evaluation for inpatient placement  · Continue PTA medications Zoloft, Seroquel, Depakene, BuSpar  · Extreme agitation and noncooperative with staff in ED -now sedated at time of admission after receiving Versed and Zyprexa in ED  · Continue to monitor  · Psychiatry consult placed for assistance with placement    Status post CVA  Assessment & Plan  · 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 and dysphagia  · Was discharged to SNF then sent to inpatient geropsych, unclear extent of physical rehabilitation patient received or was amenable to participate  · Continue puréed diet with nectar thick liquids  · Appreciate PT/OT/case management  · Continue aspirin and statin       VTE Pharmacologic Prophylaxis:   High Risk (Score >/= 5) - Pharmacological DVT Prophylaxis Ordered: enoxaparin (Lovenox)  Sequential Compression Devices Ordered    Code Status: Level 3 - DNAR and DNI   Discussion with family: Updated  (son) via phone  Anticipated Length of Stay: Patient will be admitted on an inpatient basis with an anticipated length of stay of greater than 2 midnights secondary to Severe dementia  Total Time Spent on Date of Encounter in care of patient: 65 minutes This time was spent on one or more of the following: performing physical exam; counseling and coordination of care; obtaining or reviewing history; documenting in the medical record; reviewing/ordering tests, medications or procedures; communicating with other healthcare professionals and discussing with patient's family/caregivers  Chief Complaint: Screaming    History of Present Illness:  Alberto Fagan is a 76 y o  female with a PMH of major depressive disorder, generalized anxiety disorder, vascular dementia who was ADL independent prior to 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  Patient now with left upper and lower extremity weakness, dysphagia and was discharged from inpatient hospitalization to SNF  Unfortunately at the SNF her behaviors were uncontrollable and she verbalized suicidal ideation prompting transfer [facility] [place] [facility]  While in the geropsych unit she developed nausea and vomiting, suspected gastric outlet obstruction and was transferred to [facility] for surgical intervention  She did not require surgical intervention just bowel decompression with an NG tube and improved  During the hospitalization she was evaluated by psychiatry it was recommended that she be placed back in geropsych but her son Isrrael Fournier requested to take her home with 24/7 home care  Patient was discharged against the recommendations of psychiatry and internal medicine home with her son ( per review of discharge summary from [facility]) on 5/28  The son then brought the patient to this facility later that evening after the patient was uncontrollable in the home  Patient's son states he cannot care for her at home  Patient was not found to have any acute medical reason for her agitation with a normal urinalysis, normal serum laboratories, CT head no acute abnormality  Presented to the medical service for safe disposition as family is requesting not to take patient home  Review of Systems:  Review of Systems   Unable to perform ROS: Dementia       Past Medical and Surgical History:   Past Medical History:   Diagnosis Date   • Dementia ([facility] [address] )    • No abnormality of hip joint detected on examination    • Stroke [facility])        History reviewed  No pertinent surgical history  Meds/Allergies:  Prior to Admission medications    Medication Sig Start Date End Date Taking?  Authorizing Provider   famotidine (PEPCID) 20 mg tablet Take 20 mg by mouth 2 (two) times a day   Yes Historical Provider, MD   QUEtiapine (SEROquel) 25 mg tablet Take 25 mg by mouth 3 (three) times a day as needed (agitation)   Yes Historical Provider, MD   valproic acid (DEPAKENE) 250 MG/5ML soln Take 250 mg by mouth 2 (two) times a day   Yes Historical Provider, MD   acetaminophen (TYLENOL) 325 mg tablet Take 2 tablets (650 mg total) by mouth every 6 (six) hours as needed for mild pain, headaches or fever  Patient taking differently: Take 1,000 mg by mouth 2 (two) times a day 4/20/23   Jany George MD   aspirin (ECOTRIN LOW STRENGTH) 81 mg EC tablet Take 81 mg by mouth daily    Historical Provider, MD   atorvastatin (LIPITOR) 40 mg tablet Take 1 tablet (40 mg total) by mouth every evening 4/20/23   Jany George MD   busPIRone (BUSPAR) 10 mg tablet Take 1 tablet (10 mg total) by mouth 3 (three) times a day 3/17/23   Miladis Langston MD   clonazePAM (KlonoPIN) 0 5 mg tablet Take 0 5 tablets (0 25 mg total) by mouth 2 (two) times a day as needed for anxiety for up to 10 days 4/20/23 4/30/23  Jany George MD   latanoprost (XALATAN) 0 005 % ophthalmic solution Administer 1 drop to both eyes daily at bedtime "4/20/22   Historical Provider, MD   QUEtiapine (SEROquel) 25 mg tablet Take 0 5 tablets (12 5 mg total) by mouth 2 (two) times a day  Patient taking differently: Take 50 mg by mouth 2 (two) times a day 4/20/23   Sammi Dominguez MD   sertraline (ZOLOFT) 100 mg tablet Take 1 tablet (100 mg total) by mouth daily Do not start before April 21, 2023 4/21/23   Sammi Dominguez MD   timolol (TIMOPTIC) 0 5 % ophthalmic solution Apply 1 drop to eye 2 (two) times a day 4/20/22   Historical Provider, MD   nicotine (NICODERM CQ) 7 mg/24hr TD 24 hr patch Place 1 patch on the skin over 24 hours daily Do not start before April 21, 2023 4/21/23 5/28/23  Sammi Dominguez MD     I have reviewed home medications using recent Epic encounter  Allergies: Allergies   Allergen Reactions   • Lorazepam Other (See Comments)     Increased agitation         Social History:  Marital Status: /Civil Union   Patient Pre-hospital Living Situation: Home  Patient Pre-hospital Level of Mobility: unable to be assessed at time of evaluation  Patient Pre-hospital Diet Restrictions: pureed  Substance Use History:   Social History     Substance and Sexual Activity   Alcohol Use Yes    Comment: ocassional     Social History     Tobacco Use   Smoking Status Every Day   • Packs/day: 0 25   • Types: Cigarettes   Smokeless Tobacco Never     Social History     Substance and Sexual Activity   Drug Use Never       Family History:  Family History   Problem Relation Age of Onset   • Arthritis Mother        Physical Exam:     Vitals:   Blood Pressure: 136/73 (05/28/23 2156)  Pulse: 79 (05/28/23 2156)  Temperature: 97 9 °F (36 6 °C) (05/28/23 1803)  Temp Source: Temporal (05/28/23 1803)  Respirations: (!) 24 (05/28/23 2156)  Height: 5' 1\" (154 9 cm) (05/28/23 1803)  Weight - Scale: 56 6 kg (124 lb 12 5 oz) (05/28/23 1803)  SpO2: 97 % (05/28/23 2238)    Physical Exam  Vitals and nursing note reviewed  Constitutional:       General: She is not in acute distress       " Appearance: She is underweight  She is ill-appearing  HENT:      Head: Normocephalic and atraumatic  Eyes:      Conjunctiva/sclera: Conjunctivae normal    Cardiovascular:      Rate and Rhythm: Normal rate and regular rhythm  Heart sounds: No murmur heard  Pulmonary:      Effort: Pulmonary effort is normal  No respiratory distress  Breath sounds: Normal breath sounds  Abdominal:      Palpations: Abdomen is soft  Tenderness: There is no abdominal tenderness  Musculoskeletal:      Cervical back: Neck supple  Comments: Left upper and left lower extremity weakness   Skin:     General: Skin is warm and dry  Capillary Refill: Capillary refill takes less than 2 seconds  Coloration: Skin is pale  Neurological:      Mental Status: She is alert  She is disoriented  Comments: Neurological exam baseline   Psychiatric:         Mood and Affect: Mood normal        Additional Data:     Lab Results:  Results from last 7 days   Lab Units 05/28/23  1817   EOS PCT % 2   HEMATOCRIT % 37 0   HEMOGLOBIN g/dL 11 6   LYMPHS PCT % 9*   MONOS PCT % 8   NEUTROS PCT % 80*   PLATELETS Thousands/uL 194   WBC Thousand/uL 10 04     Results from last 7 days   Lab Units 05/28/23  1817   ANION GAP mmol/L 7   ALBUMIN g/dL 2 9*   ALK PHOS U/L 45   ALT U/L 10   AST U/L 23   BUN mg/dL 13   CALCIUM mg/dL 8 7   CHLORIDE mmol/L 106   CO2 mmol/L 27   CREATININE mg/dL 0 62   GLUCOSE RANDOM mg/dL 93   POTASSIUM mmol/L 3 7   SODIUM mmol/L 140   TOTAL BILIRUBIN mg/dL 0 54                       Lines/Drains:  Invasive Devices     Peripheral Intravenous Line  Duration           Peripheral IV 05/28/23 Left Antecubital <1 day                    Imaging: Reviewed radiology reports from this admission including: CT head  CT head without contrast   Final Result by Michele Perez MD (05/28 2017)   Chronic ischemic changes as above  No acute intracranial abnormality                    Workstation performed: [id number] EKG and Other Studies Reviewed on Admission:   · EKG: NSR  HR 84     ** Please Note: This note has been constructed using a voice recognition system   **

## 2023-05-29 NOTE — PLAN OF CARE
Problem: MOBILITY - ADULT  Goal: Maintain or return to baseline ADL function  Description: INTERVENTIONS:  -  Assess patient's ability to carry out ADLs; assess patient's baseline for ADL function and identify physical deficits which impact ability to perform ADLs (bathing, care of mouth/teeth, toileting, grooming, dressing, etc )  - Assess/evaluate cause of self-care deficits   - Assess range of motion  - Assess patient's mobility; develop plan if impaired  - Assess patient's need for assistive devices and provide as appropriate  - Encourage maximum independence but intervene and supervise when necessary  - Involve family in performance of ADLs  - Assess for home care needs following discharge   - Consider OT consult to assist with ADL evaluation and planning for discharge  - Provide patient education as appropriate  Outcome: Progressing  Goal: Maintains/Returns to pre admission functional level  Description: INTERVENTIONS:  - Perform BMAT or MOVE assessment daily    - Set and communicate daily mobility goal to care team and patient/family/caregiver  - Collaborate with rehabilitation services on mobility goals if consulted  - Perform Range of Motion 2 times a day  - Reposition patient every 2 hours    - Dangle patient 2 times a day  - Stand patient 2 times a day  - Ambulate patient 2 times a day  - Out of bed to chair 2 times a day   - Out of bed for meals 2 times a day  - Out of bed for toileting  - Record patient progress and toleration of activity level   Outcome: Progressing     Problem: PAIN - ADULT  Goal: Verbalizes/displays adequate comfort level or baseline comfort level  Description: Interventions:  - Encourage patient to monitor pain and request assistance  - Assess pain using appropriate pain scale  - Administer analgesics based on type and severity of pain and evaluate response  - Implement non-pharmacological measures as appropriate and evaluate response  - Consider cultural and social influences on pain and pain management  - Notify physician/advanced practitioner if interventions unsuccessful or patient reports new pain  Outcome: Progressing     Problem: INFECTION - ADULT  Goal: Absence or prevention of progression during hospitalization  Description: INTERVENTIONS:  - Assess and monitor for signs and symptoms of infection  - Monitor lab/diagnostic results  - Monitor all insertion sites, i e  indwelling lines, tubes, and drains  - Monitor endotracheal if appropriate and nasal secretions for changes in amount and color  - [place] appropriate cooling/warming therapies per order  - Administer medications as ordered  - Instruct and encourage patient and family to use good hand hygiene technique  - Identify and instruct in appropriate isolation precautions for identified infection/condition  Outcome: Progressing  Goal: Absence of fever/infection during neutropenic period  Description: INTERVENTIONS:  - Monitor WBC    Outcome: Progressing     Problem: SAFETY ADULT  Goal: Maintain or return to baseline ADL function  Description: INTERVENTIONS:  -  Assess patient's ability to carry out ADLs; assess patient's baseline for ADL function and identify physical deficits which impact ability to perform ADLs (bathing, care of mouth/teeth, toileting, grooming, dressing, etc )  - Assess/evaluate cause of self-care deficits   - Assess range of motion  - Assess patient's mobility; develop plan if impaired  - Assess patient's need for assistive devices and provide as appropriate  - Encourage maximum independence but intervene and supervise when necessary  - Involve family in performance of ADLs  - Assess for home care needs following discharge   - Consider OT consult to assist with ADL evaluation and planning for discharge  - Provide patient education as appropriate  Outcome: Progressing  Goal: Maintains/Returns to pre admission functional level  Description: INTERVENTIONS:  - Perform BMAT or MOVE assessment daily    - Set and communicate daily mobility goal to care team and patient/family/caregiver  - Collaborate with rehabilitation services on mobility goals if consulted  - Perform Range of Motion 2 times a day  - Reposition patient every 2 hours    - Dangle patient 2 times a day  - Stand patient 2 times a day  - Ambulate patient 2 times a day  - Out of bed to chair 2 times a day   - Out of bed for meals 2 times a day  - Out of bed for toileting  - Record patient progress and toleration of activity level   Outcome: Progressing  Goal: Patient will remain free of falls  Description: INTERVENTIONS:  - Educate patient/family on patient safety including physical limitations  - Instruct patient to call for assistance with activity   - Consult OT/PT to assist with strengthening/mobility   - Keep Call bell within reach  - Keep bed low and locked with side rails adjusted as appropriate  - Keep care items and personal belongings within reach  - Initiate and maintain comfort rounds  - Make Fall Risk Sign visible to staff  - Offer Toileting every 3 Hours, in advance of need  - Initiate/Maintain bed alarm  - Apply yellow socks and bracelet for high fall risk patients  - Consider moving patient to room near nurses station  Outcome: Progressing     Problem: DISCHARGE PLANNING  Goal: Discharge to home or other facility with appropriate resources  Description: INTERVENTIONS:  - Identify barriers to discharge w/patient and caregiver  - Arrange for needed discharge resources and transportation as appropriate  - Identify discharge learning needs (meds, wound care, etc )  - Arrange for interpretive services to assist at discharge as needed  - Refer to Case Management Department for coordinating discharge planning if the patient needs post-hospital services based on physician/advanced practitioner order or complex needs related to functional status, cognitive ability, or social support system  Outcome: Progressing     Problem: Knowledge Deficit  Goal: Patient/family/caregiver demonstrates understanding of disease process, treatment plan, medications, and discharge instructions  Description: Complete learning assessment and assess knowledge base    Interventions:  - Provide teaching at level of understanding  - Provide teaching via preferred learning methods  Outcome: Progressing     Problem: RESPIRATORY - ADULT  Goal: Achieves optimal ventilation and oxygenation  Description: INTERVENTIONS:  - Assess for changes in respiratory status  - Assess for changes in mentation and behavior  - Position to facilitate oxygenation and minimize respiratory effort  - Oxygen administered by appropriate delivery if ordered  - Initiate smoking cessation education as indicated  - Encourage broncho-pulmonary hygiene including cough, deep breathe, Incentive Spirometry  - Assess the need for suctioning and aspirate as needed  - Assess and instruct to report SOB or any respiratory difficulty  - Respiratory Therapy support as indicated  Outcome: Progressing     Problem: GASTROINTESTINAL - ADULT  Goal: Minimal or absence of nausea and/or vomiting  Description: INTERVENTIONS:  - Administer IV fluids if ordered to ensure adequate hydration  - Maintain NPO status until nausea and vomiting are resolved  - Nasogastric tube if ordered  - Administer ordered antiemetic medications as needed  - Provide nonpharmacologic comfort measures as appropriate  - Advance diet as tolerated, if ordered  - Consider nutrition services referral to assist patient with adequate nutrition and appropriate food choices  Outcome: Progressing  Goal: Maintains or returns to baseline bowel function  Description: INTERVENTIONS:  - Assess bowel function  - Encourage oral fluids to ensure adequate hydration  - Administer IV fluids if ordered to ensure adequate hydration  - Administer ordered medications as needed  - Encourage mobilization and activity  - Consider nutritional services referral to assist patient with adequate nutrition and appropriate food choices  Outcome: Progressing  Goal: Maintains adequate nutritional intake  Description: INTERVENTIONS:  - Monitor percentage of each meal consumed  - Identify factors contributing to decreased intake, treat as appropriate  - Assist with meals as needed  - Monitor I&O, weight, and lab values if indicated  - Obtain nutrition services referral as needed  Outcome: Progressing  Goal: Establish and maintain optimal ostomy function  Description: INTERVENTIONS:  - Assess bowel function  - Encourage oral fluids to ensure adequate hydration  - Administer IV fluids if ordered to ensure adequate hydration   - Administer ordered medications as needed  - Encourage mobilization and activity  - Nutrition services referral to assist patient with appropriate food choices  - Assess stoma site  - Consider wound care consult   Outcome: Progressing  Goal: Oral mucous membranes remain intact  Description: INTERVENTIONS  - Assess oral mucosa and hygiene practices  - Implement preventative oral hygiene regimen  - Implement oral medicated treatments as ordered  - Initiate Nutrition services referral as needed  Outcome: Progressing     Problem: METABOLIC, FLUID AND ELECTROLYTES - ADULT  Goal: Electrolytes maintained within normal limits  Description: INTERVENTIONS:  - Monitor labs and assess patient for signs and symptoms of electrolyte imbalances  - Administer electrolyte replacement as ordered  - Monitor response to electrolyte replacements, including repeat lab results as appropriate  - Instruct patient on fluid and nutrition as appropriate  Outcome: Progressing  Goal: Fluid balance maintained  Description: INTERVENTIONS:  - Monitor labs   - Monitor I/O and WT  - Instruct patient on fluid and nutrition as appropriate  - Assess for signs & symptoms of volume excess or deficit  Outcome: Progressing  Goal: Glucose maintained within target range  Description: INTERVENTIONS:  - Monitor Blood Glucose as ordered  - Assess for signs and symptoms of hyperglycemia and hypoglycemia  - Administer ordered medications to maintain glucose within target range  - Assess nutritional intake and initiate nutrition service referral as needed  Outcome: Progressing

## 2023-05-30 PROCEDURE — 99232 SBSQ HOSP IP/OBS MODERATE 35: CPT | Performed by: FAMILY MEDICINE

## 2023-05-30 RX ADMIN — CLONAZEPAM 0.25 MG: 0.5 TABLET ORAL at 01:49

## 2023-05-30 RX ADMIN — ATORVASTATIN CALCIUM 40 MG: 40 TABLET, FILM COATED ORAL at 17:31

## 2023-05-30 RX ADMIN — BUSPIRONE HYDROCHLORIDE 10 MG: 10 TABLET ORAL at 10:20

## 2023-05-30 RX ADMIN — SERTRALINE 100 MG: 100 TABLET, FILM COATED ORAL at 10:20

## 2023-05-30 RX ADMIN — QUETIAPINE FUMARATE 50 MG: 25 TABLET ORAL at 17:32

## 2023-05-30 RX ADMIN — ACETAMINOPHEN 975 MG: 325 TABLET ORAL at 21:32

## 2023-05-30 RX ADMIN — VALPROIC ACID 250 MG: 250 SOLUTION ORAL at 10:22

## 2023-05-30 RX ADMIN — TIMOLOL MALEATE 1 DROP: 5 SOLUTION OPHTHALMIC at 17:32

## 2023-05-30 RX ADMIN — QUETIAPINE FUMARATE 50 MG: 25 TABLET ORAL at 10:20

## 2023-05-30 RX ADMIN — BUSPIRONE HYDROCHLORIDE 10 MG: 10 TABLET ORAL at 21:32

## 2023-05-30 RX ADMIN — TIMOLOL MALEATE 1 DROP: 5 SOLUTION OPHTHALMIC at 10:22

## 2023-05-30 RX ADMIN — VALPROIC ACID 250 MG: 250 SOLUTION ORAL at 17:32

## 2023-05-30 RX ADMIN — ENOXAPARIN SODIUM 40 MG: 40 INJECTION SUBCUTANEOUS at 10:13

## 2023-05-30 RX ADMIN — ACETAMINOPHEN 975 MG: 325 TABLET ORAL at 10:20

## 2023-05-30 RX ADMIN — ASPIRIN 81 MG: 81 TABLET, COATED ORAL at 10:20

## 2023-05-30 RX ADMIN — BUSPIRONE HYDROCHLORIDE 10 MG: 10 TABLET ORAL at 17:00

## 2023-05-30 RX ADMIN — FAMOTIDINE 20 MG: 20 TABLET ORAL at 10:20

## 2023-05-30 NOTE — PROGRESS NOTES
[address]  Progress Note  Name: Luisa Valles  MRN: [id number]  Unit/Bed#: -01 I Date of Admission: 5/28/2023   Date of Service: 5/30/2023 I Hospital Day: 2    Assessment/Plan   * Severe vascular dementia with psychotic disturbance [facility])  Assessment & Plan  · Patient discharged from outside hospital earlier in day and 5/28 after admission for vomiting  · Evaluated by psychiatry as she was transferred from the behavioral health unit to [facility] for the vomiting, son was requesting hospice and took the patient home with 24/7 care provided by family while awaiting services to be arranged  Review of the discharge summary the internal medicine team and psychiatry team voiced their concerns regarding the son taking the patient home but he was adamant  · Patient was brought to this facility on the same day as discharge from previous facility    Son stated to ER staff he cannot care for patient at home  · Admitted to the medical service for safe disposition pending case management evaluation for inpatient placement  · Continue PTA medications Zoloft, Seroquel, Depakene, BuSpar  · Extreme agitation and noncooperative with staff in ED, was given Versed and Zyprexa in ED  · Agitated last night required seroquel - await psych to see currently sleeping   · Continue to monitor  · Psychiatry consult placed for assistance with placement  · ua neg for infection  · Ct brain neg for acute abnormality    Status post CVA  Assessment & Plan  · 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 and dysphagia  · [facility] unclear extent of physical rehabilitation patient received or was amenable to participate  · Continue puréed diet with nectar thick liquids  · Appreciate PT/OT/case management  · Continue aspirin and statin             VTE Pharmacologic Prophylaxis:   Moderate Risk (Score 3-4) - Pharmacological DVT Prophylaxis Ordered: enoxaparin (Lovenox)  Patient Centered Rounds: I performed bedside rounds with nursing staff today  Discussions with Specialists or Other Care Team Provider: allison    Education and Discussions with Family / Patient:   Total Time Spent on Date of Encounter in care of patient: 35 minutes This time was spent on one or more of the following: performing physical exam; counseling and coordination of care; obtaining or reviewing history; documenting in the medical record; reviewing/ordering tests, medications or procedures; communicating with other healthcare professionals and discussing with patient's family/caregivers  Current Length of Stay: 2 day(s)  Current Patient Status: Inpatient   Certification Statement: The patient will continue to require additional inpatient hospital stay due to behavioral   Discharge Plan: Anticipate discharge in 48-72 hrs to discharge location to be determined pending rehab evaluations  Code Status: Level 3 - DNAR and DNI    Subjective:   Seen and examined sleeping had seroquel last night    Objective:     Vitals:   Temp (24hrs), Av 8 °F (36 6 °C), Min:97 7 °F (36 5 °C), Max:97 9 °F (36 6 °C)    Temp:  [97 7 °F (36 5 °C)-97 9 °F (36 6 °C)] 97 9 °F (36 6 °C)  HR:  [57-65] 63  Resp:  [16-18] 16  BP: (102-152)/(54-91) 152/67  SpO2:  [94 %-99 %] 94 %  Body mass index is 23 58 kg/m²  Input and Output Summary (last 24 hours): Intake/Output Summary (Last 24 hours) at 2023 1121  Last data filed at 2023 0500  Gross per 24 hour   Intake 360 ml   Output 843 ml   Net -483 ml       Physical Exam:   Physical Exam  Vitals and nursing note reviewed  Constitutional:       General: She is not in acute distress  Appearance: She is well-developed  Comments: NAD   HENT:      Head: Normocephalic and atraumatic     Eyes:      Conjunctiva/sclera: Conjunctivae normal    Cardiovascular:      Rate and Rhythm: Normal rate and regular rhythm  Heart sounds: No murmur heard  Pulmonary:      Effort: Pulmonary effort is normal  No respiratory distress  Breath sounds: Normal breath sounds  No wheezing or rales  Abdominal:      Palpations: Abdomen is soft  Tenderness: There is no abdominal tenderness  Musculoskeletal:         General: No swelling  Cervical back: Neck supple  Skin:     General: Skin is warm and dry  Capillary Refill: Capillary refill takes less than 2 seconds     Neurological:      Comments: sleeping   Psychiatric:         Mood and Affect: Mood normal           Additional Data:     Labs:  Results from last 7 days   Lab Units 05/28/23  1817   EOS PCT % 2   HEMATOCRIT % 37 0   HEMOGLOBIN g/dL 11 6   LYMPHS PCT % 9*   MONOS PCT % 8   NEUTROS PCT % 80*   PLATELETS Thousands/uL 194   WBC Thousand/uL 10 04     Results from last 7 days   Lab Units 05/28/23  1817   ANION GAP mmol/L 7   ALBUMIN g/dL 2 9*   ALK PHOS U/L 45   ALT U/L 10   AST U/L 23   BUN mg/dL 13   CALCIUM mg/dL 8 7   CHLORIDE mmol/L 106   CO2 mmol/L 27   CREATININE mg/dL 0 62   GLUCOSE RANDOM mg/dL 93   POTASSIUM mmol/L 3 7   SODIUM mmol/L 140   TOTAL BILIRUBIN mg/dL 0 54                       Lines/Drains:  Invasive Devices     Peripheral Intravenous Line  Duration           Peripheral IV 05/28/23 Left Antecubital 1 day                      Imaging: Reviewed radiology reports from this admission including: CT head    Recent Cultures (last 7 days):         Last 24 Hours Medication List:   Current Facility-Administered Medications   Medication Dose Route Frequency Provider Last Rate   • acetaminophen  650 mg Oral Q6H PRN Magnolia S Parker, CRNP     • acetaminophen  975 mg Oral BID Magnolia S Parker, CRNP     • aspirin  81 mg Oral Daily Magnolia S Parker, CRNP     • atorvastatin  40 mg Oral QPM Magnolia S Parker, CRNP     • busPIRone  10 mg Oral TID Magnolia S Parker, CRNP     • clonazePAM  0 25 mg Oral BID PRN Magnolia S Parker, CRNP     • enoxaparin  40 mg Subcutaneous Daily Magnolia S Parker, CRNP     • famotidine  20 mg Oral Daily Magnolia S Parker, CRNP     • latanoprost  1 drop Both Eyes HS Magnolia S Parker, CRNP     • QUEtiapine  25 mg Oral TID PRN Magnolia S Parker, CRNP     • QUEtiapine  50 mg Oral BID Magnolia S Parker, CRNP     • sertraline  100 mg Oral Daily Magnolia S Parker, CRNP     • timolol  1 drop Both Eyes BID Magnolia S Parker, CRNP     • valproic acid  250 mg Oral BID Magnolia S Parker, CRNP          Today, Patient Was Seen By: Becky Ledesma MD    **Please Note: This note may have been constructed using a voice recognition system  **

## 2023-05-30 NOTE — PROGRESS NOTES
Pt with limited po at this time, RN reports able to get pt to consume liquids though limited intake of solids  Weight actually with slight trend up per chart review  Deferred physical exam at this time due to combativeness, pt resting at this time  Unable to obtain diet hx  Will order mightyshake TID  Dysphagia diet per SLP without additional diet restrictions at this time

## 2023-05-30 NOTE — CONSULTS
TeleConsultation - [address] 76 y o  female MRN: [id number]  Unit/Bed#: -01 Encounter: [id number]        REQUIRED DOCUMENTATION:     1  This service was provided via Telemedicine  2  Provider located at [place]  3  TeleMed provider: Linda Galvan MD   4  Identify all parties in room with patient during tele consult:  nurse  5  Patient was then informed that this was a Telemedicine visit and that the exam was being conducted confidentially over secure lines  My office door was closed  No one else was in the room  Patient acknowledged consent and understanding of privacy and security of the Telemedicine visit, and gave us permission to have the assistant stay in the room in order to assist with the history and to conduct the exam   I informed the patient that I have reviewed their record in Epic and presented the opportunity for them to ask any questions regarding the visit today  The patient agreed to participate  Assessment/Plan     Principal Problem:    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)  Active Problems:    Status post CVA    Assessment:    Major Depressive Disorder, recurrent, severe with psychotic features  Generalized Anxiety Disorder  Dementia, Vascular Type    Treatment Plan:    Level of care Recommendation: Based on today's assessment and clinical criteria, Lyric Zayas posses a clear and present danger to self and others required Inpatient Psychiatric Hospitalization  Ermalene Pitcher has poor insight/judgment and unable to sign voluntarily and required involuntary commitment  Ermalene Pitcher  should be transferred to Inpatient  Psychiatric unit  after medical stabilization      Continue with one-to-one level of observation for patient's safety    Planned Medication Changes:    Continue with current medications    Current Medications:     Current Facility-Administered Medications   Medication Dose Route Frequency Provider Last Rate   • acetaminophen  650 mg Oral Q6H PRN Magnolia S Parker, CRNP     • acetaminophen  975 mg Oral BID Magnolia S Parker, CRNP     • aspirin  81 mg Oral Daily Magnolia S Parker, CRNP     • atorvastatin  40 mg Oral QPM Magnolia S Parker, CRNP     • busPIRone  10 mg Oral TID Magnolia S Parker, CRNP     • clonazePAM  0 25 mg Oral BID PRN Magnolia S Parker, CRNP     • enoxaparin  40 mg Subcutaneous Daily Magnolia S Parker, CRNP     • famotidine  20 mg Oral Daily Magnolia S Parker, CRNP     • latanoprost  1 drop Both Eyes HS Magnolia S Parker, CRNP     • QUEtiapine  25 mg Oral TID PRN Magnolia S Parker, CRNP     • QUEtiapine  50 mg Oral BID Magnolia S Parker, CRNP     • sertraline  100 mg Oral Daily Magnolia S Parker, CRNP     • timolol  1 drop Both Eyes BID Magnolia S Parker, CRNP     • valproic acid  250 mg Oral BID Magnolia S Parker, CRNP         Risks / Benefits of Treatment:    Patient does not verbalize understanding at this time and will require further explanation  Consults  Physician Requesting Consult: Torres Alvares MD  Principal Problem:Severe vascular dementia with psychotic disturbance [facility])    Reason for Consult: behavioral problems, impulsive behavior and difficulty with ADL's      History of Present Illness      Patient is a 76 y o  female with a history of Major Depressive Disorder and Dementia, Vascular Type who was admitted to the medical service on 5/28/2023 due to increase agitation and behavioral disturbance  Patient is a poor historian due to her dementia and during the evaluation she appeared to be very sedated  Most of the information was obtained from patient chart and nurse  Reportedly patient has history of diagnosis of vascular dementia with behavioral disturbance and was recently admitted to inpatient psychiatric facility and from there patient was discharged back to home  As per record patient's family member has been unable to care for patient due to increase agitation and combative behavior    As per nurse patient has been on/off extremely agitated and refusing to cooperate with the staff        Psychiatric Review Of Systems:     Sleep changes: yes  appetite changes: no  weight changes: no  anxiety/panic: no  fabby: no  self injurious behavior/risky behavior: no    Historical Information     Past Psychiatric History:     Psychiatric Hospitalizations: Multiple past inpatient psychiatric admissions Outpatient Treatment History: Psychotropic medication were prescribe by patient's PCP Suicide Attempts:  History of self-harm: No History of self injurious behavior was reported Violence History: History of verbally aggressive behavior was reported    Substance Abuse History:    E-Cigarette/Vaping   • E-Cigarette Use Never User           Social History     Tobacco History     Smoking Status  Every Day Smoking Frequency  0 25 packs/day Smoking Tobacco Type  Cigarettes    Smokeless Tobacco Use  Never          Alcohol History     Alcohol Use Status  Yes Comment  ocassional          Drug Use     Drug Use Status  Never          Sexual Activity     Sexually Active  Not Asked          Activities of Daily Living    Not Asked                     Family Psychiatric History:     Psychiatric Illness:  unknown      Social History:    Social History       Social History     Socioeconomic History   • Marital status: /Civil Union     Spouse name: Not on file   • Number of children: Not on file   • Years of education: Not on file   • Highest education level: Not on file   Occupational History   • Not on file   Tobacco Use   • Smoking status: Every Day     Packs/day: 0 25     Types: Cigarettes   • Smokeless tobacco: Never   Vaping Use   • Vaping Use: Never used   Substance and Sexual Activity   • Alcohol use: Yes     Comment: ocassional   • Drug use: Never   • Sexual activity: Not on file   Other Topics Concern   • Not on file   Social History Narrative   • Not on file     Social Determinants of Health     Financial Resource Strain: Not on file   Food Insecurity: No Food Insecurity (4/15/2023) Hunger Vital Sign    • Worried About Running Out of Food in the Last Year: Never true    • Ran Out of Food in the Last Year: Never true   Transportation Needs: No Transportation Needs (4/15/2023)    PRAPARE - Transportation    • Lack of Transportation (Medical): No    • Lack of Transportation (Non-Medical):  No   Physical Activity: Not on file   Stress: Not on file   Social Connections: Not on file   Intimate Partner Violence: Not on file   Housing Stability: Low Risk  (4/15/2023)    Housing Stability Vital Sign    • Unable to Pay for Housing in the Last Year: No    • Number of Places Lived in the Last Year: 1    • Unstable Housing in the Last Year: No         Traumatic History:     Abuse: Unknown        Past Medical History:    Past Medical History:   Diagnosis Date   • Dementia ([facility] [address] )    • No abnormality of hip joint detected on examination    • Stroke [facility])           Meds/Allergies     Allergies   Allergen Reactions   • Lorazepam Other (See Comments)     Increased agitation       all current active meds have been reviewed    Medical Review Of Systems:    Review of Systems      Objective     Vital signs in last 24 hours:  Temp:  [97 7 °F (36 5 °C)-97 9 °F (36 6 °C)] 97 9 °F (36 6 °C)  HR:  [57-65] 63  Resp:  [16-18] 16  BP: (102-152)/(54-91) 152/67      Intake/Output Summary (Last 24 hours) at 5/30/2023 1215  Last data filed at 5/30/2023 0500  Gross per 24 hour   Intake 360 ml   Output 843 ml   Net -483 ml       Mental Status Evaluation[de-identified]    Appearance disheveled   Behavior evasive   Speech non-verbal   Mood anxious, irritable   Affect constricted   Thought Processes organized, goal directed, patient is non-verbal   Associations patient is non-verbal   Thought Content no overt delusions, unable to asses   Perceptual Disturbances: no auditory hallucinations, no visual hallucinations, not observed   Abnormal Thoughts  Risk Potential Suicidal ideation - None, unable to assess  Homicidal ideation - None, unable to assess  Potential for aggression - Not at present   Orientation unable to assess   Memory patient is non-verbal   Consciousness sedated   Attention Span Concentration Span poor attention span  poor concentration   Intellect not formally assessed due to lack of cooperation   Insight impaired   Judgement impaired   Muscle Strength and  Gait No assessed   Motor Activity unable to assess today due to virtual visit   Language unable to assess writing today due to virtual visit                   Lab Results: I have personally reviewed all pertinent laboratory/tests results  Most Recent Labs:   Lab Results   Component Value Date    ALKPHOS 45 05/28/2023    ALT 10 05/28/2023    AST 23 05/28/2023    BUN 13 05/28/2023    CALCIUM 8 7 05/28/2023    CHOLESTEROL 79 04/15/2023     05/28/2023    CO2 27 05/28/2023    CREATININE 0 62 05/28/2023    HCT 37 0 05/28/2023    HDL 28 (L) 04/15/2023    HGB 11 6 05/28/2023    K 3 7 05/28/2023    LDLCALC 31 04/15/2023    NEUTROABS 8 09 (H) 05/28/2023     05/28/2023    RBC 3 77 (L) 05/28/2023    RDW 15 9 (H) 05/28/2023    SODIUM 140 05/28/2023    TBILI 0 54 05/28/2023    TP 6 3 (L) 05/28/2023    TRIG 100 04/15/2023    WBC 10 04 05/28/2023       Imaging Studies: CT head without contrast    Result Date: 5/28/2023  Narrative: CT BRAIN - WITHOUT CONTRAST INDICATION:   Headache, tension-type headache  COMPARISON: CT 4/24/2023 and priors TECHNIQUE:  CT examination of the brain was performed  Multiplanar 2D reformatted images were created from the source data  Radiation dose length product (DLP) for this visit:  855 mGy-cm   This examination, like all CT scans performed in the [facility], was performed utilizing techniques to minimize radiation dose exposure, including the use of iterative reconstruction and automated exposure control  IMAGE QUALITY:  Diagnostic   FINDINGS: PARENCHYMA: Decreased attenuation is noted in periventricular and subcortical white matter demonstrating an appearance that is statistically most likely to represent mild microangiopathic change  No CT signs of acute infarction  No intracranial mass, mass effect or midline shift  No acute parenchymal hemorrhage  Chronic infarct in the posterior right parietal and left occipital regions  Chronic basal ganglia lacunar infarcts  VENTRICLES AND EXTRA-AXIAL SPACES:  Normal for the patient's age  VISUALIZED ORBITS: Normal visualized orbits  PARANASAL SINUSES: Normal visualized paranasal sinuses  CALVARIUM AND EXTRACRANIAL SOFT TISSUES:  Normal      Impression: Chronic ischemic changes as above  No acute intracranial abnormality  Workstation performed: [id number]     XR ABDOMEN 1 VW PORTABLE    Result Date: 5/26/2023  Narrative: Exam: Abdomen x-ray Clinical indication: Gastric obstruction Comparison: None Technique: Supine abdomen Findings: Portable supine examination of the abdomen demonstrates contrast material seen throughout the colon to the rectosigmoid, residual from the patient's previous upper GI series  A few air-filled loops of small bowel are evident  The patient is status post cholecystectomy  Marked degenerative changes and dextroscoliosis of the lumbar spine is noted  Left hip prosthesis is evident  Heterotopic ossification is seen in the region bilateral hips        Impression: Impression: Residual contrast within the colon to the rectosigmoid  A few air-filled loops of small bowel  No evidence of obstruction  Status post cholecystectomy  Workstation:[id number]    XR CHEST 1 VW PORTABLE    Result Date: 5/26/2023  Narrative: Exam: Chest 1 view: Clinical indication:? Aspiration Comparison: None Technique: A single view of the chest was obtained  Findings: Portable AP erect view of the chest without previous chest x-rays available for comparison demonstrates obscuration of the left hemidiaphragm consistent left lower lobe infiltrate/atelectasis   Accompanying left-sided pleural effusion cannot be excluded  Right infrahilar infiltrate is evident as well  Minimal blunting of the right costophrenic angle ? small right-sided pleural effusion versus pleural thickening  The heart size is within normal limits  Uncoiling of thoracic aorta is noted  Degenerative changes seen involving the thoracic spine  Calcific tendinitis left shoulder is evident    Residual contrast is seen within the colon none  The patient is status post cholecystectomy  Impression: Impression: Obscuration left hemidiaphragm consistent left lower lobe infiltrate/atelectasis  A left-sided pleural effusion cannot be excluded  Right infrahilar infiltrate  ? small right-sided pleural effusion  Workstation:[id number]    FLUOROSCOPY UPPER GI WO AIR    Result Date: 5/20/2023  Narrative: History: 75-year-old female, evaluate passage of contrast through the gastric pylorus  Comparison studies: CT scan of the abdomen and pelvis performed on 5/19/2023 Fluoroscopic time: 58 seconds Radiation dose: 20 mGy (CAK) Technique: First, a  view of the abdomen was obtained  Then, an upper gastrointestinal exam was performed utilizing fluoroscopic assistance  : Small amount of residual contrast from recent CT scan is visualized in the colon  Nasogastric tube eventually placed with its tip in the stomach  There is a very large hiatal hernia  The lungs are clear  Cholecystectomy clips are in place  Left hip prosthesis is in place  Upper GI series: Water-soluble contrast was hand injected through the patient's nasogastric tube into the stomach  The stomach is entirely located within the lower chest  Contrast is visualized within the distal esophagus and progresses into the stomach  Contrast progresses normally through the gastric pylorus into the duodenum which is located within the right upper quadrant  Impression: Impression: Contrast progresses normally through the gastric pylorus into the duodenum  Very large hiatal hernia containing the entire stomach  The duodenum is located normally within the upper abdomen  Workstation:[id number]    CTA abdomen pelvis w wo contrast    Result Date: 5/19/2023  Narrative: PROCEDURE INFORMATION: Exam: CT Abdomen And Pelvis With Contrast Exam date and time: 5/19/2023 3:28 AM Age: 76years old Clinical indication: Nausea and vomiting; Additional info: Abdominal abscess/infection suspected TECHNIQUE: Imaging protocol: Computed tomography of the abdomen and pelvis with contrast  Radiation optimization: All CT scans at this facility use at least one of these dose optimization techniques: automated exposure control; mA and/or kV adjustment per patient size (includes targeted exams where dose is matched to clinical indication); or iterative reconstruction  Contrast material: VISI 320; Contrast volume: 80 ml; Contrast route: INTRAVENOUS (IV);   REPORTING DATA: Count of CT and Cardiac NM exams in prior 12 months: This patient has received 1 known CT and 0 known cardiac nuclear medicine studies in the 12 months prior to the current study  COMPARISON: No relevant prior studies available  FINDINGS: Lungs: Carollynn Hedger is minor infiltrate in the dependent left lower lobe  No acute airspace disease at right base  Pleural spaces: Trace right pleural effusion  Diaphragm: Huge hiatal hernia  The stomach is almost completely intrathoracic  It is full of fluid and ingested food  The gastric wall appears to be diffusely thickened  Liver:  Normal Gallbladder and bile ducts:  Surgical absence of the gallbladder  There is no bile duct dilatation  Pancreas: Normal  Spleen: Normal  Adrenal glands: Normal  Kidneys and ureters:  4 5 cm left lateral lower polar renal cyst   No suspicious renal mass on either side   No hydronephrosis  Stomach and bowel: There is no dilatation of the duodenum or small bowel  The colon contains gas, fluid and multiple clumps of barium  There is no convincing colon wall thickening  Appendix: The appendix is not clearly visualized  Intraperitoneal space: There is no ascites  No free air  Vasculature:  No aortic or iliac artery aneurysms  Lymph nodes: Unremarkable  No enlarged lymph nodes  Urinary bladder: What is seen of the bladder is normal  Streak artifact from left hip hardware limits assessment of the pelvic structures  Reproductive: The uterus is atrophic  Neither ovary is clearly visualized  Bones/joints: There are hypertrophic changes in the thoracic spine  There are a few old compression deformities including an old fracture of L1  Soft tissues: Unremarkable abdominal wall  Impression: IMPRESSION: 1  Minor infiltrate at the left base  2  Almost completely intrathoracic stomach  The intrathoracic portion is full of fluid and appears slightly thick-walled  3  No bowel obstruction or other acute GI tract abnormality   Assessment of the colon is limited by the presence of multiple clumps of barium in the lumen  4  4 5 cm left renal cyst   No obstructive uropathy   The bladder is grossly normal        FL barium swallow video w speech    Result Date: 5/16/2023  Narrative: Modified barium swallow, with speech therapy Indication: Dysphagia  TECHNIQUE:  A videofluoroscopic swallowing study was performed by the Department of Speech and Hearing Services in conjunction with Department of Radiology  Fluoroscopic time: 5 0 minutes        Fluoroscopic Dose: 24 36 mGy (CAK) FINDINGS: Noted was laryngeal penetration with thin liquid  No aspiration  (See the speech therapist's notes for further detail )    Impression: IMPRESSION:  Laryngeal penetration with thin liquid  No aspiration  Workstation:[id number]    CT head wo contrast    Result Date: 5/13/2023  Narrative: PROCEDURE INFORMATION: Exam: CT Head Without Contrast Exam date and time: 5/13/2023 2:11 PM Age: 76years old Clinical indication: Condition or disease; Other: Stroke; Other: Aggression;  Additional info: Stroke, follow up TECHNIQUE: Imaging protocol: Computed tomography of the head without contrast  Radiation optimization: All CT scans at this facility use at least one of these dose optimization techniques: automated exposure control; mA and/or kV adjustment per patient size (includes targeted exams where dose is matched to clinical indication); or iterative reconstruction  REPORTING DATA: Count of CT and Cardiac NM exams in prior 12 months: This patient has received 0 known CTs and 0 known cardiac nuclear medicine studies in the 12 months prior to the current study  COMPARISON: No relevant prior studies available  FINDINGS: Brain: There are chronic left posterior mesial temporal and occipital chronic right parietooccipital infarcts  There is no acute intracranial hemorrhage  There is lucency in the cerebral white matter, likely microvascular disease although non-specific  No evidence of mass  There is no mass effect or midline shift  Tabitha Pontiff white differentiation is intact  There are no extra-axial fluid collections  Cerebral ventricles: The ventricles and sulci are enlarged, consistent with volume loss / atrophy  No hydrocephalus  Paranasal sinuses: Visualized sinuses are unremarkable  No fluid levels  Mastoid air cells:  No significant mastoid effusion  Bones/joints:  No acute fracture  Soft tissues: Unremarkable as visualized  Vasculature: There is vascular calcification  Impression: IMPRESSION: 1    No evidence of acute intracranial abnormality  No evidence of acute infarction, hemorrhage, or mass  2    Atrophy and microvascular disease  EKG/Pathology/Other Studies:   Lab Results   Component Value Date    ATRIALRATE 84 04/24/2023    PAXIS 72 04/24/2023    PRINT 154 04/24/2023    QRSAXIS 70 04/24/2023    QRSDINT 102 04/24/2023    QTCINT 470 04/24/2023    QTINT 398 04/24/2023    TWAVEAXIS 51 04/24/2023    VENTRATE 84 04/24/2023        Code Status: Level 3 - DNAR and DNI  Advance Directive and Living Will:      Power of :    POLST:      Screenings:    1   Nutrition Screening  Nutrition Assessment (completed by Staff): Nutrition  Feeding: Total assist  Diet Type: Dysphagia II, Nectar thick liquids    2  Pain Screening  Pain Screening: Pain Assessment  Pain Assessment Tool: FLACC  Ray-Rincon FACES Pain Rating: No hurt  Patient's Stated Pain Goal: No pain  Multiple Pain Sites: No  Pain Rating: FLACC (Rest) - Face: No particular expression or smile  Pain Rating: FLACC (Rest) - Legs: Normal position or relaxed  Pain Rating: FLACC (Rest) - Activity: Lying quietly, normal position, moves easily  Pain Rating: FLACC (Rest) - Cry: No cry (awake or asleep)  Pain Rating: FLACC (Rest) - Consolability: Content, relaxed  Score: FLACC (Rest): 0    3  Suicide Screening  ED Crisis Suicide Risk Assessment:    Not available    Counseling / Coordination of Care: Total floor / unit time spent today 30 minutes  Greater than 50% of total time was spent with the patient and / or family counseling and / or coordination of care   A description of the counseling / coordination of care: Direct Patient Care, Chart Review, and Documentation

## 2023-05-30 NOTE — ASSESSMENT & PLAN NOTE
· Patient discharged from outside hospital earlier in day and 5/28 after admission for vomiting  · Evaluated by psychiatry as she was transferred from the behavioral health unit to [facility] for the vomiting, son was requesting hospice and took the patient home with 24/7 care provided by family while awaiting services to be arranged  Review of the discharge summary the internal medicine team and psychiatry team voiced their concerns regarding the son taking the patient home but he was adamant  · Patient was brought to this facility on the same day as discharge from previous facility    Son stated to ER staff he cannot care for patient at home  · Admitted to the medical service for safe disposition pending case management evaluation for inpatient placement  · Continue PTA medications Zoloft, Seroquel, Depakene, BuSpar  · Extreme agitation and noncooperative with staff in ED, was given Versed and Zyprexa in ED  · Agitated last night required seroquel - await psych to see currently sleeping   · Continue to monitor  · Psychiatry consult placed for assistance with placement  · ua neg for infection  · Ct brain neg for acute abnormality

## 2023-05-30 NOTE — PLAN OF CARE
Problem: MOBILITY - ADULT  Goal: Maintain or return to baseline ADL function  Description: INTERVENTIONS:  -  Assess patient's ability to carry out ADLs; assess patient's baseline for ADL function and identify physical deficits which impact ability to perform ADLs (bathing, care of mouth/teeth, toileting, grooming, dressing, etc )  - Assess/evaluate cause of self-care deficits   - Assess range of motion  - Assess patient's mobility; develop plan if impaired  - Assess patient's need for assistive devices and provide as appropriate  - Encourage maximum independence but intervene and supervise when necessary  - Involve family in performance of ADLs  - Assess for home care needs following discharge   - Consider OT consult to assist with ADL evaluation and planning for discharge  - Provide patient education as appropriate  Outcome: Progressing     Problem: PAIN - ADULT  Goal: Verbalizes/displays adequate comfort level or baseline comfort level  Description: Interventions:  - Encourage patient to monitor pain and request assistance  - Assess pain using appropriate pain scale  - Administer analgesics based on type and severity of pain and evaluate response  - Implement non-pharmacological measures as appropriate and evaluate response  - Consider cultural and social influences on pain and pain management  - Notify physician/advanced practitioner if interventions unsuccessful or patient reports new pain  Outcome: Progressing     Problem: INFECTION - ADULT  Goal: Absence or prevention of progression during hospitalization  Description: INTERVENTIONS:  - Assess and monitor for signs and symptoms of infection  - Monitor lab/diagnostic results  - Monitor all insertion sites, i e  indwelling lines, tubes, and drains  - Monitor endotracheal if appropriate and nasal secretions for changes in amount and color  - [place] appropriate cooling/warming therapies per order  - Administer medications as ordered  - Instruct and encourage patient and family to use good hand hygiene technique  - Identify and instruct in appropriate isolation precautions for identified infection/condition  Outcome: Progressing     Problem: SAFETY ADULT  Goal: Maintain or return to baseline ADL function  Description: INTERVENTIONS:  -  Assess patient's ability to carry out ADLs; assess patient's baseline for ADL function and identify physical deficits which impact ability to perform ADLs (bathing, care of mouth/teeth, toileting, grooming, dressing, etc )  - Assess/evaluate cause of self-care deficits   - Assess range of motion  - Assess patient's mobility; develop plan if impaired  - Assess patient's need for assistive devices and provide as appropriate  - Encourage maximum independence but intervene and supervise when necessary  - Involve family in performance of ADLs  - Assess for home care needs following discharge   - Consider OT consult to assist with ADL evaluation and planning for discharge  - Provide patient education as appropriate  Outcome: Progressing

## 2023-05-30 NOTE — PLAN OF CARE
Problem: MOBILITY - ADULT  Goal: Maintain or return to baseline ADL function  Description: INTERVENTIONS:  -  Assess patient's ability to carry out ADLs; assess patient's baseline for ADL function and identify physical deficits which impact ability to perform ADLs (bathing, care of mouth/teeth, toileting, grooming, dressing, etc )  - Assess/evaluate cause of self-care deficits   - Assess range of motion  - Assess patient's mobility; develop plan if impaired  - Assess patient's need for assistive devices and provide as appropriate  - Encourage maximum independence but intervene and supervise when necessary  - Involve family in performance of ADLs  - Assess for home care needs following discharge   - Consider OT consult to assist with ADL evaluation and planning for discharge  - Provide patient education as appropriate  Outcome: Progressing  Goal: Maintains/Returns to pre admission functional level  Description: INTERVENTIONS:  - Perform BMAT or MOVE assessment daily    - Set and communicate daily mobility goal to care team and patient/family/caregiver  - Collaborate with rehabilitation services on mobility goals if consulted  - Perform Range of Motion 2 times a day  - Reposition patient every 2 hours    - Dangle patient 2 times a day  - Stand patient 2 times a day  - Ambulate patient 2 times a day  - Out of bed to chair 2 times a day   - Out of bed for meals 2 times a day  - Out of bed for toileting  - Record patient progress and toleration of activity level   Outcome: Progressing     Problem: PAIN - ADULT  Goal: Verbalizes/displays adequate comfort level or baseline comfort level  Description: Interventions:  - Encourage patient to monitor pain and request assistance  - Assess pain using appropriate pain scale  - Administer analgesics based on type and severity of pain and evaluate response  - Implement non-pharmacological measures as appropriate and evaluate response  - Consider cultural and social influences on pain and pain management  - Notify physician/advanced practitioner if interventions unsuccessful or patient reports new pain  Outcome: Progressing     Problem: INFECTION - ADULT  Goal: Absence or prevention of progression during hospitalization  Description: INTERVENTIONS:  - Assess and monitor for signs and symptoms of infection  - Monitor lab/diagnostic results  - Monitor all insertion sites, i e  indwelling lines, tubes, and drains  - Monitor endotracheal if appropriate and nasal secretions for changes in amount and color  - [place] appropriate cooling/warming therapies per order  - Administer medications as ordered  - Instruct and encourage patient and family to use good hand hygiene technique  - Identify and instruct in appropriate isolation precautions for identified infection/condition  Outcome: Progressing  Goal: Absence of fever/infection during neutropenic period  Description: INTERVENTIONS:  - Monitor WBC    Outcome: Progressing     Problem: SAFETY ADULT  Goal: Maintain or return to baseline ADL function  Description: INTERVENTIONS:  -  Assess patient's ability to carry out ADLs; assess patient's baseline for ADL function and identify physical deficits which impact ability to perform ADLs (bathing, care of mouth/teeth, toileting, grooming, dressing, etc )  - Assess/evaluate cause of self-care deficits   - Assess range of motion  - Assess patient's mobility; develop plan if impaired  - Assess patient's need for assistive devices and provide as appropriate  - Encourage maximum independence but intervene and supervise when necessary  - Involve family in performance of ADLs  - Assess for home care needs following discharge   - Consider OT consult to assist with ADL evaluation and planning for discharge  - Provide patient education as appropriate  Outcome: Progressing  Goal: Maintains/Returns to pre admission functional level  Description: INTERVENTIONS:  - Perform BMAT or MOVE assessment daily    - Set and communicate daily mobility goal to care team and patient/family/caregiver  - Collaborate with rehabilitation services on mobility goals if consulted  - Perform Range of Motion 2 times a day  - Reposition patient every 2 hours    - Dangle patient 2 times a day  - Stand patient 2 times a day  - Ambulate patient 2 times a day  - Out of bed to chair 2 times a day   - Out of bed for meals 2 times a day  - Out of bed for toileting  - Record patient progress and toleration of activity level   Outcome: Progressing  Goal: Patient will remain free of falls  Description: INTERVENTIONS:  - Educate patient/family on patient safety including physical limitations  - Instruct patient to call for assistance with activity   - Consult OT/PT to assist with strengthening/mobility   - Keep Call bell within reach  - Keep bed low and locked with side rails adjusted as appropriate  - Keep care items and personal belongings within reach  - Initiate and maintain comfort rounds  - Make Fall Risk Sign visible to staff  - Offer Toileting every 3 Hours, in advance of need  - Initiate/Maintain bed alarm  - Apply yellow socks and bracelet for high fall risk patients  - Consider moving patient to room near nurses station  Outcome: Progressing

## 2023-05-31 ENCOUNTER — APPOINTMENT (INPATIENT)
Dept: RADIOLOGY | Facility: HOSPITAL | Age: 78
DRG: 884 | End: 2023-05-31
Payer: MEDICARE

## 2023-05-31 PROBLEM — G93.40 ENCEPHALOPATHY ACUTE: Status: ACTIVE | Noted: 2023-05-31

## 2023-05-31 LAB
AMMONIA PLAS-SCNC: 19 UMOL/L (ref 18–72)
ANION GAP SERPL CALCULATED.3IONS-SCNC: 3 MMOL/L (ref 4–13)
BUN SERPL-MCNC: 15 MG/DL (ref 5–25)
CALCIUM SERPL-MCNC: 8.4 MG/DL (ref 8.4–10.2)
CHLORIDE SERPL-SCNC: 109 MMOL/L (ref 96–108)
CO2 SERPL-SCNC: 31 MMOL/L (ref 21–32)
CREAT SERPL-MCNC: 0.61 MG/DL (ref 0.6–1.3)
GFR SERPL CREATININE-BSD FRML MDRD: 86 ML/MIN/1.73SQ M
GLUCOSE SERPL-MCNC: 75 MG/DL (ref 65–140)
POTASSIUM SERPL-SCNC: 3.4 MMOL/L (ref 3.5–5.3)
SODIUM SERPL-SCNC: 143 MMOL/L (ref 135–147)
VALPROATE SERPL-MCNC: 52 UG/ML (ref 50–100)

## 2023-05-31 PROCEDURE — NC001 PR NO CHARGE: Performed by: NURSE PRACTITIONER

## 2023-05-31 PROCEDURE — 73502 X-RAY EXAM HIP UNI 2-3 VIEWS: CPT

## 2023-05-31 PROCEDURE — 80164 ASSAY DIPROPYLACETIC ACD TOT: CPT | Performed by: FAMILY MEDICINE

## 2023-05-31 PROCEDURE — 80048 BASIC METABOLIC PNL TOTAL CA: CPT | Performed by: FAMILY MEDICINE

## 2023-05-31 PROCEDURE — 82140 ASSAY OF AMMONIA: CPT | Performed by: FAMILY MEDICINE

## 2023-05-31 PROCEDURE — 99232 SBSQ HOSP IP/OBS MODERATE 35: CPT | Performed by: FAMILY MEDICINE

## 2023-05-31 RX ORDER — QUETIAPINE FUMARATE 25 MG/1
50 TABLET, FILM COATED ORAL
Status: DISCONTINUED | OUTPATIENT
Start: 2023-05-31 | End: 2023-06-02

## 2023-05-31 RX ORDER — ACETAMINOPHEN 325 MG/1
650 TABLET ORAL EVERY 8 HOURS PRN
Status: DISCONTINUED | OUTPATIENT
Start: 2023-05-31 | End: 2023-06-14 | Stop reason: HOSPADM

## 2023-05-31 RX ORDER — SODIUM CHLORIDE AND POTASSIUM CHLORIDE 150; 450 MG/100ML; MG/100ML
75 INJECTION, SOLUTION INTRAVENOUS CONTINUOUS
Status: DISCONTINUED | OUTPATIENT
Start: 2023-05-31 | End: 2023-05-31

## 2023-05-31 RX ORDER — QUETIAPINE FUMARATE 25 MG/1
12.5 TABLET, FILM COATED ORAL 3 TIMES DAILY PRN
Status: DISCONTINUED | OUTPATIENT
Start: 2023-05-31 | End: 2023-06-02

## 2023-05-31 RX ORDER — ACETAMINOPHEN 325 MG/1
650 TABLET ORAL EVERY 8 HOURS PRN
Status: DISCONTINUED | OUTPATIENT
Start: 2023-05-31 | End: 2023-05-31

## 2023-05-31 RX ORDER — BUSPIRONE HYDROCHLORIDE 5 MG/1
5 TABLET ORAL 3 TIMES DAILY
Status: DISCONTINUED | OUTPATIENT
Start: 2023-05-31 | End: 2023-06-03

## 2023-05-31 RX ADMIN — CLONAZEPAM 0.25 MG: 0.5 TABLET ORAL at 22:57

## 2023-05-31 RX ADMIN — ASPIRIN 81 MG: 81 TABLET, COATED ORAL at 11:00

## 2023-05-31 RX ADMIN — VALPROIC ACID 250 MG: 250 SOLUTION ORAL at 17:58

## 2023-05-31 RX ADMIN — FAMOTIDINE 20 MG: 20 TABLET ORAL at 11:00

## 2023-05-31 RX ADMIN — ENOXAPARIN SODIUM 40 MG: 40 INJECTION SUBCUTANEOUS at 11:00

## 2023-05-31 RX ADMIN — ATORVASTATIN CALCIUM 40 MG: 40 TABLET, FILM COATED ORAL at 17:57

## 2023-05-31 RX ADMIN — QUETIAPINE FUMARATE 50 MG: 25 TABLET ORAL at 21:03

## 2023-05-31 RX ADMIN — POTASSIUM CHLORIDE AND SODIUM CHLORIDE 75 ML/HR: 450; 150 INJECTION, SOLUTION INTRAVENOUS at 13:00

## 2023-05-31 RX ADMIN — TIMOLOL MALEATE 1 DROP: 5 SOLUTION OPHTHALMIC at 11:01

## 2023-05-31 RX ADMIN — SERTRALINE 100 MG: 100 TABLET, FILM COATED ORAL at 11:01

## 2023-05-31 RX ADMIN — BUSPIRONE HYDROCHLORIDE 5 MG: 5 TABLET ORAL at 21:03

## 2023-05-31 RX ADMIN — VALPROIC ACID 250 MG: 250 SOLUTION ORAL at 11:01

## 2023-05-31 RX ADMIN — TIMOLOL MALEATE 1 DROP: 5 SOLUTION OPHTHALMIC at 17:58

## 2023-05-31 RX ADMIN — BUSPIRONE HYDROCHLORIDE 5 MG: 5 TABLET ORAL at 17:00

## 2023-05-31 NOTE — NURSING NOTE
Clarified with Dr Socorro Munson that patient does not need 1:1 for safety as mentioned in psych progress note  Pt confused, but not a safety risk  Pt in room near nurses station with bed alarm on and functioning  This RN sent tiger text to Dr Bettye David for clarification but did not receive response, therefore reached out to primary provider

## 2023-05-31 NOTE — ASSESSMENT & PLAN NOTE
· Patient discharged from outside hospital earlier in day and 5/28 after admission for vomiting  · Evaluated by psychiatry as she was transferred from the behavioral health unit to [facility] for the vomiting, son was requesting hospice and took the patient home with 24/7 care provided by family while awaiting services to be arranged  Review of the discharge summary the internal medicine team and psychiatry team voiced their concerns regarding the son taking the patient home but he was adamant  · Patient was brought to this facility on the same day as discharge from previous facility    Son stated to ER staff he cannot care for patient at home  · Evaluated by psychiatry on 5/30 and deemed to lack appropriate judgement and is danger to self and needs inpatient psychiatric treatment, patient with advanced dementia do not think will be able to sign 201- will need to be 302- will hold off on today will adjust her meds to keep more awake and ensure she is eating and her electrolytes ok prior to any psych clearance

## 2023-05-31 NOTE — ASSESSMENT & PLAN NOTE
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

## 2023-05-31 NOTE — ASSESSMENT & PLAN NOTE
· On severe vascular dementia POA initially was psychotic features- but since seeing her yesterday and today she is somnolent poor po intake will wake up to name and then falls asleep  · Ct brain neg   · No infection evident on admission  · depakote level checked nml therapeutic   · No prns overnight   · Decrease buspar to 5mg po tid , decrease seroquel to once a day at hs 50 mg from 50 mg po bid , ensure sleep wake cycle , reorient   · Will check ammonia level and vitamin b12  · tsh nml in beg of may 5/19  · Repeat labs in am   · Sodium trending up as poor po intake start 1/2 nss with potassium as also low at 75 cc/her for now for 10 hrs

## 2023-05-31 NOTE — PHYSICAL THERAPY NOTE
PHYSICAL THERAPY NOTE          Patient Name: Jay VALLECILLO Date: 5/31/2023 05/31/23 1502   Note Type   Note type Evaluation; Cancelled Session   Cancel Reasons Medical status     Chart reviewed  Patient planned to return to inpatient psych  Pt is not appropriate to participate in therapy services due to behavior  Will continue to follow and see if/when appropriate to participate       Yuli Pizarro, PT,DPT

## 2023-05-31 NOTE — PROGRESS NOTES
[address] [facility]  Progress Note  Name: Marilyn Xavier  MRN: [id number]  Unit/Bed#: -01 I Date of Admission: 5/28/2023   Date of Service: 5/31/2023 I Hospital Day: 3    Assessment/Plan   Severe vascular dementia with psychotic disturbance [facility])  Assessment & Plan  · Patient discharged from outside hospital earlier in day and 5/28 after admission for vomiting  · Evaluated by psychiatry as she was transferred from the behavioral health unit to tertiary [facility] center for the vomiting, son was requesting hospice and took the patient home with 24/7 care provided by family while awaiting services to be arranged  Review of the discharge summary the internal medicine team and psychiatry team voiced their concerns regarding the son taking the patient home but he was adamant  · Patient was brought to this facility on the same day as discharge from previous facility    Son stated to ER staff he cannot care for patient at home  · Evaluated by psychiatry on 5/30 and deemed to lack appropriate judgement and is danger to self and needs inpatient psychiatric treatment, patient with advanced dementia do not think will be able to sign 201- will need to be 302- will hold off on today will adjust her meds to keep more awake and ensure she is eating and her electrolytes ok prior to any psych clearance     * Encephalopathy acute  Assessment & Plan  · On severe vascular dementia POA initially was psychotic features- but since seeing her yesterday and today she is somnolent poor po intake will wake up to name and then falls asleep  · Ct brain neg   · No infection evident on admission  · depakote level checked nml therapeutic   · No prns overnight   · Decrease buspar to 5mg po tid , decrease seroquel to once a day at hs 50 mg from 50 mg po bid , ensure sleep wake cycle , reorient   · Will check ammonia level and vitamin b12  · tsh nml in beg of may 5/19  · Repeat labs in am   · Sodium trending up as poor po intake start 1/2 nss with potassium as also low at 75 cc/her for now for 10 hrs     Status post CVA  Assessment & Plan  · S/p right MCA stroke 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 and lower extremity weakness and dysphagia  · Was discharged to SNF then sent to inpatient geropsych, unclear extent of physical rehabilitation patient received or was amenable to participate  · Continue puréed diet with nectar thick liquids  · Appreciate PT/OT/case management- needs inpatient psych   · Continue aspirin and statin             VTE Pharmacologic Prophylaxis:   Moderate Risk (Score 3-4) - Pharmacological DVT Prophylaxis Ordered: enoxaparin (Lovenox)  Patient Centered Rounds: I performed bedside rounds with nursing staff today  Discussions with Specialists or Other Care Team Provider: cm    Education and Discussions with Family / Patient: pt too somnolent to discuss will update family   Total Time Spent on Date of Encounter in care of patient: 35 minutes This time was spent on one or more of the following: performing physical exam; counseling and coordination of care; obtaining or reviewing history; documenting in the medical record; reviewing/ordering tests, medications or procedures; communicating with other healthcare professionals and discussing with patient's family/caregivers  Current Length of Stay: 3 day(s)  Current Patient Status: Inpatient   Certification Statement: The patient will continue to require additional inpatient hospital stay due to dementia   Discharge Plan: Anticipate discharge in 24-48 hrs to inpatient psych  Code Status: Level 3 - DNAR and DNI    Subjective:   Seen and examined breakfast tray not touched to somnolent opens her eyes to her name and closes cant really speak clear mumbles due to somnolence   No prn used last night    Objective:     Vitals:   Temp (24hrs), Av 1 °F (36 7 °C), Min:97 7 °F (36 5 °C), Max:98 6 °F (37 °C)    Temp:  [97 7 °F (36 5 °C)-98 6 °F (37 °C)] 97 7 °F (36 5 °C)  HR:  [65-81] 65  Resp:  [16] 16  BP: (102-125)/(54-67) 125/54  SpO2:  [96 %-98 %] 96 %  Body mass index is 23 58 kg/m²  Input and Output Summary (last 24 hours): Intake/Output Summary (Last 24 hours) at 5/31/2023 1042  Last data filed at 5/30/2023 1738  Gross per 24 hour   Intake --   Output 238 ml   Net -238 ml       Physical Exam:   Physical Exam  Vitals and nursing note reviewed  Constitutional:       General: She is not in acute distress  Appearance: She is well-developed  Comments: NAD   HENT:      Head: Normocephalic and atraumatic  Eyes:      Conjunctiva/sclera: Conjunctivae normal    Cardiovascular:      Rate and Rhythm: Normal rate and regular rhythm  Heart sounds: No murmur heard  Pulmonary:      Effort: Pulmonary effort is normal  No respiratory distress  Breath sounds: Normal breath sounds  No wheezing or rales  Abdominal:      General: There is no distension  Palpations: Abdomen is soft  Tenderness: There is no abdominal tenderness  Musculoskeletal:         General: No swelling  Cervical back: Neck supple  Skin:     General: Skin is warm and dry  Capillary Refill: Capillary refill takes less than 2 seconds     Neurological:      Comments: Somnolent mumbles when opens her eyes to her name called    Psychiatric:         Mood and Affect: Mood normal           Additional Data:     Labs:  Results from last 7 days   Lab Units 05/28/23  1817   EOS PCT % 2   HEMATOCRIT % 37 0   HEMOGLOBIN g/dL 11 6   LYMPHS PCT % 9*   MONOS PCT % 8   NEUTROS PCT % 80*   PLATELETS Thousands/uL 194   WBC Thousand/uL 10 04     Results from last 7 days   Lab Units 05/31/23  0435 05/28/23  1817   ANION GAP mmol/L 3* 7   ALBUMIN g/dL  --  2 9*   ALK PHOS U/L  --  45   ALT U/L  --  10   AST U/L  --  23   BUN mg/dL 15 13   CALCIUM mg/dL 8 4 8 7   CHLORIDE mmol/L 109* 106   CO2 mmol/L 31 27   CREATININE mg/dL 0 61 0 62   GLUCOSE RANDOM mg/dL 75 93   POTASSIUM mmol/L 3 4* 3 7 SODIUM mmol/L 143 140   TOTAL BILIRUBIN mg/dL  --  0 54                       Lines/Drains:  Invasive Devices     Peripheral Intravenous Line  Duration           Peripheral IV 05/28/23 Left Antecubital 2 days                      Imaging: Reviewed radiology reports from this admission including: CT head    Recent Cultures (last 7 days):         Last 24 Hours Medication List:   Current Facility-Administered Medications   Medication Dose Route Frequency Provider Last Rate   • acetaminophen  650 mg Oral Q8H PRN Elida Pope MD     • aspirin  81 mg Oral Daily Magnolia S Parker, CRNP     • atorvastatin  40 mg Oral QPM Magnolia S Parker, CRNP     • busPIRone  5 mg Oral TID Elida Pope MD     • clonazePAM  0 25 mg Oral BID PRN Magnolia S Parker, CRNP     • enoxaparin  40 mg Subcutaneous Daily Magnolai S Parker, CRNP     • famotidine  20 mg Oral Daily Magnolia S Parker, CRNP     • latanoprost  1 drop Both Eyes HS Magnolia S Parker, CRNP     • QUEtiapine  12 5 mg Oral TID PRN Eliad Pope MD     • QUEtiapine  50 mg Oral HS Elida Pope MD     • sertraline  100 mg Oral Daily Magnolia S Parker, CRNP     • sodium chloride 0 45 % with KCl 20 mEq/L  75 mL/hr Intravenous Continuous Elida Pope MD     • timolol  1 drop Both Eyes BID Magnolia S Parker, CRNP     • valproic acid  250 mg Oral BID Magnolia S Parker, CRNP          Today, Patient Was Seen By: Elida Pope MD    **Please Note: This note may have been constructed using a voice recognition system  **

## 2023-05-31 NOTE — PLAN OF CARE
Problem: MOBILITY - ADULT  Goal: Maintain or return to baseline ADL function  Description: INTERVENTIONS:  -  Assess patient's ability to carry out ADLs; assess patient's baseline for ADL function and identify physical deficits which impact ability to perform ADLs (bathing, care of mouth/teeth, toileting, grooming, dressing, etc )  - Assess/evaluate cause of self-care deficits   - Assess range of motion  - Assess patient's mobility; develop plan if impaired  - Assess patient's need for assistive devices and provide as appropriate  - Encourage maximum independence but intervene and supervise when necessary  - Involve family in performance of ADLs  - Assess for home care needs following discharge   - Consider OT consult to assist with ADL evaluation and planning for discharge  - Provide patient education as appropriate  Outcome: Progressing  Goal: Maintains/Returns to pre admission functional level  Description: INTERVENTIONS:  - Perform BMAT or MOVE assessment daily    - Set and communicate daily mobility goal to care team and patient/family/caregiver  - Collaborate with rehabilitation services on mobility goals if consulted  - Perform Range of Motion 2 times a day  - Reposition patient every 2 hours    - Dangle patient 2 times a day  - Stand patient 2 times a day  - Ambulate patient 2 times a day  - Out of bed to chair 2 times a day   - Out of bed for meals 2 times a day  - Out of bed for toileting  - Record patient progress and toleration of activity level   Outcome: Progressing     Problem: PAIN - ADULT  Goal: Verbalizes/displays adequate comfort level or baseline comfort level  Description: Interventions:  - Encourage patient to monitor pain and request assistance  - Assess pain using appropriate pain scale  - Administer analgesics based on type and severity of pain and evaluate response  - Implement non-pharmacological measures as appropriate and evaluate response  - Consider cultural and social influences on pain and pain management  - Notify physician/advanced practitioner if interventions unsuccessful or patient reports new pain  Outcome: Progressing     Problem: INFECTION - ADULT  Goal: Absence or prevention of progression during hospitalization  Description: INTERVENTIONS:  - Assess and monitor for signs and symptoms of infection  - Monitor lab/diagnostic results  - Monitor all insertion sites, i e  indwelling lines, tubes, and drains  - Monitor endotracheal if appropriate and nasal secretions for changes in amount and color  - [place] appropriate cooling/warming therapies per order  - Administer medications as ordered  - Instruct and encourage patient and family to use good hand hygiene technique  - Identify and instruct in appropriate isolation precautions for identified infection/condition  Outcome: Progressing  Goal: Absence of fever/infection during neutropenic period  Description: INTERVENTIONS:  - Monitor WBC    Outcome: Progressing     Problem: SAFETY ADULT  Goal: Maintain or return to baseline ADL function  Description: INTERVENTIONS:  -  Assess patient's ability to carry out ADLs; assess patient's baseline for ADL function and identify physical deficits which impact ability to perform ADLs (bathing, care of mouth/teeth, toileting, grooming, dressing, etc )  - Assess/evaluate cause of self-care deficits   - Assess range of motion  - Assess patient's mobility; develop plan if impaired  - Assess patient's need for assistive devices and provide as appropriate  - Encourage maximum independence but intervene and supervise when necessary  - Involve family in performance of ADLs  - Assess for home care needs following discharge   - Consider OT consult to assist with ADL evaluation and planning for discharge  - Provide patient education as appropriate  Outcome: Progressing  Goal: Maintains/Returns to pre admission functional level  Description: INTERVENTIONS:  - Perform BMAT or MOVE assessment daily    - Set and communicate daily mobility goal to care team and patient/family/caregiver  - Collaborate with rehabilitation services on mobility goals if consulted  - Perform Range of Motion 2 times a day  - Reposition patient every 2 hours    - Dangle patient 2 times a day  - Stand patient 2 times a day  - Ambulate patient 2 times a day  - Out of bed to chair 2 times a day   - Out of bed for meals 2 times a day  - Out of bed for toileting  - Record patient progress and toleration of activity level   Outcome: Progressing  Goal: Patient will remain free of falls  Description: INTERVENTIONS:  - Educate patient/family on patient safety including physical limitations  - Instruct patient to call for assistance with activity   - Consult OT/PT to assist with strengthening/mobility   - Keep Call bell within reach  - Keep bed low and locked with side rails adjusted as appropriate  - Keep care items and personal belongings within reach  - Initiate and maintain comfort rounds  - Make Fall Risk Sign visible to staff  - Offer Toileting every 3 Hours, in advance of need  - Initiate/Maintain bed alarm  - Apply yellow socks and bracelet for high fall risk patients  - Consider moving patient to room near nurses station  Outcome: Progressing     Problem: DISCHARGE PLANNING  Goal: Discharge to home or other facility with appropriate resources  Description: INTERVENTIONS:  - Identify barriers to discharge w/patient and caregiver  - Arrange for needed discharge resources and transportation as appropriate  - Identify discharge learning needs (meds, wound care, etc )  - Arrange for interpretive services to assist at discharge as needed  - Refer to Case Management Department for coordinating discharge planning if the patient needs post-hospital services based on physician/advanced practitioner order or complex needs related to functional status, cognitive ability, or social support system  Outcome: Progressing     Problem: Knowledge Deficit  Goal: Patient/family/caregiver demonstrates understanding of disease process, treatment plan, medications, and discharge instructions  Description: Complete learning assessment and assess knowledge base  Interventions:  - Provide teaching at level of understanding  - Provide teaching via preferred learning methods  Outcome: Progressing     Problem: NEUROSENSORY - ADULT  Goal: Achieves stable or improved neurological status  Description: INTERVENTIONS  - Monitor and report changes in neurological status  - Monitor vital signs such as temperature, blood pressure, glucose, and any other labs ordered   - Initiate measures to prevent increased intracranial pressure  - Monitor for seizure activity and implement precautions if appropriate      Outcome: Progressing  Goal: Remains free of injury related to seizures activity  Description: INTERVENTIONS  - Maintain airway, patient safety  and administer oxygen as ordered  - Monitor patient for seizure activity, document and report duration and description of seizure to physician/advanced practitioner  - If seizure occurs,  ensure patient safety during seizure  - Reorient patient post seizure  - Seizure pads on all 4 side rails  - Instruct patient/family to notify RN of any seizure activity including if an aura is experienced  - Instruct patient/family to call for assistance with activity based on nursing assessment  - Administer anti-seizure medications if ordered    Outcome: Progressing  Goal: Achieves maximal functionality and self care  Description: INTERVENTIONS  - Monitor swallowing and airway patency with patient fatigue and changes in neurological status  - Encourage and assist patient to increase activity and self care     - Encourage visually impaired, hearing impaired and aphasic patients to use assistive/communication devices  Outcome: Progressing     Problem: RESPIRATORY - ADULT  Goal: Achieves optimal ventilation and oxygenation  Description: INTERVENTIONS:  - Assess for changes in respiratory status  - Assess for changes in mentation and behavior  - Position to facilitate oxygenation and minimize respiratory effort  - Oxygen administered by appropriate delivery if ordered  - Initiate smoking cessation education as indicated  - Encourage broncho-pulmonary hygiene including cough, deep breathe, Incentive Spirometry  - Assess the need for suctioning and aspirate as needed  - Assess and instruct to report SOB or any respiratory difficulty  - Respiratory Therapy support as indicated  Outcome: Progressing     Problem: GASTROINTESTINAL - ADULT  Goal: Minimal or absence of nausea and/or vomiting  Description: INTERVENTIONS:  - Administer IV fluids if ordered to ensure adequate hydration  - Maintain NPO status until nausea and vomiting are resolved  - Nasogastric tube if ordered  - Administer ordered antiemetic medications as needed  - Provide nonpharmacologic comfort measures as appropriate  - Advance diet as tolerated, if ordered  - Consider nutrition services referral to assist patient with adequate nutrition and appropriate food choices  Outcome: Progressing  Goal: Maintains or returns to baseline bowel function  Description: INTERVENTIONS:  - Assess bowel function  - Encourage oral fluids to ensure adequate hydration  - Administer IV fluids if ordered to ensure adequate hydration  - Administer ordered medications as needed  - Encourage mobilization and activity  - Consider nutritional services referral to assist patient with adequate nutrition and appropriate food choices  Outcome: Progressing  Goal: Maintains adequate nutritional intake  Description: INTERVENTIONS:  - Monitor percentage of each meal consumed  - Identify factors contributing to decreased intake, treat as appropriate  - Assist with meals as needed  - Monitor I&O, weight, and lab values if indicated  - Obtain nutrition services referral as needed  Outcome: Progressing  Goal: Establish and maintain optimal ostomy function  Description: INTERVENTIONS:  - Assess bowel function  - Encourage oral fluids to ensure adequate hydration  - Administer IV fluids if ordered to ensure adequate hydration   - Administer ordered medications as needed  - Encourage mobilization and activity  - Nutrition services referral to assist patient with appropriate food choices  - Assess stoma site  - Consider wound care consult   Outcome: Progressing  Goal: Oral mucous membranes remain intact  Description: INTERVENTIONS  - Assess oral mucosa and hygiene practices  - Implement preventative oral hygiene regimen  - Implement oral medicated treatments as ordered  - Initiate Nutrition services referral as needed  Outcome: Progressing     Problem: GENITOURINARY - ADULT  Goal: Maintains or returns to baseline urinary function  Description: INTERVENTIONS:  - Assess urinary function  - Encourage oral fluids to ensure adequate hydration if ordered  - Administer IV fluids as ordered to ensure adequate hydration  - Administer ordered medications as needed  - Offer frequent toileting  - Follow urinary retention protocol if ordered  Outcome: Progressing  Goal: Absence of urinary retention  Description: INTERVENTIONS:  - Assess patient’s ability to void and empty bladder  - Monitor I/O  - Bladder scan as needed  - Discuss with physician/AP medications to alleviate retention as needed  - Discuss catheterization for long term situations as appropriate  Outcome: Progressing     Problem: METABOLIC, FLUID AND ELECTROLYTES - ADULT  Goal: Electrolytes maintained within normal limits  Description: INTERVENTIONS:  - Monitor labs and assess patient for signs and symptoms of electrolyte imbalances  - Administer electrolyte replacement as ordered  - Monitor response to electrolyte replacements, including repeat lab results as appropriate  - Instruct patient on fluid and nutrition as appropriate  Outcome: Progressing  Goal: Fluid balance maintained  Description: INTERVENTIONS:  - Monitor labs   - Monitor I/O and WT  - Instruct patient on fluid and nutrition as appropriate  - Assess for signs & symptoms of volume excess or deficit  Outcome: Progressing  Goal: Glucose maintained within target range  Description: INTERVENTIONS:  - Monitor Blood Glucose as ordered  - Assess for signs and symptoms of hyperglycemia and hypoglycemia  - Administer ordered medications to maintain glucose within target range  - Assess nutritional intake and initiate nutrition service referral as needed  Outcome: Progressing     Problem: SKIN/TISSUE INTEGRITY - ADULT  Goal: Skin Integrity remains intact(Skin Breakdown Prevention)  Description: Assess:  -Perform Dhaval assessment every     -Clean and moisturize skin every     -Inspect skin when repositioning, toileting, and assisting with ADLS  -Assess under medical devices such as    every     -Assess extremities for adequate circulation and sensation     Bed Management:  -Have minimal linens on bed & keep smooth, unwrinkled  -Change linens as needed when moist or perspiring  -Avoid sitting or lying in one position for more than    hours while in bed  -Keep HOB at   degrees     Toileting:  -Offer bedside commode  -Assess for incontinence every     -Use incontinent care products after each incontinent episode such as       Activity:  -Mobilize patient    times a day  -Encourage activity and walks on unit  -Encourage or provide ROM exercises   -Turn and reposition patient every    Hours  -Use appropriate equipment to lift or move patient in bed  -Instruct/ Assist with weight shifting every    when out of bed in chair  -Consider limitation of chair time    hour intervals    Skin Care:  -Avoid use of baby powder, tape, friction and shearing, hot water or constrictive clothing  -Relieve pressure over bony prominences using     -Do not massage red bony areas    Next Steps:  -Teach patient strategies to minimize risks such as      -Consider consults to interdisciplinary teams such as     Outcome: Progressing  Goal: Incision(s), wounds(s) or drain site(s) healing without S/S of infection  Description: INTERVENTIONS  - Assess and document dressing, incision, wound bed, drain sites and surrounding tissue  - Provide patient and family education  - Perform skin care/dressing changes every     Outcome: Progressing  Goal: Pressure injury heals and does not worsen  Description: Interventions:  - Implement low air loss mattress or specialty surface (Criteria met)  - Apply silicone foam dressing  - Instruct/assist with weight shifting every    minutes when in chair   - Limit chair time to    hour intervals  - Use special pressure reducing interventions such as    when in chair   - Apply fecal or urinary incontinence containment device   - Perform passive or active ROM every     - Turn and reposition patient & offload bony prominences every    hours   - Utilize friction reducing device or surface for transfers   - Consider consults to  interdisciplinary teams such as     - Use incontinent care products after each incontinent episode such as           - Consider nutrition services referral as needed  Outcome: Progressing     Problem: HEMATOLOGIC - ADULT  Goal: Maintains hematologic stability  Description: INTERVENTIONS  - Assess for signs and symptoms of bleeding or hemorrhage  - Monitor labs  - Administer supportive blood products/factors as ordered and appropriate  Outcome: Progressing     Problem: MUSCULOSKELETAL - ADULT  Goal: Maintain or return mobility to safest level of function  Description: INTERVENTIONS:  - Assess patient's ability to carry out ADLs; assess patient's baseline for ADL function and identify physical deficits which impact ability to perform ADLs (bathing, care of mouth/teeth, toileting, grooming, dressing, etc )  - Assess/evaluate cause of self-care deficits   - Assess range of motion  - Assess patient's mobility  - Assess patient's need for assistive devices and provide as appropriate  - Encourage maximum independence but intervene and supervise when necessary  - Involve family in performance of ADLs  - Assess for home care needs following discharge   - Consider OT consult to assist with ADL evaluation and planning for discharge  - Provide patient education as appropriate  Outcome: Progressing  Goal: Maintain proper alignment of affected body part  Description: INTERVENTIONS:  - Support, maintain and protect limb and body alignment  - Provide patient/ family with appropriate education  Outcome: Progressing     Problem: Prexisting or High Potential for Compromised Skin Integrity  Goal: Skin integrity is maintained or improved  Description: INTERVENTIONS:  - Identify patients at risk for skin breakdown  - Assess and monitor skin integrity  - Assess and monitor nutrition and hydration status  - Monitor labs   - Assess for incontinence   - Turn and reposition patient  - Assist with mobility/ambulation  - Relieve pressure over bony prominences  - Avoid friction and shearing  - Provide appropriate hygiene as needed including keeping skin clean and dry  - Evaluate need for skin moisturizer/barrier cream  - Collaborate with interdisciplinary team   - Patient/family teaching  - Consider wound care consult   Outcome: Progressing     Problem: Nutrition/Hydration-ADULT  Goal: Nutrient/Hydration intake appropriate for improving, restoring or maintaining nutritional needs  Description: Monitor and assess patient's nutrition/hydration status for malnutrition  Collaborate with interdisciplinary team and initiate plan and interventions as ordered  Monitor patient's weight and dietary intake as ordered or per policy  Utilize nutrition screening tool and intervene as necessary  Determine patient's food preferences and provide high-protein, high-caloric foods as appropriate       INTERVENTIONS:  - Monitor oral intake, urinary output, labs, and treatment plans  - Assess nutrition and hydration status and recommend course of action  - Evaluate amount of meals eaten  - Assist patient with eating if necessary   - Allow adequate time for meals  - Recommend/ encourage appropriate diets, oral nutritional supplements, and vitamin/mineral supplements  - Order, calculate, and assess calorie counts as needed  - Recommend, monitor, and adjust tube feedings and TPN/PPN based on assessed needs  - Assess need for intravenous fluids  - Provide specific nutrition/hydration education as appropriate  - Include patient/family/caregiver in decisions related to nutrition  Outcome: Progressing

## 2023-05-31 NOTE — PROGRESS NOTES
Patient became combative at lunch time when staff attempted to feed patient  Patient attempted to throw lunch tray  Emotional support provided to patient  Patient found to be naked in bed shortly after lunch  Attempted to reapply patients gown and patient began kicking and hitting staff  Gown placed on patient several times and patient continues to take gown off  Patient throwing off blanket when staff reapplies  Alarms are on and functioning patient does not use call bell

## 2023-05-31 NOTE — CASE MANAGEMENT
Case Management Assessment & Discharge Planning Note    Patient name Wiley Diggs  Location [address] 221/-92 MRN [id number]  : 1945 Date 2023       Current Admission Date: 2023  Current Admission Diagnosis:Encephalopathy acute   Patient Active Problem List    Diagnosis Date Noted   • Encephalopathy acute 2023   • Status post CVA 2023   • Recurrent falls 2023   • Tobacco abuse 2023   • Acute ischemic CVA 2023   • Severe vascular dementia with psychotic disturbance ([facility] [address] ) 2023   • Generalized anxiety disorder 2023   • Hypertensive urgency 2023      LOS (days): 3  Geometric Mean LOS (GMLOS) (days): 4 70  Days to GMLOS:2     OBJECTIVE:    Risk of Unplanned Readmission Score: 20 99     CM called pt's son,baseline information was obtained  CM discussed the role of CM in helping the patient develop a discharge plan and assist the patient in carrying out their plan  Current admission status: Inpatient  Referral Reason: Other (Post Acute Placement (specify))    Preferred Pharmacy:   [address] [place] [address]  Phone: [phone] Fax: [phone]    Primary Care Provider: Chino Najera DO    Primary Insurance: MEDICARE  Secondary Insurance: BLUE CROSS    ASSESSMENT:  [address] Proxies    There are no active Health Care Proxies on file         Advance Directives  Does patient have a Health Care POA?: Yes  Does patient have Advance Directives?: Yes  Advance Directives: Power of  for health care  Primary Contact: Jeffrey Rivero (son)         Readmission Root Cause  30 Day Readmission: No    Patient Information  Admitted from[de-identified] Home  Mental Status: Confused  During Assessment patient was accompanied by: Not accompanied during assessment  Assessment information provided by[de-identified] Son  Primary Caregiver: Spouse  Support Systems: Spouse/significant other, Son  [place] of Residence: [address] do you live in?: [address] entry access options  Select all that apply : Stairs  Number of steps to enter home  : 1  Do the steps have railings?: No  Type of Current Residence: [place]  In the last 12 months, was there a time when you were not able to pay the mortgage or rent on time?: No  In the last 12 months, how many places have you lived?: 1  In the last 12 months, was there a time when you did not have a steady place to sleep or slept in a shelter (including now)?: No  Homeless/housing insecurity resource given?: N/A  Living Arrangements: Lives w/ Spouse/significant other  Is patient a ?: No    Activities of Daily Living Prior to Admission  Functional Status: Assistance  Completes ADLs independently?: No  Level of ADL dependence: Assistance  Ambulates independently?: No  Level of ambulatory dependence: Assistance  Does patient use assisted devices?: Yes  Assisted Devices (DME) used: Cl Heaton (Comment) (Shoes with lifts)  Does patient currently own DME?: Yes  What DME does the patient currently own?: Tangela Rojas Other (Comment) (Shoes with lifts)  Does patient have a history of Outpatient Therapy (PT/OT)?: No  Does the patient have a history of Short-Term Rehab?: Yes [facility])  Does patient have a history of HHC?: Yes ([facility])  Does patient currently have [address]?: No         Patient Information Continued  Income Source: SSI/SSD  Does patient have prescription coverage?: Yes  Within the past 12 months, you worried that your food would run out before you got the money to buy more : Never true  Within the past 12 months, the food you bought just didn't last and you didn't have money to get more : Never true  Food insecurity resource given?: N/A  Does patient receive dialysis treatments?: No  Does patient have a history of substance abuse?: No  Does patient have a history of Mental Health Diagnosis?: No         Means of Transportation  Means of Transport to Appts[de-identified] Family transport  In the past 12 months, has lack of transportation kept you from medical appointments or from getting medications?: No  In the past 12 months, has lack of transportation kept you from meetings, work, or from getting things needed for daily living?: No  Was application for public transport provided?: N/A        DISCHARGE DETAILS:    Discharge planning discussed with[de-identified] Pt's son  Freedom of Choice: Yes  Comments - Freedom of Choice: Pt's son requested [address] [place] [address] for Lissy Imre U  12  Team discharge recommendations reviewed with patient/caregiver?: Yes  Did patient/caregiver verbalize understanding of patient care needs?: Yes  Were patient/caregiver advised of the risks associated with not following Treatment Team discharge recommendations?: Yes    Contacts  Patient Contacts: Karena Stuart (son)  Relationship to Patient[de-identified] Family  Contact Method: Phone  Phone Number: [phone]    [facility] [address]         Is the patient interested in [facility] at discharge?: No    DME Referral Provided  Referral made for DME?: No    Other Referral/Resources/Interventions Provided:  Interventions: Inpatient Behavioral Health         Treatment Team Recommendation: Inpatient Behavioral Health  Discharge Destination Plan[de-identified] Inpatient [facility] called pt's son to discuss post discharge options  Pt's son requested [facility] when the pt is medically cleared  CM called [facility] and obtained contact information and required referral documentation  [facility] requested a face sheet and 302 faxed to [phone]

## 2023-06-01 LAB
AMPHETAMINES SERPL QL SCN: NEGATIVE
ANION GAP SERPL CALCULATED.3IONS-SCNC: 3 MMOL/L (ref 4–13)
BARBITURATES UR QL: NEGATIVE
BASOPHILS # BLD AUTO: 0.01 THOUSANDS/ÂΜL (ref 0–0.1)
BASOPHILS NFR BLD AUTO: 0 % (ref 0–1)
BENZODIAZ UR QL: POSITIVE
BUN SERPL-MCNC: 12 MG/DL (ref 5–25)
CALCIUM SERPL-MCNC: 8.2 MG/DL (ref 8.4–10.2)
CHLORIDE SERPL-SCNC: 109 MMOL/L (ref 96–108)
CO2 SERPL-SCNC: 29 MMOL/L (ref 21–32)
COCAINE UR QL: NEGATIVE
CREAT SERPL-MCNC: 0.53 MG/DL (ref 0.6–1.3)
EOSINOPHIL # BLD AUTO: 0.3 THOUSAND/ÂΜL (ref 0–0.61)
EOSINOPHIL NFR BLD AUTO: 5 % (ref 0–6)
ERYTHROCYTE [DISTWIDTH] IN BLOOD BY AUTOMATED COUNT: 16.5 % (ref 11.6–15.1)
GFR SERPL CREATININE-BSD FRML MDRD: 90 ML/MIN/1.73SQ M
GLUCOSE SERPL-MCNC: 76 MG/DL (ref 65–140)
HCT VFR BLD AUTO: 32.6 % (ref 34.8–46.1)
HGB BLD-MCNC: 10.2 G/DL (ref 11.5–15.4)
IMM GRANULOCYTES # BLD AUTO: 0.02 THOUSAND/UL (ref 0–0.2)
IMM GRANULOCYTES NFR BLD AUTO: 0 % (ref 0–2)
LYMPHOCYTES # BLD AUTO: 1.13 THOUSANDS/ÂΜL (ref 0.6–4.47)
LYMPHOCYTES NFR BLD AUTO: 20 % (ref 14–44)
MCH RBC QN AUTO: 30.8 PG (ref 26.8–34.3)
MCHC RBC AUTO-ENTMCNC: 31.3 G/DL (ref 31.4–37.4)
MCV RBC AUTO: 99 FL (ref 82–98)
METHADONE UR QL: NEGATIVE
MONOCYTES # BLD AUTO: 0.65 THOUSAND/ÂΜL (ref 0.17–1.22)
MONOCYTES NFR BLD AUTO: 12 % (ref 4–12)
NEUTROPHILS # BLD AUTO: 3.44 THOUSANDS/ÂΜL (ref 1.85–7.62)
NEUTS SEG NFR BLD AUTO: 63 % (ref 43–75)
NRBC BLD AUTO-RTO: 0 /100 WBCS
OPIATES UR QL SCN: NEGATIVE
OXYCODONE+OXYMORPHONE UR QL SCN: NEGATIVE
PCP UR QL: NEGATIVE
PLATELET # BLD AUTO: 191 THOUSANDS/UL (ref 149–390)
PMV BLD AUTO: 9.6 FL (ref 8.9–12.7)
POTASSIUM SERPL-SCNC: 3.8 MMOL/L (ref 3.5–5.3)
RBC # BLD AUTO: 3.31 MILLION/UL (ref 3.81–5.12)
SODIUM SERPL-SCNC: 141 MMOL/L (ref 135–147)
THC UR QL: NEGATIVE
TSH SERPL DL<=0.05 MIU/L-ACNC: 3.83 UIU/ML (ref 0.45–4.5)
VIT B12 SERPL-MCNC: 686 PG/ML (ref 180–914)
WBC # BLD AUTO: 5.55 THOUSAND/UL (ref 4.31–10.16)

## 2023-06-01 PROCEDURE — 80048 BASIC METABOLIC PNL TOTAL CA: CPT | Performed by: FAMILY MEDICINE

## 2023-06-01 PROCEDURE — 92526 ORAL FUNCTION THERAPY: CPT

## 2023-06-01 PROCEDURE — 85025 COMPLETE CBC W/AUTO DIFF WBC: CPT | Performed by: FAMILY MEDICINE

## 2023-06-01 PROCEDURE — 84443 ASSAY THYROID STIM HORMONE: CPT | Performed by: FAMILY MEDICINE

## 2023-06-01 PROCEDURE — 82607 VITAMIN B-12: CPT | Performed by: FAMILY MEDICINE

## 2023-06-01 PROCEDURE — 99232 SBSQ HOSP IP/OBS MODERATE 35: CPT | Performed by: FAMILY MEDICINE

## 2023-06-01 PROCEDURE — 80307 DRUG TEST PRSMV CHEM ANLYZR: CPT | Performed by: FAMILY MEDICINE

## 2023-06-01 RX ORDER — OLANZAPINE 10 MG/1
5 INJECTION, POWDER, LYOPHILIZED, FOR SOLUTION INTRAMUSCULAR ONCE
Status: DISCONTINUED | OUTPATIENT
Start: 2023-06-01 | End: 2023-06-01

## 2023-06-01 RX ORDER — OLANZAPINE 10 MG/1
2.5 INJECTION, POWDER, LYOPHILIZED, FOR SOLUTION INTRAMUSCULAR
Status: DISCONTINUED | OUTPATIENT
Start: 2023-06-01 | End: 2023-06-02

## 2023-06-01 RX ADMIN — ASPIRIN 81 MG: 81 TABLET, COATED ORAL at 09:51

## 2023-06-01 RX ADMIN — SERTRALINE 100 MG: 100 TABLET, FILM COATED ORAL at 09:51

## 2023-06-01 RX ADMIN — VALPROIC ACID 250 MG: 250 SOLUTION ORAL at 09:53

## 2023-06-01 RX ADMIN — ENOXAPARIN SODIUM 40 MG: 40 INJECTION SUBCUTANEOUS at 09:52

## 2023-06-01 RX ADMIN — VALPROIC ACID 250 MG: 250 SOLUTION ORAL at 17:34

## 2023-06-01 RX ADMIN — BUSPIRONE HYDROCHLORIDE 5 MG: 5 TABLET ORAL at 15:51

## 2023-06-01 RX ADMIN — TIMOLOL MALEATE 1 DROP: 5 SOLUTION OPHTHALMIC at 17:34

## 2023-06-01 RX ADMIN — LATANOPROST 1 DROP: 50 SOLUTION OPHTHALMIC at 22:03

## 2023-06-01 RX ADMIN — TIMOLOL MALEATE 1 DROP: 5 SOLUTION OPHTHALMIC at 09:56

## 2023-06-01 RX ADMIN — QUETIAPINE FUMARATE 50 MG: 25 TABLET ORAL at 21:10

## 2023-06-01 RX ADMIN — ATORVASTATIN CALCIUM 40 MG: 40 TABLET, FILM COATED ORAL at 17:33

## 2023-06-01 RX ADMIN — QUETIAPINE FUMARATE 12.5 MG: 25 TABLET ORAL at 14:21

## 2023-06-01 RX ADMIN — BUSPIRONE HYDROCHLORIDE 5 MG: 5 TABLET ORAL at 21:10

## 2023-06-01 RX ADMIN — FAMOTIDINE 20 MG: 20 TABLET ORAL at 09:51

## 2023-06-01 RX ADMIN — CLONAZEPAM 0.25 MG: 0.5 TABLET ORAL at 15:51

## 2023-06-01 RX ADMIN — BUSPIRONE HYDROCHLORIDE 5 MG: 5 TABLET ORAL at 09:51

## 2023-06-01 NOTE — SPEECH THERAPY NOTE
"Speech Language/Pathology    Speech/Language Pathology Progress Note    Patient Name: Annetta REID Date: 6/1/2023       Subjective: \"Oh God please let me die\"    Objective:  Pt seen for diet tolerance/advancement  Currently on suicide precautions  Puree/ nectar thick liquids d/t lethargy on initial eval  PCA feeding pt    Assessment:  Limited intake d/t refusal and mental status  Minimal puree consumed prior to pt beginning to throw food  Solids not trialed d/t lack of dentition  Offered ice cream and thin liquid trials which pt was agreeable to  Tolerated ice cream without difficulty, delayed cough x1 which could have been d/t verbosity during intake  Limited trials of thins via straw, no overt coughing  Will recommend upgrade as ice cream is a preferred food and may be useful in behavior management       Plan/Recommendations:  Recommend puree and thin liquids; meds crushed in puree  Full feed    Malcolm Castro [address] CCC-SLP  6/1/2023       "

## 2023-06-01 NOTE — CASE MANAGEMENT
Case Management Discharge Planning Note    Patient name Luis Miguel Purchase  Location [address] 221/-70 MRN [id number]  : 1945 Date 2023       Current Admission Date: 2023  Current Admission Diagnosis:Encephalopathy acute   Patient Active Problem List    Diagnosis Date Noted   • Encephalopathy acute 2023   • Status post CVA 2023   • Recurrent falls 2023   • Tobacco abuse 2023   • Acute ischemic CVA 2023   • Severe vascular dementia with psychotic disturbance ([facility] [address] ) 2023   • Generalized anxiety disorder 2023   • Hypertensive urgency 2023      LOS (days): 4  Geometric Mean LOS (GMLOS) (days): 4 70  Days to GMLOS:1 2     OBJECTIVE:  Risk of Unplanned Readmission Score: 25 95         Current admission status: Inpatient   Preferred Pharmacy:   [address] [place] [address]  Phone: [phone] Fax: [phone]    Primary Care Provider: Falguni Patton DO    Primary Insurance: MEDICARE  Secondary Insurance: BLUE CROSS    DISCHARGE DETAILS:     Pt was medically cleared by the physician and the [id number] was signed for treatment  CM called [facility] for evaluation

## 2023-06-01 NOTE — PLAN OF CARE
Problem: MOBILITY - ADULT  Goal: Maintain or return to baseline ADL function  Description: INTERVENTIONS:  -  Assess patient's ability to carry out ADLs; assess patient's baseline for ADL function and identify physical deficits which impact ability to perform ADLs (bathing, care of mouth/teeth, toileting, grooming, dressing, etc )  - Assess/evaluate cause of self-care deficits   - Assess range of motion  - Assess patient's mobility; develop plan if impaired  - Assess patient's need for assistive devices and provide as appropriate  - Encourage maximum independence but intervene and supervise when necessary  - Involve family in performance of ADLs  - Assess for home care needs following discharge   - Consider OT consult to assist with ADL evaluation and planning for discharge  - Provide patient education as appropriate  Outcome: Progressing  Goal: Maintains/Returns to pre admission functional level  Description: INTERVENTIONS:  - Perform BMAT or MOVE assessment daily    - Set and communicate daily mobility goal to care team and patient/family/caregiver  - Collaborate with rehabilitation services on mobility goals if consulted  - Perform Range of Motion 2 times a day  - Reposition patient every 2 hours    - Dangle patient 2 times a day  - Stand patient 2 times a day  - Ambulate patient 2 times a day  - Out of bed to chair 2 times a day   - Out of bed for meals 2 times a day  - Out of bed for toileting  - Record patient progress and toleration of activity level   Outcome: Progressing     Problem: PAIN - ADULT  Goal: Verbalizes/displays adequate comfort level or baseline comfort level  Description: Interventions:  - Encourage patient to monitor pain and request assistance  - Assess pain using appropriate pain scale  - Administer analgesics based on type and severity of pain and evaluate response  - Implement non-pharmacological measures as appropriate and evaluate response  - Consider cultural and social influences on pain and pain management  - Notify physician/advanced practitioner if interventions unsuccessful or patient reports new pain  Outcome: Progressing     Problem: INFECTION - ADULT  Goal: Absence or prevention of progression during hospitalization  Description: INTERVENTIONS:  - Assess and monitor for signs and symptoms of infection  - Monitor lab/diagnostic results  - Monitor all insertion sites, i e  indwelling lines, tubes, and drains  - Monitor endotracheal if appropriate and nasal secretions for changes in amount and color  - [place] appropriate cooling/warming therapies per order  - Administer medications as ordered  - Instruct and encourage patient and family to use good hand hygiene technique  - Identify and instruct in appropriate isolation precautions for identified infection/condition  Outcome: Progressing  Goal: Absence of fever/infection during neutropenic period  Description: INTERVENTIONS:  - Monitor WBC    Outcome: Progressing     Problem: SAFETY ADULT  Goal: Maintain or return to baseline ADL function  Description: INTERVENTIONS:  -  Assess patient's ability to carry out ADLs; assess patient's baseline for ADL function and identify physical deficits which impact ability to perform ADLs (bathing, care of mouth/teeth, toileting, grooming, dressing, etc )  - Assess/evaluate cause of self-care deficits   - Assess range of motion  - Assess patient's mobility; develop plan if impaired  - Assess patient's need for assistive devices and provide as appropriate  - Encourage maximum independence but intervene and supervise when necessary  - Involve family in performance of ADLs  - Assess for home care needs following discharge   - Consider OT consult to assist with ADL evaluation and planning for discharge  - Provide patient education as appropriate  Outcome: Progressing  Goal: Maintains/Returns to pre admission functional level  Description: INTERVENTIONS:  - Perform BMAT or MOVE assessment daily    - Set and communicate daily mobility goal to care team and patient/family/caregiver  - Collaborate with rehabilitation services on mobility goals if consulted  - Perform Range of Motion 2 times a day  - Reposition patient every 2 hours    - Dangle patient 2 times a day  - Stand patient 2 times a day  - Ambulate patient 2 times a day  - Out of bed to chair 2 times a day   - Out of bed for meals 2 times a day  - Out of bed for toileting  - Record patient progress and toleration of activity level   Outcome: Progressing

## 2023-06-01 NOTE — ASSESSMENT & PLAN NOTE
· Patient discharged from outside hospital earlier in day and 5/28 after admission for vomiting  · Evaluated by psychiatry as she was transferred from the behavioral health unit to [facility] for the vomiting, son was requesting hospice and took the patient home with 24/7 care provided by family while awaiting services to be arranged  Review of the discharge summary the internal medicine team and psychiatry team voiced their concerns regarding the son taking the patient home but he was adamant  · Patient was brought to this facility on the same day as discharge from previous facility    Son stated to ER staff he cannot care for patient at home  · Evaluated by psychiatry on 5/30 and deemed to lack appropriate judgement and is danger to self and needs inpatient psychiatric treatment, patient with advanced dementia do not think will be able to sign 201- will need to be 302- 302 has been signed  · Also try to wrap a call bell around her neck to commit suicide she is expressing suicidal ideations now patient has been 302 and a psych facility in terms of [facility] as that would be the choice as discussed with the son is being worked on by    · 1:1 to be placed for suicide ideations and safety  · Medically cleared for psych admission

## 2023-06-01 NOTE — ASSESSMENT & PLAN NOTE
· On severe vascular dementia POA initially was psychotic features- but since seeing her yesterday and today she is somnolent poor po intake will wake up to name and then falls asleep  · Ct brain neg   · No infection evident on admission  · depakote level checked nml therapeutic   · No prns overnight   · Decrease buspar to 5mg po tid , decrease seroquel to once a day at hs 50 mg from 50 mg po bid , ensure sleep wake cycle , reorient   · Ammonia normal vitamin B12 is normal  · tsh nml in beg of may 5/19  · Repeat labs in am   · Electrolytes stable  Patient has become more awake and becoming combative, tonight she is actually getting the first day of only once a day Seroquel we will see but she slept through the whole night

## 2023-06-01 NOTE — PROGRESS NOTES
Patient attempting to hit staff during medication administration  Patient swallowed medication crushed in puree  Patient requiring multiple staff members to perform care due to behaviors  Patients dressing redressed on RUE, patient biting dressing off and spitting  Dressing removed due to aspiration risk  Patient stating she wants to commit suicide, when asked if she has a plan patient stated any way possible  Emotional support provided to patient  Patient remains on 1:1 observation

## 2023-06-01 NOTE — RAPID RESPONSE
Rapid Response Note  Gisselle Child 76 y o  female MRN: [id number]  Unit/Bed#: -01 Encounter: [id number]    Rapid Response Notification(s):   Response called date/time:  5/31/2023 7:58 PM  Response team arrival date/time:  5/31/2023 7:58 PM  Response end date/time:  5/31/2023 8:05 PM  Level of care:  Medsur  Rapid response location:  [facility] unit  Primary reason for rapid response call: Other (comment) (near fall)    Rapid Response Intervention(s):   Airway:  None  Breathing:  None  Circulation:  None  Fluids administered:  None  Medications administered:  None       Assessment:   · Severe Vascular dementia with psychotic disturbance  · Near fall out of bed  · Left hip pain    Plan:   · Check left hip xray  · 1:1 for patient safety     Rapid Response Outcome:   Transfer:  Remain on floor  Code Status: Level 3 (DNAR and DNI)      Family notified of transfer: n/a  Family member contacted: n/a     Background/Situation:   Gisselle Child is a 76 y o  female with MDD, vascular dementia, right MCA stroke, generalized anxiety disorder, who presented to [address] [place] [address] on 5/28 with left upper and lower extremity weakness, dysphagia and worsening encephalopathy  Recently was hospitalized at [facility] in patient psych  She was sent in from the SNF, who report her behaviors were uncontrollable and that she was verbalizing suicidal ideation  Patient did have an unplanned transfer to [facility] from [facility] for suspected gastric outlet obstruction  No surgical intervention was required  It was recommended that the patient be discharged to [facility] however her son requested to take her home with 24/7 care on 5/28  The patient was brought to [address] [place] [address] later on the same day for this admission  RRT called today for near fall  RN reports there was an audible bed alarm and patient was found in between the side rails with her left leg extended and on the ground  Right hand found with new skin tear   Per nursing, patient was lowered to the floor "and then assisted into bed  Upon team arrival, patient was already back in bed and with dressing to right hand  Patient confused, agitated and yelling  Yelling \"you took my baby\"  Admitted to left hip pain on exam  No obvious deformity of left hip  Review of Systems   Unable to perform ROS: Mental status change       Objective:   Vitals:    05/30/23 2330 05/31/23 0750 05/31/23 1537 05/31/23 1949   BP: 102/67 125/54 (!) 173/65 135/83   Pulse: 81 65 65    Resp: 16 16 16 20   Temp: 97 9 °F (36 6 °C) 97 7 °F (36 5 °C) 98 2 °F (36 8 °C) 98 1 °F (36 7 °C)   TempSrc:       SpO2: 98% 96% 99%    Weight:       Height:         Physical Exam  Vitals reviewed  Constitutional:       General: She is not in acute distress  Appearance: She is not ill-appearing or toxic-appearing  HENT:      Head: Normocephalic and atraumatic  Mouth/Throat:      Mouth: Mucous membranes are moist       Pharynx: Oropharynx is clear  Eyes:      Conjunctiva/sclera: Conjunctivae normal    Cardiovascular:      Rate and Rhythm: Normal rate  Pulses: Normal pulses  Abdominal:      General: Bowel sounds are normal  There is no distension  Palpations: Abdomen is soft  Musculoskeletal:         General: No swelling or deformity  Comments: Left hip tenderness on palpation   Skin:     General: Skin is warm and dry  Capillary Refill: Capillary refill takes less than 2 seconds  Coloration: Skin is pale  Neurological:      Mental Status: She is alert  She is disoriented  Psychiatric:         Mood and Affect: Mood is anxious  Affect is angry  Behavior: Behavior is agitated  Portions of the record may have been created with voice recognition software  Occasional wrong word or \"sound a like\" substitutions may have occurred due to the inherent limitations of voice recognition software  Read the chart carefully and recognize, using context, where substitutions have occurred      RAYSA Welsh    "

## 2023-06-01 NOTE — CODE DOCUMENTATION
"Pt sustained an unwitnessed fall in which the pt slid between the side rails on the bed  RN responded to bed alarm immediately and found the pt on her knees with her upper body leaning on the bed  RN called for assistance  Pt lowered to the floor and lifted into bed  Vital signs were stable and pt received a skin tear from fall  Pt also complaining of left hip pain  Tear dressed by RN  Pt began screaming that she wanted to die and \"to tie a rope around my neck and hang me\"  She proceeded to kick, slap, and pull the hair of assisting RN's  Pt also wrapped her hands tightly around her throat in an attempt to strangle herself  RR was called because the pt is suicidal and aggressive    "

## 2023-06-01 NOTE — PROGRESS NOTES
[address]  Progress Note  Name: Serafin uDmont  MRN: [id number]  Unit/Bed#: -01 I Date of Admission: 5/28/2023   Date of Service: 6/1/2023 I Hospital Day: 4    Assessment/Plan   Severe vascular dementia with psychotic disturbance [facility])  Assessment & Plan  · Patient discharged from outside hospital earlier in day and 5/28 after admission for vomiting  · Evaluated by psychiatry as she was transferred from the behavioral health unit to [facility] for the vomiting, son was requesting hospice and took the patient home with 24/7 care provided by family while awaiting services to be arranged  Review of the discharge summary the internal medicine team and psychiatry team voiced their concerns regarding the son taking the patient home but he was adamant  · Patient was brought to this facility on the same day as discharge from previous facility    Son stated to ER staff he cannot care for patient at home  · Evaluated by psychiatry on 5/30 and deemed to lack appropriate judgement and is danger to self and needs inpatient psychiatric treatment, patient with advanced dementia do not think will be able to sign 201- will need to be 302- 302 has been signed  · Also try to wrap a call bell around her neck to commit suicide she is expressing suicidal ideations now patient has been 302 and a psych facility in terms of [place] as that would be the choice as discussed with the son is being worked on by    · 1:1 to be placed for suicide ideations and safety  · Medically cleared for psych admission    * Encephalopathy acute  Assessment & Plan  · On severe vascular dementia POA initially was psychotic features- but since seeing her yesterday and today she is somnolent poor po intake will wake up to name and then falls asleep  · Ct brain neg   · No infection evident on admission  · depakote level checked nml therapeutic   · No prns overnight   · Decrease buspar to 5mg po tid , decrease seroquel to once a day at hs 50 mg from 50 mg po bid , ensure sleep wake cycle , reorient   · Ammonia normal vitamin B12 is normal  · tsh nml in beg of may 5/19  · Repeat labs in am   · Electrolytes stable  Patient has become more awake and becoming combative, tonight she is actually getting the first day of only once a day Seroquel we will see but she slept through the whole night  Status post CVA  Assessment & Plan  · 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 and dysphagia  · Was discharged to SNF then sent to inpatient geropsych, unclear extent of physical rehabilitation patient received or was amenable to participate  · Continue puréed diet with nectar thick liquids  · Appreciate PT/OT/case management- needs inpatient psych   · Continue aspirin and statin             VTE Pharmacologic Prophylaxis:   Moderate Risk (Score 3-4) - Pharmacological DVT Prophylaxis Ordered: enoxaparin (Lovenox)  Patient Centered Rounds: I performed bedside rounds with nursing staff today  Discussions with Specialists or Other Care Team Provider:  Discussed with cm    Education and Discussions with Family / Patient:   Total Time Spent on Date of Encounter in care of patient: 35 minutes This time was spent on one or more of the following: performing physical exam; counseling and coordination of care; obtaining or reviewing history; documenting in the medical record; reviewing/ordering tests, medications or procedures; communicating with other healthcare professionals and discussing with patient's family/caregivers  Current Length of Stay: 4 day(s)  Current Patient Status: Inpatient   Certification Statement: The patient will continue to require additional inpatient hospital stay due to psych placement   Discharge Plan: Anticipate discharge in 48 hrs to inpatient psych      Code Status: Level 3 - DNAR and DNI    Subjective:   Seen and examined was wide awake yesterday afternoon tried to throw tray slept through night but last night tried to hang herself with call bell now sleeping but arousable     Objective:     Vitals:   Temp (24hrs), Av °F (36 7 °C), Min:97 7 °F (36 5 °C), Max:98 2 °F (36 8 °C)    Temp:  [97 7 °F (36 5 °C)-98 2 °F (36 8 °C)] 97 7 °F (36 5 °C)  HR:  [] 69  Resp:  [16-20] 16  BP: (121-173)/(61-83) 165/80  SpO2:  [90 %-99 %] 96 %  Body mass index is 23 58 kg/m²  Input and Output Summary (last 24 hours): Intake/Output Summary (Last 24 hours) at 2023 1106  Last data filed at 2023 1056  Gross per 24 hour   Intake 788 75 ml   Output 225 ml   Net 563 75 ml       Physical Exam:   Physical Exam  Vitals and nursing note reviewed  Constitutional:       General: She is not in acute distress  Appearance: She is well-developed  HENT:      Head: Normocephalic and atraumatic  Eyes:      Conjunctiva/sclera: Conjunctivae normal    Cardiovascular:      Rate and Rhythm: Normal rate and regular rhythm  Heart sounds: No murmur heard  Pulmonary:      Effort: Pulmonary effort is normal  No respiratory distress  Breath sounds: Normal breath sounds  No wheezing or rales  Abdominal:      General: There is no distension  Palpations: Abdomen is soft  Tenderness: There is no abdominal tenderness  Musculoskeletal:         General: No swelling  Cervical back: Neck supple  Skin:     General: Skin is warm and dry  Capillary Refill: Capillary refill takes less than 2 seconds     Neurological:      Comments: Sleeping wakes up if name called   Psychiatric:         Mood and Affect: Mood normal          Additional Data:     Labs:  Results from last 7 days   Lab Units 23  0506   EOS PCT % 5   HEMATOCRIT % 32 6*   HEMOGLOBIN g/dL 10 2*   LYMPHS PCT % 20   MONOS PCT % 12   NEUTROS PCT % 63   PLATELETS Thousands/uL 191   WBC Thousand/uL 5 55     Results from last 7 days   Lab Units 23  0506 23  0435 23  1817 ANION GAP mmol/L 3*   < > 7   ALBUMIN g/dL  --   --  2 9*   ALK PHOS U/L  --   --  45   ALT U/L  --   --  10   AST U/L  --   --  23   BUN mg/dL 12   < > 13   CALCIUM mg/dL 8 2*   < > 8 7   CHLORIDE mmol/L 109*   < > 106   CO2 mmol/L 29   < > 27   CREATININE mg/dL 0 53*   < > 0 62   GLUCOSE RANDOM mg/dL 76   < > 93   POTASSIUM mmol/L 3 8   < > 3 7   SODIUM mmol/L 141   < > 140   TOTAL BILIRUBIN mg/dL  --   --  0 54    < > = values in this interval not displayed  Lines/Drains:  Invasive Devices     None                       Imaging: Reviewed radiology reports from this admission including: CT head    Recent Cultures (last 7 days):         Last 24 Hours Medication List:   Current Facility-Administered Medications   Medication Dose Route Frequency Provider Last Rate   • acetaminophen  650 mg Oral Q8H PRN Ros Dave MD     • aspirin  81 mg Oral Daily Magnolia S Parker, CRNP     • atorvastatin  40 mg Oral QPM Magnolia S Parker, CRNP     • busPIRone  5 mg Oral TID Ros Dave MD     • clonazePAM  0 25 mg Oral BID PRN Magnolia S Parker, CRNP     • enoxaparin  40 mg Subcutaneous Daily Magnolia S Parker, CRNP     • famotidine  20 mg Oral Daily Magnolia S Parker, CRNP     • latanoprost  1 drop Both Eyes HS Magnolia S Parker, CRNP     • QUEtiapine  12 5 mg Oral TID PRN Ros Dave MD     • QUEtiapine  50 mg Oral HS Ros Dave MD     • sertraline  100 mg Oral Daily Magnolia S Parker, CRNP     • timolol  1 drop Both Eyes BID Magnolia S Parker, CRNP     • valproic acid  250 mg Oral BID Magnolia S Parker, CRNP          Today, Patient Was Seen By: Ros Dave MD    **Please Note: This note may have been constructed using a voice recognition system  **

## 2023-06-02 PROBLEM — D64.9 CHRONIC ANEMIA: Status: ACTIVE | Noted: 2023-06-02

## 2023-06-02 LAB
ATRIAL RATE: 63 BPM
HCT VFR BLD AUTO: 32.5 % (ref 34.8–46.1)
HGB BLD-MCNC: 10.2 G/DL (ref 11.5–15.4)
P AXIS: 9 DEGREES
PR INTERVAL: 144 MS
QRS AXIS: 76 DEGREES
QRSD INTERVAL: 74 MS
QT INTERVAL: 414 MS
QTC INTERVAL: 423 MS
T WAVE AXIS: -80 DEGREES
VENTRICULAR RATE: 63 BPM

## 2023-06-02 PROCEDURE — 85014 HEMATOCRIT: CPT | Performed by: FAMILY MEDICINE

## 2023-06-02 PROCEDURE — 99232 SBSQ HOSP IP/OBS MODERATE 35: CPT | Performed by: FAMILY MEDICINE

## 2023-06-02 PROCEDURE — 85018 HEMOGLOBIN: CPT | Performed by: FAMILY MEDICINE

## 2023-06-02 PROCEDURE — 93005 ELECTROCARDIOGRAM TRACING: CPT

## 2023-06-02 RX ORDER — OLANZAPINE 10 MG/1
2.5 INJECTION, POWDER, LYOPHILIZED, FOR SOLUTION INTRAMUSCULAR EVERY 4 HOURS PRN
Status: DISCONTINUED | OUTPATIENT
Start: 2023-06-02 | End: 2023-06-14 | Stop reason: HOSPADM

## 2023-06-02 RX ORDER — QUETIAPINE FUMARATE 25 MG/1
50 TABLET, FILM COATED ORAL
Status: DISCONTINUED | OUTPATIENT
Start: 2023-06-02 | End: 2023-06-03

## 2023-06-02 RX ORDER — CALCIUM CARBONATE 500 MG/1
500 TABLET, CHEWABLE ORAL 2 TIMES DAILY PRN
Status: DISCONTINUED | OUTPATIENT
Start: 2023-06-02 | End: 2023-06-14 | Stop reason: HOSPADM

## 2023-06-02 RX ADMIN — TIMOLOL MALEATE 1 DROP: 5 SOLUTION OPHTHALMIC at 17:55

## 2023-06-02 RX ADMIN — VALPROIC ACID 250 MG: 250 SOLUTION ORAL at 17:45

## 2023-06-02 RX ADMIN — OLANZAPINE 2.5 MG: 10 INJECTION, POWDER, FOR SOLUTION INTRAMUSCULAR at 21:05

## 2023-06-02 RX ADMIN — CALCIUM CARBONATE (ANTACID) CHEW TAB 500 MG 500 MG: 500 CHEW TAB at 22:15

## 2023-06-02 RX ADMIN — BUSPIRONE HYDROCHLORIDE 5 MG: 5 TABLET ORAL at 20:48

## 2023-06-02 RX ADMIN — LATANOPROST 1 DROP: 50 SOLUTION OPHTHALMIC at 21:10

## 2023-06-02 RX ADMIN — CLONAZEPAM 0.25 MG: 0.5 TABLET ORAL at 00:38

## 2023-06-02 RX ADMIN — OLANZAPINE 2.5 MG: 10 INJECTION, POWDER, FOR SOLUTION INTRAMUSCULAR at 01:00

## 2023-06-02 RX ADMIN — OLANZAPINE 2.5 MG: 10 INJECTION, POWDER, FOR SOLUTION INTRAMUSCULAR at 04:04

## 2023-06-02 RX ADMIN — QUETIAPINE FUMARATE 50 MG: 25 TABLET ORAL at 17:44

## 2023-06-02 RX ADMIN — ATORVASTATIN CALCIUM 40 MG: 40 TABLET, FILM COATED ORAL at 17:44

## 2023-06-02 RX ADMIN — BUSPIRONE HYDROCHLORIDE 5 MG: 5 TABLET ORAL at 17:44

## 2023-06-02 NOTE — CASE MANAGEMENT
Case Management Discharge Planning Note    Patient name Mik Sampson  Location [address] 221/-27 MRN [id number]  : 1945 Date 2023       Current Admission Date: 2023  Current Admission Diagnosis:Encephalopathy acute   Patient Active Problem List    Diagnosis Date Noted   • Chronic anemia 2023   • Encephalopathy acute 2023   • Status post CVA 2023   • Recurrent falls 2023   • Tobacco abuse 2023   • Acute ischemic CVA 2023   • Severe vascular dementia with psychotic disturbance ([facility] [address] ) 2023   • Generalized anxiety disorder 2023   • Hypertensive urgency 2023      LOS (days): 5  Geometric Mean LOS (GMLOS) (days): 4 70  Days to GMLOS:-0 2     OBJECTIVE:  Risk of Unplanned Readmission Score: 27 77         Current admission status: Inpatient   Preferred Pharmacy:   [address] [place] [address]  Phone: [phone] Fax: [phone]    Primary Care Provider: Carlene Springer DO    Primary Insurance: MEDICARE  Secondary Insurance: BLUE CROSS    DISCHARGE DETAILS:    [facility], they could not accept  [facility] declined as well  Have been in contact with [address] [place] [address] Admission Team     Awaiting approval and acceptance to [address] [place] [address] Unit  EKG was done as requested  [address] [place] [address] provider reviewed the EKG and cleared patient for dc  Per Intake the case is being reviewed by the accepting MD and they will get back to the team     CM provided handoff to Nursing Supervisor as they can set up transport  Update- [address] Admissions will update the team at [address] [place] [address] tomorrow  Provider updated

## 2023-06-02 NOTE — PROGRESS NOTES
Patient at times during shift would grab her own throat tightly and state she wanted to kill herself  Staff would assist patient and remove patients hands from throat and use diversion activities and conversation along with emotional support  Patient did also state she would starve herself to death if she needed, but then did eat ice cream and drink fluids during shift  Patient is under continual observation for suicidal behaviors

## 2023-06-02 NOTE — PLAN OF CARE
Problem: MOBILITY - ADULT  Goal: Maintain or return to baseline ADL function  Description: INTERVENTIONS:  -  Assess patient's ability to carry out ADLs; assess patient's baseline for ADL function and identify physical deficits which impact ability to perform ADLs (bathing, care of mouth/teeth, toileting, grooming, dressing, etc )  - Assess/evaluate cause of self-care deficits   - Assess range of motion  - Assess patient's mobility; develop plan if impaired  - Assess patient's need for assistive devices and provide as appropriate  - Encourage maximum independence but intervene and supervise when necessary  - Involve family in performance of ADLs  - Assess for home care needs following discharge   - Consider OT consult to assist with ADL evaluation and planning for discharge  - Provide patient education as appropriate  Outcome: Progressing  Goal: Maintains/Returns to pre admission functional level  Description: INTERVENTIONS:  - Perform BMAT or MOVE assessment daily    - Set and communicate daily mobility goal to care team and patient/family/caregiver  - Collaborate with rehabilitation services on mobility goals if consulted  - Perform Range of Motion 2 times a day  - Reposition patient every 2 hours    - Dangle patient 2 times a day  - Stand patient 2 times a day  - Ambulate patient 2 times a day  - Out of bed to chair 2 times a day   - Out of bed for meals 2 times a day  - Out of bed for toileting  - Record patient progress and toleration of activity level   Outcome: Progressing     Problem: PAIN - ADULT  Goal: Verbalizes/displays adequate comfort level or baseline comfort level  Description: Interventions:  - Encourage patient to monitor pain and request assistance  - Assess pain using appropriate pain scale  - Administer analgesics based on type and severity of pain and evaluate response  - Implement non-pharmacological measures as appropriate and evaluate response  - Consider cultural and social influences on pain and pain management  - Notify physician/advanced practitioner if interventions unsuccessful or patient reports new pain  Outcome: Progressing     Problem: INFECTION - ADULT  Goal: Absence or prevention of progression during hospitalization  Description: INTERVENTIONS:  - Assess and monitor for signs and symptoms of infection  - Monitor lab/diagnostic results  - Monitor all insertion sites, i e  indwelling lines, tubes, and drains  - Monitor endotracheal if appropriate and nasal secretions for changes in amount and color  - [place] appropriate cooling/warming therapies per order  - Administer medications as ordered  - Instruct and encourage patient and family to use good hand hygiene technique  - Identify and instruct in appropriate isolation precautions for identified infection/condition  Outcome: Progressing  Goal: Absence of fever/infection during neutropenic period  Description: INTERVENTIONS:  - Monitor WBC    Outcome: Progressing     Problem: SAFETY ADULT  Goal: Maintain or return to baseline ADL function  Description: INTERVENTIONS:  -  Assess patient's ability to carry out ADLs; assess patient's baseline for ADL function and identify physical deficits which impact ability to perform ADLs (bathing, care of mouth/teeth, toileting, grooming, dressing, etc )  - Assess/evaluate cause of self-care deficits   - Assess range of motion  - Assess patient's mobility; develop plan if impaired  - Assess patient's need for assistive devices and provide as appropriate  - Encourage maximum independence but intervene and supervise when necessary  - Involve family in performance of ADLs  - Assess for home care needs following discharge   - Consider OT consult to assist with ADL evaluation and planning for discharge  - Provide patient education as appropriate  Outcome: Progressing  Goal: Maintains/Returns to pre admission functional level  Description: INTERVENTIONS:  - Perform BMAT or MOVE assessment daily    - Set and communicate daily mobility goal to care team and patient/family/caregiver  - Collaborate with rehabilitation services on mobility goals if consulted  - Perform Range of Motion 2 times a day  - Reposition patient every 2 hours    - Dangle patient 2 times a day  - Stand patient 2 times a day  - Ambulate patient 2 times a day  - Out of bed to chair 2 times a day   - Out of bed for meals 2 times a day  - Out of bed for toileting  - Record patient progress and toleration of activity level   Outcome: Progressing  Goal: Patient will remain free of falls  Description: INTERVENTIONS:  - Educate patient/family on patient safety including physical limitations  - Instruct patient to call for assistance with activity   - Consult OT/PT to assist with strengthening/mobility   - Keep Call bell within reach  - Keep bed low and locked with side rails adjusted as appropriate  - Keep care items and personal belongings within reach  - Initiate and maintain comfort rounds  - Make Fall Risk Sign visible to staff  - Offer Toileting every 3 Hours, in advance of need  - Initiate/Maintain bed alarm  - Apply yellow socks and bracelet for high fall risk patients  - Consider moving patient to room near nurses station  Outcome: Progressing     Problem: DISCHARGE PLANNING  Goal: Discharge to home or other facility with appropriate resources  Description: INTERVENTIONS:  - Identify barriers to discharge w/patient and caregiver  - Arrange for needed discharge resources and transportation as appropriate  - Identify discharge learning needs (meds, wound care, etc )  - Arrange for interpretive services to assist at discharge as needed  - Refer to Case Management Department for coordinating discharge planning if the patient needs post-hospital services based on physician/advanced practitioner order or complex needs related to functional status, cognitive ability, or social support system  Outcome: Progressing     Problem: Knowledge Deficit  Goal: Patient/family/caregiver demonstrates understanding of disease process, treatment plan, medications, and discharge instructions  Description: Complete learning assessment and assess knowledge base  Interventions:  - Provide teaching at level of understanding  - Provide teaching via preferred learning methods  Outcome: Progressing     Problem: NEUROSENSORY - ADULT  Goal: Achieves stable or improved neurological status  Description: INTERVENTIONS  - Monitor and report changes in neurological status  - Monitor vital signs such as temperature, blood pressure, glucose, and any other labs ordered   - Initiate measures to prevent increased intracranial pressure  - Monitor for seizure activity and implement precautions if appropriate      Outcome: Progressing  Goal: Remains free of injury related to seizures activity  Description: INTERVENTIONS  - Maintain airway, patient safety  and administer oxygen as ordered  - Monitor patient for seizure activity, document and report duration and description of seizure to physician/advanced practitioner  - If seizure occurs,  ensure patient safety during seizure  - Reorient patient post seizure  - Seizure pads on all 4 side rails  - Instruct patient/family to notify RN of any seizure activity including if an aura is experienced  - Instruct patient/family to call for assistance with activity based on nursing assessment  - Administer anti-seizure medications if ordered    Outcome: Progressing  Goal: Achieves maximal functionality and self care  Description: INTERVENTIONS  - Monitor swallowing and airway patency with patient fatigue and changes in neurological status  - Encourage and assist patient to increase activity and self care     - Encourage visually impaired, hearing impaired and aphasic patients to use assistive/communication devices  Outcome: Progressing     Problem: RESPIRATORY - ADULT  Goal: Achieves optimal ventilation and oxygenation  Description: INTERVENTIONS:  - Assess for changes in respiratory status  - Assess for changes in mentation and behavior  - Position to facilitate oxygenation and minimize respiratory effort  - Oxygen administered by appropriate delivery if ordered  - Initiate smoking cessation education as indicated  - Encourage broncho-pulmonary hygiene including cough, deep breathe, Incentive Spirometry  - Assess the need for suctioning and aspirate as needed  - Assess and instruct to report SOB or any respiratory difficulty  - Respiratory Therapy support as indicated  Outcome: Progressing     Problem: GASTROINTESTINAL - ADULT  Goal: Minimal or absence of nausea and/or vomiting  Description: INTERVENTIONS:  - Administer IV fluids if ordered to ensure adequate hydration  - Maintain NPO status until nausea and vomiting are resolved  - Nasogastric tube if ordered  - Administer ordered antiemetic medications as needed  - Provide nonpharmacologic comfort measures as appropriate  - Advance diet as tolerated, if ordered  - Consider nutrition services referral to assist patient with adequate nutrition and appropriate food choices  Outcome: Progressing  Goal: Maintains or returns to baseline bowel function  Description: INTERVENTIONS:  - Assess bowel function  - Encourage oral fluids to ensure adequate hydration  - Administer IV fluids if ordered to ensure adequate hydration  - Administer ordered medications as needed  - Encourage mobilization and activity  - Consider nutritional services referral to assist patient with adequate nutrition and appropriate food choices  Outcome: Progressing  Goal: Maintains adequate nutritional intake  Description: INTERVENTIONS:  - Monitor percentage of each meal consumed  - Identify factors contributing to decreased intake, treat as appropriate  - Assist with meals as needed  - Monitor I&O, weight, and lab values if indicated  - Obtain nutrition services referral as needed  Outcome: Progressing  Goal: Establish and maintain optimal ostomy function  Description: INTERVENTIONS:  - Assess bowel function  - Encourage oral fluids to ensure adequate hydration  - Administer IV fluids if ordered to ensure adequate hydration   - Administer ordered medications as needed  - Encourage mobilization and activity  - Nutrition services referral to assist patient with appropriate food choices  - Assess stoma site  - Consider wound care consult   Outcome: Progressing  Goal: Oral mucous membranes remain intact  Description: INTERVENTIONS  - Assess oral mucosa and hygiene practices  - Implement preventative oral hygiene regimen  - Implement oral medicated treatments as ordered  - Initiate Nutrition services referral as needed  Outcome: Progressing     Problem: GENITOURINARY - ADULT  Goal: Maintains or returns to baseline urinary function  Description: INTERVENTIONS:  - Assess urinary function  - Encourage oral fluids to ensure adequate hydration if ordered  - Administer IV fluids as ordered to ensure adequate hydration  - Administer ordered medications as needed  - Offer frequent toileting  - Follow urinary retention protocol if ordered  Outcome: Progressing  Goal: Absence of urinary retention  Description: INTERVENTIONS:  - Assess patient’s ability to void and empty bladder  - Monitor I/O  - Bladder scan as needed  - Discuss with physician/AP medications to alleviate retention as needed  - Discuss catheterization for long term situations as appropriate  Outcome: Progressing     Problem: METABOLIC, FLUID AND ELECTROLYTES - ADULT  Goal: Electrolytes maintained within normal limits  Description: INTERVENTIONS:  - Monitor labs and assess patient for signs and symptoms of electrolyte imbalances  - Administer electrolyte replacement as ordered  - Monitor response to electrolyte replacements, including repeat lab results as appropriate  - Instruct patient on fluid and nutrition as appropriate  Outcome: Progressing  Goal: Fluid balance maintained  Description: INTERVENTIONS:  - Monitor labs   - Monitor I/O and WT  - Instruct patient on fluid and nutrition as appropriate  - Assess for signs & symptoms of volume excess or deficit  Outcome: Progressing  Goal: Glucose maintained within target range  Description: INTERVENTIONS:  - Monitor Blood Glucose as ordered  - Assess for signs and symptoms of hyperglycemia and hypoglycemia  - Administer ordered medications to maintain glucose within target range  - Assess nutritional intake and initiate nutrition service referral as needed  Outcome: Progressing     Problem: SKIN/TISSUE INTEGRITY - ADULT  Goal: Skin Integrity remains intact(Skin Breakdown Prevention)  Description: Assess:  -Perform Dhaval assessment every shift  -Clean and moisturize skin every day  -Inspect skin when repositioning, toileting, and assisting with ADLS  -Assess under medical devices   -Assess extremities for adequate circulation and sensation     Bed Management:  -Have minimal linens on bed & keep smooth, unwrinkled  -Change linens as needed when moist or perspiring    Toileting:  -Offer bedside commode  -Assess for incontinence   -Use incontinent care products after each incontinent episode    Activity:  -Encourage activity and walks on unit  -Encourage or provide ROM exercises   -Use appropriate equipment to lift or move patient in bed    Skin Care:  -Avoid use of baby powder, tape, friction and shearing, hot water or constrictive clothing  -Relieve pressure over bony prominences   -Do not massage red bony areas    Goal: Incision(s), wounds(s) or drain site(s) healing without S/S of infection  Description: INTERVENTIONS  - Assess and document dressing, incision, wound bed, drain sites and surrounding tissue  - Provide patient and family education  - Perform skin care/dressing changes every shift  Outcome: Progressing  Goal: Pressure injury heals and does not worsen  Description: Interventions:  - Implement low air loss mattress or specialty surface (Criteria met)  - Apply silicone foam dressing  - Instruct/assist with weight shifting every 60 minutes when in chair   - Use special pressure reducing interventions   - Apply fecal or urinary incontinence containment device   - Perform passive or active ROM   - Turn and reposition patient & offload bony prominences  - Utilize friction reducing device or surface for transfers   - Consider consults to  interdisciplinary teams   - Use incontinent care products after each incontinent episode   - Consider nutrition services referral as needed  Outcome: Progressing     Problem: HEMATOLOGIC - ADULT  Goal: Maintains hematologic stability  Description: INTERVENTIONS  - Assess for signs and symptoms of bleeding or hemorrhage  - Monitor labs  - Administer supportive blood products/factors as ordered and appropriate  Outcome: Progressing     Problem: MUSCULOSKELETAL - ADULT  Goal: Maintain or return mobility to safest level of function  Description: INTERVENTIONS:  - Assess patient's ability to carry out ADLs; assess patient's baseline for ADL function and identify physical deficits which impact ability to perform ADLs (bathing, care of mouth/teeth, toileting, grooming, dressing, etc )  - Assess/evaluate cause of self-care deficits   - Assess range of motion  - Assess patient's mobility  - Assess patient's need for assistive devices and provide as appropriate  - Encourage maximum independence but intervene and supervise when necessary  - Involve family in performance of ADLs  - Assess for home care needs following discharge   - Consider OT consult to assist with ADL evaluation and planning for discharge  - Provide patient education as appropriate  Outcome: Progressing  Goal: Maintain proper alignment of affected body part  Description: INTERVENTIONS:  - Support, maintain and protect limb and body alignment  - Provide patient/ family with appropriate education  Outcome: Progressing     Problem: Prexisting or High Potential for Compromised Skin Integrity  Goal: Skin integrity is maintained or improved  Description: INTERVENTIONS:  - Identify patients at risk for skin breakdown  - Assess and monitor skin integrity  - Assess and monitor nutrition and hydration status  - Monitor labs   - Assess for incontinence   - Turn and reposition patient  - Assist with mobility/ambulation  - Relieve pressure over bony prominences  - Avoid friction and shearing  - Provide appropriate hygiene as needed including keeping skin clean and dry  - Evaluate need for skin moisturizer/barrier cream  - Collaborate with interdisciplinary team   - Patient/family teaching  - Consider wound care consult   Outcome: Progressing     Problem: Nutrition/Hydration-ADULT  Goal: Nutrient/Hydration intake appropriate for improving, restoring or maintaining nutritional needs  Description: Monitor and assess patient's nutrition/hydration status for malnutrition  Collaborate with interdisciplinary team and initiate plan and interventions as ordered  Monitor patient's weight and dietary intake as ordered or per policy  Utilize nutrition screening tool and intervene as necessary  Determine patient's food preferences and provide high-protein, high-caloric foods as appropriate       INTERVENTIONS:  - Monitor oral intake, urinary output, labs, and treatment plans  - Assess nutrition and hydration status and recommend course of action  - Evaluate amount of meals eaten  - Assist patient with eating if necessary   - Allow adequate time for meals  - Recommend/ encourage appropriate diets, oral nutritional supplements, and vitamin/mineral supplements  - Order, calculate, and assess calorie counts as needed  - Recommend, monitor, and adjust tube feedings and TPN/PPN based on assessed needs  - Assess need for intravenous fluids  - Provide specific nutrition/hydration education as appropriate  - Include patient/family/caregiver in decisions related to nutrition  Outcome: Progressing Problem: COPING  Goal: Pt/Family able to verbalize concerns and demonstrate effective coping strategies  Description: INTERVENTIONS:  - Assist patient/family to identify coping skills, available support systems and cultural and spiritual values  - Provide emotional support, including active listening and acknowledgement of concerns of patient and caregivers  - Reduce environmental stimuli, as able  - Provide patient education  - Assess for spiritual pain/suffering and initiate spiritual care, including notification of Pastoral Care or y[facility] based community as needed  - Assess effectiveness of coping strategies  Outcome: Progressing     Problem: CONFUSION/THOUGHT DISTURBANCE  Goal: Thought disturbances (confusion, delirium, depression, dementia or psychosis) are managed to maintain or return to baseline mental status and functional level  Description: INTERVENTIONS:  - Assess for possible contributors to  thought disturbance, including but not limited to medications, infection, impaired vision or hearing, underlying metabolic abnormalities, dehydration, respiratory compromise,  psychiatric diagnoses and notify attending PHYSICAN/AP  - Monitor and intervene to maintain adequate nutrition, hydration, elimination, sleep and activity  - Decrease environmental stimuli, including noise as appropriate  - Provide frequent contacts to provide refocusing, direction and reassurance as needed  Approach patient calmly with eye contact and at their level    - [place] high risk fall precautions, aspiration precautions and other safety measures, as indicated  - If delirium suspected, notify physician/AP of change in condition and request immediate in-person evaluation  - Pursue consults as appropriate including Geriatric (campus dependent), OT for cognitive evaluation/activity planning, psychiatric, pastoral care, etc   Outcome: Progressing     Problem: SELF HARM  Goal: Effect of psychiatric condition will be minimized and patient will be protected from self harm  Description: INTERVENTIONS:  - Assess impact of patient's symptoms on level of functioning, self-care needs and offer support as indicated  - Assess patient/family knowledge of depression, impact on illness and need for teaching  - Provide emotional support, presence and reassurance  - Assess for possible suicidal thoughts, ideation or self-harm  If patient expresses suicidal thoughts or statements do not leave alone, notify physician/AP immediately, initiate suicide precautions, and determine need for continual observation    - initiate consults and referrals as appropriate (a mental health professional, Spiritual Care  Outcome: Progressing     Problem: SPIRITUAL CARE  Goal: Patient feels balance and connection with others and/or higher power that empowers the self during times of loss, guilt and fear  Description: INTERVENTIONS:  - Create safety for patient through empathic presence and non-judgmental listening  - Encourage patient to explore his/her values, beliefs and/or spiritual images and practices  - Encourage use of breath work, imagery, meditation, relaxation, reiki to ease distress and provide healing  - Encourage use of cultural and spiritual celebrations and rituals  - Facilitate discussion that helps patient sort out spiritual concerns  - Help patient identify where meaning/hope/comfort & strength are in his/her life  - Refer patient to y[facility]a community for assistance, as appropriate  - Respond to patient/family need for prayer/ritual/sacrament/ceremony  Outcome: Progressing

## 2023-06-02 NOTE — ASSESSMENT & PLAN NOTE
· Patient discharged from outside hospital earlier in day and 5/28 after admission for vomiting  · Evaluated by psychiatry as she was transferred from the behavioral health unit to [facility] for the vomiting, son was requesting hospice and took the patient home with 24/7 care provided by family while awaiting services to be arranged  Review of the discharge summary the internal medicine team and psychiatry team voiced their concerns regarding the son taking the patient home but he was adamant  · Patient was brought to this facility on the same day as discharge from previous facility    Son stated to ER staff he cannot care for patient at home  · Evaluated by psychiatry on 5/30 and deemed to lack appropriate judgement and is danger to self and needs inpatient psychiatric treatment, patient with advanced dementia do not think will be able to sign 201- will need to be 302- 302 has been signed  · Also try to wrap a call bell around her neck to commit suicide she is expressing suicidal ideations now patient has been 302 and a psych facility in terms of [address] [place] [address] as that would be the choice as discussed with the son is being worked on by    · 1:1 to be placed for suicide ideations and safety  · Medically cleared for psych admission

## 2023-06-02 NOTE — NURSING NOTE
Provider contacted regarding increased agitation and aggression to staff by pt despite Seroquel  Prn Zyprexa added  Will continue to monitor

## 2023-06-02 NOTE — PLAN OF CARE
Problem: SAFETY ADULT  Goal: Maintain or return to baseline ADL function  Description: INTERVENTIONS:  -  Assess patient's ability to carry out ADLs; assess patient's baseline for ADL function and identify physical deficits which impact ability to perform ADLs (bathing, care of mouth/teeth, toileting, grooming, dressing, etc )  - Assess/evaluate cause of self-care deficits   - Assess range of motion  - Assess patient's mobility; develop plan if impaired  - Assess patient's need for assistive devices and provide as appropriate  - Encourage maximum independence but intervene and supervise when necessary  - Involve family in performance of ADLs  - Assess for home care needs following discharge   - Consider OT consult to assist with ADL evaluation and planning for discharge  - Provide patient education as appropriate  Outcome: Not Progressing  Goal: Maintains/Returns to pre admission functional level  Description: INTERVENTIONS:  - Perform BMAT or MOVE assessment daily    - Set and communicate daily mobility goal to care team and patient/family/caregiver  - Collaborate with rehabilitation services on mobility goals if consulted  - Perform Range of Motion 2 times a day  - Reposition patient every 2 hours    - Dangle patient 2 times a day  - Stand patient 2 times a day  - Ambulate patient 2 times a day  - Out of bed to chair 2 times a day   - Out of bed for meals 2 times a day  - Out of bed for toileting  - Record patient progress and toleration of activity level   Outcome: Not Progressing  Goal: Patient will remain free of falls  Description: INTERVENTIONS:  - Educate patient/family on patient safety including physical limitations  - Instruct patient to call for assistance with activity   - Consult OT/PT to assist with strengthening/mobility   - Keep Call bell within reach  - Keep bed low and locked with side rails adjusted as appropriate  - Keep care items and personal belongings within reach  - Initiate and maintain comfort rounds  - Make Fall Risk Sign visible to staff  - Offer Toileting every 3 Hours, in advance of need  - Initiate/Maintain bed alarm  - Apply yellow socks and bracelet for high fall risk patients  - Consider moving patient to room near nurses station  Outcome: Not Progressing              Activity:    -Encourage activity and walks on unit  -Encourage or provide ROM exercises   -Turn and reposition patient every 2Hours  -Use appropriate equipment to lift or move patient in bed      Outcome: Not Progressing  Goal: Incision(s), wounds(s) or drain site(s) healing without S/S of infection  Description: INTERVENTIONS  - Assess and document dressing, incision, wound bed, drain sites and surrounding tissue  - Provide patient and family education  - Perform skin care/dressing changes every SHIFT  Outcome: Not Progressing  Goal: Pressure injury heals and does not worsen  Description: Interventions:  - Implement low air loss mattress or specialty surface (Criteria met)          - Apply fecal or urinary incontinence containment device     - Turn and reposition patient & offload bony prominences every 2hours   - Utilize friction reducing device or surface for transfers       - Consider nutrition services referral as needed  Outcome: Not Progressing     Problem: Prexisting or High Potential for Compromised Skin Integrity  Goal: Skin integrity is maintained or improved  Description: INTERVENTIONS:  - Identify patients at risk for skin breakdown  - Assess and monitor skin integrity  - Assess and monitor nutrition and hydration status  - Monitor labs   - Assess for incontinence   - Turn and reposition patient  - Assist with mobility/ambulation  - Relieve pressure over bony prominences  - Avoid friction and shearing  - Provide appropriate hygiene as needed including keeping skin clean and dry  - Evaluate need for skin moisturizer/barrier cream  - Collaborate with interdisciplinary team   - Patient/family teaching  - Consider wound care consult   Outcome: Not Progressing     Problem: COPING  Goal: Pt/Family able to verbalize concerns and demonstrate effective coping strategies  Description: INTERVENTIONS:  - Assist patient/family to identify coping skills, available support systems and cultural and spiritual values  - Provide emotional support, including active listening and acknowledgement of concerns of patient and caregivers  - Reduce environmental stimuli, as able  - Provide patient education  - Assess for spiritual pain/suffering and initiate spiritual care, including notification of Pastoral Care or yahaira based community as needed  - Assess effectiveness of coping strategies  Outcome: Not Progressing     Problem: CONFUSION/THOUGHT DISTURBANCE  Goal: Thought disturbances (confusion, delirium, depression, dementia or psychosis) are managed to maintain or return to baseline mental status and functional level  Description: INTERVENTIONS:  - Assess for possible contributors to  thought disturbance, including but not limited to medications, infection, impaired vision or hearing, underlying metabolic abnormalities, dehydration, respiratory compromise,  psychiatric diagnoses and notify attending PHYSICAN/AP  - Monitor and intervene to maintain adequate nutrition, hydration, elimination, sleep and activity  - Decrease environmental stimuli, including noise as appropriate  - Provide frequent contacts to provide refocusing, direction and reassurance as needed  Approach patient calmly with eye contact and at their level    - [place] high risk fall precautions, aspiration precautions and other safety measures, as indicated  - If delirium suspected, notify physician/AP of change in condition and request immediate in-person evaluation  - Pursue consults as appropriate including Geriatric (campus dependent), OT for cognitive evaluation/activity planning, psychiatric, pastoral care, etc   Outcome: Not Progressing     Problem: SELF HARM  Goal: Effect of psychiatric condition will be minimized and patient will be protected from self harm  Description: INTERVENTIONS:  - Assess impact of patient's symptoms on level of functioning, self-care needs and offer support as indicated  - Assess patient/family knowledge of depression, impact on illness and need for teaching  - Provide emotional support, presence and reassurance  - Assess for possible suicidal thoughts, ideation or self-harm  If patient expresses suicidal thoughts or statements do not leave alone, notify physician/AP immediately, initiate suicide precautions, and determine need for continual observation    - initiate consults and referrals as appropriate (a mental health professional, Spiritual Care  Outcome: Not Progressing     Problem: SPIRITUAL CARE  Goal: Patient feels balance and connection with others and/or higher power that empowers the self during times of loss, guilt and fear  Description: INTERVENTIONS:  - Create safety for patient through empathic presence and non-judgmental listening  - Encourage patient to explore his/her values, beliefs and/or spiritual images and practices  - Encourage use of breath work, imagery, meditation, relaxation, reiki to ease distress and provide healing  - Encourage use of cultural and spiritual celebrations and rituals  - Facilitate discussion that helps patient sort out spiritual concerns  - Help patient identify where meaning/hope/comfort & strength are in his/her life  - Refer patient to yahaira community for assistance, as appropriate  - Respond to patient/family need for prayer/ritual/sacrament/ceremony  Outcome: Not Progressing

## 2023-06-02 NOTE — ASSESSMENT & PLAN NOTE
· On severe vascular dementia POA initially was psychotic features- but since seeing her yesterday and today she is somnolent poor po intake will wake up to name and then falls asleep  · Ct brain neg   · No infection evident on admission  · depakote level checked nml therapeutic   · No prns overnight   · Decrease buspar to 5mg po tid , decrease seroquel to once a day at hs 50 mg from 50 mg po bid , ensure sleep wake cycle , reorient   · Ammonia normal vitamin B12 is normal  · tsh nml in beg of may 5/19  · Repeat labs in am   · Electrolytes stable  Not sleep well at night required a as needed discontinue Klonopin as needed redose Seroquel at 6 PM so we will have time to work to patient to go to sleep continue other medications  Reorient  Patient is awaiting on 0302 for inpatient psychiatric bed    Today she is sleeping

## 2023-06-02 NOTE — PROGRESS NOTES
[address] [facility]  Progress Note  Name: Sheryl Lujan  MRN: [id number]  Unit/Bed#: -01 I Date of Admission: 5/28/2023   Date of Service: 6/2/2023 I Hospital Day: 5    Assessment/Plan   Severe vascular dementia with psychotic disturbance [facility])  Assessment & Plan  · Patient discharged from outside hospital earlier in day and 5/28 after admission for vomiting  · Evaluated by psychiatry as she was transferred from the behavioral health unit to [facility] for the vomiting, son was requesting hospice and took the patient home with 24/7 care provided by family while awaiting services to be arranged  Review of the discharge summary the internal medicine team and psychiatry team voiced their concerns regarding the son taking the patient home but he was adamant  · Patient was brought to this facility on the same day as discharge from previous facility    Son stated to ER staff he cannot care for patient at home  · Evaluated by psychiatry on 5/30 and deemed to lack appropriate judgement and is danger to self and needs inpatient psychiatric treatment, patient with advanced dementia do not think will be able to sign 201- will need to be 302- 302 has been signed  · Also try to wrap a call bell around her neck to commit suicide she is expressing suicidal ideations now patient has been 302 and a psych facility in terms of [place] as that would be the choice as discussed with the son is being worked on by    · 1:1 to be placed for suicide ideations and safety  · Medically cleared for psych admission    * Encephalopathy acute  Assessment & Plan  · On severe vascular dementia POA initially was psychotic features- but since seeing her yesterday and today she is somnolent poor po intake will wake up to name and then falls asleep  · Ct brain neg   · No infection evident on admission  · depakote level checked nml therapeutic   · No prns overnight   · Decrease buspar to 5mg po tid , decrease seroquel to once a day at hs 50 mg from 50 mg po bid , ensure sleep wake cycle , reorient   · Ammonia normal vitamin B12 is normal  · tsh nml in beg of may 5/19  · Repeat labs in am   · Electrolytes stable  Not sleep well at night required a as needed discontinue Klonopin as needed redose Seroquel at 6 PM so we will have time to work to patient to go to sleep continue other medications  Reorient  Patient is awaiting on 0302 for inpatient psychiatric bed  Today she is sleeping    Status post CVA  Assessment & Plan  · 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 and dysphagia  · Was discharged to SNF then sent to inpatient geropsych, unclear extent of physical rehabilitation patient received or was amenable to participate  · Continue puréed diet with nectar thick liquids  · Appreciate PT/OT/case management- needs inpatient psych   · Continue aspirin and statin             VTE Pharmacologic Prophylaxis:   Moderate Risk (Score 3-4) - Pharmacological DVT Prophylaxis Ordered: enoxaparin (Lovenox)  Patient Centered Rounds: I performed bedside rounds with nursing staff today  Discussions with Specialists or Other Care Team Provider: cm    Education and Discussions with Family / Patient: pt  Total Time Spent on Date of Encounter in care of patient: 35 minutes This time was spent on one or more of the following: performing physical exam; counseling and coordination of care; obtaining or reviewing history; documenting in the medical record; reviewing/ordering tests, medications or procedures; communicating with other healthcare professionals and discussing with patient's family/caregivers  Current Length of Stay: 5 day(s)  Current Patient Status: Inpatient   Certification Statement: The patient will continue to require additional inpatient hospital stay due to psych placement   Discharge Plan: Anticipate discharge in 24-48 hrs to inpatient psych      Code Status: Level 3 - DNAR and DNI    Subjective:   Seen and examined sleeping but arousable - did not sleep all night required prns for agitation    Objective:     Vitals:   No data recorded  HR:  [67-74] 74  Resp:  [16-18] 16  BP: (113-126)/(69) 126/69  SpO2:  [96 %-97 %] 97 %  Body mass index is 23 58 kg/m²  Input and Output Summary (last 24 hours): Intake/Output Summary (Last 24 hours) at 6/2/2023 1206  Last data filed at 6/1/2023 2150  Gross per 24 hour   Intake 480 ml   Output 300 ml   Net 180 ml       Physical Exam:   Physical Exam  Vitals and nursing note reviewed  Constitutional:       General: She is not in acute distress  Appearance: She is well-developed  HENT:      Head: Normocephalic and atraumatic  Eyes:      Conjunctiva/sclera: Conjunctivae normal    Cardiovascular:      Rate and Rhythm: Normal rate and regular rhythm  Heart sounds: No murmur heard  Pulmonary:      Effort: Pulmonary effort is normal  No respiratory distress  Breath sounds: Normal breath sounds  No wheezing or rales  Abdominal:      General: There is no distension  Palpations: Abdomen is soft  Tenderness: There is no abdominal tenderness  Musculoskeletal:         General: No swelling  Cervical back: Neck supple  Skin:     General: Skin is warm and dry  Capillary Refill: Capillary refill takes less than 2 seconds     Neurological:      Comments: Sleeping but arousable   Psychiatric:         Mood and Affect: Mood normal          Additional Data:     Labs:  Results from last 7 days   Lab Units 06/02/23  0426 06/01/23  0506   EOS PCT %  --  5   HEMATOCRIT % 32 5* 32 6*   HEMOGLOBIN g/dL 10 2* 10 2*   LYMPHS PCT %  --  20   MONOS PCT %  --  12   NEUTROS PCT %  --  63   PLATELETS Thousands/uL  --  191   WBC Thousand/uL  --  5 55     Results from last 7 days   Lab Units 06/01/23  0506 05/31/23  0435 05/28/23  1817   ANION GAP mmol/L 3*   < > 7   ALBUMIN g/dL  --   --  2 9*   ALK PHOS U/L  --   -- 45   ALT U/L  --   --  10   AST U/L  --   --  23   BUN mg/dL 12   < > 13   CALCIUM mg/dL 8 2*   < > 8 7   CHLORIDE mmol/L 109*   < > 106   CO2 mmol/L 29   < > 27   CREATININE mg/dL 0 53*   < > 0 62   GLUCOSE RANDOM mg/dL 76   < > 93   POTASSIUM mmol/L 3 8   < > 3 7   SODIUM mmol/L 141   < > 140   TOTAL BILIRUBIN mg/dL  --   --  0 54    < > = values in this interval not displayed  Lines/Drains:  Invasive Devices     None                       Imaging: Reviewed radiology reports from this admission including: CT head    Recent Cultures (last 7 days):         Last 24 Hours Medication List:   Current Facility-Administered Medications   Medication Dose Route Frequency Provider Last Rate   • acetaminophen  650 mg Oral Q8H PRN Catracho Davalos MD     • aspirin  81 mg Oral Daily Magnolia S Parker, CRNP     • atorvastatin  40 mg Oral QPM Magnolia S Parker, CRNP     • busPIRone  5 mg Oral TID Catracho Davalos MD     • enoxaparin  40 mg Subcutaneous Daily Magnolia S Parker, CRNP     • famotidine  20 mg Oral Daily Magnolia S Parker, CRNP     • latanoprost  1 drop Both Eyes HS Magnolia S Parker, CRNP     • OLANZapine  2 5 mg Intramuscular Q4H PRN Catracho Davalos MD     • QUEtiapine  50 mg Oral HS Catracho Davalos MD     • sertraline  100 mg Oral Daily Magnolia S Parker, CRNP     • timolol  1 drop Both Eyes BID Magnolia S Parker, CRNP     • valproic acid  250 mg Oral BID Magnolia S Parker, CRNP          Today, Patient Was Seen By: Catracho Davalos MD    **Please Note: This note may have been constructed using a voice recognition system  **

## 2023-06-02 NOTE — ASSESSMENT & PLAN NOTE
· Hb has been fluctuating since beg of may when she was admitted to [facility] anywhere between 9 8-11  · Pt hb has remained stable at 10 2 for 2 consecutive times - no evidence of gross bleed no hematemesis and no melena no hematochezia  · She has poor nutritional aspect that can contribute to anemia   · She needs no scopes or transfusion at this time as there is no evidence of gross bleed hb stable for 2 days and in her range of base
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
## 2023-06-03 LAB
ANION GAP SERPL CALCULATED.3IONS-SCNC: 5 MMOL/L (ref 4–13)
BUN SERPL-MCNC: 11 MG/DL (ref 5–25)
CALCIUM SERPL-MCNC: 8.5 MG/DL (ref 8.4–10.2)
CHLORIDE SERPL-SCNC: 109 MMOL/L (ref 96–108)
CO2 SERPL-SCNC: 25 MMOL/L (ref 21–32)
CREAT SERPL-MCNC: 0.52 MG/DL (ref 0.6–1.3)
GFR SERPL CREATININE-BSD FRML MDRD: 91 ML/MIN/1.73SQ M
GLUCOSE SERPL-MCNC: 96 MG/DL (ref 65–140)
POTASSIUM SERPL-SCNC: 4 MMOL/L (ref 3.5–5.3)
SODIUM SERPL-SCNC: 139 MMOL/L (ref 135–147)

## 2023-06-03 PROCEDURE — 80048 BASIC METABOLIC PNL TOTAL CA: CPT | Performed by: FAMILY MEDICINE

## 2023-06-03 PROCEDURE — 99497 ADVNCD CARE PLAN 30 MIN: CPT | Performed by: FAMILY MEDICINE

## 2023-06-03 PROCEDURE — 99232 SBSQ HOSP IP/OBS MODERATE 35: CPT | Performed by: FAMILY MEDICINE

## 2023-06-03 RX ORDER — BUSPIRONE HYDROCHLORIDE 5 MG/1
5 TABLET ORAL 2 TIMES DAILY
Status: DISCONTINUED | OUTPATIENT
Start: 2023-06-03 | End: 2023-06-14 | Stop reason: HOSPADM

## 2023-06-03 RX ORDER — QUETIAPINE FUMARATE 50 MG/1
100 TABLET, EXTENDED RELEASE ORAL
Status: DISCONTINUED | OUTPATIENT
Start: 2023-06-03 | End: 2023-06-03

## 2023-06-03 RX ORDER — QUETIAPINE FUMARATE 50 MG/1
100 TABLET, EXTENDED RELEASE ORAL
Status: DISCONTINUED | OUTPATIENT
Start: 2023-06-03 | End: 2023-06-07

## 2023-06-03 RX ORDER — DIVALPROEX SODIUM 250 MG/1
500 TABLET, EXTENDED RELEASE ORAL
Status: DISCONTINUED | OUTPATIENT
Start: 2023-06-03 | End: 2023-06-05

## 2023-06-03 RX ORDER — QUETIAPINE FUMARATE 25 MG/1
75 TABLET, FILM COATED ORAL
Status: DISCONTINUED | OUTPATIENT
Start: 2023-06-03 | End: 2023-06-03

## 2023-06-03 RX ADMIN — DIVALPROEX SODIUM 500 MG: 250 TABLET, FILM COATED, EXTENDED RELEASE ORAL at 21:07

## 2023-06-03 RX ADMIN — ENOXAPARIN SODIUM 40 MG: 40 INJECTION SUBCUTANEOUS at 14:33

## 2023-06-03 RX ADMIN — ACETAMINOPHEN 650 MG: 325 TABLET ORAL at 21:13

## 2023-06-03 RX ADMIN — VALPROIC ACID 250 MG: 250 SOLUTION ORAL at 14:42

## 2023-06-03 RX ADMIN — ATORVASTATIN CALCIUM 40 MG: 40 TABLET, FILM COATED ORAL at 18:54

## 2023-06-03 RX ADMIN — ASPIRIN 81 MG: 81 TABLET, COATED ORAL at 14:33

## 2023-06-03 RX ADMIN — TRIMETHOBENZAMIDE HYDROCHLORIDE 100 MG: 100 INJECTION INTRAMUSCULAR at 00:35

## 2023-06-03 RX ADMIN — SERTRALINE 100 MG: 100 TABLET, FILM COATED ORAL at 14:32

## 2023-06-03 RX ADMIN — TIMOLOL MALEATE 1 DROP: 5 SOLUTION OPHTHALMIC at 18:54

## 2023-06-03 RX ADMIN — FAMOTIDINE 20 MG: 20 TABLET ORAL at 14:33

## 2023-06-03 RX ADMIN — TIMOLOL MALEATE 1 DROP: 5 SOLUTION OPHTHALMIC at 14:41

## 2023-06-03 RX ADMIN — QUETIAPINE FUMARATE 100 MG: 50 TABLET, EXTENDED RELEASE ORAL at 18:54

## 2023-06-03 RX ADMIN — BUSPIRONE HYDROCHLORIDE 5 MG: 5 TABLET ORAL at 18:54

## 2023-06-03 NOTE — PLAN OF CARE
Problem: MOBILITY - ADULT  Goal: Maintain or return to baseline ADL function  Description: INTERVENTIONS:  -  Assess patient's ability to carry out ADLs; assess patient's baseline for ADL function and identify physical deficits which impact ability to perform ADLs (bathing, care of mouth/teeth, toileting, grooming, dressing, etc )  - Assess/evaluate cause of self-care deficits   - Assess range of motion  - Assess patient's mobility; develop plan if impaired  - Assess patient's need for assistive devices and provide as appropriate  - Encourage maximum independence but intervene and supervise when necessary  - Involve family in performance of ADLs  - Assess for home care needs following discharge   - Consider OT consult to assist with ADL evaluation and planning for discharge  - Provide patient education as appropriate  Outcome: Progressing  Goal: Maintains/Returns to pre admission functional level  Description: INTERVENTIONS:  - Perform BMAT or MOVE assessment daily    - Set and communicate daily mobility goal to care team and patient/family/caregiver  - Collaborate with rehabilitation services on mobility goals if consulted  - Perform Range of Motion 2 times a day  - Reposition patient every 2 hours    - Dangle patient 2 times a day  - Stand patient 2 times a day  - Ambulate patient 2 times a day  - Out of bed to chair 2 times a day   - Out of bed for meals 2 times a day  - Out of bed for toileting  - Record patient progress and toleration of activity level   Outcome: Progressing     Problem: PAIN - ADULT  Goal: Verbalizes/displays adequate comfort level or baseline comfort level  Description: Interventions:  - Encourage patient to monitor pain and request assistance  - Assess pain using appropriate pain scale  - Administer analgesics based on type and severity of pain and evaluate response  - Implement non-pharmacological measures as appropriate and evaluate response  - Consider cultural and social influences on pain and pain management  - Notify physician/advanced practitioner if interventions unsuccessful or patient reports new pain  Outcome: Progressing     Problem: INFECTION - ADULT  Goal: Absence or prevention of progression during hospitalization  Description: INTERVENTIONS:  - Assess and monitor for signs and symptoms of infection  - Monitor lab/diagnostic results  - Monitor all insertion sites, i e  indwelling lines, tubes, and drains  - Monitor endotracheal if appropriate and nasal secretions for changes in amount and color  - [place] appropriate cooling/warming therapies per order  - Administer medications as ordered  - Instruct and encourage patient and family to use good hand hygiene technique  - Identify and instruct in appropriate isolation precautions for identified infection/condition  Outcome: Progressing  Goal: Absence of fever/infection during neutropenic period  Description: INTERVENTIONS:  - Monitor WBC    Outcome: Progressing     Problem: SAFETY ADULT  Goal: Maintain or return to baseline ADL function  Description: INTERVENTIONS:  -  Assess patient's ability to carry out ADLs; assess patient's baseline for ADL function and identify physical deficits which impact ability to perform ADLs (bathing, care of mouth/teeth, toileting, grooming, dressing, etc )  - Assess/evaluate cause of self-care deficits   - Assess range of motion  - Assess patient's mobility; develop plan if impaired  - Assess patient's need for assistive devices and provide as appropriate  - Encourage maximum independence but intervene and supervise when necessary  - Involve family in performance of ADLs  - Assess for home care needs following discharge   - Consider OT consult to assist with ADL evaluation and planning for discharge  - Provide patient education as appropriate  Outcome: Progressing  Goal: Maintains/Returns to pre admission functional level  Description: INTERVENTIONS:  - Perform BMAT or MOVE assessment daily    - Set and communicate daily mobility goal to care team and patient/family/caregiver  - Collaborate with rehabilitation services on mobility goals if consulted  - Perform Range of Motion 2 times a day  - Reposition patient every 2 hours    - Dangle patient 2 times a day  - Stand patient 2 times a day  - Ambulate patient 2 times a day  - Out of bed to chair 2 times a day   - Out of bed for meals 2 times a day  - Out of bed for toileting  - Record patient progress and toleration of activity level   Outcome: Progressing  Goal: Patient will remain free of falls  Description: INTERVENTIONS:  - Educate patient/family on patient safety including physical limitations  - Instruct patient to call for assistance with activity   - Consult OT/PT to assist with strengthening/mobility   - Keep Call bell within reach  - Keep bed low and locked with side rails adjusted as appropriate  - Keep care items and personal belongings within reach  - Initiate and maintain comfort rounds  - Make Fall Risk Sign visible to staff  - Offer Toileting every 3 Hours, in advance of need  - Initiate/Maintain bed alarm  - Apply yellow socks and bracelet for high fall risk patients  - Consider moving patient to room near nurses station  Outcome: Progressing     Problem: DISCHARGE PLANNING  Goal: Discharge to home or other facility with appropriate resources  Description: INTERVENTIONS:  - Identify barriers to discharge w/patient and caregiver  - Arrange for needed discharge resources and transportation as appropriate  - Identify discharge learning needs (meds, wound care, etc )  - Arrange for interpretive services to assist at discharge as needed  - Refer to Case Management Department for coordinating discharge planning if the patient needs post-hospital services based on physician/advanced practitioner order or complex needs related to functional status, cognitive ability, or social support system  Outcome: Progressing     Problem: Knowledge Deficit  Goal: Patient/family/caregiver demonstrates understanding of disease process, treatment plan, medications, and discharge instructions  Description: Complete learning assessment and assess knowledge base  Interventions:  - Provide teaching at level of understanding  - Provide teaching via preferred learning methods  Outcome: Progressing     Problem: NEUROSENSORY - ADULT  Goal: Achieves stable or improved neurological status  Description: INTERVENTIONS  - Monitor and report changes in neurological status  - Monitor vital signs such as temperature, blood pressure, glucose, and any other labs ordered   - Initiate measures to prevent increased intracranial pressure  - Monitor for seizure activity and implement precautions if appropriate      Outcome: Progressing  Goal: Remains free of injury related to seizures activity  Description: INTERVENTIONS  - Maintain airway, patient safety  and administer oxygen as ordered  - Monitor patient for seizure activity, document and report duration and description of seizure to physician/advanced practitioner  - If seizure occurs,  ensure patient safety during seizure  - Reorient patient post seizure  - Seizure pads on all 4 side rails  - Instruct patient/family to notify RN of any seizure activity including if an aura is experienced  - Instruct patient/family to call for assistance with activity based on nursing assessment  - Administer anti-seizure medications if ordered    Outcome: Progressing  Goal: Achieves maximal functionality and self care  Description: INTERVENTIONS  - Monitor swallowing and airway patency with patient fatigue and changes in neurological status  - Encourage and assist patient to increase activity and self care     - Encourage visually impaired, hearing impaired and aphasic patients to use assistive/communication devices  Outcome: Progressing     Problem: RESPIRATORY - ADULT  Goal: Achieves optimal ventilation and oxygenation  Description: INTERVENTIONS:  - Assess for changes in respiratory status  - Assess for changes in mentation and behavior  - Position to facilitate oxygenation and minimize respiratory effort  - Oxygen administered by appropriate delivery if ordered  - Initiate smoking cessation education as indicated  - Encourage broncho-pulmonary hygiene including cough, deep breathe, Incentive Spirometry  - Assess the need for suctioning and aspirate as needed  - Assess and instruct to report SOB or any respiratory difficulty  - Respiratory Therapy support as indicated  Outcome: Progressing     Problem: GASTROINTESTINAL - ADULT  Goal: Minimal or absence of nausea and/or vomiting  Description: INTERVENTIONS:  - Administer IV fluids if ordered to ensure adequate hydration  - Maintain NPO status until nausea and vomiting are resolved  - Nasogastric tube if ordered  - Administer ordered antiemetic medications as needed  - Provide nonpharmacologic comfort measures as appropriate  - Advance diet as tolerated, if ordered  - Consider nutrition services referral to assist patient with adequate nutrition and appropriate food choices  Outcome: Progressing  Goal: Maintains or returns to baseline bowel function  Description: INTERVENTIONS:  - Assess bowel function  - Encourage oral fluids to ensure adequate hydration  - Administer IV fluids if ordered to ensure adequate hydration  - Administer ordered medications as needed  - Encourage mobilization and activity  - Consider nutritional services referral to assist patient with adequate nutrition and appropriate food choices  Outcome: Progressing  Goal: Maintains adequate nutritional intake  Description: INTERVENTIONS:  - Monitor percentage of each meal consumed  - Identify factors contributing to decreased intake, treat as appropriate  - Assist with meals as needed  - Monitor I&O, weight, and lab values if indicated  - Obtain nutrition services referral as needed  Outcome: Progressing  Goal: Establish and maintain optimal ostomy function  Description: INTERVENTIONS:  - Assess bowel function  - Encourage oral fluids to ensure adequate hydration  - Administer IV fluids if ordered to ensure adequate hydration   - Administer ordered medications as needed  - Encourage mobilization and activity  - Nutrition services referral to assist patient with appropriate food choices  - Assess stoma site  - Consider wound care consult   Outcome: Progressing  Goal: Oral mucous membranes remain intact  Description: INTERVENTIONS  - Assess oral mucosa and hygiene practices  - Implement preventative oral hygiene regimen  - Implement oral medicated treatments as ordered  - Initiate Nutrition services referral as needed  Outcome: Progressing     Problem: GENITOURINARY - ADULT  Goal: Maintains or returns to baseline urinary function  Description: INTERVENTIONS:  - Assess urinary function  - Encourage oral fluids to ensure adequate hydration if ordered  - Administer IV fluids as ordered to ensure adequate hydration  - Administer ordered medications as needed  - Offer frequent toileting  - Follow urinary retention protocol if ordered  Outcome: Progressing  Goal: Absence of urinary retention  Description: INTERVENTIONS:  - Assess patient’s ability to void and empty bladder  - Monitor I/O  - Bladder scan as needed  - Discuss with physician/AP medications to alleviate retention as needed  - Discuss catheterization for long term situations as appropriate  Outcome: Progressing

## 2023-06-03 NOTE — QUICK NOTE
Psychiatry declined patient to be transferred to [facility] due to end-stage dementia vascular nothing they can offer  Patient does not need to have sedation at all times even increased her behaviors are not completely controlled    She is not eating she is not drinking she is a danger to herself and others if her behaviors get escalated without sedation I discussed with her son who is a power of  in terms of transitioning her to hospice comfort care due to end-stage dementia and nothing more that can be offered to reverse her situation as she may require higher sedation at the would be appropriate for her to be on hospice due to the side effects and also with her not eating and not drinking she will phase to further medical complications for which unless a permanent solution is offered peg tube which is not recommended with her aggression and dementia or it seems would not want anyway , nothing can be done and the best rec is hospice care son is in agreement   Discussed with  comfort care in nursing home - she will reach out to son

## 2023-06-03 NOTE — ASSESSMENT & PLAN NOTE
· Patient discharged from outside hospital earlier in day and 5/28 after admission for vomiting  · Evaluated by psychiatry as she was transferred from the behavioral health unit to [facility] for the vomiting, son was requesting hospice and took the patient home with 24/7 care provided by family while awaiting services to be arranged  Review of the discharge summary the internal medicine team and psychiatry team voiced their concerns regarding the son taking the patient home but he was adamant  · Patient was brought to this facility on the same day as discharge from previous facility  Son stated to ER staff he cannot care for patient at home  · Evaluated by psychiatry on 5/30 and deemed to lack appropriate judgement and is danger to self and needs inpatient psychiatric treatment, patient with advanced dementia do not think will be able to sign 201- will need to be 302- 302 has been signed  · Also try to wrap a call bell around her neck to commit suicide she is expressing suicidal ideations now patient has been 302   · Patient is on a one-to-one for safety reasons  · [facility] psychiatry declined the patient stating that she has vascular dementia and there is nothing they can do from psychiatric point of view  Patient is acting up at night requiring as needed's she and that she is sleeping through the day    We will decrease her BuSpar down to 5 mg twice daily increase her Seroquel to 75 mg at night and make it at 6 PM so has time to work hopefully to avoid as needed's hopefully to keep more awake during the day as her oral intake is poor as she is eating intermittent ice cream that is it   · I have consulted psychiatry here to evaluate the patient and to manage adjustment of her medications daily via televisit as she was declined for inpatient psychiatry

## 2023-06-03 NOTE — CONSULTS
TeleConsultation - [address] 76 y o  female MRN: [id number]  Unit/Bed#: -01 Encounter: [id number]        REQUIRED DOCUMENTATION:     1  This service was provided via Telemedicine  2  Provider located at [facility]   3  TeleMed provider: Franci Chen MD   4  Identify all parties in room with patient during tele consult: Patient   5  Patient was then informed that this was a Telemedicine visit and that the exam was being conducted confidentially over secure lines  My office door was closed  No one else was in the room  Patient acknowledged consent and understanding of privacy and security of the Telemedicine visit, and gave us permission to have the assistant stay in the room in order to assist with the history and to conduct the exam   I informed the patient that I have reviewed their record in Epic and presented the opportunity for them to ask any questions regarding the visit today  The patient agreed to participate  Discussed with Boris KAUR     Assessment/Plan     Present on Admission:  • Severe vascular dementia with psychotic disturbance [facility])    Assessment:    Major Neurocognitive Disorder      Patient with end-stage major neurocognitive disorder with continued behavioral disturbance is not accepted by inpatient psychiatry and consulted for management of agitation behaviors  Recommend transitioning to and increasing Seroquel Xr 100 mg qhs and Depakote  mg nightly      Treatment Plan:    Planned Medication Changes:    -Per Above     Current Medications:     Current Facility-Administered Medications   Medication Dose Route Frequency Provider Last Rate   • acetaminophen  650 mg Oral Q8H PRN Boris Vance MD     • aspirin  81 mg Oral Daily RAYSA Dominguez     • atorvastatin  40 mg Oral QPM RAYSA Dominguez     • busPIRone  5 mg Oral BID Boris Vance MD     • calcium carbonate  500 mg Oral BID PRN RAYSA Parham     • enoxaparin  40 mg Subcutaneous Daily Magnolia S Parker, CRNP     • famotidine  20 mg Oral Daily Magnolia S Parker, CRNP     • latanoprost  1 drop Both Eyes HS Magnolia S Parker, CRNP     • OLANZapine  2 5 mg Intramuscular Q4H PRN Nella No MD     • QUEtiapine  75 mg Oral HS Nella No MD     • sertraline  100 mg Oral Daily Magnolia S Parker, CRNP     • timolol  1 drop Both Eyes BID Magnolia S Parker, CRNP     • trimethobenzamide  100 mg Intramuscular Q6H PRN RAYSA Vivar     • valproic acid  250 mg Oral BID Magnolia S Parker, CRNP         Risks / Benefits of Treatment:    Risks, benefits, and possible side effects of medications explained to patient and patient verbalizes understanding        Other treatment modalities recommended as indicated:    · delirium non-pharmacological treatment      Inpatient consult to Psychiatry  Consult performed by: Alicia Bhatt MD  Consult ordered by: Nella No MD        Physician Requesting Consult: Nella No MD  Principal Problem:Encephalopathy acute    Reason for Consult: Psych Evaluation      History of Present Illness      Patient is an extremely poor historian due to cognitive status majority of information obtained per chart review limited clinical exam      Psychiatric Review Of Systems:  Unable to Fully Obtain       Historical Information     Past Psychiatric History: Unable to Fully Obtain         Substance Abuse History: Unable to Fully Obtain         Family Psychiatric History:  Unable to Fully Obtain          Social History: Unable to Fully Obtain          Traumatic History: Unable to Fully Obtain         Past Medical History:   Diagnosis Date   • Dementia ([facility] [address] )    • No abnormality of hip joint detected on examination    • Stroke [facility])        Medical Review Of Systems:    Review of Systems    Meds/Allergies     all current active meds have been reviewed  Allergies   Allergen Reactions   • Lorazepam Other (See Comments)     Increased agitation         Objective     Vital signs in last 24 hours:  Temp:  [98 1 °F (36 7 °C)] 98 1 °F (36 7 °C)  HR:  [68] 68  Resp:  [15] 15  BP: (120)/(57) 120/57      Intake/Output Summary (Last 24 hours) at 6/3/2023 1455  Last data filed at 6/2/2023 1945  Gross per 24 hour   Intake --   Output 550 ml   Net -550 ml       Mental Status Evaluation:    Appearance:  age appropriate   Behavior:  restless and fidgety   Speech:  normal pitch and normal volume   Mood:  normal   Affect:  normal   Language: naming objects   Thought Process:  concrete   Associations intact associations   Thought Content:  normal   Perceptual Disturbances: None   Risk Potential: Suicidal Ideations none  Homicidal Ideations none  Potential for Aggression No   Sensorium:  person, place and time/date   Cognition:  recent and remote memory grossly intact   Consciousness:  alert    Attention: attention span and concentration were age appropriate   Intellect: within normal limits   Fund of Knowledge: awareness of current events: n/a   Insight:  limited   Judgment: limited   Muscle Strength:  Muscle Tone: normal NFT  normal   Gait/Station: normal gait/station   Motor Activity: no abnormal movements       Lab Results: I have personally reviewed all pertinent laboratory/tests results       Most Recent Labs:   Lab Results   Component Value Date    ALB 2 9 (L) 05/28/2023    ALKPHOS 45 05/28/2023    ALT 10 05/28/2023    AMMONIA 19 05/31/2023    AST 23 05/28/2023    BUN 11 06/03/2023    CALCIUM 8 5 06/03/2023    CHOLESTEROL 79 04/15/2023     (H) 06/03/2023    CO2 25 06/03/2023    CREATININE 0 52 (L) 06/03/2023     05/14/2023    GLUC 96 06/03/2023    HCT 32 5 (L) 06/02/2023    HDL 28 (L) 04/15/2023    HGB 10 2 (L) 06/02/2023    HGBA1C 5 5 05/14/2023    K 4 0 06/03/2023    LDLCALC 31 04/15/2023    NEUTROABS 3 44 06/01/2023     06/01/2023    RBC 3 31 (L) 06/01/2023    RDW 16 5 (H) 06/01/2023    SODIUM 139 06/03/2023    TBILI 0 54 05/28/2023    TP 6 3 (L) 05/28/2023    TRIG 100 04/15/2023    SIT1UITPIANH 3 832 06/01/2023    VALPROICTOT 52 05/31/2023    WBC 5 55 06/01/2023       Imaging Studies: XR hip/pelv 2-3 vws left if performed    Result Date: 6/1/2023  Narrative: LEFT HIP INDICATION:   left hip pain  Fall  COMPARISON: 4/24/2023 VIEWS:  XR HIP/PELV 2-3 VWS LEFT  W PELVIS IF PERFORMED FINDINGS: There is no acute fracture or dislocation  There is a left hip arthroplasty  Components are in anatomic alignment  There is no evidence of loosening or subsidence  There is a stable fractured cerclage wire over the greater trochanter  There is deformity of the left iliac crest  No lytic or blastic osseous lesion  There are atherosclerotic calcifications  There is heterotopic ossification adjacent to the right greater trochanter  Soft tissues are otherwise unremarkable  Degenerative changes visualized lower lumbar spine  Impression: No acute osseous abnormality  Workstation performed: [id number]     CT head without contrast    Result Date: 5/28/2023  Narrative: CT BRAIN - WITHOUT CONTRAST INDICATION:   Headache, tension-type headache  COMPARISON: CT 4/24/2023 and priors TECHNIQUE:  CT examination of the brain was performed  Multiplanar 2D reformatted images were created from the source data  Radiation dose length product (DLP) for this visit:  855 mGy-cm   This examination, like all CT scans performed in the [facility], was performed utilizing techniques to minimize radiation dose exposure, including the use of iterative reconstruction and automated exposure control  IMAGE QUALITY:  Diagnostic  FINDINGS: PARENCHYMA: Decreased attenuation is noted in periventricular and subcortical white matter demonstrating an appearance that is statistically most likely to represent mild microangiopathic change  No CT signs of acute infarction  No intracranial mass, mass effect or midline shift  No acute parenchymal hemorrhage    Chronic infarct in the posterior right parietal and left occipital regions  Chronic basal ganglia lacunar infarcts  VENTRICLES AND EXTRA-AXIAL SPACES:  Normal for the patient's age  VISUALIZED ORBITS: Normal visualized orbits  PARANASAL SINUSES: Normal visualized paranasal sinuses  CALVARIUM AND EXTRACRANIAL SOFT TISSUES:  Normal      Impression: Chronic ischemic changes as above  No acute intracranial abnormality  Workstation performed: [id number]     XR ABDOMEN 1 VW PORTABLE    Result Date: 5/26/2023  Narrative: Exam: Abdomen x-ray Clinical indication: Gastric obstruction Comparison: None Technique: Supine abdomen Findings: Portable supine examination of the abdomen demonstrates contrast material seen throughout the colon to the rectosigmoid, residual from the patient's previous upper GI series  A few air-filled loops of small bowel are evident  The patient is status post cholecystectomy  Marked degenerative changes and dextroscoliosis of the lumbar spine is noted  Left hip prosthesis is evident  Heterotopic ossification is seen in the region bilateral hips        Impression: Impression: Residual contrast within the colon to the rectosigmoid  A few air-filled loops of small bowel  No evidence of obstruction  Status post cholecystectomy  Workstation:[id number]    XR CHEST 1 VW PORTABLE    Result Date: 5/26/2023  Narrative: Exam: Chest 1 view: Clinical indication:? Aspiration Comparison: None Technique: A single view of the chest was obtained  Findings: Portable AP erect view of the chest without previous chest x-rays available for comparison demonstrates obscuration of the left hemidiaphragm consistent left lower lobe infiltrate/atelectasis  Accompanying left-sided pleural effusion cannot be excluded  Right infrahilar infiltrate is evident as well  Minimal blunting of the right costophrenic angle ? small right-sided pleural effusion versus pleural thickening  The heart size is within normal limits  Uncoiling of thoracic aorta is noted   Degenerative changes seen involving the thoracic spine  Calcific tendinitis left shoulder is evident    Residual contrast is seen within the colon none  The patient is status post cholecystectomy  Impression: Impression: Obscuration left hemidiaphragm consistent left lower lobe infiltrate/atelectasis  A left-sided pleural effusion cannot be excluded  Right infrahilar infiltrate  ? small right-sided pleural effusion  Workstation:[id number]    FLUOROSCOPY UPPER GI WO AIR    Result Date: 5/20/2023  Narrative: History: 64-year-old female, evaluate passage of contrast through the gastric pylorus  Comparison studies: CT scan of the abdomen and pelvis performed on 5/19/2023 Fluoroscopic time: 58 seconds Radiation dose: 20 mGy (CAK) Technique: First, a  view of the abdomen was obtained  Then, an upper gastrointestinal exam was performed utilizing fluoroscopic assistance  : Small amount of residual contrast from recent CT scan is visualized in the colon  Nasogastric tube eventually placed with its tip in the stomach  There is a very large hiatal hernia  The lungs are clear  Cholecystectomy clips are in place  Left hip prosthesis is in place  Upper GI series: Water-soluble contrast was hand injected through the patient's nasogastric tube into the stomach  The stomach is entirely located within the lower chest  Contrast is visualized within the distal esophagus and progresses into the stomach  Contrast progresses normally through the gastric pylorus into the duodenum which is located within the right upper quadrant  Impression: Impression: Contrast progresses normally through the gastric pylorus into the duodenum  Very large hiatal hernia containing the entire stomach  The duodenum is located normally within the upper abdomen   Workstation:[id number]    CTA abdomen pelvis w wo contrast    Result Date: 5/19/2023  Narrative: PROCEDURE INFORMATION: Exam: CT Abdomen And Pelvis With Contrast Exam date and time: 5/19/2023 3:28 AM Age: 76years old Clinical indication: Nausea and vomiting; Additional info: Abdominal abscess/infection suspected TECHNIQUE: Imaging protocol: Computed tomography of the abdomen and pelvis with contrast  Radiation optimization: All CT scans at this facility use at least one of these dose optimization techniques: automated exposure control; mA and/or kV adjustment per patient size (includes targeted exams where dose is matched to clinical indication); or iterative reconstruction  Contrast material: VISI 320; Contrast volume: 80 ml; Contrast route: INTRAVENOUS (IV);   REPORTING DATA: Count of CT and Cardiac NM exams in prior 12 months: This patient has received 1 known CT and 0 known cardiac nuclear medicine studies in the 12 months prior to the current study  COMPARISON: No relevant prior studies available  FINDINGS: Lungs: Maryelizabeth Le is minor infiltrate in the dependent left lower lobe  No acute airspace disease at right base  Pleural spaces: Trace right pleural effusion  Diaphragm: Huge hiatal hernia  The stomach is almost completely intrathoracic  It is full of fluid and ingested food  The gastric wall appears to be diffusely thickened  Liver:  Normal Gallbladder and bile ducts:  Surgical absence of the gallbladder  There is no bile duct dilatation  Pancreas: Normal  Spleen: Normal  Adrenal glands: Normal  Kidneys and ureters:  4 5 cm left lateral lower polar renal cyst   No suspicious renal mass on either side   No hydronephrosis  Stomach and bowel: There is no dilatation of the duodenum or small bowel  The colon contains gas, fluid and multiple clumps of barium  There is no convincing colon wall thickening  Appendix: The appendix is not clearly visualized  Intraperitoneal space: There is no ascites  No free air  Vasculature:  No aortic or iliac artery aneurysms  Lymph nodes: Unremarkable  No enlarged lymph nodes   Urinary bladder: What is seen of the bladder is normal  Streak artifact from left hip hardware limits assessment of the pelvic structures  Reproductive: The uterus is atrophic  Neither ovary is clearly visualized  Bones/joints: There are hypertrophic changes in the thoracic spine  There are a few old compression deformities including an old fracture of L1  Soft tissues: Unremarkable abdominal wall  Impression: IMPRESSION: 1  Minor infiltrate at the left base  2  Almost completely intrathoracic stomach  The intrathoracic portion is full of fluid and appears slightly thick-walled  3  No bowel obstruction or other acute GI tract abnormality   Assessment of the colon is limited by the presence of multiple clumps of barium in the lumen  4  4 5 cm left renal cyst   No obstructive uropathy   The bladder is grossly normal        FL barium swallow video w speech    Result Date: 5/16/2023  Narrative: Modified barium swallow, with speech therapy Indication: Dysphagia  TECHNIQUE:  A videofluoroscopic swallowing study was performed by the Department of Speech and Hearing Services in conjunction with Department of Radiology  Fluoroscopic time: 5 0 minutes        Fluoroscopic Dose: 24 36 mGy (CAK) FINDINGS: Noted was laryngeal penetration with thin liquid  No aspiration  (See the speech therapist's notes for further detail )    Impression: IMPRESSION:  Laryngeal penetration with thin liquid  No aspiration  Workstation:[id number]    CT head wo contrast    Result Date: 5/13/2023  Narrative: PROCEDURE INFORMATION: Exam: CT Head Without Contrast Exam date and time: 5/13/2023 2:11 PM Age: 76years old Clinical indication: Condition or disease; Other: Stroke; Other: Aggression;  Additional info: Stroke, follow up TECHNIQUE: Imaging protocol: Computed tomography of the head without contrast  Radiation optimization: All CT scans at this facility use at least one of these dose optimization techniques: automated exposure control; mA and/or kV adjustment per patient size (includes targeted exams where dose is matched to clinical indication); or iterative reconstruction  REPORTING DATA: Count of CT and Cardiac NM exams in prior 12 months: This patient has received 0 known CTs and 0 known cardiac nuclear medicine studies in the 12 months prior to the current study  COMPARISON: No relevant prior studies available  FINDINGS: Brain: There are chronic left posterior mesial temporal and occipital chronic right parietooccipital infarcts  There is no acute intracranial hemorrhage  There is lucency in the cerebral white matter, likely microvascular disease although non-specific  No evidence of mass  There is no mass effect or midline shift  Elysia Penning white differentiation is intact  There are no extra-axial fluid collections  Cerebral ventricles: The ventricles and sulci are enlarged, consistent with volume loss / atrophy  No hydrocephalus  Paranasal sinuses: Visualized sinuses are unremarkable  No fluid levels  Mastoid air cells:  No significant mastoid effusion  Bones/joints:  No acute fracture  Soft tissues: Unremarkable as visualized  Vasculature: There is vascular calcification  Impression: IMPRESSION: 1    No evidence of acute intracranial abnormality  No evidence of acute infarction, hemorrhage, or mass  2    Atrophy and microvascular disease  EKG/Pathology/Other Studies:   Lab Results   Component Value Date    ATRIALRATE 63 06/02/2023    PAXIS 9 06/02/2023    PRINT 144 06/02/2023    QRSAXIS 76 06/02/2023    QRSDINT 74 06/02/2023    QTCINT 423 06/02/2023    QTINT 414 06/02/2023    TWAVEAXIS -80 06/02/2023    VENTRATE 63 06/02/2023        Code Status: Level 3 - DNAR and DNI  Advance Directive and Living Will:      Power of :    POLST:      Screenings:    1  Nutrition Screening  Nutrition Assessment (completed by Staff): Nutrition  Feeding: Refused meal  Diet Type: Dysphagia II, Thin Liquids    2   Pain Screening  Pain Screening: Pain Assessment  Pain Assessment Tool: Ray-Baker FACES  Pain Score: 0  Ray-Baker FACES Pain Rating: No hurt  Pain "Location/Orientation: Orientation: Right, Location: Hip  Pain Onset/Description: Onset: Ongoing  Patient's Stated Pain Goal: No pain  Hospital Pain Intervention(s): Repositioned, MD notified (Comment), Rest  Multiple Pain Sites: No  Pain Rating: FLACC (Rest) - Face: Occasional grimace or frown, withdrawn, disinterested, appears sad or worried  Pain Rating: FLACC (Rest) - Legs: Normal position or relaxed  Pain Rating: FLACC (Rest) - Activity: Lying quietly, normal position, moves easily  Pain Rating: FLACC (Rest) - Cry: No cry (awake or asleep)  Pain Rating: FLACC (Rest) - Consolability: Content, relaxed  Score: FLACC (Rest): 1  Pain Rating: FLACC (Activity) - Face: Occasional grimace or frown, withdrawn, disinterested, appears sad or worried  Pain Rating: FLACC (Activity) - Legs: Normal position or relaxed  Pain Rating: FLACC (Activity) - Activity: Lying quietly, normal position, moves easily  Pain Rating: FLACC (Activity) - Cry: No cry (awake or asleep)  Pain Rating: FLACC (Activity) - Consolability: Content, relaxed  Score: FLACC (Activity): 1    3  Suicide Screening  ED Crisis Suicide Risk Assessment: Suicide Risk Assessment  Description of self harming behaviors or thoughts[de-identified] stating \"I want to die  God crucify me, take me to heaven  I need to be tortured more before I die  \"      Counseling / Coordination of Care: Total floor / unit time spent today 30 minutes  Greater than 50% of total time was spent with the patient and / or family counseling and / or coordination of care  A description of the counseling / coordination of care: Direct Patient Care, Chart Review, and Documentation         "

## 2023-06-03 NOTE — PLAN OF CARE
Problem: MOBILITY - ADULT  Goal: Maintain or return to baseline ADL function  Description: INTERVENTIONS:  -  Assess patient's ability to carry out ADLs; assess patient's baseline for ADL function and identify physical deficits which impact ability to perform ADLs (bathing, care of mouth/teeth, toileting, grooming, dressing, etc )  - Assess/evaluate cause of self-care deficits   - Assess range of motion  - Assess patient's mobility; develop plan if impaired  - Assess patient's need for assistive devices and provide as appropriate  - Encourage maximum independence but intervene and supervise when necessary  - Involve family in performance of ADLs  - Assess for home care needs following discharge   - Consider OT consult to assist with ADL evaluation and planning for discharge  - Provide patient education as appropriate  Outcome: Progressing     Problem: PAIN - ADULT  Goal: Verbalizes/displays adequate comfort level or baseline comfort level  Description: Interventions:  - Encourage patient to monitor pain and request assistance  - Assess pain using appropriate pain scale  - Administer analgesics based on type and severity of pain and evaluate response  - Implement non-pharmacological measures as appropriate and evaluate response  - Consider cultural and social influences on pain and pain management  - Notify physician/advanced practitioner if interventions unsuccessful or patient reports new pain  Outcome: Progressing     Problem: INFECTION - ADULT  Goal: Absence or prevention of progression during hospitalization  Description: INTERVENTIONS:  - Assess and monitor for signs and symptoms of infection  - Monitor lab/diagnostic results  - Monitor all insertion sites, i e  indwelling lines, tubes, and drains  - Monitor endotracheal if appropriate and nasal secretions for changes in amount and color  - [place] appropriate cooling/warming therapies per order  - Administer medications as ordered  - Instruct and encourage patient and family to use good hand hygiene technique  - Identify and instruct in appropriate isolation precautions for identified infection/condition  Outcome: Progressing     Problem: SAFETY ADULT  Goal: Maintain or return to baseline ADL function  Description: INTERVENTIONS:  -  Assess patient's ability to carry out ADLs; assess patient's baseline for ADL function and identify physical deficits which impact ability to perform ADLs (bathing, care of mouth/teeth, toileting, grooming, dressing, etc )  - Assess/evaluate cause of self-care deficits   - Assess range of motion  - Assess patient's mobility; develop plan if impaired  - Assess patient's need for assistive devices and provide as appropriate  - Encourage maximum independence but intervene and supervise when necessary  - Involve family in performance of ADLs  - Assess for home care needs following discharge   - Consider OT consult to assist with ADL evaluation and planning for discharge  - Provide patient education as appropriate  Outcome: Progressing

## 2023-06-03 NOTE — CASE MANAGEMENT
Case Management Discharge Planning Note    Patient name Reno Singleton  Location [address] 221/-14 MRN [id number]  : 1945 Date 6/3/2023       Current Admission Date: 2023  Current Admission Diagnosis:Encephalopathy acute   Patient Active Problem List    Diagnosis Date Noted   • Chronic anemia 2023   • Encephalopathy acute 2023   • Status post CVA 2023   • Recurrent falls 2023   • Tobacco abuse 2023   • Acute ischemic CVA 2023   • Severe vascular dementia with psychotic disturbance ([facility] [address] ) 2023   • Generalized anxiety disorder 2023   • Hypertensive urgency 2023      LOS (days): 6  Geometric Mean LOS (GMLOS) (days): 4 70  Days to GMLOS:-0 8     OBJECTIVE:  Risk of Unplanned Readmission Score: 28 52         Current admission status: Inpatient   Preferred Pharmacy:   [address] [place] [address]  Phone: [phone] Fax: [phone]    Primary Care Provider: Anabela Meadows DO    Primary Insurance: MEDICARE  Secondary Insurance: BLUE CROSS    DISCHARGE DETAILS:           Patient denied 6B and 6T  Per reviewing doctor patient has vascular dementia and there is nothing that can be done from a psychiatric standpoint

## 2023-06-03 NOTE — ACP (ADVANCE CARE PLANNING)
Advanced Care Planning Progress Note    Serious Illness Conversation    1  What is your understanding now of where you are with your illness? Prognostic Understanding: appropriate understanding of prognosis  Son      2  How much information about what is likely to be ahead with your illness would you like to have? Patient end stage dementia      3  What did you (clinician) communicate to the patient? Prognostic Communication: Time - I wish we were not in this situation, but I am worried that time may be as short as couple of month maybe (express as a range, e g  days to weeks, weeks to months, months to a year)  4  If your health situation worsens, what are your most important goals? Goals: be spiritually and emotionally at peace     5  What are the biggest fears and worries about the future and your health? Fears/Worries: pain     6  What abilities are so critical to your life that you cannot imagine living without them? Unacceptable Function: being chronically confused or not being myself     7  What gives you strength as you think about the future with your illness? N/a     8  If you become sicker, how much are you willing to go through for the possibility of gaining more time? Be in the hospital: No Have a feeding tube: No   Be in the ICU: No Live in a nursing home: Yes   Be on a ventilator: No Be uncomfortable: No   Be on dialysis: No Undergo aggressive test and/or procedures: No   9  How much does your proxy and family know about your priorities and wishes? Discussion Discussion: extensive discussion with family about goals and wishes          How does this plan sound to you? I will do everything I can to help you through this    Patient verbalized understanding of the plan     I have spent >35minutes speaking with my patient on advanced care planning today or during this visit     Advanced directives  Five Wishes: Patient does not have Five Wishes- would not like information         Rk Rowan Mayank Wiggins MD

## 2023-06-03 NOTE — PROGRESS NOTES
[address]  Progress Note  Name: Paige Levin  MRN: [id number]  Unit/Bed#: -01 I Date of Admission: 5/28/2023   Date of Service: 6/3/2023  Hospital Day: 6    Assessment/Plan   Severe vascular dementia with psychotic disturbance [facility])  Assessment & Plan  · Patient discharged from outside hospital earlier in day and 5/28 after admission for vomiting  · Evaluated by psychiatry as she was transferred from the behavioral health unit to [facility] for the vomiting, son was requesting hospice and took the patient home with 24/7 care provided by family while awaiting services to be arranged  Review of the discharge summary the internal medicine team and psychiatry team voiced their concerns regarding the son taking the patient home but he was adamant  · Patient was brought to this facility on the same day as discharge from previous facility  Son stated to ER staff he cannot care for patient at home  · Evaluated by psychiatry on 5/30 and deemed to lack appropriate judgement and is danger to self and needs inpatient psychiatric treatment, patient with advanced dementia do not think will be able to sign 201- will need to be 302- 302 has been signed  · Also try to wrap a call bell around her neck to commit suicide she is expressing suicidal ideations now patient has been 302   · Patient is on a one-to-one for safety reasons  · [facility] psychiatry declined the patient stating that she has vascular dementia and there is nothing they can do from psychiatric point of view  Patient is acting up at night requiring as needed's she and that she is sleeping through the day    We will decrease her BuSpar down to 5 mg twice daily increase her Seroquel to 75 mg at night and make it at 6 PM so has time to work hopefully to avoid as needed's hopefully to keep more awake during the day as her oral intake is poor as she is eating intermittent ice cream that is it   · I have consulted psychiatry here to evaluate the patient and to manage adjustment of her medications daily via televisit as she was declined for inpatient psychiatry    * Encephalopathy acute  Assessment & Plan  · On severe vascular dementia POA initially was psychotic features- but since seeing her yesterday and today she is somnolent poor po intake will wake up to name and then falls asleep  · Ct brain neg   · No infection evident on admission  · depakote level checked nml therapeutic   · No prns overnight   · Medication adjustment has been done in terms of BuSpar has been lowered Seroquel has been lowered to once a day to avoid oversedation unfortunately the patient next up at night and gets as needed's but they are able to wake her up in the morning we will have to adjust her medications for behavioral control  · tsh nml in beg of may 5/19   · Electrolytes stable      Chronic anemia  Assessment & Plan  · Hb has been fluctuating since beg of may when she was admitted to [facility] anywhere between 9 8-11  · Pt hb has remained stable at 10 2 for 2 consecutive times - no evidence of gross bleed no hematemesis and no melena no hematochezia  · She has poor nutritional aspect that can contribute to anemia   · She needs no scopes or transfusion at this time as there is no evidence of gross bleed hb stable for 2 days and in her range of base    Status post CVA  Assessment & Plan  · 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 and dysphagia  · Was discharged to SNF then sent to inpatient geropsych, unclear extent of physical rehabilitation patient received or was amenable to participate  · Continue puréed diet with nectar thick liquids  · Appreciate PT/OT/case management- needs inpatient psych   · Continue aspirin and statin             VTE Pharmacologic Prophylaxis:   Moderate Risk (Score 3-4) - Pharmacological DVT Prophylaxis Ordered: enoxaparin (Lovenox)  Patient Centered Rounds:  I "performed bedside rounds with nursing staff today  Discussions with Specialists or Other Care Team Provider: allison    Education and Discussions with Family / Patient: pt  Total Time Spent on Date of Encounter in care of patient: 35 minutes This time was spent on one or more of the following: performing physical exam; counseling and coordination of care; obtaining or reviewing history; documenting in the medical record; reviewing/ordering tests, medications or procedures; communicating with other healthcare professionals and discussing with patient's family/caregivers  Current Length of Stay: 6 day(s)  Current Patient Status: Inpatient   Certification Statement: The patient will continue to require additional inpatient hospital stay due to await psych placement   Discharge Plan: Anticipate discharge in 24-48 hrs to inpatient psych  Code Status: Level 3 - DNAR and DNI    Subjective:   Seen and examined was sleeping awoken states \" who are you how did youget here \"last night ate 5 ice creams too fast and vomited now no n/v    Objective:     Vitals:   Temp (24hrs), Av °F (36 7 °C), Min:97 9 °F (36 6 °C), Max:98 1 °F (36 7 °C)    Temp:  [97 9 °F (36 6 °C)-98 1 °F (36 7 °C)] 98 1 °F (36 7 °C)  HR:  [65-68] 68  Resp:  [15-16] 15  BP: (120-124)/(57-69) 120/57  SpO2:  [94 %] 94 %  Body mass index is 23 58 kg/m²  Input and Output Summary (last 24 hours): Intake/Output Summary (Last 24 hours) at 6/3/2023 1114  Last data filed at 2023 1945  Gross per 24 hour   Intake --   Output 550 ml   Net -550 ml       Physical Exam:   Physical Exam  Vitals and nursing note reviewed  Constitutional:       General: She is not in acute distress  Appearance: She is well-developed  Comments: awake   HENT:      Head: Normocephalic and atraumatic  Eyes:      Conjunctiva/sclera: Conjunctivae normal    Cardiovascular:      Rate and Rhythm: Normal rate and regular rhythm        Heart sounds: No murmur " heard   Pulmonary:      Effort: Pulmonary effort is normal  No respiratory distress  Breath sounds: Normal breath sounds  No wheezing or rales  Abdominal:      General: Bowel sounds are normal  There is no distension  Palpations: Abdomen is soft  Tenderness: There is no abdominal tenderness  Musculoskeletal:         General: No swelling  Cervical back: Neck supple  Skin:     General: Skin is warm and dry  Capillary Refill: Capillary refill takes less than 2 seconds  Neurological:      Comments: Awake    Psychiatric:         Mood and Affect: Mood normal          Additional Data:     Labs:  Results from last 7 days   Lab Units 06/02/23  0426 06/01/23  0506   EOS PCT %  --  5   HEMATOCRIT % 32 5* 32 6*   HEMOGLOBIN g/dL 10 2* 10 2*   LYMPHS PCT %  --  20   MONOS PCT %  --  12   NEUTROS PCT %  --  63   PLATELETS Thousands/uL  --  191   WBC Thousand/uL  --  5 55     Results from last 7 days   Lab Units 06/01/23  0506 05/31/23  0435 05/28/23  1817   ANION GAP mmol/L 3*   < > 7   ALBUMIN g/dL  --   --  2 9*   ALK PHOS U/L  --   --  45   ALT U/L  --   --  10   AST U/L  --   --  23   BUN mg/dL 12   < > 13   CALCIUM mg/dL 8 2*   < > 8 7   CHLORIDE mmol/L 109*   < > 106   CO2 mmol/L 29   < > 27   CREATININE mg/dL 0 53*   < > 0 62   GLUCOSE RANDOM mg/dL 76   < > 93   POTASSIUM mmol/L 3 8   < > 3 7   SODIUM mmol/L 141   < > 140   TOTAL BILIRUBIN mg/dL  --   --  0 54    < > = values in this interval not displayed                         Lines/Drains:  Invasive Devices     None                       Imaging: Reviewed radiology reports from this admission including: CT head    Recent Cultures (last 7 days):         Last 24 Hours Medication List:   Current Facility-Administered Medications   Medication Dose Route Frequency Provider Last Rate   • acetaminophen  650 mg Oral Q8H PRN Sandie Muniz MD     • aspirin  81 mg Oral Daily Magnolia S ADAN CanalesNP     • atorvastatin  40 mg Oral QPM Magnolia S Parker, CRNP     • busPIRone  5 mg Oral BID Johnny Lugo MD     • calcium carbonate  500 mg Oral BID PRN RAYSA Tillman     • enoxaparin  40 mg Subcutaneous Daily Magnolia S Parker, CRNP     • famotidine  20 mg Oral Daily Magnolia S Parker, CRNP     • latanoprost  1 drop Both Eyes HS Magnolia S Parker, CRNP     • OLANZapine  2 5 mg Intramuscular Q4H PRN Johnny Lugo MD     • QUEtiapine  75 mg Oral HS Johnny Lugo MD     • sertraline  100 mg Oral Daily Magnolia S Parker, CRNP     • timolol  1 drop Both Eyes BID Magnolia S Parker, CRNP     • trimethobenzamide  100 mg Intramuscular Q6H PRN RAYSA Tillman     • valproic acid  250 mg Oral BID Magnolia S Parker, CRNP          Today, Patient Was Seen By: Johnny Lugo MD    **Please Note: This note may have been constructed using a voice recognition system  **

## 2023-06-03 NOTE — ASSESSMENT & PLAN NOTE
· On severe vascular dementia POA initially was psychotic features- but since seeing her yesterday and today she is somnolent poor po intake will wake up to name and then falls asleep  · Ct brain neg   · No infection evident on admission  · depakote level checked nml therapeutic   · No prns overnight   · Medication adjustment has been done in terms of BuSpar has been lowered Seroquel has been lowered to once a day to avoid oversedation unfortunately the patient next up at night and gets as needed's but they are able to wake her up in the morning we will have to adjust her medications for behavioral control  · tsh nml in beg of may 5/19   · Electrolytes stable

## 2023-06-04 PROCEDURE — 99232 SBSQ HOSP IP/OBS MODERATE 35: CPT | Performed by: FAMILY MEDICINE

## 2023-06-04 RX ADMIN — DIVALPROEX SODIUM 500 MG: 250 TABLET, FILM COATED, EXTENDED RELEASE ORAL at 21:05

## 2023-06-04 RX ADMIN — TIMOLOL MALEATE 1 DROP: 5 SOLUTION OPHTHALMIC at 10:03

## 2023-06-04 RX ADMIN — FAMOTIDINE 20 MG: 20 TABLET ORAL at 10:01

## 2023-06-04 RX ADMIN — BUSPIRONE HYDROCHLORIDE 5 MG: 5 TABLET ORAL at 10:01

## 2023-06-04 RX ADMIN — TIMOLOL MALEATE 1 DROP: 5 SOLUTION OPHTHALMIC at 17:10

## 2023-06-04 RX ADMIN — ASPIRIN 81 MG: 81 TABLET, COATED ORAL at 10:01

## 2023-06-04 RX ADMIN — ATORVASTATIN CALCIUM 40 MG: 40 TABLET, FILM COATED ORAL at 17:08

## 2023-06-04 RX ADMIN — SERTRALINE 100 MG: 100 TABLET, FILM COATED ORAL at 10:01

## 2023-06-04 RX ADMIN — LATANOPROST 1 DROP: 50 SOLUTION OPHTHALMIC at 21:05

## 2023-06-04 RX ADMIN — BUSPIRONE HYDROCHLORIDE 5 MG: 5 TABLET ORAL at 17:09

## 2023-06-04 RX ADMIN — QUETIAPINE FUMARATE 100 MG: 50 TABLET, EXTENDED RELEASE ORAL at 21:04

## 2023-06-04 RX ADMIN — ENOXAPARIN SODIUM 40 MG: 40 INJECTION SUBCUTANEOUS at 10:01

## 2023-06-04 NOTE — PROGRESS NOTES
[address]  Progress Note  Name: Elidia Hathaway  MRN: [id number]  Unit/Bed#: -01 I Date of Admission: 5/28/2023   Date of Service: 6/4/2023  Hospital Day: 7    Assessment/Plan   Severe vascular dementia with psychotic disturbance [facility])  Assessment & Plan  · Patient discharged from outside hospital earlier in day and 5/28 after admission for vomiting  · Evaluated by psychiatry as she was transferred from the behavioral health unit to [facility] center for the vomiting, son was requesting hospice and took the patient home with 24/7 care provided by family while awaiting services to be arranged  Review of the discharge summary the internal medicine team and psychiatry team voiced their concerns regarding the son taking the patient home but he was adamant  · Patient was brought to this facility on the same day as discharge from previous facility  Son stated to ER staff he cannot care for patient at home  · Evaluated by psychiatry on 5/30 and deemed to lack appropriate judgement and is danger to self and needs inpatient psychiatric treatment, patient with advanced dementia do not think will be able to sign 201- will need to be 302- 302 has been signed  · Also try to wrap a call bell around her neck to commit suicide she is expressing suicidal ideations now patient has been 302   · Patient is on a one-to-one for safety reasons  · [facility] psychiatry declined the patient stating that she has vascular dementia and there is nothing they can do from psychiatric point of view      Psychiatry has declined to admit the patient to psych as 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 nothing they can offer I did ask a psychiatrist here to reevaluate to try to manage as if her behavior is uncontrollable she was increased to Depakote 500 at night and Seroquel 100 mg at night as needed BuSpar was adjusted and decreased to 5 mg twice daily she has not slept since with the morning as she is oriented today to name but what ever she is saying does not make sense she did eat something for breakfast today she has had agitation at night to try to avoid giving her Zyprexa to the 2 3 to morning to avoid her to be sedated at night  As stated previously I discussed with the son in terms of hospice care in the nursing home as prognosis is poor not a lot of options available see my separate note awaiting  to find facility is able to accept her to comfort care  See my separate note from 6/2    * Encephalopathy acute  Assessment & Plan  · On severe vascular dementia POA initially was psychotic features- but since seeing her yesterday and today she is somnolent poor po intake will wake up to name and then falls asleep  · Ct brain neg   · No infection evident on admission  · depakote level checked nml therapeutic   · No prns overnight   · tsh nml in beg of may 5/19   · Electrolytes stable  Psychiatry has declined to admit the patient to psych as 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 nothing they can offer I did ask a psychiatrist here to reevaluate to try to manage as if her behavior is uncontrollable she was increased to Depakote 500 at night and Seroquel 100 mg at night as needed BuSpar was adjusted and decreased to 5 mg twice daily she has not slept since with the morning as she is oriented today to name but what ever she is saying does not make sense she did eat something for breakfast today she has had agitation at night to try to avoid giving her Zyprexa to the 2 3 to morning to avoid her to be sedated at night  As stated previously I discussed with the son in terms of hospice care in the nursing home as prognosis is poor not a lot of options available see my separate note awaiting  to find facility is able to accept her to comfort care      Chronic anemia  Assessment & Plan  · Hb has been fluctuating since beg of may when she was admitted to [facility] anywhere between 9 8-11  · Pt hb has remained stable at 10 2 for 2 consecutive times - no evidence of gross bleed no hematemesis and no melena no hematochezia  · She has poor nutritional aspect that can contribute to anemia   · She needs no scopes or transfusion at this time as there is no evidence of gross bleed hb stable for 2 days and in her range of base    Status post CVA  Assessment & Plan  · 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 and dysphagia  · Was discharged to SNF then sent to inpatient geropsych, unclear extent of physical rehabilitation patient received or was amenable to participate  · Continue puréed diet with nectar thick liquids  · Appreciate PT/OT/case management- needs inpatient psych   · Continue aspirin and statin             VTE Pharmacologic Prophylaxis: VTE Score: 3 Moderate Risk (Score 3-4) - Pharmacological DVT Prophylaxis Ordered: enoxaparin (Lovenox)  Patient Centered Rounds: I performed bedside rounds with nursing staff today  Discussions with Specialists or Other Care Team Provider: nursing    Education and Discussions with Family / Patient: pt  Total Time Spent on Date of Encounter in care of patient: 35 minutes This time was spent on one or more of the following: performing physical exam; counseling and coordination of care; obtaining or reviewing history; documenting in the medical record; reviewing/ordering tests, medications or procedures; communicating with other healthcare professionals and discussing with patient's family/caregivers  Current Length of Stay: 7 day(s)  Current Patient Status: Inpatient   Certification Statement: The patient will continue to require additional inpatient hospital stay due to 2/2 behavioral issues  Discharge Plan: Anticipate discharge in 48-72 hrs to discharge location to be determined pending rehab evaluations      Code Status: Level 3 - DNAR and DNI    Subjective:   Seen and examined screaming allnight betina slept sine 3 ate some breakfast today talking nonstop    Objective:     Vitals:   Temp (24hrs), Av 6 °F (37 °C), Min:98 6 °F (37 °C), Max:98 6 °F (37 °C)    Temp:  [98 6 °F (37 °C)] 98 6 °F (37 °C)  HR:  [69-86] 82  Resp:  [16-18] 18  BP: (125-143)/(61-73) 139/61  SpO2:  [90 %-97 %] 97 %  Body mass index is 23 58 kg/m²  Input and Output Summary (last 24 hours): Intake/Output Summary (Last 24 hours) at 2023 1142  Last data filed at 2023 0900  Gross per 24 hour   Intake 240 ml   Output 600 ml   Net -360 ml       Physical Exam:   Physical Exam  Vitals and nursing note reviewed  Constitutional:       General: She is not in acute distress  Appearance: She is well-developed  HENT:      Head: Normocephalic and atraumatic  Eyes:      Conjunctiva/sclera: Conjunctivae normal    Cardiovascular:      Rate and Rhythm: Normal rate and regular rhythm  Heart sounds: No murmur heard  Pulmonary:      Effort: Pulmonary effort is normal  No respiratory distress  Breath sounds: Normal breath sounds  No wheezing or rales  Abdominal:      General: There is no distension  Palpations: Abdomen is soft  Tenderness: There is no abdominal tenderness  Musculoskeletal:         General: No swelling  Cervical back: Neck supple  Skin:     General: Skin is warm and dry  Capillary Refill: Capillary refill takes less than 2 seconds  Neurological:      Mental Status: She is alert        Comments: Awake and orineted to self only   Psychiatric:         Mood and Affect: Mood normal          Additional Data:     Labs:  Results from last 7 days   Lab Units 23  0426 23  0506   EOS PCT %  --  5   HEMATOCRIT % 32 5* 32 6*   HEMOGLOBIN g/dL 10 2* 10 2*   LYMPHS PCT %  --  20   MONOS PCT %  --  12   NEUTROS PCT %  --  63   PLATELETS Thousands/uL  --  191   WBC Thousand/uL  --  5 55     Results from last 7 days   Lab Units 23  1155 23  0435 23  1817   ANION GAP mmol/L 5   < > 7   ALBUMIN g/dL  --   --  2 9*   ALK PHOS U/L  --   --  45   ALT U/L  --   --  10   AST U/L  --   --  23   BUN mg/dL 11   < > 13   CALCIUM mg/dL 8 5   < > 8 7   CHLORIDE mmol/L 109*   < > 106   CO2 mmol/L 25   < > 27   CREATININE mg/dL 0 52*   < > 0 62   GLUCOSE RANDOM mg/dL 96   < > 93   POTASSIUM mmol/L 4 0   < > 3 7   SODIUM mmol/L 139   < > 140   TOTAL BILIRUBIN mg/dL  --   --  0 54    < > = values in this interval not displayed  Lines/Drains:  Invasive Devices     None                       Imaging: Reviewed radiology reports from this admission including: CT head    Recent Cultures (last 7 days):         Last 24 Hours Medication List:   Current Facility-Administered Medications   Medication Dose Route Frequency Provider Last Rate   • acetaminophen  650 mg Oral Q8H PRN Anand Camara MD     • aspirin  81 mg Oral Daily Magnolia S Parker, CRNP     • atorvastatin  40 mg Oral QPM Magnolia S Parker, CRNP     • busPIRone  5 mg Oral BID Anand Camara MD     • calcium carbonate  500 mg Oral BID PRN RAYSA Peralta     • divalproex sodium  500 mg Oral HS Coby Damon MD     • enoxaparin  40 mg Subcutaneous Daily Magnolia S Parker, CRNP     • famotidine  20 mg Oral Daily Magnolia S Parker, CRNP     • latanoprost  1 drop Both Eyes HS Magnolia S Parker, CRNP     • OLANZapine  2 5 mg Intramuscular Q4H PRN Anand Camara MD     • QUEtiapine  100 mg Oral HS Coby Damon MD     • sertraline  100 mg Oral Daily Magnolia S Parker, CRNP     • timolol  1 drop Both Eyes BID Magnolia S Parker, CRNP     • trimethobenzamide  100 mg Intramuscular Q6H PRN RAYSA Peralta          Today, Patient Was Seen By: Anand Camara MD    **Please Note: This note may have been constructed using a voice recognition system  **

## 2023-06-04 NOTE — ASSESSMENT & PLAN NOTE
· Patient discharged from outside hospital earlier in day and 5/28 after admission for vomiting  · Evaluated by psychiatry as she was transferred from the behavioral health unit to [facility] for the vomiting, son was requesting hospice and took the patient home with 24/7 care provided by family while awaiting services to be arranged  Review of the discharge summary the internal medicine team and psychiatry team voiced their concerns regarding the son taking the patient home but he was adamant  · Patient was brought to this facility on the same day as discharge from previous facility  Son stated to ER staff he cannot care for patient at home  · Evaluated by psychiatry on 5/30 and deemed to lack appropriate judgement and is danger to self and needs inpatient psychiatric treatment, patient with advanced dementia do not think will be able to sign 201- will need to be 302- 302 has been signed  · Also try to wrap a call bell around her neck to commit suicide she is expressing suicidal ideations now patient has been 302   · Patient is on a one-to-one for safety reasons  · [facility] psychiatry declined the patient stating that she has vascular dementia and there is nothing they can do from psychiatric point of view  Psychiatry has declined to admit the patient to psych as 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 nothing they can offer I did ask a psychiatrist here to reevaluate to try to manage as if her behavior is uncontrollable she was increased to Depakote 500 at night and Seroquel 100 mg at night as needed BuSpar was adjusted and decreased to 5 mg twice daily she has not slept since with the morning as she is oriented today to name but what ever she is saying does not make sense she did eat something for breakfast today she has had agitation at night to try to avoid giving her Zyprexa to the 2 3 to morning to avoid her to be sedated at night    As stated previously I discussed with the son in terms of hospice care in the nursing home as prognosis is poor not a lot of options available see my separate note awaiting  to find facility is able to accept her to comfort care    See my separate note from 6/2

## 2023-06-04 NOTE — CASE MANAGEMENT
Case Management Discharge Planning Note    Patient name Carlito Mask  Location /-93 MRN [id number]  : 1945 Date 2023       Current Admission Date: 2023  Current Admission Diagnosis:Encephalopathy acute   Patient Active Problem List    Diagnosis Date Noted   • Chronic anemia 2023   • Encephalopathy acute 2023   • Status post CVA 2023   • Recurrent falls 2023   • Tobacco abuse 2023   • Acute ischemic CVA 2023   • Severe vascular dementia with psychotic disturbance ([facility] [address] ) 2023   • Generalized anxiety disorder 2023   • Hypertensive urgency 2023      LOS (days): 7  Geometric Mean LOS (GMLOS) (days): 4 70  Days to GMLOS:-2     OBJECTIVE:  Risk of Unplanned Readmission Score: 25 64         Current admission status: Inpatient   Preferred Pharmacy:   [address] [place] [address] [address]  [address] [facility] [address]  Phone: [phone] Fax: [phone]    Primary Care Provider: Kevin Girard DO    Primary Insurance: MEDICARE  Secondary Insurance: BLUE CROSS    DISCHARGE DETAILS:     CM contacted son, Bill Lamas, to discuss plans for patient  Son provided background information on patient since stroke  Patient went to [facility] for STR but fell out of bed and was then limited more with mobility  Patients behaviors deteriorated at [facility] and patient was 302'd to [facility] psych  While at [address] [place] [address] [address] psych patient was transferred to [place] [address] and returned home 23 with plans for hospice but [place] [address] had difficulty finding agency due to patients behaviors  Once home family thought patient may be more oriented to place and surroundings but behaviors were unmanageable  Son reports patient would not want to live like this      Son feels [facility] team competent in saying patient not appropriate for inpatient psych as patient will never get out of psych unit if admitted; behaviors too far advanced dementia  Son requesting patient be discharged to facility with dementia unit on [address] headed toward hospice  Family wants patient to be at peace

## 2023-06-04 NOTE — ASSESSMENT & PLAN NOTE
· On severe vascular dementia POA initially was psychotic features- but since seeing her yesterday and today she is somnolent poor po intake will wake up to name and then falls asleep  · Ct brain neg   · No infection evident on admission  · depakote level checked nml therapeutic   · No prns overnight   · tsh nml in beg of may 5/19   · Electrolytes stable  Psychiatry has declined to admit the patient to psych as 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 nothing they can offer I did ask a psychiatrist here to reevaluate to try to manage as if her behavior is uncontrollable she was increased to Depakote 500 at night and Seroquel 100 mg at night as needed BuSpar was adjusted and decreased to 5 mg twice daily she has not slept since with the morning as she is oriented today to name but what ever she is saying does not make sense she did eat something for breakfast today she has had agitation at night to try to avoid giving her Zyprexa to the 2 3 to morning to avoid her to be sedated at night  As stated previously I discussed with the son in terms of hospice care in the nursing home as prognosis is poor not a lot of options available see my separate note awaiting  to find facility is able to accept her to comfort care

## 2023-06-04 NOTE — ASSESSMENT & PLAN NOTE
· Hb has been fluctuating since beg of may when she was admitted to [facility] anywhere between 9 8-11  · Pt hb has remained stable at 10 2 for 2 consecutive times - no evidence of gross bleed no hematemesis and no melena no hematochezia  · She has poor nutritional aspect that can contribute to anemia   · She needs no scopes or transfusion at this time as there is no evidence of gross bleed hb stable for 2 days and in her range of base

## 2023-06-04 NOTE — PLAN OF CARE
Problem: MOBILITY - ADULT  Goal: Maintain or return to baseline ADL function  Description: INTERVENTIONS:  -  Assess patient's ability to carry out ADLs; assess patient's baseline for ADL function and identify physical deficits which impact ability to perform ADLs (bathing, care of mouth/teeth, toileting, grooming, dressing, etc )  - Assess/evaluate cause of self-care deficits   - Assess range of motion  - Assess patient's mobility; develop plan if impaired  - Assess patient's need for assistive devices and provide as appropriate  - Encourage maximum independence but intervene and supervise when necessary  - Involve family in performance of ADLs  - Assess for home care needs following discharge   - Consider OT consult to assist with ADL evaluation and planning for discharge  - Provide patient education as appropriate  Outcome: Progressing     Problem: PAIN - ADULT  Goal: Verbalizes/displays adequate comfort level or baseline comfort level  Description: Interventions:  - Encourage patient to monitor pain and request assistance  - Assess pain using appropriate pain scale  - Administer analgesics based on type and severity of pain and evaluate response  - Implement non-pharmacological measures as appropriate and evaluate response  - Consider cultural and social influences on pain and pain management  - Notify physician/advanced practitioner if interventions unsuccessful or patient reports new pain  Outcome: Progressing     Problem: INFECTION - ADULT  Goal: Absence or prevention of progression during hospitalization  Description: INTERVENTIONS:  - Assess and monitor for signs and symptoms of infection  - Monitor lab/diagnostic results  - Monitor all insertion sites, i e  indwelling lines, tubes, and drains  - Monitor endotracheal if appropriate and nasal secretions for changes in amount and color  - [place] appropriate cooling/warming therapies per order  - Administer medications as ordered  - Instruct and encourage patient and family to use good hand hygiene technique  - Identify and instruct in appropriate isolation precautions for identified infection/condition  Outcome: Progressing     Problem: SAFETY ADULT  Goal: Maintain or return to baseline ADL function  Description: INTERVENTIONS:  -  Assess patient's ability to carry out ADLs; assess patient's baseline for ADL function and identify physical deficits which impact ability to perform ADLs (bathing, care of mouth/teeth, toileting, grooming, dressing, etc )  - Assess/evaluate cause of self-care deficits   - Assess range of motion  - Assess patient's mobility; develop plan if impaired  - Assess patient's need for assistive devices and provide as appropriate  - Encourage maximum independence but intervene and supervise when necessary  - Involve family in performance of ADLs  - Assess for home care needs following discharge   - Consider OT consult to assist with ADL evaluation and planning for discharge  - Provide patient education as appropriate  Outcome: Progressing

## 2023-06-04 NOTE — PLAN OF CARE
Problem: MOBILITY - ADULT  Goal: Maintain or return to baseline ADL function  Description: INTERVENTIONS:  -  Assess patient's ability to carry out ADLs; assess patient's baseline for ADL function and identify physical deficits which impact ability to perform ADLs (bathing, care of mouth/teeth, toileting, grooming, dressing, etc )  - Assess/evaluate cause of self-care deficits   - Assess range of motion  - Assess patient's mobility; develop plan if impaired  - Assess patient's need for assistive devices and provide as appropriate  - Encourage maximum independence but intervene and supervise when necessary  - Involve family in performance of ADLs  - Assess for home care needs following discharge   - Consider OT consult to assist with ADL evaluation and planning for discharge  - Provide patient education as appropriate  Outcome: Progressing  Goal: Maintains/Returns to pre admission functional level  Description: INTERVENTIONS:  - Perform BMAT or MOVE assessment daily    - Set and communicate daily mobility goal to care team and patient/family/caregiver  - Collaborate with rehabilitation services on mobility goals if consulted  - Perform Range of Motion 2 times a day  - Reposition patient every 2 hours    - Dangle patient 2 times a day  - Stand patient 2 times a day  - Ambulate patient 2 times a day  - Out of bed to chair 2 times a day   - Out of bed for meals 2 times a day  - Out of bed for toileting  - Record patient progress and toleration of activity level   Outcome: Progressing     Problem: PAIN - ADULT  Goal: Verbalizes/displays adequate comfort level or baseline comfort level  Description: Interventions:  - Encourage patient to monitor pain and request assistance  - Assess pain using appropriate pain scale  - Administer analgesics based on type and severity of pain and evaluate response  - Implement non-pharmacological measures as appropriate and evaluate response  - Consider cultural and social influences on pain and pain management  - Notify physician/advanced practitioner if interventions unsuccessful or patient reports new pain  Outcome: Progressing     Problem: INFECTION - ADULT  Goal: Absence or prevention of progression during hospitalization  Description: INTERVENTIONS:  - Assess and monitor for signs and symptoms of infection  - Monitor lab/diagnostic results  - Monitor all insertion sites, i e  indwelling lines, tubes, and drains  - Monitor endotracheal if appropriate and nasal secretions for changes in amount and color  - [place] appropriate cooling/warming therapies per order  - Administer medications as ordered  - Instruct and encourage patient and family to use good hand hygiene technique  - Identify and instruct in appropriate isolation precautions for identified infection/condition  Outcome: Progressing  Goal: Absence of fever/infection during neutropenic period  Description: INTERVENTIONS:  - Monitor WBC    Outcome: Progressing     Problem: SAFETY ADULT  Goal: Maintain or return to baseline ADL function  Description: INTERVENTIONS:  -  Assess patient's ability to carry out ADLs; assess patient's baseline for ADL function and identify physical deficits which impact ability to perform ADLs (bathing, care of mouth/teeth, toileting, grooming, dressing, etc )  - Assess/evaluate cause of self-care deficits   - Assess range of motion  - Assess patient's mobility; develop plan if impaired  - Assess patient's need for assistive devices and provide as appropriate  - Encourage maximum independence but intervene and supervise when necessary  - Involve family in performance of ADLs  - Assess for home care needs following discharge   - Consider OT consult to assist with ADL evaluation and planning for discharge  - Provide patient education as appropriate  Outcome: Progressing  Goal: Maintains/Returns to pre admission functional level  Description: INTERVENTIONS:  - Perform BMAT or MOVE assessment daily    - Set and communicate daily mobility goal to care team and patient/family/caregiver  - Collaborate with rehabilitation services on mobility goals if consulted  - Perform Range of Motion 2 times a day  - Reposition patient every 2 hours    - Dangle patient 2 times a day  - Stand patient 2 times a day  - Ambulate patient 2 times a day  - Out of bed to chair 2 times a day   - Out of bed for meals 2 times a day  - Out of bed for toileting  - Record patient progress and toleration of activity level   Outcome: Progressing  Goal: Patient will remain free of falls  Description: INTERVENTIONS:  - Educate patient/family on patient safety including physical limitations  - Instruct patient to call for assistance with activity   - Consult OT/PT to assist with strengthening/mobility   - Keep Call bell within reach  - Keep bed low and locked with side rails adjusted as appropriate  - Keep care items and personal belongings within reach  - Initiate and maintain comfort rounds  - Make Fall Risk Sign visible to staff  - Offer Toileting every 3 Hours, in advance of need  - Initiate/Maintain bed alarm  - Apply yellow socks and bracelet for high fall risk patients  - Consider moving patient to room near nurses station  Outcome: Progressing     Problem: DISCHARGE PLANNING  Goal: Discharge to home or other facility with appropriate resources  Description: INTERVENTIONS:  - Identify barriers to discharge w/patient and caregiver  - Arrange for needed discharge resources and transportation as appropriate  - Identify discharge learning needs (meds, wound care, etc )  - Arrange for interpretive services to assist at discharge as needed  - Refer to Case Management Department for coordinating discharge planning if the patient needs post-hospital services based on physician/advanced practitioner order or complex needs related to functional status, cognitive ability, or social support system  Outcome: Progressing     Problem: Knowledge Deficit  Goal: Patient/family/caregiver demonstrates understanding of disease process, treatment plan, medications, and discharge instructions  Description: Complete learning assessment and assess knowledge base  Interventions:  - Provide teaching at level of understanding  - Provide teaching via preferred learning methods  Outcome: Progressing     Problem: NEUROSENSORY - ADULT  Goal: Achieves stable or improved neurological status  Description: INTERVENTIONS  - Monitor and report changes in neurological status  - Monitor vital signs such as temperature, blood pressure, glucose, and any other labs ordered   - Initiate measures to prevent increased intracranial pressure  - Monitor for seizure activity and implement precautions if appropriate      Outcome: Progressing  Goal: Remains free of injury related to seizures activity  Description: INTERVENTIONS  - Maintain airway, patient safety  and administer oxygen as ordered  - Monitor patient for seizure activity, document and report duration and description of seizure to physician/advanced practitioner  - If seizure occurs,  ensure patient safety during seizure  - Reorient patient post seizure  - Seizure pads on all 4 side rails  - Instruct patient/family to notify RN of any seizure activity including if an aura is experienced  - Instruct patient/family to call for assistance with activity based on nursing assessment  - Administer anti-seizure medications if ordered    Outcome: Progressing  Goal: Achieves maximal functionality and self care  Description: INTERVENTIONS  - Monitor swallowing and airway patency with patient fatigue and changes in neurological status  - Encourage and assist patient to increase activity and self care     - Encourage visually impaired, hearing impaired and aphasic patients to use assistive/communication devices  Outcome: Progressing     Problem: RESPIRATORY - ADULT  Goal: Achieves optimal ventilation and oxygenation  Description: INTERVENTIONS:  - Assess for changes in respiratory status  - Assess for changes in mentation and behavior  - Position to facilitate oxygenation and minimize respiratory effort  - Oxygen administered by appropriate delivery if ordered  - Initiate smoking cessation education as indicated  - Encourage broncho-pulmonary hygiene including cough, deep breathe, Incentive Spirometry  - Assess the need for suctioning and aspirate as needed  - Assess and instruct to report SOB or any respiratory difficulty  - Respiratory Therapy support as indicated  Outcome: Progressing

## 2023-06-05 PROCEDURE — 99232 SBSQ HOSP IP/OBS MODERATE 35: CPT | Performed by: FAMILY MEDICINE

## 2023-06-05 RX ORDER — DIVALPROEX SODIUM 125 MG/1
500 CAPSULE, COATED PELLETS ORAL
Status: DISCONTINUED | OUTPATIENT
Start: 2023-06-05 | End: 2023-06-07

## 2023-06-05 RX ADMIN — DIVALPROEX SODIUM 500 MG: 125 CAPSULE, COATED PELLETS ORAL at 21:21

## 2023-06-05 RX ADMIN — ATORVASTATIN CALCIUM 40 MG: 40 TABLET, FILM COATED ORAL at 17:19

## 2023-06-05 RX ADMIN — TIMOLOL MALEATE 1 DROP: 5 SOLUTION OPHTHALMIC at 17:23

## 2023-06-05 RX ADMIN — ENOXAPARIN SODIUM 40 MG: 40 INJECTION SUBCUTANEOUS at 09:07

## 2023-06-05 RX ADMIN — QUETIAPINE FUMARATE 100 MG: 50 TABLET, EXTENDED RELEASE ORAL at 21:14

## 2023-06-05 RX ADMIN — BUSPIRONE HYDROCHLORIDE 5 MG: 5 TABLET ORAL at 09:03

## 2023-06-05 RX ADMIN — FAMOTIDINE 20 MG: 20 TABLET ORAL at 09:03

## 2023-06-05 RX ADMIN — ASPIRIN 81 MG: 81 TABLET, COATED ORAL at 09:03

## 2023-06-05 RX ADMIN — TIMOLOL MALEATE 1 DROP: 5 SOLUTION OPHTHALMIC at 09:09

## 2023-06-05 RX ADMIN — SERTRALINE 100 MG: 100 TABLET, FILM COATED ORAL at 09:03

## 2023-06-05 RX ADMIN — BUSPIRONE HYDROCHLORIDE 5 MG: 5 TABLET ORAL at 17:19

## 2023-06-05 RX ADMIN — LATANOPROST 1 DROP: 50 SOLUTION OPHTHALMIC at 21:15

## 2023-06-05 NOTE — ASSESSMENT & PLAN NOTE
· Patient discharged from outside hospital earlier in day and 5/28 after admission for vomiting  · Evaluated by psychiatry as she was transferred from the behavioral health unit to [facility] for the vomiting, son was requesting hospice and took the patient home with 24/7 care provided by family while awaiting services to be arranged  Review of the discharge summary the internal medicine team and psychiatry team voiced their concerns regarding the son taking the patient home but he was adamant  · Patient was brought to this facility on the same day as discharge from previous facility  Son stated to ER staff he cannot care for patient at home  · Evaluated by psychiatry on 5/30 and deemed to lack appropriate judgement and is danger to self and needs inpatient psychiatric treatment, patient with advanced dementia do not think will be able to sign 201- will need to be 302- 302 has been signed  · Also try to wrap a call bell around her neck to commit suicide she is expressing suicidal ideations now patient has been 302   · Patient is on a one-to-one for safety reasons  · [facility] psychiatry declined the patient stating that she has vascular dementia and there is nothing they can do from psychiatric point of view  Psychiatry has declined to admit the patient to psych as 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 nothing they can offer I did ask a psychiatrist here to reevaluate to try to manage as if her behavior is uncontrollable she was increased to Depakote 500 at night and Seroquel 100 mg at night as needed BuSpar was adjusted and decreased to 5 mg twice daily she has not slept since with the morning as she is oriented today to name but what ever she is saying does not make sense she did eat something for breakfast today she has had agitation at night to try to avoid giving her Zyprexa to the 2 3 to morning to avoid her to be sedated at night    As stated previously I discussed with the son in terms of hospice care in the nursing home as prognosis is poor not a lot of options available see my separate note awaiting  to find facility is able to accept her to comfort care    See my separate note from 6/2

## 2023-06-05 NOTE — ASSESSMENT & PLAN NOTE
· On severe vascular dementia POA initially was psychotic features- but since seeing her yesterday and today she is somnolent poor po intake will wake up to name and then falls asleep  · Ct brain neg   · No infection evident on admission  · depakote level checked nml therapeutic   · No prns overnight   · tsh nml in beg of may 5/19   · Electrolytes stable  Psychiatry has declined to admit the patient to psych as 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 nothing they can offer I did ask a psychiatrist here to reevaluate to try to manage as if her behavior is uncontrollable she was increased to Depakote 500 at night and Seroquel 100 mg at night as needed BuSpar was adjusted and decreased to 5 mg twice daily she has not slept since with the morning as she is oriented today to name but what ever she is saying does not make sense she did eat something for breakfast today she has had agitation at night to try to avoid giving her Zyprexa to the 2 3 to morning to avoid her to be sedated at night  As stated previously I discussed with the son in terms of hospice care in the nursing home as prognosis is poor not a lot of options available see my separate note awaiting  to find facility is able to accept her to comfort care    AWAIT nursing home placement for comfort care

## 2023-06-05 NOTE — ASSESSMENT & PLAN NOTE
· Hb has been fluctuating since beg of may when she was admitted to [facility] anywhere between 9 8-11  · Pt hb has remained stable at 10 2 for 2 consecutive times - no evidence of gross bleed no hematemesis and no melena no hematochezia  · She has poor nutritional aspect that can contribute to anemia   · She needs no scopes or transfusion at this time as there is no evidence of gross bleed hb stable for 2 days and in her range of base

## 2023-06-05 NOTE — PROGRESS NOTES
[address]  Progress Note  Name: Marilyn Xavier  MRN: [id number]  Unit/Bed#: -01 I Date of Admission: 5/28/2023   Date of Service: 6/5/2023 I Hospital Day: 8    Assessment/Plan   Severe vascular dementia with psychotic disturbance [facility])  Assessment & Plan  · Patient discharged from outside hospital earlier in day and 5/28 after admission for vomiting  · Evaluated by psychiatry as she was transferred from the behavioral health unit to [facility] center for the vomiting, son was requesting hospice and took the patient home with 24/7 care provided by family while awaiting services to be arranged  Review of the discharge summary the internal medicine team and psychiatry team voiced their concerns regarding the son taking the patient home but he was adamant  · Patient was brought to this facility on the same day as discharge from previous facility  Son stated to ER staff he cannot care for patient at home  · Evaluated by psychiatry on 5/30 and deemed to lack appropriate judgement and is danger to self and needs inpatient psychiatric treatment, patient with advanced dementia do not think will be able to sign 201- will need to be 302- 302 has been signed  · Also try to wrap a call bell around her neck to commit suicide she is expressing suicidal ideations now patient has been 302   · Patient is on a one-to-one for safety reasons  · [facility] psychiatry declined the patient stating that she has vascular dementia and there is nothing they can do from psychiatric point of view      Psychiatry has declined to admit the patient to psych as 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 nothing they can offer I did ask a psychiatrist here to reevaluate to try to manage as if her behavior is uncontrollable she was increased to Depakote 500 at night and Seroquel 100 mg at night as needed BuSpar was adjusted and decreased to 5 mg twice daily she has not slept since with the morning as she is oriented today to name but what ever she is saying does not make sense she did eat something for breakfast today she has had agitation at night to try to avoid giving her Zyprexa to the 2 3 to morning to avoid her to be sedated at night  As stated previously I discussed with the son in terms of hospice care in the nursing home as prognosis is poor not a lot of options available see my separate note awaiting  to find facility is able to accept her to comfort care  See my separate note from 6/2    * Encephalopathy acute  Assessment & Plan  · On severe vascular dementia POA initially was psychotic features- but since seeing her yesterday and today she is somnolent poor po intake will wake up to name and then falls asleep  · Ct brain neg   · No infection evident on admission  · depakote level checked nml therapeutic   · No prns overnight   · tsh nml in beg of may 5/19   · Electrolytes stable  Psychiatry has declined to admit the patient to psych as 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 nothing they can offer I did ask a psychiatrist here to reevaluate to try to manage as if her behavior is uncontrollable she was increased to Depakote 500 at night and Seroquel 100 mg at night as needed BuSpar was adjusted and decreased to 5 mg twice daily she has not slept since with the morning as she is oriented today to name but what ever she is saying does not make sense she did eat something for breakfast today she has had agitation at night to try to avoid giving her Zyprexa to the 2 3 to morning to avoid her to be sedated at night  As stated previously I discussed with the son in terms of hospice care in the nursing home as prognosis is poor not a lot of options available see my separate note awaiting  to find facility is able to accept her to comfort care    AWAIT nursing home placement for comfort care    Chronic anemia  Assessment & Plan  · Hb has been fluctuating since beg of may when she was admitted to [facility] anywhere between 9 8-11  · Pt hb has remained stable at 10 2 for 2 consecutive times - no evidence of gross bleed no hematemesis and no melena no hematochezia  · She has poor nutritional aspect that can contribute to anemia   · She needs no scopes or transfusion at this time as there is no evidence of gross bleed hb stable for 2 days and in her range of base    Status post CVA  Assessment & Plan  · 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 and dysphagia  · Was discharged to SNF then sent to inpatient geropsych, unclear extent of physical rehabilitation patient received or was amenable to participate  · Continue puréed diet with nectar thick liquids  · Appreciate PT/OT/case management- needs inpatient psych   · Continue aspirin and statin             VTE Pharmacologic Prophylaxis: VTE Score: 3 Moderate Risk (Score 3-4) - Pharmacological DVT Prophylaxis Ordered: enoxaparin (Lovenox)  Patient Centered Rounds: I performed bedside rounds with nursing staff today  Discussions with Specialists or Other Care Team Provider: cm    Education and Discussions with Family / Patient:   Total Time Spent on Date of Encounter in care of patient: 35 minutes This time was spent on one or more of the following: performing physical exam; counseling and coordination of care; obtaining or reviewing history; documenting in the medical record; reviewing/ordering tests, medications or procedures; communicating with other healthcare professionals and discussing with patient's family/caregivers  Current Length of Stay: 8 day(s)  Current Patient Status: Inpatient   Certification Statement: The patient will continue to require additional inpatient hospital stay due to placement for comfort care  Discharge Plan: Anticipate discharge in 24-48 hrs to rehab facility      Code Status: Level 3 - DNAR and DNI    Subjective:   Seen and examined did not sleep all night talking all day and night poor po intake    Objective:     Vitals:   Temp (24hrs), Av 7 °F (37 1 °C), Min:98 6 °F (37 °C), Max:98 8 °F (37 1 °C)    Temp:  [98 6 °F (37 °C)-98 8 °F (37 1 °C)] 98 8 °F (37 1 °C)  HR:  [60-66] 60  Resp:  [18-19] 19  BP: (118-142)/(75-98) 118/75  SpO2:  [93 %-97 %] 94 %  Body mass index is 23 58 kg/m²  Input and Output Summary (last 24 hours): Intake/Output Summary (Last 24 hours) at 2023 1003  Last data filed at 2023 0901  Gross per 24 hour   Intake 540 ml   Output 725 ml   Net -185 ml       Physical Exam:   Physical Exam  Vitals and nursing note reviewed  Constitutional:       General: She is not in acute distress  Appearance: She is well-developed  HENT:      Head: Normocephalic and atraumatic  Eyes:      Conjunctiva/sclera: Conjunctivae normal    Cardiovascular:      Rate and Rhythm: Normal rate and regular rhythm  Heart sounds: No murmur heard  Pulmonary:      Effort: Pulmonary effort is normal  No respiratory distress  Breath sounds: Normal breath sounds  No wheezing or rales  Abdominal:      General: There is no distension  Palpations: Abdomen is soft  Tenderness: There is no abdominal tenderness  Musculoskeletal:         General: No swelling  Cervical back: Neck supple  Skin:     General: Skin is warm and dry  Capillary Refill: Capillary refill takes less than 2 seconds  Neurological:      Mental Status: She is alert        Comments: Oriented to self    Psychiatric:         Mood and Affect: Mood normal          Additional Data:     Labs:  Results from last 7 days   Lab Units 23  0426 23  0506   EOS PCT %  --  5   HEMATOCRIT % 32 5* 32 6*   HEMOGLOBIN g/dL 10 2* 10 2*   LYMPHS PCT %  --  20   MONOS PCT %  --  12   NEUTROS PCT %  --  63   PLATELETS Thousands/uL  --  191   WBC Thousand/uL  --  5 55     Results from last 7 days   Lab Units 23  1155   ANION GAP mmol/L 5   BUN mg/dL 11   CALCIUM mg/dL 8 5 CHLORIDE mmol/L 109*   CO2 mmol/L 25   CREATININE mg/dL 0 52*   GLUCOSE RANDOM mg/dL 96   POTASSIUM mmol/L 4 0   SODIUM mmol/L 139                       Lines/Drains:  Invasive Devices     None                       Imaging: Reviewed radiology reports from this admission including: CT head    Recent Cultures (last 7 days):         Last 24 Hours Medication List:   Current Facility-Administered Medications   Medication Dose Route Frequency Provider Last Rate   • acetaminophen  650 mg Oral Q8H PRN Albina Arthur MD     • aspirin  81 mg Oral Daily Magnolia S Parker, CRNP     • atorvastatin  40 mg Oral QPM Magnolia S Parker, CRNP     • busPIRone  5 mg Oral BID Albina Arthur MD     • calcium carbonate  500 mg Oral BID PRN RAYSA Martínez     • divalproex sodium  500 mg Oral HS Chio Kolb MD     • enoxaparin  40 mg Subcutaneous Daily Magnolia S Parker, CRNP     • famotidine  20 mg Oral Daily Magnolia S Parker, CRNP     • latanoprost  1 drop Both Eyes HS Magnolia S Parker, CRNP     • OLANZapine  2 5 mg Intramuscular Q4H PRN Albina Arthur MD     • QUEtiapine  100 mg Oral HS Chio Kolb MD     • sertraline  100 mg Oral Daily Magnolia S Parker, CRNP     • timolol  1 drop Both Eyes BID Magnolia S Parker, CRNP     • trimethobenzamide  100 mg Intramuscular Q6H PRN RAYSA Martínez          Today, Patient Was Seen By: Albina Arthur MD    **Please Note: This note may have been constructed using a voice recognition system  **

## 2023-06-05 NOTE — PLAN OF CARE
Problem: PAIN - ADULT  Goal: Verbalizes/displays adequate comfort level or baseline comfort level  Description: Interventions:  - Encourage patient to monitor pain and request assistance  - Assess pain using appropriate pain scale  - Administer analgesics based on type and severity of pain and evaluate response  - Implement non-pharmacological measures as appropriate and evaluate response  - Consider cultural and social influences on pain and pain management  - Notify physician/advanced practitioner if interventions unsuccessful or patient reports new pain  Outcome: Progressing     Problem: INFECTION - ADULT  Goal: Absence or prevention of progression during hospitalization  Description: INTERVENTIONS:  - Assess and monitor for signs and symptoms of infection  - Monitor lab/diagnostic results  - Monitor all insertion sites, i e  indwelling lines, tubes, and drains  - Monitor endotracheal if appropriate and nasal secretions for changes in amount and color  - [place] appropriate cooling/warming therapies per order  - Administer medications as ordered  - Instruct and encourage patient and family to use good hand hygiene technique  - Identify and instruct in appropriate isolation precautions for identified infection/condition  Outcome: Progressing  Goal: Absence of fever/infection during neutropenic period  Description: INTERVENTIONS:  - Monitor WBC    Outcome: Progressing     Problem: SAFETY ADULT  Goal: Patient will remain free of falls  Description: INTERVENTIONS:  - Educate patient/family on patient safety including physical limitations  - Instruct patient to call for assistance with activity   - Consult OT/PT to assist with strengthening/mobility   - Keep Call bell within reach  - Keep bed low and locked with side rails adjusted as appropriate  - Keep care items and personal belongings within reach  - Initiate and maintain comfort rounds  - Make Fall Risk Sign visible to staff  - Offer Toileting every 3 Hours, in advance of need  - Initiate/Maintain bed alarm  - Apply yellow socks and bracelet for high fall risk patients  - Consider moving patient to room near nurses station  Outcome: Progressing     Problem: DISCHARGE PLANNING  Goal: Discharge to home or other facility with appropriate resources  Description: INTERVENTIONS:  - Identify barriers to discharge w/patient and caregiver  - Arrange for needed discharge resources and transportation as appropriate  - Identify discharge learning needs (meds, wound care, etc )  - Arrange for interpretive services to assist at discharge as needed  - Refer to Case Management Department for coordinating discharge planning if the patient needs post-hospital services based on physician/advanced practitioner order or complex needs related to functional status, cognitive ability, or social support system  Outcome: Progressing     Problem: Knowledge Deficit  Goal: Patient/family/caregiver demonstrates understanding of disease process, treatment plan, medications, and discharge instructions  Description: Complete learning assessment and assess knowledge base    Interventions:  - Provide teaching at level of understanding  - Provide teaching via preferred learning methods  Outcome: Progressing     Problem: NEUROSENSORY - ADULT  Goal: Achieves stable or improved neurological status  Description: INTERVENTIONS  - Monitor and report changes in neurological status  - Monitor vital signs such as temperature, blood pressure, glucose, and any other labs ordered   - Initiate measures to prevent increased intracranial pressure  - Monitor for seizure activity and implement precautions if appropriate      Outcome: Progressing  Goal: Remains free of injury related to seizures activity  Description: INTERVENTIONS  - Maintain airway, patient safety  and administer oxygen as ordered  - Monitor patient for seizure activity, document and report duration and description of seizure to physician/advanced practitioner  - If seizure occurs,  ensure patient safety during seizure  - Reorient patient post seizure  - Seizure pads on all 4 side rails  - Instruct patient/family to notify RN of any seizure activity including if an aura is experienced  - Instruct patient/family to call for assistance with activity based on nursing assessment  - Administer anti-seizure medications if ordered    Outcome: Progressing  Goal: Achieves maximal functionality and self care  Description: INTERVENTIONS  - Monitor swallowing and airway patency with patient fatigue and changes in neurological status  - Encourage and assist patient to increase activity and self care     - Encourage visually impaired, hearing impaired and aphasic patients to use assistive/communication devices  Outcome: Progressing     Problem: RESPIRATORY - ADULT  Goal: Achieves optimal ventilation and oxygenation  Description: INTERVENTIONS:  - Assess for changes in respiratory status  - Assess for changes in mentation and behavior  - Position to facilitate oxygenation and minimize respiratory effort  - Oxygen administered by appropriate delivery if ordered  - Initiate smoking cessation education as indicated  - Encourage broncho-pulmonary hygiene including cough, deep breathe, Incentive Spirometry  - Assess the need for suctioning and aspirate as needed  - Assess and instruct to report SOB or any respiratory difficulty  - Respiratory Therapy support as indicated  Outcome: Progressing     Problem: COPING  Goal: Pt/Family able to verbalize concerns and demonstrate effective coping strategies  Description: INTERVENTIONS:  - Assist patient/family to identify coping skills, available support systems and cultural and spiritual values  - Provide emotional support, including active listening and acknowledgement of concerns of patient and caregivers  - Reduce environmental stimuli, as able  - Provide patient education  - Assess for spiritual pain/suffering and initiate spiritual care, including notification of Pastoral Care or [place] based community as needed  - Assess effectiveness of coping strategies  Outcome: Progressing     Problem: Nutrition/Hydration-ADULT  Goal: Nutrient/Hydration intake appropriate for improving, restoring or maintaining nutritional needs  Description: Monitor and assess patient's nutrition/hydration status for malnutrition  Collaborate with interdisciplinary team and initiate plan and interventions as ordered  Monitor patient's weight and dietary intake as ordered or per policy  Utilize nutrition screening tool and intervene as necessary  Determine patient's food preferences and provide high-protein, high-caloric foods as appropriate       INTERVENTIONS:  - Monitor oral intake, urinary output, labs, and treatment plans  - Assess nutrition and hydration status and recommend course of action  - Evaluate amount of meals eaten  - Assist patient with eating if necessary   - Allow adequate time for meals  - Recommend/ encourage appropriate diets, oral nutritional supplements, and vitamin/mineral supplements  - Order, calculate, and assess calorie counts as needed  - Recommend, monitor, and adjust tube feedings and TPN/PPN based on assessed needs  - Assess need for intravenous fluids  - Provide specific nutrition/hydration education as appropriate  - Include patient/family/caregiver in decisions related to nutrition  Outcome: Progressing     Problem: CONFUSION/THOUGHT DISTURBANCE  Goal: Thought disturbances (confusion, delirium, depression, dementia or psychosis) are managed to maintain or return to baseline mental status and functional level  Description: INTERVENTIONS:  - Assess for possible contributors to  thought disturbance, including but not limited to medications, infection, impaired vision or hearing, underlying metabolic abnormalities, dehydration, respiratory compromise,  psychiatric diagnoses and notify attending PHYSICAN/AP  - Monitor and intervene to maintain adequate nutrition, hydration, elimination, sleep and activity  - Decrease environmental stimuli, including noise as appropriate  - Provide frequent contacts to provide refocusing, direction and reassurance as needed  Approach patient calmly with eye contact and at their level    - [place] high risk fall precautions, aspiration precautions and other safety measures, as indicated  - If delirium suspected, notify physician/AP of change in condition and request immediate in-person evaluation  - Pursue consults as appropriate including Geriatric (campus dependent), OT for cognitive evaluation/activity planning, psychiatric, pastoral care, etc   Outcome: Progressing

## 2023-06-05 NOTE — PLAN OF CARE
Problem: MOBILITY - ADULT  Goal: Maintain or return to baseline ADL function  Description: INTERVENTIONS:  -  Assess patient's ability to carry out ADLs; assess patient's baseline for ADL function and identify physical deficits which impact ability to perform ADLs (bathing, care of mouth/teeth, toileting, grooming, dressing, etc )  - Assess/evaluate cause of self-care deficits   - Assess range of motion  - Assess patient's mobility; develop plan if impaired  - Assess patient's need for assistive devices and provide as appropriate  - Encourage maximum independence but intervene and supervise when necessary  - Involve family in performance of ADLs  - Assess for home care needs following discharge   - Consider OT consult to assist with ADL evaluation and planning for discharge  - Provide patient education as appropriate  Outcome: Progressing     Problem: PAIN - ADULT  Goal: Verbalizes/displays adequate comfort level or baseline comfort level  Description: Interventions:  - Encourage patient to monitor pain and request assistance  - Assess pain using appropriate pain scale  - Administer analgesics based on type and severity of pain and evaluate response  - Implement non-pharmacological measures as appropriate and evaluate response  - Consider cultural and social influences on pain and pain management  - Notify physician/advanced practitioner if interventions unsuccessful or patient reports new pain  Outcome: Progressing     Problem: INFECTION - ADULT  Goal: Absence or prevention of progression during hospitalization  Description: INTERVENTIONS:  - Assess and monitor for signs and symptoms of infection  - Monitor lab/diagnostic results  - Monitor all insertion sites, i e  indwelling lines, tubes, and drains  - Monitor endotracheal if appropriate and nasal secretions for changes in amount and color  - [place] appropriate cooling/warming therapies per order  - Administer medications as ordered  - Instruct and encourage patient and family to use good hand hygiene technique  - Identify and instruct in appropriate isolation precautions for identified infection/condition  Outcome: Progressing     Problem: SAFETY ADULT  Goal: Maintain or return to baseline ADL function  Description: INTERVENTIONS:  -  Assess patient's ability to carry out ADLs; assess patient's baseline for ADL function and identify physical deficits which impact ability to perform ADLs (bathing, care of mouth/teeth, toileting, grooming, dressing, etc )  - Assess/evaluate cause of self-care deficits   - Assess range of motion  - Assess patient's mobility; develop plan if impaired  - Assess patient's need for assistive devices and provide as appropriate  - Encourage maximum independence but intervene and supervise when necessary  - Involve family in performance of ADLs  - Assess for home care needs following discharge   - Consider OT consult to assist with ADL evaluation and planning for discharge  - Provide patient education as appropriate  Outcome: Progressing

## 2023-06-06 PROCEDURE — 99233 SBSQ HOSP IP/OBS HIGH 50: CPT | Performed by: FAMILY MEDICINE

## 2023-06-06 RX ADMIN — OLANZAPINE 2.5 MG: 10 INJECTION, POWDER, FOR SOLUTION INTRAMUSCULAR at 22:08

## 2023-06-06 RX ADMIN — ACETAMINOPHEN 650 MG: 325 TABLET ORAL at 10:27

## 2023-06-06 RX ADMIN — ASPIRIN 81 MG: 81 TABLET, COATED ORAL at 10:27

## 2023-06-06 RX ADMIN — TIMOLOL MALEATE 1 DROP: 5 SOLUTION OPHTHALMIC at 10:29

## 2023-06-06 RX ADMIN — ENOXAPARIN SODIUM 40 MG: 40 INJECTION SUBCUTANEOUS at 10:28

## 2023-06-06 RX ADMIN — ATORVASTATIN CALCIUM 40 MG: 40 TABLET, FILM COATED ORAL at 18:05

## 2023-06-06 RX ADMIN — BUSPIRONE HYDROCHLORIDE 5 MG: 5 TABLET ORAL at 18:05

## 2023-06-06 RX ADMIN — LATANOPROST 1 DROP: 50 SOLUTION OPHTHALMIC at 21:02

## 2023-06-06 RX ADMIN — TIMOLOL MALEATE 1 DROP: 5 SOLUTION OPHTHALMIC at 18:08

## 2023-06-06 RX ADMIN — BUSPIRONE HYDROCHLORIDE 5 MG: 5 TABLET ORAL at 10:27

## 2023-06-06 RX ADMIN — QUETIAPINE FUMARATE 100 MG: 50 TABLET, EXTENDED RELEASE ORAL at 21:02

## 2023-06-06 RX ADMIN — FAMOTIDINE 20 MG: 20 TABLET ORAL at 10:27

## 2023-06-06 RX ADMIN — SERTRALINE 100 MG: 100 TABLET, FILM COATED ORAL at 10:27

## 2023-06-06 RX ADMIN — DIVALPROEX SODIUM 500 MG: 125 CAPSULE, COATED PELLETS ORAL at 21:02

## 2023-06-06 NOTE — ASSESSMENT & PLAN NOTE
· On severe vascular dementia POA initially was psychotic features- but since seeing her yesterday and today she is somnolent poor po intake will wake up to name and then falls asleep  · Ct brain neg   · No infection evident on admission  · depakote level checked nml therapeutic   · No prns overnight   · tsh nml in beg of may 5/19   · Electrolytes stable  Psychiatry has declined to admit the patient to psych as 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 nothing they can offer I did ask a psychiatrist here to reevaluate to try to manage as if her behavior is uncontrollable she was increased to Depakote 500 at night and Seroquel 100 mg at night as needed BuSpar was adjusted and decreased to 5 mg twice daily she has not slept since with the morning as she is oriented today to name but what ever she is saying does not make sense she did eat something for breakfast today she has had agitation at night to try to avoid giving her Zyprexa to the 2 3 to morning to avoid her to be sedated at night  As stated previously I discussed with the son in terms of hospice care in the nursing home as prognosis is poor not a lot of options available see my separate note awaiting  to find facility is able to accept her to comfort care  AWAIT nursing home placement for comfort care-discussion about discharge planning is ongoing with patient's family with the help of case management

## 2023-06-06 NOTE — PLAN OF CARE
Problem: MOBILITY - ADULT  Goal: Maintain or return to baseline ADL function  Description: INTERVENTIONS:  -  Assess patient's ability to carry out ADLs; assess patient's baseline for ADL function and identify physical deficits which impact ability to perform ADLs (bathing, care of mouth/teeth, toileting, grooming, dressing, etc )  - Assess/evaluate cause of self-care deficits   - Assess range of motion  - Assess patient's mobility; develop plan if impaired  - Assess patient's need for assistive devices and provide as appropriate  - Encourage maximum independence but intervene and supervise when necessary  - Involve family in performance of ADLs  - Assess for home care needs following discharge   - Consider OT consult to assist with ADL evaluation and planning for discharge  - Provide patient education as appropriate  6/6/2023 1739 by Juan Campos RN  Outcome: Progressing  6/6/2023 1739 by Juna Campos RN  Outcome: Progressing  Goal: Maintains/Returns to pre admission functional level  Description: INTERVENTIONS:  - Perform BMAT or MOVE assessment daily    - Set and communicate daily mobility goal to care team and patient/family/caregiver  - Collaborate with rehabilitation services on mobility goals if consulted  - Perform Range of Motion 2 times a day  - Reposition patient every 2 hours    - Dangle patient 2 times a day  - Stand patient 2 times a day  - Ambulate patient 2 times a day  - Out of bed to chair 2 times a day   - Out of bed for meals 2 times a day  - Out of bed for toileting  - Record patient progress and toleration of activity level   6/6/2023 1739 by Juan Campos RN  Outcome: Progressing  6/6/2023 1739 by Juan Campos RN  Outcome: Progressing     Problem: PAIN - ADULT  Goal: Verbalizes/displays adequate comfort level or baseline comfort level  Description: Interventions:  - Encourage patient to monitor pain and request assistance  - Assess pain using appropriate pain scale  - Administer analgesics based on type and severity of pain and evaluate response  - Implement non-pharmacological measures as appropriate and evaluate response  - Consider cultural and social influences on pain and pain management  - Notify physician/advanced practitioner if interventions unsuccessful or patient reports new pain  6/6/2023 1739 by Yoly Song RN  Outcome: Progressing  6/6/2023 1739 by Yoly Song RN  Outcome: Progressing     Problem: INFECTION - ADULT  Goal: Absence or prevention of progression during hospitalization  Description: INTERVENTIONS:  - Assess and monitor for signs and symptoms of infection  - Monitor lab/diagnostic results  - Monitor all insertion sites, i e  indwelling lines, tubes, and drains  - Monitor endotracheal if appropriate and nasal secretions for changes in amount and color  - [place] appropriate cooling/warming therapies per order  - Administer medications as ordered  - Instruct and encourage patient and family to use good hand hygiene technique  - Identify and instruct in appropriate isolation precautions for identified infection/condition  6/6/2023 1739 by Yoly Song RN  Outcome: Progressing  6/6/2023 1739 by Yoly Song RN  Outcome: Progressing  Goal: Absence of fever/infection during neutropenic period  Description: INTERVENTIONS:  - Monitor WBC    6/6/2023 1739 by Yoly Song RN  Outcome: Progressing  6/6/2023 1739 by Yoly Song RN  Outcome: Progressing     Problem: SAFETY ADULT  Goal: Maintain or return to baseline ADL function  Description: INTERVENTIONS:  -  Assess patient's ability to carry out ADLs; assess patient's baseline for ADL function and identify physical deficits which impact ability to perform ADLs (bathing, care of mouth/teeth, toileting, grooming, dressing, etc )  - Assess/evaluate cause of self-care deficits   - Assess range of motion  - Assess patient's mobility; develop plan if impaired  - Assess patient's need for assistive devices and provide as appropriate  - Encourage maximum independence but intervene and supervise when necessary  - Involve family in performance of ADLs  - Assess for home care needs following discharge   - Consider OT consult to assist with ADL evaluation and planning for discharge  - Provide patient education as appropriate  6/6/2023 1739 by Antone Sicard, RN  Outcome: Progressing  6/6/2023 1739 by Antone Sicard, RN  Outcome: Progressing  Goal: Maintains/Returns to pre admission functional level  Description: INTERVENTIONS:  - Perform BMAT or MOVE assessment daily    - Set and communicate daily mobility goal to care team and patient/family/caregiver  - Collaborate with rehabilitation services on mobility goals if consulted  - Perform Range of Motion 2 times a day  - Reposition patient every 2 hours    - Dangle patient 2 times a day  - Stand patient 2 times a day  - Ambulate patient 2 times a day  - Out of bed to chair 2 times a day   - Out of bed for meals 2 times a day  - Out of bed for toileting  - Record patient progress and toleration of activity level   6/6/2023 1739 by Antone Sicard, RN  Outcome: Progressing  6/6/2023 1739 by Antone Sicard, RN  Outcome: Progressing  Goal: Patient will remain free of falls  Description: INTERVENTIONS:  - Educate patient/family on patient safety including physical limitations  - Instruct patient to call for assistance with activity   - Consult OT/PT to assist with strengthening/mobility   - Keep Call bell within reach  - Keep bed low and locked with side rails adjusted as appropriate  - Keep care items and personal belongings within reach  - Initiate and maintain comfort rounds  - Make Fall Risk Sign visible to staff  - Offer Toileting every 3 Hours, in advance of need  - Initiate/Maintain bed alarm  - Apply yellow socks and bracelet for high fall risk patients  - Consider moving patient to room near nurses station  6/6/2023 1739 by Antone Sicard, RN  Outcome: Progressing  6/6/2023 1739 by Antone Sicard, RN  Outcome: Progressing     Problem: DISCHARGE PLANNING  Goal: Discharge to home or other facility with appropriate resources  Description: INTERVENTIONS:  - Identify barriers to discharge w/patient and caregiver  - Arrange for needed discharge resources and transportation as appropriate  - Identify discharge learning needs (meds, wound care, etc )  - Arrange for interpretive services to assist at discharge as needed  - Refer to Case Management Department for coordinating discharge planning if the patient needs post-hospital services based on physician/advanced practitioner order or complex needs related to functional status, cognitive ability, or social support system  6/6/2023 1739 by Tiana Sarabia RN  Outcome: Progressing  6/6/2023 1739 by Tiana Sarabia RN  Outcome: Progressing     Problem: Knowledge Deficit  Goal: Patient/family/caregiver demonstrates understanding of disease process, treatment plan, medications, and discharge instructions  Description: Complete learning assessment and assess knowledge base    Interventions:  - Provide teaching at level of understanding  - Provide teaching via preferred learning methods  6/6/2023 1739 by Tiana Sarabia RN  Outcome: Progressing  6/6/2023 1739 by Tiana Sarabia RN  Outcome: Progressing     Problem: NEUROSENSORY - ADULT  Goal: Achieves stable or improved neurological status  Description: INTERVENTIONS  - Monitor and report changes in neurological status  - Monitor vital signs such as temperature, blood pressure, glucose, and any other labs ordered   - Initiate measures to prevent increased intracranial pressure  - Monitor for seizure activity and implement precautions if appropriate      6/6/2023 1739 by Tiana Sarabia RN  Outcome: Progressing  6/6/2023 1739 by Tiana Sarabia RN  Outcome: Progressing  Goal: Remains free of injury related to seizures activity  Description: INTERVENTIONS  - Maintain airway, patient safety  and administer oxygen as ordered  - Monitor patient for seizure activity, document and report duration and description of seizure to physician/advanced practitioner  - If seizure occurs,  ensure patient safety during seizure  - Reorient patient post seizure  - Seizure pads on all 4 side rails  - Instruct patient/family to notify RN of any seizure activity including if an aura is experienced  - Instruct patient/family to call for assistance with activity based on nursing assessment  - Administer anti-seizure medications if ordered    6/6/2023 1739 by Dorothea Mccann RN  Outcome: Progressing  6/6/2023 1739 by Dorothea Mccann RN  Outcome: Progressing  Goal: Achieves maximal functionality and self care  Description: INTERVENTIONS  - Monitor swallowing and airway patency with patient fatigue and changes in neurological status  - Encourage and assist patient to increase activity and self care     - Encourage visually impaired, hearing impaired and aphasic patients to use assistive/communication devices  6/6/2023 1739 by Dorothea Mccann RN  Outcome: Progressing  6/6/2023 1739 by Dorothea Mccann RN  Outcome: Progressing     Problem: RESPIRATORY - ADULT  Goal: Achieves optimal ventilation and oxygenation  Description: INTERVENTIONS:  - Assess for changes in respiratory status  - Assess for changes in mentation and behavior  - Position to facilitate oxygenation and minimize respiratory effort  - Oxygen administered by appropriate delivery if ordered  - Initiate smoking cessation education as indicated  - Encourage broncho-pulmonary hygiene including cough, deep breathe, Incentive Spirometry  - Assess the need for suctioning and aspirate as needed  - Assess and instruct to report SOB or any respiratory difficulty  - Respiratory Therapy support as indicated  6/6/2023 1739 by Dorothea Mccann RN  Outcome: Progressing  6/6/2023 1739 by Dorothea Mccann RN  Outcome: Progressing     Problem: GASTROINTESTINAL - ADULT  Goal: Minimal or absence of nausea and/or vomiting  Description: INTERVENTIONS:  - Administer IV fluids if ordered to ensure adequate hydration  - Maintain NPO status until nausea and vomiting are resolved  - Nasogastric tube if ordered  - Administer ordered antiemetic medications as needed  - Provide nonpharmacologic comfort measures as appropriate  - Advance diet as tolerated, if ordered  - Consider nutrition services referral to assist patient with adequate nutrition and appropriate food choices  6/6/2023 1739 by Saeed Jain RN  Outcome: Progressing  6/6/2023 1739 by Saeed Jain RN  Outcome: Progressing  Goal: Maintains or returns to baseline bowel function  Description: INTERVENTIONS:  - Assess bowel function  - Encourage oral fluids to ensure adequate hydration  - Administer IV fluids if ordered to ensure adequate hydration  - Administer ordered medications as needed  - Encourage mobilization and activity  - Consider nutritional services referral to assist patient with adequate nutrition and appropriate food choices  6/6/2023 1739 by Saeed Jain RN  Outcome: Progressing  6/6/2023 1739 by Saeed Jain RN  Outcome: Progressing  Goal: Maintains adequate nutritional intake  Description: INTERVENTIONS:  - Monitor percentage of each meal consumed  - Identify factors contributing to decreased intake, treat as appropriate  - Assist with meals as needed  - Monitor I&O, weight, and lab values if indicated  - Obtain nutrition services referral as needed  6/6/2023 1739 by Saeed Jain RN  Outcome: Progressing  6/6/2023 1739 by Saeed Jain RN  Outcome: Progressing  Goal: Establish and maintain optimal ostomy function  Description: INTERVENTIONS:  - Assess bowel function  - Encourage oral fluids to ensure adequate hydration  - Administer IV fluids if ordered to ensure adequate hydration   - Administer ordered medications as needed  - Encourage mobilization and activity  - Nutrition services referral to assist patient with appropriate food choices  - Assess stoma site  - Consider wound care consult   6/6/2023 1739 by Tamara Tyson RN  Outcome: Progressing  6/6/2023 1739 by Tamara Tyson RN  Outcome: Progressing  Goal: Oral mucous membranes remain intact  Description: INTERVENTIONS  - Assess oral mucosa and hygiene practices  - Implement preventative oral hygiene regimen  - Implement oral medicated treatments as ordered  - Initiate Nutrition services referral as needed  6/6/2023 1739 by Tamara Tyson RN  Outcome: Progressing  6/6/2023 1739 by Tamara Tyson RN  Outcome: Progressing     Problem: GENITOURINARY - ADULT  Goal: Maintains or returns to baseline urinary function  Description: INTERVENTIONS:  - Assess urinary function  - Encourage oral fluids to ensure adequate hydration if ordered  - Administer IV fluids as ordered to ensure adequate hydration  - Administer ordered medications as needed  - Offer frequent toileting  - Follow urinary retention protocol if ordered  6/6/2023 1739 by Tamara Tyson RN  Outcome: Progressing  6/6/2023 1739 by Tamara Tyson RN  Outcome: Progressing  Goal: Absence of urinary retention  Description: INTERVENTIONS:  - Assess patient’s ability to void and empty bladder  - Monitor I/O  - Bladder scan as needed  - Discuss with physician/AP medications to alleviate retention as needed  - Discuss catheterization for long term situations as appropriate  6/6/2023 1739 by Tamara Tyson RN  Outcome: Progressing  6/6/2023 1739 by Tamara Tyson RN  Outcome: Progressing     Problem: METABOLIC, FLUID AND ELECTROLYTES - ADULT  Goal: Electrolytes maintained within normal limits  Description: INTERVENTIONS:  - Monitor labs and assess patient for signs and symptoms of electrolyte imbalances  - Administer electrolyte replacement as ordered  - Monitor response to electrolyte replacements, including repeat lab results as appropriate  - Instruct patient on fluid and nutrition as appropriate  6/6/2023 1739 by Tamara Tyson RN  Outcome: Progressing  6/6/2023 1739 by Tamara Tyson RN  Outcome: Progressing  Goal: Fluid balance maintained  Description: INTERVENTIONS:  - Monitor labs   - Monitor I/O and WT  - Instruct patient on fluid and nutrition as appropriate  - Assess for signs & symptoms of volume excess or deficit  6/6/2023 1739 by Anna Arora RN  Outcome: Progressing  6/6/2023 1739 by Anna Arora RN  Outcome: Progressing  Goal: Glucose maintained within target range  Description: INTERVENTIONS:  - Monitor Blood Glucose as ordered  - Assess for signs and symptoms of hyperglycemia and hypoglycemia  - Administer ordered medications to maintain glucose within target range  - Assess nutritional intake and initiate nutrition service referral as needed  6/6/2023 1739 by Anna Arora RN  Outcome: Progressing  6/6/2023 1739 by Anna Arora RN  Outcome: Progressing     Problem: SKIN/TISSUE INTEGRITY - ADULT  Goal: Skin Integrity remains intact(Skin Breakdown Prevention)  Description: Assess:  -Perform Dhaval assessment every shift  -Clean and moisturize skin every shift  -Inspect skin when repositioning, toileting, and assisting with ADLS    -Assess extremities for adequate circulation and sensation     Bed Management:  -Have minimal linens on bed & keep smooth, unwrinkled  -Change linens as needed when moist or perspiring  -Avoid sitting or lying in one position for more than 2 hours while in bed  -Keep HOB at 30degrees     Toileting:  -Offer bedside commode  -Assess for incontinence every 2 hrs  -Use incontinent care products after each incontinent episode such as pad    Activity:    -Encourage activity and walks on unit  -Encourage or provide ROM exercises   -Turn and reposition patient every 2 Hours  -Use appropriate equipment to lift or move patient in bed  -Instruct/ Assist with weight shifting every 60   when out of bed in chair  -Consider limitation of chair time 2 hour intervals    Skin Care:  -Avoid use of baby powder, tape, friction and shearing, hot water or constrictive clothing  -Relieve pressure over bony prominences using pad  -Do not massage red bony areas    Next Steps:  -Teach patient strategies to minimize risks such as PT   -Consider consults to  interdisciplinary teams such as PT  6/6/2023 1739 by Ángel Stephens RN  Outcome: Progressing  6/6/2023 1739 by Ángel Stephens RN  Outcome: Progressing  Goal: Incision(s), wounds(s) or drain site(s) healing without S/S of infection  Description: INTERVENTIONS  - Assess and document dressing, incision, wound bed, drain sites and surrounding tissue  - Provide patient and family education  - Perform skin care/dressing changes every day  6/6/2023 1739 by Ángel Stephens RN  Outcome: Progressing  6/6/2023 1739 by Ángel Stephens RN  Outcome: Progressing  Goal: Pressure injury heals and does not worsen  Description: Interventions:  - Implement low air loss mattress or specialty surface (Criteria met)  - Apply silicone foam dressing  - Instruct/assist with weight shifting every 60 minutes when in chair   - Limit chair time to 2 hour intervals  - Use special pressure reducing interventions such as pad when in chair   - Apply fecal or urinary incontinence containment device     - Turn and reposition patient & offload bony prominences every 2 hours   - Utilize friction reducing device or surface for transfers   - Consider consults to  interdisciplinary teams such as PT  - Use incontinent care products after each incontinent episode such as pad  - Consider nutrition services referral as needed  6/6/2023 1739 by Ángel Stephens RN  Outcome: Progressing  6/6/2023 1739 by Ángel Stephens RN  Outcome: Progressing     Problem: HEMATOLOGIC - ADULT  Goal: Maintains hematologic stability  Description: INTERVENTIONS  - Assess for signs and symptoms of bleeding or hemorrhage  - Monitor labs  - Administer supportive blood products/factors as ordered and appropriate  6/6/2023 1739 by Ángel Stephens RN  Outcome: Progressing  6/6/2023 1739 by Ángel Stephens RN  Outcome: Progressing     Problem: MUSCULOSKELETAL - ADULT  Goal: Maintain or return mobility to safest level of function  Description: INTERVENTIONS:  - Assess patient's ability to carry out ADLs; assess patient's baseline for ADL function and identify physical deficits which impact ability to perform ADLs (bathing, care of mouth/teeth, toileting, grooming, dressing, etc )  - Assess/evaluate cause of self-care deficits   - Assess range of motion  - Assess patient's mobility  - Assess patient's need for assistive devices and provide as appropriate  - Encourage maximum independence but intervene and supervise when necessary  - Involve family in performance of ADLs  - Assess for home care needs following discharge   - Consider OT consult to assist with ADL evaluation and planning for discharge  - Provide patient education as appropriate  6/6/2023 1739 by Ian Padron RN  Outcome: Progressing  6/6/2023 1739 by Ian Padron RN  Outcome: Progressing  Goal: Maintain proper alignment of affected body part  Description: INTERVENTIONS:  - Support, maintain and protect limb and body alignment  - Provide patient/ family with appropriate education  6/6/2023 1739 by Ian Padron RN  Outcome: Progressing  6/6/2023 1739 by Ian Padron RN  Outcome: Progressing     Problem: Prexisting or High Potential for Compromised Skin Integrity  Goal: Skin integrity is maintained or improved  Description: INTERVENTIONS:  - Identify patients at risk for skin breakdown  - Assess and monitor skin integrity  - Assess and monitor nutrition and hydration status  - Monitor labs   - Assess for incontinence   - Turn and reposition patient  - Assist with mobility/ambulation  - Relieve pressure over bony prominences  - Avoid friction and shearing  - Provide appropriate hygiene as needed including keeping skin clean and dry  - Evaluate need for skin moisturizer/barrier cream  - Collaborate with interdisciplinary team   - Patient/family teaching  - Consider wound care consult   6/6/2023 1739 by Ian Padron RN  Outcome: Progressing  6/6/2023 1739 by Guero Martinez RN  Outcome: Progressing     Problem: Nutrition/Hydration-ADULT  Goal: Nutrient/Hydration intake appropriate for improving, restoring or maintaining nutritional needs  Description: Monitor and assess patient's nutrition/hydration status for malnutrition  Collaborate with interdisciplinary team and initiate plan and interventions as ordered  Monitor patient's weight and dietary intake as ordered or per policy  Utilize nutrition screening tool and intervene as necessary  Determine patient's food preferences and provide high-protein, high-caloric foods as appropriate       INTERVENTIONS:  - Monitor oral intake, urinary output, labs, and treatment plans  - Assess nutrition and hydration status and recommend course of action  - Evaluate amount of meals eaten  - Assist patient with eating if necessary   - Allow adequate time for meals  - Recommend/ encourage appropriate diets, oral nutritional supplements, and vitamin/mineral supplements  - Order, calculate, and assess calorie counts as needed  - Recommend, monitor, and adjust tube feedings and TPN/PPN based on assessed needs  - Assess need for intravenous fluids  - Provide specific nutrition/hydration education as appropriate  - Include patient/family/caregiver in decisions related to nutrition  6/6/2023 1739 by Guero Martinez RN  Outcome: Progressing  6/6/2023 1739 by Guero Martinez RN  Outcome: Progressing     Problem: COPING  Goal: Pt/Family able to verbalize concerns and demonstrate effective coping strategies  Description: INTERVENTIONS:  - Assist patient/family to identify coping skills, available support systems and cultural and spiritual values  - Provide emotional support, including active listening and acknowledgement of concerns of patient and caregivers  - Reduce environmental stimuli, as able  - Provide patient education  - Assess for spiritual pain/suffering and initiate spiritual care, including notification of Pastoral Care or [place] based community as needed  - Assess effectiveness of coping strategies  6/6/2023 1739 by Dorothea Mccann RN  Outcome: Progressing  6/6/2023 1739 by Dorothea Mccann RN  Outcome: Progressing     Problem: CONFUSION/THOUGHT DISTURBANCE  Goal: Thought disturbances (confusion, delirium, depression, dementia or psychosis) are managed to maintain or return to baseline mental status and functional level  Description: INTERVENTIONS:  - Assess for possible contributors to  thought disturbance, including but not limited to medications, infection, impaired vision or hearing, underlying metabolic abnormalities, dehydration, respiratory compromise,  psychiatric diagnoses and notify attending PHYSICAN/AP  - Monitor and intervene to maintain adequate nutrition, hydration, elimination, sleep and activity  - Decrease environmental stimuli, including noise as appropriate  - Provide frequent contacts to provide refocusing, direction and reassurance as needed  Approach patient calmly with eye contact and at their level    - [place] high risk fall precautions, aspiration precautions and other safety measures, as indicated  - If delirium suspected, notify physician/AP of change in condition and request immediate in-person evaluation  - Pursue consults as appropriate including Geriatric (campus dependent), OT for cognitive evaluation/activity planning, psychiatric, pastoral care, etc   6/6/2023 1739 by Dorothea Mccann RN  Outcome: Progressing  6/6/2023 1739 by Dorothea Mccann RN  Outcome: Progressing     Problem: SELF HARM  Goal: Effect of psychiatric condition will be minimized and patient will be protected from self harm  Description: INTERVENTIONS:  - Assess impact of patient's symptoms on level of functioning, self-care needs and offer support as indicated  - Assess patient/family knowledge of depression, impact on illness and need for teaching  - Provide emotional support, presence and reassurance  - Assess for possible suicidal thoughts, ideation or self-harm  If patient expresses suicidal thoughts or statements do not leave alone, notify physician/AP immediately, initiate suicide precautions, and determine need for continual observation    - initiate consults and referrals as appropriate (a mental health professional, Spiritual Care  6/6/2023 1739 by Corey De Guzman RN  Outcome: Progressing  6/6/2023 1739 by Corey De Guzman RN  Outcome: Progressing     Problem: SPIRITUAL CARE  Goal: Patient feels balance and connection with others and/or higher power that empowers the self during times of loss, guilt and fear  Description: INTERVENTIONS:  - Create safety for patient through empathic presence and non-judgmental listening  - Encourage patient to explore his/her values, beliefs and/or spiritual images and practices  - Encourage use of breath work, imagery, meditation, relaxation, reiki to ease distress and provide healing  - Encourage use of cultural and spiritual celebrations and rituals  - Facilitate discussion that helps patient sort out spiritual concerns  - Help patient identify where meaning/hope/comfort & strength are in his/her life  - Refer patient to [facility] community for assistance, as appropriate  - Respond to patient/family need for prayer/ritual/sacrament/ceremony  Outcome: Progressing

## 2023-06-06 NOTE — ASSESSMENT & PLAN NOTE
· Patient discharged from outside hospital earlier in day and 5/28 after admission for vomiting  · Evaluated by psychiatry as she was transferred from the behavioral health unit to [facility] for the vomiting, son was requesting hospice and took the patient home with 24/7 care provided by family while awaiting services to be arranged  Review of the discharge summary the internal medicine team and psychiatry team voiced their concerns regarding the son taking the patient home but he was adamant  · Patient was brought to this facility on the same day as discharge from previous facility  Son stated to ER staff he cannot care for patient at home  · Evaluated by psychiatry on 5/30 and deemed to lack appropriate judgement and is danger to self and needs inpatient psychiatric treatment, patient with advanced dementia do not think will be able to sign 201- 302 has been signed  · Also try to wrap a call bell around her neck to commit suicide she is expressing suicidal ideations now patient has been 302   · Patient is on a one-to-one for safety reasons  · 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 nothing they can offer I did ask a psychiatrist here to reevaluate to try to manage as if her behavior is uncontrollable she was increased to Depakote 500 at night and Seroquel 100 mg at night as needed BuSpar was adjusted and decreased to 5 mg twice daily she has not slept since with the morning as she is oriented today to name but what ever she is saying does not make sense she did eat something for breakfast today she has had agitation at night to try to avoid giving her Zyprexa to the 2 3 to morning to avoid her to be sedated at night  previously, previously attending discussed with the son in terms of hospice care in the nursing home as prognosis is poor not a lot of options available  Ongoing discussion regarding patient goals of care, most likely hospice care will be appropriate in this case    We will discuss with son regarding more appropriate discharge planning

## 2023-06-06 NOTE — CASE MANAGEMENT
Case Management Discharge Planning Note    Patient name Berta Rizo  Location [address] 221/-[id number] MRN [id number]  : 1945 Date 2023       Current Admission Date: 2023  Current Admission Diagnosis:Encephalopathy acute   Patient Active Problem List    Diagnosis Date Noted   • Chronic anemia 2023   • Encephalopathy acute 2023   • Status post CVA 2023   • Recurrent falls 2023   • Tobacco abuse 2023   • Acute ischemic CVA 2023   • Severe vascular dementia with psychotic disturbance ([facility] [address] ) 2023   • Generalized anxiety disorder 2023   • Hypertensive urgency 2023      LOS (days): 9  Geometric Mean LOS (GMLOS) (days): 4 70  Days to GMLOS:-3 8     OBJECTIVE:  Risk of Unplanned Readmission Score: 24 2         Current admission status: Inpatient   Preferred Pharmacy:   [address] [place] [address]  Phone: [phone] Fax: [phone]    Primary Care Provider: Suzanne Scruggs DO    Primary Insurance: MEDICARE  Secondary Insurance: BLUE CROSS    DISCHARGE DETAILS:     CM spoke with Guillermina Zambrano at the [address] [place] [address]  [employer]  Guillermina Zambrano stated that the Level of Care was completed in 2023 and need to be reviewed by the [address] [facility]  CM spoke with pt's son to request any information on the MA application and to discuss options  CM told pt's son that a SNF would be out of pocket because the pt has no skilled criteria with comfort care  CM explained the other option of home with HHA's hired privately until the [address] [place] [address] [employer] application was complete

## 2023-06-06 NOTE — ASSESSMENT & PLAN NOTE
· Hb has been fluctuating since beg of may when she was admitted to [facility] anywhere between 9 8-11  · Pt hb has remained stable at 10 2 for 2 consecutive times - no evidence of gross bleed no hematemesis and no melena no hematochezia  · She has poor nutritional aspect that can contribute to anemia   · She needs no scopes or transfusion at this time as there is no evidence of gross bleed hb stable for 2 days and in her range of base

## 2023-06-06 NOTE — PROGRESS NOTES
[address]  Progress Note  Name: Salty Cortez  MRN: [id number]  Unit/Bed#: -01 I Date of Admission: 5/28/2023   Date of Service: 6/6/2023 I Hospital Day: 9    Assessment/Plan   Severe vascular dementia with psychotic disturbance [facility])  Assessment & Plan  · Patient discharged from outside hospital earlier in day and 5/28 after admission for vomiting  · Evaluated by psychiatry as she was transferred from the behavioral health unit to [facility] for the vomiting, son was requesting hospice and took the patient home with 24/7 care provided by family while awaiting services to be arranged  Review of the discharge summary the internal medicine team and psychiatry team voiced their concerns regarding the son taking the patient home but he was adamant  · Patient was brought to this facility on the same day as discharge from previous facility  Son stated to ER staff he cannot care for patient at home  · Evaluated by psychiatry on 5/30 and deemed to lack appropriate judgement and is danger to self and needs inpatient psychiatric treatment, patient with advanced dementia do not think will be able to sign 201- 302 has been signed  · Also try to wrap a call bell around her neck to commit suicide she is expressing suicidal ideations now patient has been 302   · Patient is on a one-to-one for safety reasons  · [facility] psychiatry declined the patient stating that she has vascular dementia and there is nothing they can do from psychiatric point of view      Psychiatry has declined to admit the patient to psych as 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 nothing they can offer I did ask a psychiatrist here to reevaluate to try to manage as if her behavior is uncontrollable she was increased to Depakote 500 at night and Seroquel 100 mg at night as needed BuSpar was adjusted and decreased to 5 mg twice daily she has not slept since with the morning as she is oriented today to name but what ever she is saying does not make sense she did eat something for breakfast today she has had agitation at night to try to avoid giving her Zyprexa to the 2 3 to morning to avoid her to be sedated at night  previously, previously attending discussed with the son in terms of hospice care in the nursing home as prognosis is poor not a lot of options available  Ongoing discussion regarding patient goals of care, most likely hospice care will be appropriate in this case  We will discuss with son regarding more appropriate discharge planning        * Encephalopathy acute  Assessment & Plan  · On severe vascular dementia POA initially was psychotic features- but since seeing her yesterday and today she is somnolent poor po intake will wake up to name and then falls asleep  · Ct brain neg   · No infection evident on admission  · depakote level checked nml therapeutic   · No prns overnight   · tsh nml in beg of may 5/19   · Electrolytes stable  Psychiatry has declined to admit the patient to psych as 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 nothing they can offer I did ask a psychiatrist here to reevaluate to try to manage as if her behavior is uncontrollable she was increased to Depakote 500 at night and Seroquel 100 mg at night as needed BuSpar was adjusted and decreased to 5 mg twice daily she has not slept since with the morning as she is oriented today to name but what ever she is saying does not make sense she did eat something for breakfast today she has had agitation at night to try to avoid giving her Zyprexa to the 2 3 to morning to avoid her to be sedated at night  As stated previously I discussed with the son in terms of hospice care in the nursing home as prognosis is poor not a lot of options available see my separate note awaiting  to find facility is able to accept her to comfort care    AWAIT nursing home placement for comfort care-discussion about discharge planning is ongoing with patient's family with the help of case management  Chronic anemia  Assessment & Plan  · Hb has been fluctuating since beg of may when she was admitted to [facility] anywhere between 9 8-11  · Pt hb has remained stable at 10 2 for 2 consecutive times - no evidence of gross bleed no hematemesis and no melena no hematochezia  · She has poor nutritional aspect that can contribute to anemia   · She needs no scopes or transfusion at this time as there is no evidence of gross bleed hb stable for 2 days and in her range of base    Status post CVA  Assessment & Plan  · 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 and dysphagia  · Was discharged to SNF then sent to inpatient geropsych, unclear extent of physical rehabilitation patient received or was amenable to participate  · Continue puréed diet with nectar thick liquids  · Appreciate PT/OT/case management- needs inpatient psych   · Continue aspirin and statin             VTE Pharmacologic Prophylaxis: VTE Score: 3 Moderate Risk (Score 3-4) - Pharmacological DVT Prophylaxis Ordered: enoxaparin (Lovenox)  Patient Centered Rounds: I performed bedside rounds with nursing staff today  Discussions with Specialists or Other Care Team Provider: None    Education and Discussions with Family / Patient: Updated  (son) via phone  Total Time Spent on Date of Encounter in care of patient: 10 minutes This time was spent on one or more of the following: performing physical exam; counseling and coordination of care; obtaining or reviewing history; documenting in the medical record; reviewing/ordering tests, medications or procedures; communicating with other healthcare professionals and discussing with patient's family/caregivers      Current Length of Stay: 9 day(s)  Current Patient Status: Inpatient   Certification Statement: The patient will continue to require additional inpatient hospital stay due to To monitor above condition  Discharge Plan: To be due to might    Code Status: Level 3 - DNAR and DNI    Subjective:   Seen and evaluated during the rounds  No overnight issues  Patient is still confused and demented  As per staff at bedside, no significant change noted  Objective:     Vitals:   Temp (24hrs), Av 3 °F (36 8 °C), Min:97 5 °F (36 4 °C), Max:99 °F (37 2 °C)    Temp:  [97 5 °F (36 4 °C)-99 °F (37 2 °C)] 97 5 °F (36 4 °C)  HR:  [64-88] 77  Resp:  [16-18] 16  BP: (110-123)/(60-77) 110/77  SpO2:  [96 %-97 %] 96 %  Body mass index is 23 58 kg/m²  Input and Output Summary (last 24 hours): Intake/Output Summary (Last 24 hours) at 2023 1423  Last data filed at 2023 1058  Gross per 24 hour   Intake 238 ml   Output 188 ml   Net 50 ml       Physical Exam:   Physical Exam  Vitals and nursing note reviewed  Constitutional:       Appearance: She is not ill-appearing or diaphoretic  Eyes:      Extraocular Movements: Extraocular movements intact  Conjunctiva/sclera: Conjunctivae normal       Pupils: Pupils are equal, round, and reactive to light  Cardiovascular:      Rate and Rhythm: Normal rate  Heart sounds: No friction rub  No gallop  Pulmonary:      Effort: Pulmonary effort is normal  No respiratory distress  Breath sounds: No stridor  No wheezing or rhonchi  Neurological:      Mental Status: She is alert  Cranial Nerves: No cranial nerve deficit  Motor: No weakness        Comments: confused         Additional Data:     Labs:  Results from last 7 days   Lab Units 23  0426 23  0506   EOS PCT %  --  5   HEMATOCRIT % 32 5* 32 6*   HEMOGLOBIN g/dL 10 2* 10 2*   LYMPHS PCT %  --  20   MONOS PCT %  --  12   NEUTROS PCT %  --  63   PLATELETS Thousands/uL  --  191   WBC Thousand/uL  --  5 55     Results from last 7 days   Lab Units 23  1155   ANION GAP mmol/L 5   BUN mg/dL 11   CALCIUM mg/dL 8 5   CHLORIDE mmol/L 109*   CO2 mmol/L 25   CREATININE mg/dL 0 52*   GLUCOSE RANDOM mg/dL 96   POTASSIUM mmol/L 4 0   SODIUM mmol/L 139                       Lines/Drains:  Invasive Devices     None                       Imaging: No pertinent imaging reviewed  Recent Cultures (last 7 days):         Last 24 Hours Medication List:   Current Facility-Administered Medications   Medication Dose Route Frequency Provider Last Rate   • acetaminophen  650 mg Oral Q8H PRN Ramez Albright MD     • aspirin  81 mg Oral Daily Magnolia S Parker, CRNP     • atorvastatin  40 mg Oral QPM Magnolia S Parker, CRNP     • busPIRone  5 mg Oral BID Ramez Albright MD     • calcium carbonate  500 mg Oral BID PRN Cinderella Merlin Dimler, CRNP     • divalproex sodium  500 mg Oral HS Cinderella Merlin Dimler, CRNP     • enoxaparin  40 mg Subcutaneous Daily Magnolia S Parker, CRNP     • famotidine  20 mg Oral Daily Magnolia S Parker, CRNP     • latanoprost  1 drop Both Eyes HS Magnolia S Parker, CRNP     • OLANZapine  2 5 mg Intramuscular Q4H PRN Ramez Albright MD     • QUEtiapine  100 mg Oral HS Luisa Coates MD     • sertraline  100 mg Oral Daily Magnolia S Parker, CRNP     • timolol  1 drop Both Eyes BID Magnolia S Parker, CRNP     • trimethobenzamide  100 mg Intramuscular Q6H PRN Cinderella Merlin Dimler, CRNP          Today, Patient Was Seen By: Jake Robertson MD    **Please Note: This note may have been constructed using a voice recognition system  **

## 2023-06-07 PROCEDURE — 92526 ORAL FUNCTION THERAPY: CPT

## 2023-06-07 PROCEDURE — 99232 SBSQ HOSP IP/OBS MODERATE 35: CPT | Performed by: FAMILY MEDICINE

## 2023-06-07 RX ORDER — QUETIAPINE FUMARATE 50 MG/1
150 TABLET, EXTENDED RELEASE ORAL
Status: DISCONTINUED | OUTPATIENT
Start: 2023-06-07 | End: 2023-06-07

## 2023-06-07 RX ORDER — QUETIAPINE FUMARATE 100 MG/1
100 TABLET, FILM COATED ORAL
Status: DISCONTINUED | OUTPATIENT
Start: 2023-06-07 | End: 2023-06-11

## 2023-06-07 RX ORDER — DIVALPROEX SODIUM 250 MG/1
500 TABLET, EXTENDED RELEASE ORAL DAILY
Status: DISCONTINUED | OUTPATIENT
Start: 2023-06-07 | End: 2023-06-07

## 2023-06-07 RX ORDER — DIVALPROEX SODIUM 125 MG/1
500 CAPSULE, COATED PELLETS ORAL
Status: DISCONTINUED | OUTPATIENT
Start: 2023-06-07 | End: 2023-06-14 | Stop reason: HOSPADM

## 2023-06-07 RX ORDER — DIVALPROEX SODIUM 250 MG/1
500 TABLET, EXTENDED RELEASE ORAL
Status: DISCONTINUED | OUTPATIENT
Start: 2023-06-07 | End: 2023-06-07

## 2023-06-07 RX ORDER — QUETIAPINE FUMARATE 50 MG/1
100 TABLET, EXTENDED RELEASE ORAL
Status: DISCONTINUED | OUTPATIENT
Start: 2023-06-07 | End: 2023-06-07

## 2023-06-07 RX ADMIN — ASPIRIN 81 MG: 81 TABLET, COATED ORAL at 10:23

## 2023-06-07 RX ADMIN — SERTRALINE 100 MG: 100 TABLET, FILM COATED ORAL at 10:23

## 2023-06-07 RX ADMIN — OLANZAPINE 2.5 MG: 10 INJECTION, POWDER, FOR SOLUTION INTRAMUSCULAR at 22:09

## 2023-06-07 RX ADMIN — BUSPIRONE HYDROCHLORIDE 5 MG: 5 TABLET ORAL at 17:05

## 2023-06-07 RX ADMIN — ENOXAPARIN SODIUM 40 MG: 40 INJECTION SUBCUTANEOUS at 10:24

## 2023-06-07 RX ADMIN — TIMOLOL MALEATE 1 DROP: 5 SOLUTION OPHTHALMIC at 17:02

## 2023-06-07 RX ADMIN — FAMOTIDINE 20 MG: 20 TABLET ORAL at 10:24

## 2023-06-07 RX ADMIN — ACETAMINOPHEN 650 MG: 325 TABLET ORAL at 10:23

## 2023-06-07 RX ADMIN — BUSPIRONE HYDROCHLORIDE 5 MG: 5 TABLET ORAL at 10:24

## 2023-06-07 RX ADMIN — TIMOLOL MALEATE 1 DROP: 5 SOLUTION OPHTHALMIC at 10:25

## 2023-06-07 RX ADMIN — DIVALPROEX SODIUM 500 MG: 125 CAPSULE, COATED PELLETS ORAL at 21:54

## 2023-06-07 RX ADMIN — QUETIAPINE FUMARATE 100 MG: 100 TABLET ORAL at 21:54

## 2023-06-07 RX ADMIN — OLANZAPINE 2.5 MG: 10 INJECTION, POWDER, FOR SOLUTION INTRAMUSCULAR at 12:54

## 2023-06-07 RX ADMIN — ATORVASTATIN CALCIUM 40 MG: 40 TABLET, FILM COATED ORAL at 17:05

## 2023-06-07 NOTE — ASSESSMENT & PLAN NOTE
· Hb has been fluctuating since beg of may when she was admitted to [facility] anywhere between 9 8-11  · Pt hb has remained stable at 10 2 for 2 consecutive times - no evidence of gross bleed no hematemesis and no melena no hematochezia  · She has poor nutritional aspect that can contribute to anemia   · She needs no scopes or transfusion at this time as there is no evidence of gross bleed hb stable for 2 days and in her range of base

## 2023-06-07 NOTE — ASSESSMENT & PLAN NOTE
· Patient discharged from outside hospital earlier in day and 5/28 after admission for vomiting  · Evaluated by psychiatry as she was transferred from the behavioral health unit to [facility] for the vomiting, son was requesting hospice and took the patient home with 24/7 care provided by family while awaiting services to be arranged  Review of the discharge summary the internal medicine team and psychiatry team voiced their concerns regarding the son taking the patient home but he was adamant  · Patient was brought to this facility on the same day as discharge from previous facility  Son stated to ER staff he cannot care for patient at home  · Evaluated by psychiatry on 5/30 and deemed to lack appropriate judgement and is danger to self and needs inpatient psychiatric treatment, patient with advanced dementia do not think will be able to sign 201- 302 has been signed  · Also try to wrap a call bell around her neck to commit suicide she is expressing suicidal ideations now patient has been 302   · Patient is on a one-to-one for safety reasons  · [facility] psychiatry declined the patient stating that she has vascular dementia and there is nothing they can do from psychiatric point of view  Patient still remain very uncomfortable behavior, tapping her head, blaming PCA-for stealing her baby  Patient already on Seroquel 100 mg at nighttime, patient was taking Depakote sprinklers 500 mg at nighttime, as per psychiatry recommendation, we will switch to Depakote  mg at night-continue BuSpar, IM Zyprexa as needed  If above regimen does not effective, will reach out to psychiatry for further recommendation  Ongoing discussion regarding patient goals of care, most likely hospice care will be appropriate in this case    We will discuss with son regarding more appropriate discharge planning

## 2023-06-07 NOTE — PROGRESS NOTES
Patient given prn zyprexa as she was hitting herself and unable to be redirected  Patient verbalizing wanting to die and commit suicide  Patient is on 1:1 continuous observation  Encouraging the patient to eat meals

## 2023-06-07 NOTE — PLAN OF CARE
Problem: MOBILITY - ADULT  Goal: Maintain or return to baseline ADL function  Description: INTERVENTIONS:  -  Assess patient's ability to carry out ADLs; assess patient's baseline for ADL function and identify physical deficits which impact ability to perform ADLs (bathing, care of mouth/teeth, toileting, grooming, dressing, etc )  - Assess/evaluate cause of self-care deficits   - Assess range of motion  - Assess patient's mobility; develop plan if impaired  - Assess patient's need for assistive devices and provide as appropriate  - Encourage maximum independence but intervene and supervise when necessary  - Involve family in performance of ADLs  - Assess for home care needs following discharge   - Consider OT consult to assist with ADL evaluation and planning for discharge  - Provide patient education as appropriate  Outcome: Progressing     Problem: PAIN - ADULT  Goal: Verbalizes/displays adequate comfort level or baseline comfort level  Description: Interventions:  - Encourage patient to monitor pain and request assistance  - Assess pain using appropriate pain scale  - Administer analgesics based on type and severity of pain and evaluate response  - Implement non-pharmacological measures as appropriate and evaluate response  - Consider cultural and social influences on pain and pain management  - Notify physician/advanced practitioner if interventions unsuccessful or patient reports new pain  Outcome: Progressing     Problem: INFECTION - ADULT  Goal: Absence or prevention of progression during hospitalization  Description: INTERVENTIONS:  - Assess and monitor for signs and symptoms of infection  - Monitor lab/diagnostic results  - Monitor all insertion sites, i e  indwelling lines, tubes, and drains  - Monitor endotracheal if appropriate and nasal secretions for changes in amount and color  - [place] appropriate cooling/warming therapies per order  - Administer medications as ordered  - Instruct and encourage patient and family to use good hand hygiene technique  - Identify and instruct in appropriate isolation precautions for identified infection/condition  Outcome: Progressing     Problem: SAFETY ADULT  Goal: Maintain or return to baseline ADL function  Description: INTERVENTIONS:  -  Assess patient's ability to carry out ADLs; assess patient's baseline for ADL function and identify physical deficits which impact ability to perform ADLs (bathing, care of mouth/teeth, toileting, grooming, dressing, etc )  - Assess/evaluate cause of self-care deficits   - Assess range of motion  - Assess patient's mobility; develop plan if impaired  - Assess patient's need for assistive devices and provide as appropriate  - Encourage maximum independence but intervene and supervise when necessary  - Involve family in performance of ADLs  - Assess for home care needs following discharge   - Consider OT consult to assist with ADL evaluation and planning for discharge  - Provide patient education as appropriate  Outcome: Progressing     Problem: RESPIRATORY - ADULT  Goal: Achieves optimal ventilation and oxygenation  Description: INTERVENTIONS:  - Assess for changes in respiratory status  - Assess for changes in mentation and behavior  - Position to facilitate oxygenation and minimize respiratory effort  - Oxygen administered by appropriate delivery if ordered  - Initiate smoking cessation education as indicated  - Encourage broncho-pulmonary hygiene including cough, deep breathe, Incentive Spirometry  - Assess the need for suctioning and aspirate as needed  - Assess and instruct to report SOB or any respiratory difficulty  - Respiratory Therapy support as indicated  Outcome: Progressing     Problem: GASTROINTESTINAL - ADULT  Goal: Minimal or absence of nausea and/or vomiting  Description: INTERVENTIONS:  - Administer IV fluids if ordered to ensure adequate hydration  - Maintain NPO status until nausea and vomiting are resolved  - Nasogastric tube if ordered  - Administer ordered antiemetic medications as needed  - Provide nonpharmacologic comfort measures as appropriate  - Advance diet as tolerated, if ordered  - Consider nutrition services referral to assist patient with adequate nutrition and appropriate food choices  Outcome: Progressing

## 2023-06-07 NOTE — PROGRESS NOTES
[address]  Progress Note  Name: Darylene Maya  MRN: [id number]  Unit/Bed#: -01 I Date of Admission: 5/28/2023   Date of Service: 6/7/2023 I Hospital Day: 10    Assessment/Plan   Severe vascular dementia with psychotic disturbance [facility])  Assessment & Plan  · Patient discharged from outside hospital earlier in day and 5/28 after admission for vomiting  · Evaluated by psychiatry as she was transferred from the behavioral health unit to [facility] center for the vomiting, son was requesting hospice and took the patient home with 24/7 care provided by family while awaiting services to be arranged  Review of the discharge summary the internal medicine team and psychiatry team voiced their concerns regarding the son taking the patient home but he was adamant  · Patient was brought to this facility on the same day as discharge from previous facility  Son stated to ER staff he cannot care for patient at home  · Evaluated by psychiatry on 5/30 and deemed to lack appropriate judgement and is danger to self and needs inpatient psychiatric treatment, patient with advanced dementia do not think will be able to sign 201- 302 has been signed  · Also try to wrap a call bell around her neck to commit suicide she is expressing suicidal ideations now patient has been 302   · Patient is on a one-to-one for safety reasons  · [facility] psychiatry declined the patient stating that she has vascular dementia and there is nothing they can do from psychiatric point of view  Patient still remain very uncomfortable behavior, tapping her head, blaming PCA-for stealing her baby  Patient already on Seroquel 100 mg at nighttime, patient was taking Depakote sprinklers 500 mg at nighttime, as per psychiatry recommendation, we will switch to Depakote  mg at night-continue BuSpar, IM Zyprexa as needed    If above regimen does not effective, will reach out to psychiatry for further recommendation  Ongoing discussion regarding patient goals of care, most likely hospice care will be appropriate in this case  We will discuss with son regarding more appropriate discharge planning        * Encephalopathy acute  Assessment & Plan  · On severe vascular dementia POA initially was psychotic features- but since seeing her yesterday and today she is somnolent poor po intake will wake up to name and then falls asleep  · Ct brain neg   · No infection evident on admission  · depakote level checked nml therapeutic   · No prns overnight   · tsh nml in beg of may 5/19   · Electrolytes stable  Patient is still remains significantly delirious  Blaming PCA for stealing her baby  Also blaming PCA that PCA was trying to hurt her  Patient was tapping her head-because she wants to get rid of some ideas from her head  Patient is still remain in psychotic stage  Continue Seroquel 100 mg at nighttime, Depakote sprinkles changed to Depakote  mg at nighttime  Continue BuSpar and Zyprexa as needed  We will monitor patient's response  If does not improve, can adjust medications  Ongoing discussion needs to be done with family regarding safe discharge and goals of care  Try to reach out to patient's son-if he can visit patient's and her family meeting  Unable to reach, left voice message-awaiting for response      Chronic anemia  Assessment & Plan  · Hb has been fluctuating since beg of may when she was admitted to [facility] anywhere between 9 8-11  · Pt hb has remained stable at 10 2 for 2 consecutive times - no evidence of gross bleed no hematemesis and no melena no hematochezia  · She has poor nutritional aspect that can contribute to anemia   · She needs no scopes or transfusion at this time as there is no evidence of gross bleed hb stable for 2 days and in her range of base    Status post CVA  Assessment & Plan  · 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 and dysphagia  · Was discharged to SNF then sent to inpatient geropsych, unclear extent of physical rehabilitation patient received or was amenable to participate  · Continue puréed diet with nectar thick liquids  · Appreciate PT/OT/case management- needs inpatient psych   · Continue aspirin and statin           VTE Pharmacologic Prophylaxis: VTE Score: 3 Moderate Risk (Score 3-4) - Pharmacological DVT Prophylaxis Ordered: enoxaparin (Lovenox)  Patient Centered Rounds: I performed bedside rounds with nursing staff today  Discussions with Specialists or Other Care Team Provider: None    Education and Discussions with Family / Patient: Left voice message for son        Total Time Spent on Date of Encounter in care of patient: 10 minutes This time was spent on one or more of the following: performing physical exam; counseling and coordination of care; obtaining or reviewing history; documenting in the medical record; reviewing/ordering tests, medications or procedures; communicating with other healthcare professionals and discussing with patient's family/caregivers  Current Length of Stay: 10 day(s)  Current Patient Status: Inpatient   Certification Statement: The patient will continue to require additional inpatient hospital stay due to To monitor above condition  Discharge Plan: To be determined    Code Status: Level 3 - DNAR and DNI    Subjective:   Seen and evaluated during the event  Patient remain actively delusional, blaming PCA that she is stealing her baby and killing her baby, patient also blaming PCL that PCA is trying to hurt her her    Objective:     Vitals:   Temp (24hrs), Av 4 °F (36 9 °C), Min:97 9 °F (36 6 °C), Max:98 8 °F (37 1 °C)    Temp:  [97 9 °F (36 6 °C)-98 8 °F (37 1 °C)] 98 4 °F (36 9 °C)  HR:  [63-81] 81  Resp:  [16] 16  BP: ()/(52-69) 135/65  SpO2:  [95 %-99 %] 99 %  Body mass index is 23 58 kg/m²  Input and Output Summary (last 24 hours):      Intake/Output Summary (Last 24 hours) at 6/7/2023 1257  Last data filed at 6/7/2023 0900  Gross per 24 hour   Intake 360 ml   Output 275 ml   Net 85 ml       Physical Exam:   Physical Exam  Vitals and nursing note reviewed  Eyes:      General: No scleral icterus  Left eye: No discharge  Conjunctiva/sclera: Conjunctivae normal       Pupils: Pupils are equal, round, and reactive to light  Cardiovascular:      Rate and Rhythm: Normal rate  Heart sounds: No murmur heard  No friction rub  No gallop  Pulmonary:      Effort: Pulmonary effort is normal  No respiratory distress  Breath sounds: No stridor  No wheezing or rhonchi  Abdominal:      General: Abdomen is flat  Bowel sounds are normal  There is no distension  Palpations: There is no mass  Tenderness: There is no abdominal tenderness  Hernia: No hernia is present  Neurological:      Mental Status: She is alert  Psychiatric:         Mood and Affect: Mood is anxious  Behavior: Behavior is agitated  Thought Content: Thought content is paranoid and delusional          Cognition and Memory: Cognition is impaired  Judgment: Judgment is inappropriate  Additional Data:     Labs:  Results from last 7 days   Lab Units 06/02/23  0426 06/01/23  0506   EOS PCT %  --  5   HEMATOCRIT % 32 5* 32 6*   HEMOGLOBIN g/dL 10 2* 10 2*   LYMPHS PCT %  --  20   MONOS PCT %  --  12   NEUTROS PCT %  --  63   PLATELETS Thousands/uL  --  191   WBC Thousand/uL  --  5 55     Results from last 7 days   Lab Units 06/03/23  1155   ANION GAP mmol/L 5   BUN mg/dL 11   CALCIUM mg/dL 8 5   CHLORIDE mmol/L 109*   CO2 mmol/L 25   CREATININE mg/dL 0 52*   GLUCOSE RANDOM mg/dL 96   POTASSIUM mmol/L 4 0   SODIUM mmol/L 139                       Lines/Drains:  Invasive Devices     None                       Imaging: No pertinent imaging reviewed      Recent Cultures (last 7 days):         Last 24 Hours Medication List:   Current Facility-Administered Medications   Medication Dose Route Frequency Provider Last Rate   • acetaminophen  650 mg Oral Q8H PRN Fabrizio Hernadez MD     • aspirin  81 mg Oral Daily Magnolia S Parker, CRNP     • atorvastatin  40 mg Oral QPM Magnolia S Parker, CRNP     • busPIRone  5 mg Oral BID Fabrizio Hernadez MD     • calcium carbonate  500 mg Oral BID PRN RAYSA Hatfield     • divalproex sodium  500 mg Oral HS Sterling Meyer MD     • enoxaparin  40 mg Subcutaneous Daily Magnolia S Parker, CRNP     • famotidine  20 mg Oral Daily Magnolia S Parker, CRNP     • latanoprost  1 drop Both Eyes HS Magnolia S Parker, CRNP     • OLANZapine  2 5 mg Intramuscular Q4H PRN Fabrizio Hernadez MD     • QUEtiapine  100 mg Oral HS Sterling Meyer MD     • sertraline  100 mg Oral Daily Magnolia S Parker, CRNP     • timolol  1 drop Both Eyes BID Magnolia S Parker, CRNP     • trimethobenzamide  100 mg Intramuscular Q6H PRN RAYSA Hatfield          Today, Patient Was Seen By: Zay Herrera MD    **Please Note: This note may have been constructed using a voice recognition system  **

## 2023-06-07 NOTE — SPEECH THERAPY NOTE
Speech Language/Pathology    Speech/Language Pathology Progress Note    Patient Name: Arely Johnson  FNWXP'O Date: 6/7/2023       Subjective:  Pt verbose and confabulatory    Objective:  Assess tolerance of upgraded textures  Pt currently on puree and thin liquids, had been requesting a cupcake or a muffin  Assessment:  Seen w/ muffin as mech soft trials and thins via straw  Pt resistive to upright positioning  PCA present t/o session for pt safety  SLP provided full assistance for feeding  Pt demo'd impaired bolus retrieval from fork and prolonged mastication  Pt verbose and frequently talking w/ food in mouth, required verbal cues to masticate and clear from oral cavity  Pt eventually clearing trials from mouth but remains at risk of choking d/t verbosity and poor attention  No overt coughing/throat clearing  Plan/Recommendations:  Continue puree/thin liquids  Can have mech soft snacks w/ close supervision and verbal cues, but should stop feeding pt if attention worsens/pt not following commands for clearance of bolus     SLP will continue to follow    Malcolm Lopez [phone] [facility]  6/7/2023

## 2023-06-07 NOTE — ASSESSMENT & PLAN NOTE
· On severe vascular dementia POA initially was psychotic features- but since seeing her yesterday and today she is somnolent poor po intake will wake up to name and then falls asleep  · Ct brain neg   · No infection evident on admission  · depakote level checked nml therapeutic   · No prns overnight   · tsh nml in beg of may 5/19   · Electrolytes stable  Patient is still remains significantly delirious  Blaming PCA for stealing her baby  Also blaming PCA that PCA was trying to hurt her  Patient was tapping her head-because she wants to get rid of some ideas from her head  Patient is still remain in psychotic stage  Continue Seroquel 100 mg at nighttime, Depakote sprinkles changed to Depakote  mg at nighttime  Continue BuSpar and Zyprexa as needed  We will monitor patient's response  If does not improve, can adjust medications  Ongoing discussion needs to be done with family regarding safe discharge and goals of care  Try to reach out to patient's son-if he can visit patient's and her family meeting  Unable to reach, left voice message-awaiting for response

## 2023-06-08 PROBLEM — F22 PARANOIA (PSYCHOSIS) (HCC): Status: ACTIVE | Noted: 2023-06-08

## 2023-06-08 PROCEDURE — 99232 SBSQ HOSP IP/OBS MODERATE 35: CPT | Performed by: FAMILY MEDICINE

## 2023-06-08 RX ORDER — HALOPERIDOL 5 MG/ML
5 INJECTION INTRAMUSCULAR ONCE
Status: COMPLETED | OUTPATIENT
Start: 2023-06-08 | End: 2023-06-08

## 2023-06-08 RX ADMIN — DIVALPROEX SODIUM 500 MG: 125 CAPSULE, COATED PELLETS ORAL at 21:40

## 2023-06-08 RX ADMIN — ATORVASTATIN CALCIUM 40 MG: 40 TABLET, FILM COATED ORAL at 17:22

## 2023-06-08 RX ADMIN — LATANOPROST 1 DROP: 50 SOLUTION OPHTHALMIC at 21:42

## 2023-06-08 RX ADMIN — TIMOLOL MALEATE 1 DROP: 5 SOLUTION OPHTHALMIC at 17:22

## 2023-06-08 RX ADMIN — BUSPIRONE HYDROCHLORIDE 5 MG: 5 TABLET ORAL at 17:22

## 2023-06-08 RX ADMIN — HALOPERIDOL LACTATE 5 MG: 5 INJECTION, SOLUTION INTRAMUSCULAR at 00:06

## 2023-06-08 RX ADMIN — TIMOLOL MALEATE 1 DROP: 5 SOLUTION OPHTHALMIC at 09:57

## 2023-06-08 RX ADMIN — ENOXAPARIN SODIUM 40 MG: 40 INJECTION SUBCUTANEOUS at 09:01

## 2023-06-08 RX ADMIN — OLANZAPINE 2.5 MG: 10 INJECTION, POWDER, FOR SOLUTION INTRAMUSCULAR at 22:48

## 2023-06-08 RX ADMIN — QUETIAPINE FUMARATE 100 MG: 100 TABLET ORAL at 21:40

## 2023-06-08 NOTE — ASSESSMENT & PLAN NOTE
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-she wants to eat regular food, when was still that we are giving  But for safety, patient started screaming and banging her head with her hand      Psychiatric medication dose adjusted, monitor response  Continue continual observation

## 2023-06-08 NOTE — CASE MANAGEMENT
Case Management Discharge Planning Note    Patient name Jay Araujo  Location [address] [id number] MRN [id number]  : 1945 Date 2023       Current Admission Date: 2023  Current Admission Diagnosis:Encephalopathy acute   Patient Active Problem List    Diagnosis Date Noted   • Chronic anemia 2023   • Encephalopathy acute 2023   • Status post CVA 2023   • Recurrent falls 2023   • Tobacco abuse 2023   • Acute ischemic CVA 2023   • Severe vascular dementia with psychotic disturbance ([facility] [address] ) 2023   • Generalized anxiety disorder 2023   • Hypertensive urgency 2023      LOS (days): 11  Geometric Mean LOS (GMLOS) (days): 4 70  Days to GMLOS:-6 1     OBJECTIVE:  Risk of Unplanned Readmission Score: 25 2         Current admission status: Inpatient   Preferred Pharmacy:   [address] [place] [address]  Phone: [phone] Fax: [phone]    Primary Care Provider: Marino Abbott DO    Primary Insurance: MEDICARE  Secondary Insurance: BLUE CROSS    DISCHARGE DETAILS:\    CM spoke to Tiarra Stiles ([place]) on the phone  We discussed the barriers to a safe dc at this time, which is Melissa's behaviors  And an accepting facility  CM reviewed patient triggered for a 2nd Level of Care assessment with the Benny related to her recent Psych stay  Per [facility], initiated the 302 on  and he overturned it to a 201  Per Tiarra Stiles patient was in the Geriatric [facility] for 5 days until she went to the ED for medical care and transferred to  [facility] understood why a Level 2 assessment is needed  CM sent the MA 51, PASSR and supporting documentation to the [facility] requesting a Level 2 assessment and a LOC assessment  CM will continue to work on a safe dc plan

## 2023-06-08 NOTE — PLAN OF CARE
Problem: MOBILITY - ADULT  Goal: Maintain or return to baseline ADL function  Description: INTERVENTIONS:  -  Assess patient's ability to carry out ADLs; assess patient's baseline for ADL function and identify physical deficits which impact ability to perform ADLs (bathing, care of mouth/teeth, toileting, grooming, dressing, etc )  - Assess/evaluate cause of self-care deficits   - Assess range of motion  - Assess patient's mobility; develop plan if impaired  - Assess patient's need for assistive devices and provide as appropriate  - Encourage maximum independence but intervene and supervise when necessary  - Involve family in performance of ADLs  - Assess for home care needs following discharge   - Consider OT consult to assist with ADL evaluation and planning for discharge  - Provide patient education as appropriate  Outcome: Progressing  Goal: Maintains/Returns to pre admission functional level  Description: INTERVENTIONS:  - Perform BMAT or MOVE assessment daily    - Set and communicate daily mobility goal to care team and patient/family/caregiver  - Collaborate with rehabilitation services on mobility goals if consulted  - Perform Range of Motion 2 times a day  - Reposition patient every 2 hours    - Dangle patient 2 times a day  - Stand patient 2 times a day  - Ambulate patient 2 times a day  - Out of bed to chair 2 times a day   - Out of bed for meals 2 times a day  - Out of bed for toileting  - Record patient progress and toleration of activity level   Outcome: Progressing     Problem: PAIN - ADULT  Goal: Verbalizes/displays adequate comfort level or baseline comfort level  Description: Interventions:  - Encourage patient to monitor pain and request assistance  - Assess pain using appropriate pain scale  - Administer analgesics based on type and severity of pain and evaluate response  - Implement non-pharmacological measures as appropriate and evaluate response  - Consider cultural and social influences on pain and pain management  - Notify physician/advanced practitioner if interventions unsuccessful or patient reports new pain  Outcome: Progressing     Problem: INFECTION - ADULT  Goal: Absence or prevention of progression during hospitalization  Description: INTERVENTIONS:  - Assess and monitor for signs and symptoms of infection  - Monitor lab/diagnostic results  - Monitor all insertion sites, i e  indwelling lines, tubes, and drains  - Monitor endotracheal if appropriate and nasal secretions for changes in amount and color  - [place] appropriate cooling/warming therapies per order  - Administer medications as ordered  - Instruct and encourage patient and family to use good hand hygiene technique  - Identify and instruct in appropriate isolation precautions for identified infection/condition  Outcome: Progressing  Goal: Absence of fever/infection during neutropenic period  Description: INTERVENTIONS:  - Monitor WBC    Outcome: Progressing     Problem: SAFETY ADULT  Goal: Maintain or return to baseline ADL function  Description: INTERVENTIONS:  -  Assess patient's ability to carry out ADLs; assess patient's baseline for ADL function and identify physical deficits which impact ability to perform ADLs (bathing, care of mouth/teeth, toileting, grooming, dressing, etc )  - Assess/evaluate cause of self-care deficits   - Assess range of motion  - Assess patient's mobility; develop plan if impaired  - Assess patient's need for assistive devices and provide as appropriate  - Encourage maximum independence but intervene and supervise when necessary  - Involve family in performance of ADLs  - Assess for home care needs following discharge   - Consider OT consult to assist with ADL evaluation and planning for discharge  - Provide patient education as appropriate  Outcome: Progressing  Goal: Maintains/Returns to pre admission functional level  Description: INTERVENTIONS:  - Perform BMAT or MOVE assessment daily    - Set and communicate daily mobility goal to care team and patient/family/caregiver  - Collaborate with rehabilitation services on mobility goals if consulted  - Perform Range of Motion 2 times a day  - Reposition patient every 2 hours    - Dangle patient 2 times a day  - Stand patient 2 times a day  - Ambulate patient 2 times a day  - Out of bed to chair 2 times a day   - Out of bed for meals 2 times a day  - Out of bed for toileting  - Record patient progress and toleration of activity level   Outcome: Progressing  Goal: Patient will remain free of falls  Description: INTERVENTIONS:  - Educate patient/family on patient safety including physical limitations  - Instruct patient to call for assistance with activity   - Consult OT/PT to assist with strengthening/mobility   - Keep Call bell within reach  - Keep bed low and locked with side rails adjusted as appropriate  - Keep care items and personal belongings within reach  - Initiate and maintain comfort rounds  - Make Fall Risk Sign visible to staff  - Offer Toileting every 3 Hours, in advance of need  - Initiate/Maintain bed alarm  - Apply yellow socks and bracelet for high fall risk patients  - Consider moving patient to room near nurses station  Outcome: Progressing     Problem: DISCHARGE PLANNING  Goal: Discharge to home or other facility with appropriate resources  Description: INTERVENTIONS:  - Identify barriers to discharge w/patient and caregiver  - Arrange for needed discharge resources and transportation as appropriate  - Identify discharge learning needs (meds, wound care, etc )  - Arrange for interpretive services to assist at discharge as needed  - Refer to Case Management Department for coordinating discharge planning if the patient needs post-hospital services based on physician/advanced practitioner order or complex needs related to functional status, cognitive ability, or social support system  Outcome: Progressing     Problem: Knowledge Deficit  Goal: Patient/family/caregiver demonstrates understanding of disease process, treatment plan, medications, and discharge instructions  Description: Complete learning assessment and assess knowledge base  Interventions:  - Provide teaching at level of understanding  - Provide teaching via preferred learning methods  Outcome: Progressing     Problem: NEUROSENSORY - ADULT  Goal: Achieves stable or improved neurological status  Description: INTERVENTIONS  - Monitor and report changes in neurological status  - Monitor vital signs such as temperature, blood pressure, glucose, and any other labs ordered   - Initiate measures to prevent increased intracranial pressure  - Monitor for seizure activity and implement precautions if appropriate      Outcome: Progressing  Goal: Remains free of injury related to seizures activity  Description: INTERVENTIONS  - Maintain airway, patient safety  and administer oxygen as ordered  - Monitor patient for seizure activity, document and report duration and description of seizure to physician/advanced practitioner  - If seizure occurs,  ensure patient safety during seizure  - Reorient patient post seizure  - Seizure pads on all 4 side rails  - Instruct patient/family to notify RN of any seizure activity including if an aura is experienced  - Instruct patient/family to call for assistance with activity based on nursing assessment  - Administer anti-seizure medications if ordered    Outcome: Progressing  Goal: Achieves maximal functionality and self care  Description: INTERVENTIONS  - Monitor swallowing and airway patency with patient fatigue and changes in neurological status  - Encourage and assist patient to increase activity and self care     - Encourage visually impaired, hearing impaired and aphasic patients to use assistive/communication devices  Outcome: Progressing     Problem: RESPIRATORY - ADULT  Goal: Achieves optimal ventilation and oxygenation  Description: INTERVENTIONS:  - Assess for changes in respiratory status  - Assess for changes in mentation and behavior  - Position to facilitate oxygenation and minimize respiratory effort  - Oxygen administered by appropriate delivery if ordered  - Initiate smoking cessation education as indicated  - Encourage broncho-pulmonary hygiene including cough, deep breathe, Incentive Spirometry  - Assess the need for suctioning and aspirate as needed  - Assess and instruct to report SOB or any respiratory difficulty  - Respiratory Therapy support as indicated  Outcome: Progressing     Problem: GASTROINTESTINAL - ADULT  Goal: Minimal or absence of nausea and/or vomiting  Description: INTERVENTIONS:  - Administer IV fluids if ordered to ensure adequate hydration  - Maintain NPO status until nausea and vomiting are resolved  - Nasogastric tube if ordered  - Administer ordered antiemetic medications as needed  - Provide nonpharmacologic comfort measures as appropriate  - Advance diet as tolerated, if ordered  - Consider nutrition services referral to assist patient with adequate nutrition and appropriate food choices  Outcome: Progressing  Goal: Maintains or returns to baseline bowel function  Description: INTERVENTIONS:  - Assess bowel function  - Encourage oral fluids to ensure adequate hydration  - Administer IV fluids if ordered to ensure adequate hydration  - Administer ordered medications as needed  - Encourage mobilization and activity  - Consider nutritional services referral to assist patient with adequate nutrition and appropriate food choices  Outcome: Progressing  Goal: Maintains adequate nutritional intake  Description: INTERVENTIONS:  - Monitor percentage of each meal consumed  - Identify factors contributing to decreased intake, treat as appropriate  - Assist with meals as needed  - Monitor I&O, weight, and lab values if indicated  - Obtain nutrition services referral as needed  Outcome: Progressing  Goal: Establish and maintain optimal ostomy function  Description: INTERVENTIONS:  - Assess bowel function  - Encourage oral fluids to ensure adequate hydration  - Administer IV fluids if ordered to ensure adequate hydration   - Administer ordered medications as needed  - Encourage mobilization and activity  - Nutrition services referral to assist patient with appropriate food choices  - Assess stoma site  - Consider wound care consult   Outcome: Progressing  Goal: Oral mucous membranes remain intact  Description: INTERVENTIONS  - Assess oral mucosa and hygiene practices  - Implement preventative oral hygiene regimen  - Implement oral medicated treatments as ordered  - Initiate Nutrition services referral as needed  Outcome: Progressing     Problem: GENITOURINARY - ADULT  Goal: Maintains or returns to baseline urinary function  Description: INTERVENTIONS:  - Assess urinary function  - Encourage oral fluids to ensure adequate hydration if ordered  - Administer IV fluids as ordered to ensure adequate hydration  - Administer ordered medications as needed  - Offer frequent toileting  - Follow urinary retention protocol if ordered  Outcome: Progressing  Goal: Absence of urinary retention  Description: INTERVENTIONS:  - Assess patient’s ability to void and empty bladder  - Monitor I/O  - Bladder scan as needed  - Discuss with physician/AP medications to alleviate retention as needed  - Discuss catheterization for long term situations as appropriate  Outcome: Progressing     Problem: METABOLIC, FLUID AND ELECTROLYTES - ADULT  Goal: Electrolytes maintained within normal limits  Description: INTERVENTIONS:  - Monitor labs and assess patient for signs and symptoms of electrolyte imbalances  - Administer electrolyte replacement as ordered  - Monitor response to electrolyte replacements, including repeat lab results as appropriate  - Instruct patient on fluid and nutrition as appropriate  Outcome: Progressing  Goal: Fluid balance maintained  Description: INTERVENTIONS:  - Monitor labs   - Monitor I/O and WT  - Instruct patient on fluid and nutrition as appropriate  - Assess for signs & symptoms of volume excess or deficit  Outcome: Progressing  Goal: Glucose maintained within target range  Description: INTERVENTIONS:  - Monitor Blood Glucose as ordered  - Assess for signs and symptoms of hyperglycemia and hypoglycemia  - Administer ordered medications to maintain glucose within target range  - Assess nutritional intake and initiate nutrition service referral as needed  Outcome: Progressing     Problem: SKIN/TISSUE INTEGRITY - ADULT  Goal: Skin Integrity remains intact(Skin Breakdown Prevention)  Description: Assess:  -Perform Dhaval assessment every    -Clean and moisturize skin every    -Inspect skin when repositioning, toileting, and assisting with ADLS  -Assess under medical devices such as   every      -Assess extremities for adequate circulation and sensation     Bed Management:  -Have minimal linens on bed & keep smooth, unwrinkled  -Change linens as needed when moist or perspiring  -Avoid sitting or lying in one position for more than   hours while in bed  -Keep HOB at    degrees     Toileting:  -Offer bedside commode  -Assess for incontinence every   -Use incontinent care products after each incontinent episode such as   Activity:  -Mobilize patient   times a day  -Encourage activity and walks on unit  -Encourage or provide ROM exercises   -Turn and reposition patient every   Hours  -Use appropriate equipment to lift or move patient in bed  -Instruct/ Assist with weight shifting every   when out of bed in chair  -Consider limitation of chair time   Jennifer Cage  hour intervals    Skin Care:  -Avoid use of baby powder, tape, friction and shearing, hot water or constrictive clothing  -Relieve pressure over bony prominences using    -Do not massage red bony areas    Next Steps:  -Teach patient strategies to minimize risks such as     -Consider consults to interdisciplinary teams such as   Outcome: Progressing  Goal: Incision(s), wounds(s) or drain site(s) healing without S/S of infection  Description: INTERVENTIONS  - Assess and document dressing, incision, wound bed, drain sites and surrounding tissue  - Provide patient and family education  - Perform skin care/dressing changes every   Outcome: Progressing  Goal: Pressure injury heals and does not worsen  Description: Interventions:  - Implement low air loss mattress or specialty surface (Criteria met)  - Apply silicone foam dressing  - Instruct/assist with weight shifting every   minutes when in chair   - Limit chair time to   hour intervals  - Use special pressure reducing interventions such as   when in chair   - Apply fecal or urinary incontinence containment device   - Perform passive or active ROM every   - Turn and reposition patient & offload bony prominences every    hours   - Utilize friction reducing device or surface for transfers   - Consider consults to  interdisciplinary teams such as    - Use incontinent care products after each incontinent episode such as    - Consider nutrition services referral as needed  Outcome: Progressing     Problem: HEMATOLOGIC - ADULT  Goal: Maintains hematologic stability  Description: INTERVENTIONS  - Assess for signs and symptoms of bleeding or hemorrhage  - Monitor labs  - Administer supportive blood products/factors as ordered and appropriate  Outcome: Progressing     Problem: MUSCULOSKELETAL - ADULT  Goal: Maintain or return mobility to safest level of function  Description: INTERVENTIONS:  - Assess patient's ability to carry out ADLs; assess patient's baseline for ADL function and identify physical deficits which impact ability to perform ADLs (bathing, care of mouth/teeth, toileting, grooming, dressing, etc )  - Assess/evaluate cause of self-care deficits   - Assess range of motion  - Assess patient's mobility  - Assess patient's need for assistive devices and provide as appropriate  - Encourage maximum independence but intervene and supervise when necessary  - Involve family in performance of ADLs  - Assess for home care needs following discharge   - Consider OT consult to assist with ADL evaluation and planning for discharge  - Provide patient education as appropriate  Outcome: Progressing  Goal: Maintain proper alignment of affected body part  Description: INTERVENTIONS:  - Support, maintain and protect limb and body alignment  - Provide patient/ family with appropriate education  Outcome: Progressing     Problem: Prexisting or High Potential for Compromised Skin Integrity  Goal: Skin integrity is maintained or improved  Description: INTERVENTIONS:  - Identify patients at risk for skin breakdown  - Assess and monitor skin integrity  - Assess and monitor nutrition and hydration status  - Monitor labs   - Assess for incontinence   - Turn and reposition patient  - Assist with mobility/ambulation  - Relieve pressure over bony prominences  - Avoid friction and shearing  - Provide appropriate hygiene as needed including keeping skin clean and dry  - Evaluate need for skin moisturizer/barrier cream  - Collaborate with interdisciplinary team   - Patient/family teaching  - Consider wound care consult   Outcome: Progressing     Problem: Nutrition/Hydration-ADULT  Goal: Nutrient/Hydration intake appropriate for improving, restoring or maintaining nutritional needs  Description: Monitor and assess patient's nutrition/hydration status for malnutrition  Collaborate with interdisciplinary team and initiate plan and interventions as ordered  Monitor patient's weight and dietary intake as ordered or per policy  Utilize nutrition screening tool and intervene as necessary  Determine patient's food preferences and provide high-protein, high-caloric foods as appropriate       INTERVENTIONS:  - Monitor oral intake, urinary output, labs, and treatment plans  - Assess nutrition and hydration status and recommend course of action  - Evaluate amount of meals eaten  - Assist patient with eating if necessary   - Allow adequate time for meals  - Recommend/ encourage appropriate diets, oral nutritional supplements, and vitamin/mineral supplements  - Order, calculate, and assess calorie counts as needed  - Recommend, monitor, and adjust tube feedings and TPN/PPN based on assessed needs  - Assess need for intravenous fluids  - Provide specific nutrition/hydration education as appropriate  - Include patient/family/caregiver in decisions related to nutrition  Outcome: Progressing     Problem: COPING  Goal: Pt/Family able to verbalize concerns and demonstrate effective coping strategies  Description: INTERVENTIONS:  - Assist patient/family to identify coping skills, available support systems and cultural and spiritual values  - Provide emotional support, including active listening and acknowledgement of concerns of patient and caregivers  - Reduce environmental stimuli, as able  - Provide patient education  - Assess for spiritual pain/suffering and initiate spiritual care, including notification of Pastoral Care or yahaira based community as needed  - Assess effectiveness of coping strategies  Outcome: Progressing     Problem: CONFUSION/THOUGHT DISTURBANCE  Goal: Thought disturbances (confusion, delirium, depression, dementia or psychosis) are managed to maintain or return to baseline mental status and functional level  Description: INTERVENTIONS:  - Assess for possible contributors to  thought disturbance, including but not limited to medications, infection, impaired vision or hearing, underlying metabolic abnormalities, dehydration, respiratory compromise,  psychiatric diagnoses and notify attending PHYSICAN/AP  - Monitor and intervene to maintain adequate nutrition, hydration, elimination, sleep and activity  - Decrease environmental stimuli, including noise as appropriate    - Provide frequent contacts to provide refocusing, direction and reassurance as needed  Approach patient calmly with eye contact and at their level  - [place] high risk fall precautions, aspiration precautions and other safety measures, as indicated  - If delirium suspected, notify physician/AP of change in condition and request immediate in-person evaluation  - Pursue consults as appropriate including Geriatric (campus dependent), OT for cognitive evaluation/activity planning, psychiatric, pastoral care, etc   Outcome: Progressing     Problem: SELF HARM  Goal: Effect of psychiatric condition will be minimized and patient will be protected from self harm  Description: INTERVENTIONS:  - Assess impact of patient's symptoms on level of functioning, self-care needs and offer support as indicated  - Assess patient/family knowledge of depression, impact on illness and need for teaching  - Provide emotional support, presence and reassurance  - Assess for possible suicidal thoughts, ideation or self-harm  If patient expresses suicidal thoughts or statements do not leave alone, notify physician/AP immediately, initiate suicide precautions, and determine need for continual observation    - initiate consults and referrals as appropriate (a mental health professional, Spiritual Care  Outcome: Progressing     Problem: SPIRITUAL CARE  Goal: Patient feels balance and connection with others and/or higher power that empowers the self during times of loss, guilt and fear  Description: INTERVENTIONS:  - Create safety for patient through empathic presence and non-judgmental listening  - Encourage patient to explore his/her values, beliefs and/or spiritual images and practices  - Encourage use of breath work, imagery, meditation, relaxation, reiki to ease distress and provide healing  - Encourage use of cultural and spiritual celebrations and rituals  - Facilitate discussion that helps patient sort out spiritual concerns  - Help patient identify where meaning/hope/comfort & strength are in his/her life  - Refer patient to yahaira community for assistance, as appropriate  - Respond to patient/family need for prayer/ritual/sacrament/ceremony  Outcome: Progressing

## 2023-06-08 NOTE — ASSESSMENT & PLAN NOTE
· Patient discharged from outside hospital earlier in day and 5/28 after admission for vomiting  · Evaluated by psychiatry as she was transferred from the behavioral health unit to [facility] for the vomiting, son was requesting hospice and took the patient home with 24/7 care provided by family while awaiting services to be arranged  Review of the discharge summary the internal medicine team and psychiatry team voiced their concerns regarding the son taking the patient home but he was adamant  · Patient was brought to this facility on the same day as discharge from previous facility  Son stated to ER staff he cannot care for patient at home  · Evaluated by psychiatry on 5/30 and deemed to lack appropriate judgement and is danger to self and needs inpatient psychiatric treatment, patient with advanced dementia do not think will be able to sign [id number] has been signed  · Also try to wrap a call bell around her neck to commit suicide she is expressing suicidal ideations now patient has been 302   · Patient is on a one-to-one for safety reasons  · [facility] psychiatry declined the patient stating that she has vascular dementia and there is nothing they can do from psychiatric point of view  Patient still remain very uncomfortable behavior, tapping her head, blaming PCA-for stealing her baby  Patient already on Seroquel 100 mg at nighttime, patient was taking Depakote sprinklers 500 mg at nighttime, as per psychiatry recommendation, we will switch to Depakote  mg at night-continue BuSpar, IM Zyprexa as needed  If above regimen does not effective, will reach out to psychiatry for further recommendation  Ongoing discussion regarding patient goals of care, most likely hospice care will be appropriate in this case    We will discuss with son regarding more appropriate discharge planning

## 2023-06-08 NOTE — SPEECH THERAPY NOTE
Speech Language/Pathology  Attempted to see pt multiple times throughout the day for dysphagia therapy  Unable to complete d/t pt sleeping for majority of the day  Per RN, pt required medication last night d/t behaviors   SLP will continue to follow as able/appropriate    Malcolm Driscoll 85 CCC-SLP  6/8/23

## 2023-06-08 NOTE — ASSESSMENT & PLAN NOTE
· Hb has been fluctuating since beg of may when she was admitted to [facility]  anywhere between 9 8-11  · Pt hb has remained stable at 10 2 for 2 consecutive times - no evidence of gross bleed no hematemesis and no melena no hematochezia  · She has poor nutritional aspect that can contribute to anemia   · She needs no scopes or transfusion at this time as there is no evidence of gross bleed hb stable for 2 days and in her range of base

## 2023-06-08 NOTE — PROGRESS NOTES
[address]  Progress Note  Name: Freida Pena  MRN: [id number]  Unit/Bed#: -01 I Date of Admission: 5/28/2023   Date of Service: 6/8/2023  Hospital Day: 11    Assessment/Plan   Paranoia (psychosis) [facility])  Assessment & Plan  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-she wants to eat regular food, when was still that we are giving  But for safety, patient started screaming and banging her head with her hand  Psychiatric medication dose adjusted, monitor response  Continue continual observation    Severe vascular dementia with psychotic disturbance ([facility] [address] )  Assessment & Plan  · Patient discharged from outside hospital earlier in day and 5/28 after admission for vomiting  · Evaluated by psychiatry as she was transferred from the behavioral health unit to [facility] for the vomiting, son was requesting hospice and took the patient home with 24/7 care provided by family while awaiting services to be arranged  Review of the discharge summary the internal medicine team and psychiatry team voiced their concerns regarding the son taking the patient home but he was adamant  · Patient was brought to this facility on the same day as discharge from previous facility    Son stated to ER staff he cannot care for patient at home  · Evaluated by psychiatry on 5/30 and deemed to lack appropriate judgement and is danger to self and needs inpatient psychiatric treatment, patient with advanced dementia do not think will be able to sign 201- 302 has been signed  · Also try to wrap a call bell around her neck to commit suicide she is expressing suicidal ideations now patient has been 302   · Patient is on a one-to-one for safety reasons  · [facility] psychiatry declined the patient stating that she has vascular dementia and there is nothing they can do from psychiatric point of view  Patient still remain very uncomfortable behavior, tapping her head, blaming PCA-for stealing her baby  Patient already on Seroquel 100 mg at nighttime, patient was taking Depakote sprinklers 500 mg at nighttime, as per psychiatry recommendation, we will switch to Depakote  mg at night-continue BuSpar, IM Zyprexa as needed  If above regimen does not effective, will reach out to psychiatry for further recommendation  Ongoing discussion regarding patient goals of care, most likely hospice care will be appropriate in this case  We will discuss with son regarding more appropriate discharge planning        * Encephalopathy acute  Assessment & Plan  · On severe vascular dementia POA initially was psychotic features- but since seeing her yesterday and today she is somnolent poor po intake will wake up to name and then falls asleep  · Ct brain neg   · No infection evident on admission  · depakote level checked nml therapeutic   · No prns overnight   · tsh nml in beg of may 5/19   · Electrolytes stable  Patient is still remains significantly delirious  Blaming PCA for stealing her baby  Also blaming PCA that PCA was trying to hurt her  Patient was tapping her head-because she wants to get rid of some ideas from her head  Patient is still remain in psychotic stage  Continue Seroquel 100 mg at nighttime, Depakote sprinkles changed to Depakote  mg at nighttime  Continue BuSpar and Zyprexa as needed  We will monitor patient's response  If does not improve, can adjust medications  Ongoing discussion needs to be done with family regarding safe discharge and goals of care    Try to reach out to patient's son-if he can visit patient's and her family meeting  Unable to reach, left voice message-awaiting for response  Chronic anemia  Assessment & Plan  · Hb has been fluctuating since beg of may when she was admitted to [facility] anywhere between 9 8-11  · Pt hb has remained stable at 10 2 for 2 consecutive times - no evidence of gross bleed no hematemesis and no melena no hematochezia  · She has poor nutritional aspect that can contribute to anemia   · She needs no scopes or transfusion at this time as there is no evidence of gross bleed hb stable for 2 days and in her range of base    Status post CVA  Assessment & Plan  · 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 and dysphagia  · Was discharged to SNF then sent to inpatient geropsych, unclear extent of physical rehabilitation patient received or was amenable to participate  · Continue puréed diet with nectar thick liquids  · Appreciate PT/OT/case management- needs inpatient psych   · Continue aspirin and statin         VTE Pharmacologic Prophylaxis: VTE Score: 3 Moderate Risk (Score 3-4) - Pharmacological DVT Prophylaxis Ordered: enoxaparin (Lovenox)  Patient Centered Rounds: I performed bedside rounds with nursing staff today  Discussions with Specialists or Other Care Team Provider: None    Education and Discussions with Family / Patient: Management is spoke with son over the phone  Total Time Spent on Date of Encounter in care of patient: 10 minutes This time was spent on one or more of the following: performing physical exam; counseling and coordination of care; obtaining or reviewing history; documenting in the medical record; reviewing/ordering tests, medications or procedures; communicating with other healthcare professionals and discussing with patient's family/caregivers      Current Length of Stay: 11 day(s)  Current Patient Status: Inpatient   Certification Statement: The patient will continue to require additional inpatient hospital stay due to To monitor above condition  Discharge Plan: to be determined    Code Status: Level 3 - DNAR and DNI    Subjective:   Seen and evaluated during the rounds  Patient is still hallucinating and remain psychotic  Objective:     Vitals:   Temp (24hrs), Av 1 °F (36 7 °C), Min:98 1 °F (36 7 °C), Max:98 1 °F (36 7 °C)    Temp:  [98 1 °F (36 7 °C)] 98 1 °F (36 7 °C)  HR:  [63-82] 68  Resp:  [16-19] 18  BP: ()/(52-78) 117/64  SpO2:  [94 %-99 %] 94 %  Body mass index is 23 58 kg/m²  Input and Output Summary (last 24 hours):   No intake or output data in the 24 hours ending 23    Physical Exam:   Physical Exam  Vitals and nursing note reviewed  Constitutional:       Comments: Hallucinating  HENT:      Mouth/Throat:      Mouth: Mucous membranes are moist       Pharynx: Oropharynx is clear  No oropharyngeal exudate  Eyes:      General: No scleral icterus  Left eye: No discharge  Extraocular Movements: Extraocular movements intact  Conjunctiva/sclera: Conjunctivae normal       Pupils: Pupils are equal, round, and reactive to light  Cardiovascular:      Rate and Rhythm: Normal rate  Heart sounds: No murmur heard  No friction rub  No gallop  Pulmonary:      Effort: Pulmonary effort is normal  No respiratory distress  Breath sounds: No stridor  No wheezing or rhonchi  Abdominal:      General: Abdomen is flat  Bowel sounds are normal  There is no distension  Tenderness: There is no abdominal tenderness  Musculoskeletal:      Cervical back: Normal range of motion  Neurological:      Mental Status: She is alert  Psychiatric:         Behavior: Behavior is uncooperative and agitated  Thought Content: Thought content is paranoid and delusional          Judgment: Judgment is inappropriate           Additional Data:     Labs:  Results from last 7 days   Lab Units 23  0426   HEMATOCRIT % 32 5*   HEMOGLOBIN g/dL 10 2*     Results from last 7 days   Lab Units 06/03/23  1155   ANION GAP mmol/L 5   BUN mg/dL 11   CALCIUM mg/dL 8 5   CHLORIDE mmol/L 109*   CO2 mmol/L 25   CREATININE mg/dL 0 52*   GLUCOSE RANDOM mg/dL 96   POTASSIUM mmol/L 4 0   SODIUM mmol/L 139                       Lines/Drains:  Invasive Devices     None                       Imaging: No pertinent imaging reviewed  Recent Cultures (last 7 days):         Last 24 Hours Medication List:   Current Facility-Administered Medications   Medication Dose Route Frequency Provider Last Rate   • acetaminophen  650 mg Oral Q8H PRN Johnny Lugo MD     • aspirin  81 mg Oral Daily Magnolia S Parker, CRNP     • atorvastatin  40 mg Oral QPM Magnolia S Parker, CRNP     • busPIRone  5 mg Oral BID Johnny Lugo MD     • calcium carbonate  500 mg Oral BID PRN RAYSA Tillman     • divalproex sodium  500 mg Oral HS Magnolia S Parker, CRNP     • enoxaparin  40 mg Subcutaneous Daily Magnolia S Parker, CRNP     • famotidine  20 mg Oral Daily Magnolia S Parker, CRNP     • latanoprost  1 drop Both Eyes HS Magnolia S Parker, CRNP     • OLANZapine  2 5 mg Intramuscular Q4H PRN Johnny Lugo MD     • QUEtiapine  100 mg Oral HS Magnolia S Parker, CRNP     • sertraline  100 mg Oral Daily Magnolia S Parker, CRNP     • timolol  1 drop Both Eyes BID Magnolia S Parker, CRNP     • trimethobenzamide  100 mg Intramuscular Q6H PRN RAYSA Tillman          Today, Patient Was Seen By: Asha Coles MD    **Please Note: This note may have been constructed using a voice recognition system  **

## 2023-06-08 NOTE — PLAN OF CARE
Problem: MOBILITY - ADULT  Goal: Maintain or return to baseline ADL function  Description: INTERVENTIONS:  -  Assess patient's ability to carry out ADLs; assess patient's baseline for ADL function and identify physical deficits which impact ability to perform ADLs (bathing, care of mouth/teeth, toileting, grooming, dressing, etc )  - Assess/evaluate cause of self-care deficits   - Assess range of motion  - Assess patient's mobility; develop plan if impaired  - Assess patient's need for assistive devices and provide as appropriate  - Encourage maximum independence but intervene and supervise when necessary  - Involve family in performance of ADLs  - Assess for home care needs following discharge   - Consider OT consult to assist with ADL evaluation and planning for discharge  - Provide patient education as appropriate  Outcome: Progressing  Goal: Maintains/Returns to pre admission functional level  Description: INTERVENTIONS:  - Perform BMAT or MOVE assessment daily    - Set and communicate daily mobility goal to care team and patient/family/caregiver  - Collaborate with rehabilitation services on mobility goals if consulted  - Perform Range of Motion 2 times a day  - Reposition patient every 2 hours    - Dangle patient 2 times a day  - Stand patient 2 times a day  - Ambulate patient 2 times a day  - Out of bed to chair 2 times a day   - Out of bed for meals 2 times a day  - Out of bed for toileting  - Record patient progress and toleration of activity level   Outcome: Progressing     Problem: PAIN - ADULT  Goal: Verbalizes/displays adequate comfort level or baseline comfort level  Description: Interventions:  - Encourage patient to monitor pain and request assistance  - Assess pain using appropriate pain scale  - Administer analgesics based on type and severity of pain and evaluate response  - Implement non-pharmacological measures as appropriate and evaluate response  - Consider cultural and social influences on pain and pain management  - Notify physician/advanced practitioner if interventions unsuccessful or patient reports new pain  Outcome: Progressing     Problem: INFECTION - ADULT  Goal: Absence or prevention of progression during hospitalization  Description: INTERVENTIONS:  - Assess and monitor for signs and symptoms of infection  - Monitor lab/diagnostic results  - Monitor all insertion sites, i e  indwelling lines, tubes, and drains  - Monitor endotracheal if appropriate and nasal secretions for changes in amount and color  - [place] appropriate cooling/warming therapies per order  - Administer medications as ordered  - Instruct and encourage patient and family to use good hand hygiene technique  - Identify and instruct in appropriate isolation precautions for identified infection/condition  Outcome: Progressing  Goal: Absence of fever/infection during neutropenic period  Description: INTERVENTIONS:  - Monitor WBC    Outcome: Progressing     Problem: SAFETY ADULT  Goal: Maintain or return to baseline ADL function  Description: INTERVENTIONS:  -  Assess patient's ability to carry out ADLs; assess patient's baseline for ADL function and identify physical deficits which impact ability to perform ADLs (bathing, care of mouth/teeth, toileting, grooming, dressing, etc )  - Assess/evaluate cause of self-care deficits   - Assess range of motion  - Assess patient's mobility; develop plan if impaired  - Assess patient's need for assistive devices and provide as appropriate  - Encourage maximum independence but intervene and supervise when necessary  - Involve family in performance of ADLs  - Assess for home care needs following discharge   - Consider OT consult to assist with ADL evaluation and planning for discharge  - Provide patient education as appropriate  Outcome: Progressing  Goal: Maintains/Returns to pre admission functional level  Description: INTERVENTIONS:  - Perform BMAT or MOVE assessment daily    - Set and communicate daily mobility goal to care team and patient/family/caregiver  - Collaborate with rehabilitation services on mobility goals if consulted  - Perform Range of Motion 2 times a day  - Reposition patient every 2 hours    - Dangle patient 2 times a day  - Stand patient 2 times a day  - Ambulate patient 2 times a day  - Out of bed to chair 2 times a day   - Out of bed for meals 2 times a day  - Out of bed for toileting  - Record patient progress and toleration of activity level   Outcome: Progressing  Goal: Patient will remain free of falls  Description: INTERVENTIONS:  - Educate patient/family on patient safety including physical limitations  - Instruct patient to call for assistance with activity   - Consult OT/PT to assist with strengthening/mobility   - Keep Call bell within reach  - Keep bed low and locked with side rails adjusted as appropriate  - Keep care items and personal belongings within reach  - Initiate and maintain comfort rounds  - Make Fall Risk Sign visible to staff  - Offer Toileting every 3 Hours, in advance of need  - Initiate/Maintain bed alarm  - Apply yellow socks and bracelet for high fall risk patients  - Consider moving patient to room near nurses station  Outcome: Progressing     Problem: DISCHARGE PLANNING  Goal: Discharge to home or other facility with appropriate resources  Description: INTERVENTIONS:  - Identify barriers to discharge w/patient and caregiver  - Arrange for needed discharge resources and transportation as appropriate  - Identify discharge learning needs (meds, wound care, etc )  - Arrange for interpretive services to assist at discharge as needed  - Refer to Case Management Department for coordinating discharge planning if the patient needs post-hospital services based on physician/advanced practitioner order or complex needs related to functional status, cognitive ability, or social support system  Outcome: Progressing     Problem: Knowledge Deficit  Goal: Patient/family/caregiver demonstrates understanding of disease process, treatment plan, medications, and discharge instructions  Description: Complete learning assessment and assess knowledge base  Interventions:  - Provide teaching at level of understanding  - Provide teaching via preferred learning methods  Outcome: Progressing     Problem: NEUROSENSORY - ADULT  Goal: Achieves stable or improved neurological status  Description: INTERVENTIONS  - Monitor and report changes in neurological status  - Monitor vital signs such as temperature, blood pressure, glucose, and any other labs ordered   - Initiate measures to prevent increased intracranial pressure  - Monitor for seizure activity and implement precautions if appropriate      Outcome: Progressing  Goal: Remains free of injury related to seizures activity  Description: INTERVENTIONS  - Maintain airway, patient safety  and administer oxygen as ordered  - Monitor patient for seizure activity, document and report duration and description of seizure to physician/advanced practitioner  - If seizure occurs,  ensure patient safety during seizure  - Reorient patient post seizure  - Seizure pads on all 4 side rails  - Instruct patient/family to notify RN of any seizure activity including if an aura is experienced  - Instruct patient/family to call for assistance with activity based on nursing assessment  - Administer anti-seizure medications if ordered    Outcome: Progressing  Goal: Achieves maximal functionality and self care  Description: INTERVENTIONS  - Monitor swallowing and airway patency with patient fatigue and changes in neurological status  - Encourage and assist patient to increase activity and self care     - Encourage visually impaired, hearing impaired and aphasic patients to use assistive/communication devices  Outcome: Progressing     Problem: RESPIRATORY - ADULT  Goal: Achieves optimal ventilation and oxygenation  Description: INTERVENTIONS:  - Assess for changes in respiratory status  - Assess for changes in mentation and behavior  - Position to facilitate oxygenation and minimize respiratory effort  - Oxygen administered by appropriate delivery if ordered  - Initiate smoking cessation education as indicated  - Encourage broncho-pulmonary hygiene including cough, deep breathe, Incentive Spirometry  - Assess the need for suctioning and aspirate as needed  - Assess and instruct to report SOB or any respiratory difficulty  - Respiratory Therapy support as indicated  Outcome: Progressing     Problem: GASTROINTESTINAL - ADULT  Goal: Minimal or absence of nausea and/or vomiting  Description: INTERVENTIONS:  - Administer IV fluids if ordered to ensure adequate hydration  - Maintain NPO status until nausea and vomiting are resolved  - Nasogastric tube if ordered  - Administer ordered antiemetic medications as needed  - Provide nonpharmacologic comfort measures as appropriate  - Advance diet as tolerated, if ordered  - Consider nutrition services referral to assist patient with adequate nutrition and appropriate food choices  Outcome: Progressing  Goal: Maintains or returns to baseline bowel function  Description: INTERVENTIONS:  - Assess bowel function  - Encourage oral fluids to ensure adequate hydration  - Administer IV fluids if ordered to ensure adequate hydration  - Administer ordered medications as needed  - Encourage mobilization and activity  - Consider nutritional services referral to assist patient with adequate nutrition and appropriate food choices  Outcome: Progressing  Goal: Maintains adequate nutritional intake  Description: INTERVENTIONS:  - Monitor percentage of each meal consumed  - Identify factors contributing to decreased intake, treat as appropriate  - Assist with meals as needed  - Monitor I&O, weight, and lab values if indicated  - Obtain nutrition services referral as needed  Outcome: Progressing  Goal: Establish and maintain optimal ostomy function  Description: INTERVENTIONS:  - Assess bowel function  - Encourage oral fluids to ensure adequate hydration  - Administer IV fluids if ordered to ensure adequate hydration   - Administer ordered medications as needed  - Encourage mobilization and activity  - Nutrition services referral to assist patient with appropriate food choices  - Assess stoma site  - Consider wound care consult   Outcome: Progressing  Goal: Oral mucous membranes remain intact  Description: INTERVENTIONS  - Assess oral mucosa and hygiene practices  - Implement preventative oral hygiene regimen  - Implement oral medicated treatments as ordered  - Initiate Nutrition services referral as needed  Outcome: Progressing     Problem: GENITOURINARY - ADULT  Goal: Maintains or returns to baseline urinary function  Description: INTERVENTIONS:  - Assess urinary function  - Encourage oral fluids to ensure adequate hydration if ordered  - Administer IV fluids as ordered to ensure adequate hydration  - Administer ordered medications as needed  - Offer frequent toileting  - Follow urinary retention protocol if ordered  Outcome: Progressing  Goal: Absence of urinary retention  Description: INTERVENTIONS:  - Assess patient’s ability to void and empty bladder  - Monitor I/O  - Bladder scan as needed  - Discuss with physician/AP medications to alleviate retention as needed  - Discuss catheterization for long term situations as appropriate  Outcome: Progressing     Problem: METABOLIC, FLUID AND ELECTROLYTES - ADULT  Goal: Electrolytes maintained within normal limits  Description: INTERVENTIONS:  - Monitor labs and assess patient for signs and symptoms of electrolyte imbalances  - Administer electrolyte replacement as ordered  - Monitor response to electrolyte replacements, including repeat lab results as appropriate  - Instruct patient on fluid and nutrition as appropriate  Outcome: Progressing  Goal: Fluid balance maintained  Description: INTERVENTIONS:  - Monitor labs   - Monitor I/O and WT  - Instruct patient on fluid and nutrition as appropriate  - Assess for signs & symptoms of volume excess or deficit  Outcome: Progressing  Goal: Glucose maintained within target range  Description: INTERVENTIONS:  - Monitor Blood Glucose as ordered  - Assess for signs and symptoms of hyperglycemia and hypoglycemia  - Administer ordered medications to maintain glucose within target range  - Assess nutritional intake and initiate nutrition service referral as needed  Outcome: Progressing     Problem: SKIN/TISSUE INTEGRITY - ADULT  Goal: Skin Integrity remains intact(Skin Breakdown Prevention)  Description: Assess:  -Perform Dhaval assessment every shift  -Clean and moisturize skin every shift  -Inspect skin when repositioning, toileting, and assisting with ADLS    -Assess extremities for adequate circulation and sensation     Bed Management:  -Have minimal linens on bed & keep smooth, unwrinkled  -Change linens as needed when moist or perspiring  -Avoid sitting or lying in one position for more than 2 hours while in bed  -Keep HOB at 45 degrees     Toileting:  -Offer bedside commode  -Assess for incontinence every 2 hrs  -Use incontinent care products after each incontinent episode such as pad    Activity:  -Mobilize patient 3 times a day  -Encourage activity and walks on unit  -Encourage or provide ROM exercises   -Turn and reposition patient every 2 Hours  -Use appropriate equipment to lift or move patient in bed  -Instruct/ Assist with weight shifting every hr  when out of bed in chair  -Consider limitation of chair time 2 hour intervals    Skin Care:  -Avoid use of baby powder, tape, friction and shearing, hot water or constrictive clothing    -Do not massage red bony areas    Next Steps:     -Consider consults to  interdisciplinary teams such as PT  Outcome: Progressing  Goal: Incision(s), wounds(s) or drain site(s) healing without S/S of infection  Description: INTERVENTIONS  - Assess and document dressing, incision, wound bed, drain sites and surrounding tissue  - Provide patient and family education  - Perform skin care/dressing changes every day  Outcome: Progressing  Goal: Pressure injury heals and does not worsen  Description: Interventions:  - Implement low air loss mattress or specialty surface (Criteria met)  - Apply silicone foam dressing  - Instruct/assist with weight shifting every 60 minutes when in chair   - Limit chair time to 2 hour intervals  - Use special pressure reducing interventions such as reposition when in chair   - Apply fecal or urinary incontinence containment device     - Turn and reposition patient & offload bony prominences every 2 hours   - Utilize friction reducing device or surface for transfers   - Consider consults to  interdisciplinary teams such as PT  - Use incontinent care products after each incontinent episode such as pad  - Consider nutrition services referral as needed  Outcome: Progressing     Problem: HEMATOLOGIC - ADULT  Goal: Maintains hematologic stability  Description: INTERVENTIONS  - Assess for signs and symptoms of bleeding or hemorrhage  - Monitor labs  - Administer supportive blood products/factors as ordered and appropriate  Outcome: Progressing     Problem: MUSCULOSKELETAL - ADULT  Goal: Maintain or return mobility to safest level of function  Description: INTERVENTIONS:  - Assess patient's ability to carry out ADLs; assess patient's baseline for ADL function and identify physical deficits which impact ability to perform ADLs (bathing, care of mouth/teeth, toileting, grooming, dressing, etc )  - Assess/evaluate cause of self-care deficits   - Assess range of motion  - Assess patient's mobility  - Assess patient's need for assistive devices and provide as appropriate  - Encourage maximum independence but intervene and supervise when necessary  - Involve family in performance of ADLs  - Assess for home care needs following discharge   - Consider OT consult to assist with ADL evaluation and planning for discharge  - Provide patient education as appropriate  Outcome: Progressing  Goal: Maintain proper alignment of affected body part  Description: INTERVENTIONS:  - Support, maintain and protect limb and body alignment  - Provide patient/ family with appropriate education  Outcome: Progressing     Problem: Prexisting or High Potential for Compromised Skin Integrity  Goal: Skin integrity is maintained or improved  Description: INTERVENTIONS:  - Identify patients at risk for skin breakdown  - Assess and monitor skin integrity  - Assess and monitor nutrition and hydration status  - Monitor labs   - Assess for incontinence   - Turn and reposition patient  - Assist with mobility/ambulation  - Relieve pressure over bony prominences  - Avoid friction and shearing  - Provide appropriate hygiene as needed including keeping skin clean and dry  - Evaluate need for skin moisturizer/barrier cream  - Collaborate with interdisciplinary team   - Patient/family teaching  - Consider wound care consult   Outcome: Progressing     Problem: Nutrition/Hydration-ADULT  Goal: Nutrient/Hydration intake appropriate for improving, restoring or maintaining nutritional needs  Description: Monitor and assess patient's nutrition/hydration status for malnutrition  Collaborate with interdisciplinary team and initiate plan and interventions as ordered  Monitor patient's weight and dietary intake as ordered or per policy  Utilize nutrition screening tool and intervene as necessary  Determine patient's food preferences and provide high-protein, high-caloric foods as appropriate       INTERVENTIONS:  - Monitor oral intake, urinary output, labs, and treatment plans  - Assess nutrition and hydration status and recommend course of action  - Evaluate amount of meals eaten  - Assist patient with eating if necessary   - Allow adequate time for meals  - Recommend/ encourage appropriate diets, oral nutritional supplements, and vitamin/mineral supplements  - Order, calculate, and assess calorie counts as needed  - Recommend, monitor, and adjust tube feedings and TPN/PPN based on assessed needs  - Assess need for intravenous fluids  - Provide specific nutrition/hydration education as appropriate  - Include patient/family/caregiver in decisions related to nutrition  Outcome: Progressing     Problem: COPING  Goal: Pt/Family able to verbalize concerns and demonstrate effective coping strategies  Description: INTERVENTIONS:  - Assist patient/family to identify coping skills, available support systems and cultural and spiritual values  - Provide emotional support, including active listening and acknowledgement of concerns of patient and caregivers  - Reduce environmental stimuli, as able  - Provide patient education  - Assess for spiritual pain/suffering and initiate spiritual care, including notification of Pastoral Care or [facility] based community as needed  - Assess effectiveness of coping strategies  Outcome: Progressing     Problem: CONFUSION/THOUGHT DISTURBANCE  Goal: Thought disturbances (confusion, delirium, depression, dementia or psychosis) are managed to maintain or return to baseline mental status and functional level  Description: INTERVENTIONS:  - Assess for possible contributors to  thought disturbance, including but not limited to medications, infection, impaired vision or hearing, underlying metabolic abnormalities, dehydration, respiratory compromise,  psychiatric diagnoses and notify attending PHYSICAN/AP  - Monitor and intervene to maintain adequate nutrition, hydration, elimination, sleep and activity  - Decrease environmental stimuli, including noise as appropriate  - Provide frequent contacts to provide refocusing, direction and reassurance as needed  Approach patient calmly with eye contact and at their level    - [place] high risk fall precautions, aspiration precautions and other safety measures, as indicated  - If delirium suspected, notify physician/AP of change in condition and request immediate in-person evaluation  - Pursue consults as appropriate including Geriatric (campus dependent), OT for cognitive evaluation/activity planning, psychiatric, pastoral care, etc   Outcome: Progressing     Problem: SELF HARM  Goal: Effect of psychiatric condition will be minimized and patient will be protected from self harm  Description: INTERVENTIONS:  - Assess impact of patient's symptoms on level of functioning, self-care needs and offer support as indicated  - Assess patient/family knowledge of depression, impact on illness and need for teaching  - Provide emotional support, presence and reassurance  - Assess for possible suicidal thoughts, ideation or self-harm  If patient expresses suicidal thoughts or statements do not leave alone, notify physician/AP immediately, initiate suicide precautions, and determine need for continual observation    - initiate consults and referrals as appropriate (a mental health professional, Spiritual Care  Outcome: Progressing     Problem: SPIRITUAL CARE  Goal: Patient feels balance and connection with others and/or higher power that empowers the self during times of loss, guilt and fear  Description: INTERVENTIONS:  - Create safety for patient through empathic presence and non-judgmental listening  - Encourage patient to explore his/her values, beliefs and/or spiritual images and practices  - Encourage use of breath work, imagery, meditation, relaxation, reiki to ease distress and provide healing  - Encourage use of cultural and spiritual celebrations and rituals  - Facilitate discussion that helps patient sort out spiritual concerns  - Help patient identify where meaning/hope/comfort & strength are in his/her life  - Refer patient to [facility] community for assistance, as appropriate  - Respond to patient/family need for prayer/ritual/sacrament/ceremony  Outcome: Progressing

## 2023-06-09 LAB
ALBUMIN SERPL BCP-MCNC: 2.2 G/DL (ref 3.5–5)
ALP SERPL-CCNC: 44 U/L (ref 34–104)
ALT SERPL W P-5'-P-CCNC: 9 U/L (ref 7–52)
ANION GAP SERPL CALCULATED.3IONS-SCNC: 3 MMOL/L (ref 4–13)
AST SERPL W P-5'-P-CCNC: 19 U/L (ref 13–39)
BASOPHILS # BLD AUTO: 0.01 THOUSANDS/ÂΜL (ref 0–0.1)
BASOPHILS NFR BLD AUTO: 0 % (ref 0–1)
BILIRUB SERPL-MCNC: 0.42 MG/DL (ref 0.2–1)
BUN SERPL-MCNC: 10 MG/DL (ref 5–25)
CALCIUM ALBUM COR SERPL-MCNC: 9.3 MG/DL (ref 8.3–10.1)
CALCIUM SERPL-MCNC: 7.9 MG/DL (ref 8.4–10.2)
CHLORIDE SERPL-SCNC: 108 MMOL/L (ref 96–108)
CO2 SERPL-SCNC: 28 MMOL/L (ref 21–32)
CREAT SERPL-MCNC: 0.46 MG/DL (ref 0.6–1.3)
EOSINOPHIL # BLD AUTO: 0.16 THOUSAND/ÂΜL (ref 0–0.61)
EOSINOPHIL NFR BLD AUTO: 3 % (ref 0–6)
ERYTHROCYTE [DISTWIDTH] IN BLOOD BY AUTOMATED COUNT: 15.9 % (ref 11.6–15.1)
GFR SERPL CREATININE-BSD FRML MDRD: 94 ML/MIN/1.73SQ M
GLUCOSE SERPL-MCNC: 89 MG/DL (ref 65–140)
HCT VFR BLD AUTO: 29.2 % (ref 34.8–46.1)
HGB BLD-MCNC: 9.3 G/DL (ref 11.5–15.4)
IMM GRANULOCYTES # BLD AUTO: 0.02 THOUSAND/UL (ref 0–0.2)
IMM GRANULOCYTES NFR BLD AUTO: 0 % (ref 0–2)
LYMPHOCYTES # BLD AUTO: 1.02 THOUSANDS/ÂΜL (ref 0.6–4.47)
LYMPHOCYTES NFR BLD AUTO: 22 % (ref 14–44)
MCH RBC QN AUTO: 30.9 PG (ref 26.8–34.3)
MCHC RBC AUTO-ENTMCNC: 31.8 G/DL (ref 31.4–37.4)
MCV RBC AUTO: 97 FL (ref 82–98)
MONOCYTES # BLD AUTO: 0.7 THOUSAND/ÂΜL (ref 0.17–1.22)
MONOCYTES NFR BLD AUTO: 15 % (ref 4–12)
NEUTROPHILS # BLD AUTO: 2.78 THOUSANDS/ÂΜL (ref 1.85–7.62)
NEUTS SEG NFR BLD AUTO: 60 % (ref 43–75)
NRBC BLD AUTO-RTO: 0 /100 WBCS
PLATELET # BLD AUTO: 188 THOUSANDS/UL (ref 149–390)
PMV BLD AUTO: 9.5 FL (ref 8.9–12.7)
POTASSIUM SERPL-SCNC: 3.2 MMOL/L (ref 3.5–5.3)
PROT SERPL-MCNC: 5.3 G/DL (ref 6.4–8.4)
RBC # BLD AUTO: 3.01 MILLION/UL (ref 3.81–5.12)
SODIUM SERPL-SCNC: 139 MMOL/L (ref 135–147)
WBC # BLD AUTO: 4.69 THOUSAND/UL (ref 4.31–10.16)

## 2023-06-09 PROCEDURE — 85025 COMPLETE CBC W/AUTO DIFF WBC: CPT | Performed by: FAMILY MEDICINE

## 2023-06-09 PROCEDURE — 80053 COMPREHEN METABOLIC PANEL: CPT | Performed by: FAMILY MEDICINE

## 2023-06-09 PROCEDURE — 99232 SBSQ HOSP IP/OBS MODERATE 35: CPT | Performed by: FAMILY MEDICINE

## 2023-06-09 RX ORDER — POTASSIUM CHLORIDE 20MEQ/15ML
20 LIQUID (ML) ORAL ONCE
Status: COMPLETED | OUTPATIENT
Start: 2023-06-09 | End: 2023-06-09

## 2023-06-09 RX ORDER — POLYETHYLENE GLYCOL 3350 17 G/17G
17 POWDER, FOR SOLUTION ORAL DAILY PRN
Status: DISCONTINUED | OUTPATIENT
Start: 2023-06-09 | End: 2023-06-14 | Stop reason: HOSPADM

## 2023-06-09 RX ORDER — HALOPERIDOL 5 MG/ML
5 INJECTION INTRAMUSCULAR ONCE
Status: COMPLETED | OUTPATIENT
Start: 2023-06-09 | End: 2023-06-09

## 2023-06-09 RX ORDER — SENNOSIDES 8.6 MG
2 TABLET ORAL
Status: DISCONTINUED | OUTPATIENT
Start: 2023-06-09 | End: 2023-06-14 | Stop reason: HOSPADM

## 2023-06-09 RX ORDER — QUETIAPINE FUMARATE 25 MG/1
25 TABLET, FILM COATED ORAL DAILY
Status: DISCONTINUED | OUTPATIENT
Start: 2023-06-09 | End: 2023-06-11

## 2023-06-09 RX ADMIN — BUSPIRONE HYDROCHLORIDE 5 MG: 5 TABLET ORAL at 17:00

## 2023-06-09 RX ADMIN — TIMOLOL MALEATE 1 DROP: 5 SOLUTION OPHTHALMIC at 17:04

## 2023-06-09 RX ADMIN — QUETIAPINE FUMARATE 25 MG: 25 TABLET ORAL at 16:59

## 2023-06-09 RX ADMIN — ASPIRIN 81 MG: 81 TABLET, COATED ORAL at 10:08

## 2023-06-09 RX ADMIN — ATORVASTATIN CALCIUM 40 MG: 40 TABLET, FILM COATED ORAL at 17:00

## 2023-06-09 RX ADMIN — POLYETHYLENE GLYCOL 3350 17 G: 17 POWDER, FOR SOLUTION ORAL at 21:36

## 2023-06-09 RX ADMIN — ACETAMINOPHEN 650 MG: 325 TABLET ORAL at 16:24

## 2023-06-09 RX ADMIN — POTASSIUM CHLORIDE 20 MEQ: 1.5 SOLUTION ORAL at 10:14

## 2023-06-09 RX ADMIN — OLANZAPINE 2.5 MG: 10 INJECTION, POWDER, FOR SOLUTION INTRAMUSCULAR at 17:25

## 2023-06-09 RX ADMIN — FAMOTIDINE 20 MG: 20 TABLET ORAL at 10:08

## 2023-06-09 RX ADMIN — SENNOSIDES 17.2 MG: 8.6 TABLET, FILM COATED ORAL at 21:26

## 2023-06-09 RX ADMIN — DIVALPROEX SODIUM 500 MG: 125 CAPSULE, COATED PELLETS ORAL at 21:26

## 2023-06-09 RX ADMIN — BUSPIRONE HYDROCHLORIDE 5 MG: 5 TABLET ORAL at 10:08

## 2023-06-09 RX ADMIN — SERTRALINE 100 MG: 100 TABLET, FILM COATED ORAL at 10:08

## 2023-06-09 RX ADMIN — LATANOPROST 1 DROP: 50 SOLUTION OPHTHALMIC at 21:39

## 2023-06-09 RX ADMIN — HALOPERIDOL LACTATE 5 MG: 5 INJECTION, SOLUTION INTRAMUSCULAR at 00:10

## 2023-06-09 RX ADMIN — QUETIAPINE FUMARATE 100 MG: 100 TABLET ORAL at 21:26

## 2023-06-09 RX ADMIN — ENOXAPARIN SODIUM 40 MG: 40 INJECTION SUBCUTANEOUS at 10:16

## 2023-06-09 NOTE — PROGRESS NOTES
"Patient continuously yelling out \"I want to die\" \"Let me die\" \"Why won't you let god take me\" \"I have to die\" and also grabbing at neck and hitting self on forehead  Unable to redirect  Attempted to calm patient by dimming lights, decreasing stimulation, and repositioning  All non-pharmacologic interventions ineffective  Patient continues to be aggressive towards herself  Prn zyprexa given as ordered     "

## 2023-06-09 NOTE — PROGRESS NOTES
[address]  Progress Note  Name: Radha Rucker  MRN: [id number]  Unit/Bed#: -01 I Date of Admission: 5/28/2023   Date of Service: 6/9/2023  Hospital Day: 12    Assessment/Plan   Paranoia (psychosis) [facility])  Assessment & Plan  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-she wants to eat regular food, when was still that we are giving  But for safety, patient started screaming and banging her head with her hand  Per nurse's note, overnight patient was screaming, choking herself, and saying she was going to kill herself  She was also trying to bite herself and  punching herself in the head and stomach  We will and reconsulted psychiatry for more recommendation  If psychiatry agrees, will try to adjust medication, either increase Seroquel or Depakote dose      Severe vascular dementia with psychotic disturbance ([facility] [address] )  Assessment & Plan  · Patient discharged from outside hospital earlier in day and 5/28 after admission for vomiting  · Evaluated by psychiatry as she was transferred from the behavioral health unit to [facility] center for the vomiting, son was requesting hospice and took the patient home with 24/7 care provided by family while awaiting services to be arranged  Review of the discharge summary the internal medicine team and psychiatry team voiced their concerns regarding the son taking the patient home but he was adamant  · Patient was brought to this facility on the same day as discharge from previous facility    Son stated to ER staff he cannot care for patient at home  · Evaluated by psychiatry on 5/30 and deemed to lack appropriate judgement and is danger to self and needs inpatient psychiatric treatment, patient with advanced dementia do not think will be able to sign 201- 302 has been signed  · Also try to wrap a call bell around her neck to commit suicide she is expressing suicidal ideations now patient has been 302   · Patient is on a one-to-one for safety reasons  · [facility] psychiatry declined the patient stating that she has vascular dementia and there is nothing they can do from psychiatric point of view  Patient still remain very uncomfortable behavior, tapping her head, blaming PCA-for stealing her baby  Patient already on Seroquel 100 mg at nighttime, patient was taking Depakote sprinklers 500 mg at nighttime, as per psychiatry recommendation, we will switch to Depakote  mg at night-continue BuSpar, IM Zyprexa as needed  If above regimen does not effective, will reach out to psychiatry for further recommendation  Ongoing discussion regarding patient goals of care, most likely hospice care will be appropriate in this case  We will discuss with son regarding more appropriate discharge planning        * Encephalopathy acute  Assessment & Plan  · On severe vascular dementia POA initially was psychotic features- but since seeing her yesterday and today she is somnolent poor po intake will wake up to name and then falls asleep  · Ct brain neg   · No infection evident on admission  · depakote level checked nml therapeutic   · No prns overnight   · tsh nml in beg of may 5/19   · Electrolytes stable  Patient is still remains significantly delirious  Blaming PCA for stealing her baby  Also blaming PCA that PCA was trying to hurt her  Patient was tapping her head-because she wants to get rid of some ideas from her head  Patient is still remain in psychotic stage  Continue Seroquel 100 mg at nighttime, Depakote sprinkles changed to Depakote  mg at nighttime    Continue BuSpar and Zyprexa as needed  We will monitor patient's response  If does not improve, can adjust medications  Ongoing discussion needs to be done with family regarding safe discharge and goals of care  Try to reach out to patient's son-if he can visit patient's and her family meeting  Unable to reach, left voice message-awaiting for response  Chronic anemia  Assessment & Plan  · Hb has been fluctuating since beg of may when she was admitted to [facility] anywhere between 9 8-11  · Pt hb has remained stable at 10 2 for 2 consecutive times - no evidence of gross bleed no hematemesis and no melena no hematochezia  · She has poor nutritional aspect that can contribute to anemia   · She needs no scopes or transfusion at this time as there is no evidence of gross bleed hb stable for 2 days and in her range of base    Status post CVA  Assessment & Plan  · 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 and dysphagia  · Was discharged to SNF then sent to inpatient geropsych, unclear extent of physical rehabilitation patient received or was amenable to participate  · Continue puréed diet with nectar thick liquids  · Appreciate PT/OT/case management- needs inpatient psych   · Continue aspirin and statin             VTE Pharmacologic Prophylaxis: VTE Score: 3 Moderate Risk (Score 3-4) - Pharmacological DVT Prophylaxis Ordered: heparin  Patient Centered Rounds: I performed bedside rounds with nursing staff today  Discussions with Specialists or Other Care Team Provider: Pending psychiatry consult    Education and Discussions with Family / Patient: Updated  (son) via phone      Total Time Spent on Date of Encounter in care of patient: 10 minutes This time was spent on one or more of the following: performing physical exam; counseling and coordination of care; obtaining or reviewing history; documenting in the medical record; reviewing/ordering tests, medications or procedures; communicating with other healthcare professionals and discussing with patient's family/caregivers  Current Length of Stay: 12 day(s)  Current Patient Status: Inpatient   Certification Statement: The patient will continue to require additional inpatient hospital stay due to To monitor above conditions  Discharge Plan: Anticipate discharge in 48-72 hrs to To be determined    Code Status: Level 3 - DNAR and DNI    Subjective:   Seen and evaluated during the room  During the morning rounds, patient is in sleep  PC on the bedside for one-to-one observation,    Objective:     Vitals:   Temp (24hrs), Av 8 °F (36 6 °C), Min:97 3 °F (36 3 °C), Max:98 1 °F (36 7 °C)    Temp:  [97 3 °F (36 3 °C)-98 1 °F (36 7 °C)] 97 9 °F (36 6 °C)  HR:  [63-80] 66  Resp:  [17-18] 17  BP: (105-139)/(59-80) 105/59  SpO2:  [94 %-99 %] 94 %  Body mass index is 23 58 kg/m²  Input and Output Summary (last 24 hours): Intake/Output Summary (Last 24 hours) at 2023 1504  Last data filed at 2023 0830  Gross per 24 hour   Intake 110 ml   Output --   Net 110 ml       Physical Exam:   Physical Exam  Vitals reviewed  HENT:      Mouth/Throat:      Mouth: Mucous membranes are moist       Pharynx: No oropharyngeal exudate or posterior oropharyngeal erythema  Cardiovascular:      Rate and Rhythm: Normal rate  Heart sounds: Normal heart sounds  No murmur heard  No friction rub  No gallop  Pulmonary:      Effort: Pulmonary effort is normal  No respiratory distress  Breath sounds: No stridor  No wheezing or rhonchi  Abdominal:      General: Abdomen is flat  Bowel sounds are normal  There is no distension  Palpations: There is no mass  Tenderness: There is no abdominal tenderness  Hernia: No hernia is present           Additional Data:     Labs:  Results from last 7 days   Lab Units 23  0531   EOS PCT % 3   HEMATOCRIT % 29 2*   HEMOGLOBIN g/dL 9 3*   LYMPHS PCT % 22   MONOS PCT % 15* NEUTROS PCT % 60   PLATELETS Thousands/uL 188   WBC Thousand/uL 4 69     Results from last 7 days   Lab Units 06/09/23  0531   ANION GAP mmol/L 3*   ALBUMIN g/dL 2 2*   ALK PHOS U/L 44   ALT U/L 9   AST U/L 19   BUN mg/dL 10   CALCIUM mg/dL 7 9*   CHLORIDE mmol/L 108   CO2 mmol/L 28   CREATININE mg/dL 0 46*   GLUCOSE RANDOM mg/dL 89   POTASSIUM mmol/L 3 2*   SODIUM mmol/L 139   TOTAL BILIRUBIN mg/dL 0 42                       Lines/Drains:  Invasive Devices     None                       Imaging: No pertinent imaging reviewed  Recent Cultures (last 7 days):         Last 24 Hours Medication List:   Current Facility-Administered Medications   Medication Dose Route Frequency Provider Last Rate   • acetaminophen  650 mg Oral Q8H PRN Albina Arthur MD     • aspirin  81 mg Oral Daily Magnolia S Parker, CRNP     • atorvastatin  40 mg Oral QPM Magnolia S Parker, CRNP     • busPIRone  5 mg Oral BID Albina Arthur MD     • calcium carbonate  500 mg Oral BID PRN RAYSA Martínez     • divalproex sodium  500 mg Oral HS Magnolia S Parker, CRNP     • enoxaparin  40 mg Subcutaneous Daily Magnolia S Parker, CRNP     • famotidine  20 mg Oral Daily Magnolia S Parker, CRNP     • latanoprost  1 drop Both Eyes HS Magnolia S Parker, CRNP     • OLANZapine  2 5 mg Intramuscular Q4H PRN Albina Arthur MD     • QUEtiapine  100 mg Oral HS Magnolia S Parker, CRNP     • sertraline  100 mg Oral Daily Magnolia S Parker, CRNP     • timolol  1 drop Both Eyes BID Magnolia S Parker, CRNP     • trimethobenzamide  100 mg Intramuscular Q6H PRN RAYSA Martínez          Today, Patient Was Seen By: Jonathan Peacock MD    **Please Note: This note may have been constructed using a voice recognition system  **

## 2023-06-09 NOTE — PROGRESS NOTES
"Pt continues to become increasingly aggressive  She is biting herself and hitting herself in the head  She continues to yell \"I just want to die\" and screams  Prn dose of zyprexa given  Will continue to monitor    "

## 2023-06-09 NOTE — PROGRESS NOTES
Pt continues to have increased agitation despite seroquel and zyprexa  She is screaming, choking herself, and saying she is going to kill herself  TRUONG Mcgowan had the call bell ripped out of her hand by the pt and she tried to throw it across the room  She is also trying to bite herself and is punching herself in the head and stomach  Interventions such as distraction and environmental changes have not helped to ease the pts aggression  Provider notified and one time order for haldol issued  Will administer and continue to monitor

## 2023-06-09 NOTE — TELEMEDICINE
TeleConsultation - [id number] 76 y o  female MRN: [id number]  Unit/Bed#: -01 Encounter: [id number]        REQUIRED DOCUMENTATION:     1  This service was provided via Telemedicine  2  Provider located at [facility]   3  TeleMed provider: Yesenia Perez MD   4  Identify all parties in room with patient during tele consult:  pt  5  Patient was then informed that this was a Telemedicine visit and that the exam was being conducted confidentially over secure lines  My office door was closed  No one else was in the room  Patient acknowledged consent and understanding of privacy and security of the Telemedicine visit, and gave us permission to have the assistant stay in the room in order to assist with the history and to conduct the exam   I informed the patient that I have reviewed their record in Epic and presented the opportunity for them to ask any questions regarding the visit today  The patient agreed to participate  Assessment/Plan     Present on Admission:  • 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)  • Paranoia (psychosis) (HCC)    Assessment:    Major Depressive Disorder, recurrent, severe with psychotic features  Generalized Anxiety Disorder  Dementia, Vascular Type  Rule out superimposed acute encephalopathy/delirium    Treatment Plan:    I agree with the prior psychiatric recommendation for inpatient psychiatric placement under 302 upon medical clearance  Consider adding additional Seroquel 25 mg p o  q  5 PM   Otherwise no psychiatric medication changes recommended at this time  Continue current precautions  Reconsult psychiatry as needed      Recommend Delirium Precautions:  Maintain sleep-wake cycle, avoid nighttime interruptions  Provide adequate pain control  Avoid urinary retention and constipation  Provide frequent and early mobilization  Provide frequent redirection and reorientation as needed  Avoid medications that may worsen or precipitate delirium such as tramadol, benzodiazepines, anticholinergics, and Benadryl  Redirect unwanted behaviors as first-line therapy, avoid physical restraints as able to  Provide frequent reorientation  Continue to monitor    Current Medications:     Current Facility-Administered Medications   Medication Dose Route Frequency Provider Last Rate   • acetaminophen  650 mg Oral Q8H PRN Albina Arthur MD     • aspirin  81 mg Oral Daily Magnolia S Parker, CRNP     • atorvastatin  40 mg Oral QPM Magnolia S Parker, CRNP     • busPIRone  5 mg Oral BID Albina Arthur MD     • calcium carbonate  500 mg Oral BID PRN RAYSA Martínez     • divalproex sodium  500 mg Oral HS Magnolia S Parker, CRNP     • enoxaparin  40 mg Subcutaneous Daily Magnolia S Parker, CRNP     • famotidine  20 mg Oral Daily Magnolia S Parker, CRNP     • latanoprost  1 drop Both Eyes HS Magnolia S Parker, CRNP     • OLANZapine  2 5 mg Intramuscular Q4H PRN Albina Arthur MD     • QUEtiapine  100 mg Oral HS Magnolia S Parker, CRNP     • sertraline  100 mg Oral Daily Magnolia S Parker, CRNP     • timolol  1 drop Both Eyes BID Magnolia S Parker, CRNP     • trimethobenzamide  100 mg Intramuscular Q6H PRN RAYSA Martínez         Risks / Benefits of Treatment:    Risks, benefits, and possible side effects of medications explained to patient and patient verbalizes understanding  Other treatment modalities recommended as indicated:    · Inpatient psychiatric treatment      Inpatient consult to Psychiatry  Consult performed by: Pérez Walker MD  Consult ordered by: Jonathan Peacock MD        Physician Requesting Consult: Jonathan Peacock MD  Principal Problem:Encephalopathy acute    Reason for Consult: Paranoia; suicidal ideation with attempted self-harm      History of Present Illness      This is a follow-up psychiatry consult to that provided May 30, 2023  Please see my consult for details    In summary from my consult:  Patient is a 76 y o  female with a history of Major Depressive Disorder and Dementia, Vascular Type who was admitted to the medical service on 5/28/2023 due to increase agitation and behavioral disturbance  Patient is a poor historian due to her dementia and during the evaluation she appeared to be very sedated  Most of the information was obtained from patient chart and nurse  Reportedly patient has history of diagnosis of vascular dementia with behavioral disturbance and was recently admitted to inpatient psychiatric facility and from there patient was discharged back to home  As per record patient's family member has been unable to care for patient due to increase agitation and combative behavior  As per nurse patient has been on/off extremely agitated and refusing to cooperate with the staff        Psychiatric Review Of Systems:      Sleep changes: yes  appetite changes: no  weight changes: no  anxiety/panic: no  fabby: no  self injurious behavior/risky behavior: no           Historical Information []Expand by Default  Past Psychiatric History:      Psychiatric Hospitalizations: Multiple past inpatient psychiatric admissions Outpatient Treatment History: Psychotropic medication were prescribe by patient's PCP Suicide Attempts:  History of self-harm: No History of self injurious behavior was reported Violence History: History of verbally aggressive behavior was reported     Substance Abuse History:           E-Cigarette/Vaping   • E-Cigarette Use Never User                  Social History            Tobacco History      Smoking Status  Every Day Smoking Frequency  0 25 packs/day Smoking Tobacco Type  Cigarettes     Smokeless Tobacco Use  Never                 Alcohol History      Alcohol Use Status  Yes Comment  ocassional                Drug Use      Drug Use Status  Never                Sexual Activity      Sexually Active  Not Asked            Activities of Daily Living    Not Asked                            Family Psychiatric History:      Psychiatric Illness:      unknown        Social History:           Social History []Expand by Default  Social History               Socioeconomic History   • Marital status: /Civil Union       Spouse name: Not on file   • Number of children: Not on file   • Years of education: Not on file   • Highest education level: Not on file   Occupational History   • Not on file   Tobacco Use   • Smoking status: Every Day       Packs/day: 0 25       Types: Cigarettes   • Smokeless tobacco: Never   Vaping Use   • Vaping Use: Never used   Substance and Sexual Activity   • Alcohol use: Yes       Comment: ocassional   • Drug use: Never   • Sexual activity: Not on file   Other Topics Concern   • Not on file   Social History Narrative   • Not on file      Social Determinants of Health           Financial Resource Strain: Not on file   Food Insecurity: No Food Insecurity (4/15/2023)     Hunger Vital Sign     • Worried About Running Out of Food in the Last Year: Never true     • Ran Out of Food in the Last Year: Never true   Transportation Needs: No Transportation Needs (4/15/2023)     PRAPARE - Transportation     • Lack of Transportation (Medical): No     • Lack of Transportation (Non-Medical):  No   Physical Activity: Not on file   Stress: Not on file   Social Connections: Not on file   Intimate Partner Violence: Not on file   Housing Stability: Low Risk  (4/15/2023)     Housing Stability Vital Sign     • Unable to Pay for Housing in the Last Year: No     • Number of Places Lived in the Last Year: 1     • Unstable Housing in the Last Year: No               Traumatic History:      Abuse: Unknown           Past Medical History:     Medical History        Past Medical History:   Diagnosis Date   • Dementia ([facility] [address] )     • No abnormality of hip joint detected on examination     • Stroke [facility])           Medical History Pertinent Negatives                  Meds/Allergies         Allergies   Allergen Reactions   • Lorazepam Other (See Comments)       "Increased agitation            Interval course; nursing reports that the patient has continued to show significant mood lability  She frequently sleeps during the day and is up at night  She is frequently continued to voice suicidal ideation and attempted self-harm for example by banging her head against the bed rail  She has demonstrated active hallucinations and paranoid delusions towards staff  Mental status examination: The patient was alert and in bed for the assessment  She was oriented to person and birthdate and was able to tell me she was in the hospital   She was tangential in response to most other questions  She constantly repeated she wanted to go home but that was \"apply cream\" and Asking God to help her  At one point she began to call for her son \"Venu\"  Affect was depressed and anxious  Speech was pressured  She had progressively came more psychomotor agitated with questions  She denied having active suicidal homicidal ideation at this time but per nursing he frequently states she wants to kill herself  Insight and judgment remain gravely impaired  Past Medical History:   Diagnosis Date   • Dementia ([facility] [address] )    • No abnormality of hip joint detected on examination    • Stroke [facility])        Medical Review Of Systems:    Review of Systems    Meds/Allergies     all current active meds have been reviewed  Allergies   Allergen Reactions   • Lorazepam Other (See Comments)     Increased agitation         Objective     Vital signs in last 24 hours:  Temp:  [97 3 °F (36 3 °C)-98 1 °F (36 7 °C)] 97 9 °F (36 6 °C)  HR:  [63-80] 66  Resp:  [17-18] 17  BP: (105-139)/(59-80) 105/59      Intake/Output Summary (Last 24 hours) at 6/9/2023 1615  Last data filed at 6/9/2023 0830  Gross per 24 hour   Intake 110 ml   Output --   Net 110 ml       Lab Results: I have personally reviewed all pertinent laboratory/tests results           Imaging Studies: XR hip/pelv 2-3 vws left if performed    Result Date: " 6/1/2023  Narrative: LEFT HIP INDICATION:   left hip pain  Fall  COMPARISON: 4/24/2023 VIEWS:  XR HIP/PELV 2-3 VWS LEFT  W PELVIS IF PERFORMED FINDINGS: There is no acute fracture or dislocation  There is a left hip arthroplasty  Components are in anatomic alignment  There is no evidence of loosening or subsidence  There is a stable fractured cerclage wire over the greater trochanter  There is deformity of the left iliac crest  No lytic or blastic osseous lesion  There are atherosclerotic calcifications  There is heterotopic ossification adjacent to the right greater trochanter  Soft tissues are otherwise unremarkable  Degenerative changes visualized lower lumbar spine  Impression: No acute osseous abnormality  Workstation performed: [id number]     CT head without contrast    Result Date: 5/28/2023  Narrative: CT BRAIN - WITHOUT CONTRAST INDICATION:   Headache, tension-type headache  COMPARISON: CT 4/24/2023 and priors TECHNIQUE:  CT examination of the brain was performed  Multiplanar 2D reformatted images were created from the source data  Radiation dose length product (DLP) for this visit:  855 mGy-cm   This examination, like all CT scans performed in the [facility], was performed utilizing techniques to minimize radiation dose exposure, including the use of iterative reconstruction and automated exposure control  IMAGE QUALITY:  Diagnostic  FINDINGS: PARENCHYMA: Decreased attenuation is noted in periventricular and subcortical white matter demonstrating an appearance that is statistically most likely to represent mild microangiopathic change  No CT signs of acute infarction  No intracranial mass, mass effect or midline shift  No acute parenchymal hemorrhage  Chronic infarct in the posterior right parietal and left occipital regions  Chronic basal ganglia lacunar infarcts  VENTRICLES AND EXTRA-AXIAL SPACES:  Normal for the patient's age  VISUALIZED ORBITS: Normal visualized orbits  PARANASAL SINUSES: Normal visualized paranasal sinuses  CALVARIUM AND EXTRACRANIAL SOFT TISSUES:  Normal      Impression: Chronic ischemic changes as above  No acute intracranial abnormality  Workstation performed: [id number]     XR ABDOMEN 1 VW PORTABLE    Result Date: 5/26/2023  Narrative: Exam: Abdomen x-ray Clinical indication: Gastric obstruction Comparison: None Technique: Supine abdomen Findings: Portable supine examination of the abdomen demonstrates contrast material seen throughout the colon to the rectosigmoid, residual from the patient's previous upper GI series  A few air-filled loops of small bowel are evident  The patient is status post cholecystectomy  Marked degenerative changes and dextroscoliosis of the lumbar spine is noted  Left hip prosthesis is evident  Heterotopic ossification is seen in the region bilateral hips        Impression: Impression: Residual contrast within the colon to the rectosigmoid  A few air-filled loops of small bowel  No evidence of obstruction  Status post cholecystectomy  Workstation:[id number]    XR CHEST 1 VW PORTABLE    Result Date: 5/26/2023  Narrative: Exam: Chest 1 view: Clinical indication:? Aspiration Comparison: None Technique: A single view of the chest was obtained  Findings: Portable AP erect view of the chest without previous chest x-rays available for comparison demonstrates obscuration of the left hemidiaphragm consistent left lower lobe infiltrate/atelectasis  Accompanying left-sided pleural effusion cannot be excluded  Right infrahilar infiltrate is evident as well  Minimal blunting of the right costophrenic angle ? small right-sided pleural effusion versus pleural thickening  The heart size is within normal limits  Uncoiling of thoracic aorta is noted  Degenerative changes seen involving the thoracic spine  Calcific tendinitis left shoulder is evident    Residual contrast is seen within the colon none   The patient is status post cholecystectomy  Impression: Impression: Obscuration left hemidiaphragm consistent left lower lobe infiltrate/atelectasis  A left-sided pleural effusion cannot be excluded  Right infrahilar infiltrate  ? small right-sided pleural effusion  Workstation:[id number]    FLUOROSCOPY UPPER GI WO AIR    Result Date: 5/20/2023  Narrative: History: 20-year-old female, evaluate passage of contrast through the gastric pylorus  Comparison studies: CT scan of the abdomen and pelvis performed on 5/19/2023 Fluoroscopic time: 58 seconds Radiation dose: 20 mGy (CAK) Technique: First, a  view of the abdomen was obtained  Then, an upper gastrointestinal exam was performed utilizing fluoroscopic assistance  : Small amount of residual contrast from recent CT scan is visualized in the colon  Nasogastric tube eventually placed with its tip in the stomach  There is a very large hiatal hernia  The lungs are clear  Cholecystectomy clips are in place  Left hip prosthesis is in place  Upper GI series: Water-soluble contrast was hand injected through the patient's nasogastric tube into the stomach  The stomach is entirely located within the lower chest  Contrast is visualized within the distal esophagus and progresses into the stomach  Contrast progresses normally through the gastric pylorus into the duodenum which is located within the right upper quadrant  Impression: Impression: Contrast progresses normally through the gastric pylorus into the duodenum  Very large hiatal hernia containing the entire stomach  The duodenum is located normally within the upper abdomen  Workstation:[id number]    CTA abdomen pelvis w wo contrast    Result Date: 5/19/2023  Narrative: PROCEDURE INFORMATION: Exam: CT Abdomen And Pelvis With Contrast Exam date and time: 5/19/2023 3:28 AM Age: 76years old Clinical indication: Nausea and vomiting;  Additional info: Abdominal abscess/infection suspected TECHNIQUE: Imaging protocol: Computed tomography of the abdomen and pelvis with contrast  Radiation optimization: All CT scans at this facility use at least one of these dose optimization techniques: automated exposure control; mA and/or kV adjustment per patient size (includes targeted exams where dose is matched to clinical indication); or iterative reconstruction  Contrast material: VISI 320; Contrast volume: 80 ml; Contrast route: INTRAVENOUS (IV);   REPORTING DATA: Count of CT and Cardiac NM exams in prior 12 months: This patient has received 1 known CT and 0 known cardiac nuclear medicine studies in the 12 months prior to the current study  COMPARISON: No relevant prior studies available  FINDINGS: Lungs: Raynell Never is minor infiltrate in the dependent left lower lobe  No acute airspace disease at right base  Pleural spaces: Trace right pleural effusion  Diaphragm: Huge hiatal hernia  The stomach is almost completely intrathoracic  It is full of fluid and ingested food  The gastric wall appears to be diffusely thickened  Liver:  Normal Gallbladder and bile ducts:  Surgical absence of the gallbladder  There is no bile duct dilatation  Pancreas: Normal  Spleen: Normal  Adrenal glands: Normal  Kidneys and ureters:  4 5 cm left lateral lower polar renal cyst   No suspicious renal mass on either side   No hydronephrosis  Stomach and bowel: There is no dilatation of the duodenum or small bowel  The colon contains gas, fluid and multiple clumps of barium  There is no convincing colon wall thickening  Appendix: The appendix is not clearly visualized  Intraperitoneal space: There is no ascites  No free air  Vasculature:  No aortic or iliac artery aneurysms  Lymph nodes: Unremarkable  No enlarged lymph nodes  Urinary bladder: What is seen of the bladder is normal  Streak artifact from left hip hardware limits assessment of the pelvic structures  Reproductive: The uterus is atrophic  Neither ovary is clearly visualized  Bones/joints:  There are hypertrophic changes in the thoracic spine  There are a few old compression deformities including an old fracture of L1  Soft tissues: Unremarkable abdominal wall  Impression: IMPRESSION: 1  Minor infiltrate at the left base  2  Almost completely intrathoracic stomach  The intrathoracic portion is full of fluid and appears slightly thick-walled  3  No bowel obstruction or other acute GI tract abnormality   Assessment of the colon is limited by the presence of multiple clumps of barium in the lumen  4  4 5 cm left renal cyst   No obstructive uropathy   The bladder is grossly normal        FL barium swallow video w speech    Result Date: 5/16/2023  Narrative: Modified barium swallow, with speech therapy Indication: Dysphagia  TECHNIQUE:  A videofluoroscopic swallowing study was performed by the Department of Speech and Hearing Services in conjunction with Department of Radiology  Fluoroscopic time: 5 0 minutes        Fluoroscopic Dose: 24 36 mGy (CAK) FINDINGS: Noted was laryngeal penetration with thin liquid  No aspiration  (See the speech therapist's notes for further detail )    Impression: IMPRESSION:  Laryngeal penetration with thin liquid  No aspiration  Workstation:[id number]    CT head wo contrast    Result Date: 5/13/2023  Narrative: PROCEDURE INFORMATION: Exam: CT Head Without Contrast Exam date and time: 5/13/2023 2:11 PM Age: 76years old Clinical indication: Condition or disease; Other: Stroke; Other: Aggression; Additional info: Stroke, follow up TECHNIQUE: Imaging protocol: Computed tomography of the head without contrast  Radiation optimization: All CT scans at this facility use at least one of these dose optimization techniques: automated exposure control; mA and/or kV adjustment per patient size (includes targeted exams where dose is matched to clinical indication); or iterative reconstruction  REPORTING DATA: Count of CT and Cardiac NM exams in prior 12 months:  This patient has received 0 known CTs and 0 known cardiac nuclear medicine studies in the 12 months prior to the current study  COMPARISON: No relevant prior studies available  FINDINGS: Brain: There are chronic left posterior mesial temporal and occipital chronic right parietooccipital infarcts  There is no acute intracranial hemorrhage  There is lucency in the cerebral white matter, likely microvascular disease although non-specific  No evidence of mass  There is no mass effect or midline shift  Caridad Bristle white differentiation is intact  There are no extra-axial fluid collections  Cerebral ventricles: The ventricles and sulci are enlarged, consistent with volume loss / atrophy  No hydrocephalus  Paranasal sinuses: Visualized sinuses are unremarkable  No fluid levels  Mastoid air cells:  No significant mastoid effusion  Bones/joints:  No acute fracture  Soft tissues: Unremarkable as visualized  Vasculature: There is vascular calcification  Impression: IMPRESSION: 1    No evidence of acute intracranial abnormality  No evidence of acute infarction, hemorrhage, or mass  2    Atrophy and microvascular disease  EKG/Pathology/Other Studies:   Lab Results   Component Value Date    ATRIALRATE 63 06/02/2023    PAXIS 9 06/02/2023    PRINT 144 06/02/2023    QRSAXIS 76 06/02/2023    QRSDINT 74 06/02/2023    QTCINT 423 06/02/2023    QTINT 414 06/02/2023    TWAVEAXIS -80 06/02/2023    VENTRATE 63 06/02/2023        Code Status: Level 3 - DNAR and DNI  Advance Directive and Living Will:      Power of :    POLST:      Screenings:    1  Nutrition Screening  · Not available on chart    2  Pain Screening  Not available on chart    3  Suicide Screening  Not available on chart    Counseling / Coordination of Care: Total floor / unit time spent today 30 minutes  Greater than 50% of total time was spent with the patient and / or family counseling and / or coordination of care   A description of the counseling / coordination of care: Chart review, patient evaluation, coordination communication with staff, nursing and provider

## 2023-06-09 NOTE — ASSESSMENT & PLAN NOTE
· Hb has been fluctuating since beg of may when she was admitted to [facility] anywhere between 9 8-11  · Pt hb has remained stable at 10 2 for 2 consecutive times - no evidence of gross bleed no hematemesis and no melena no hematochezia  · She has poor nutritional aspect that can contribute to anemia   · She needs no scopes or transfusion at this time as there is no evidence of gross bleed hb stable for 2 days and in her range of base

## 2023-06-09 NOTE — ASSESSMENT & PLAN NOTE
· Patient discharged from outside hospital earlier in day and 5/28 after admission for vomiting  · Evaluated by psychiatry as she was transferred from the behavioral health unit to [facility] for the vomiting, son was requesting hospice and took the patient home with 24/7 care provided by family while awaiting services to be arranged  Review of the discharge summary the internal medicine team and psychiatry team voiced their concerns regarding the son taking the patient home but he was adamant  · Patient was brought to this facility on the same day as discharge from previous facility  Son stated to ER staff he cannot care for patient at home  · Evaluated by psychiatry on 5/30 and deemed to lack appropriate judgement and is danger to self and needs inpatient psychiatric treatment, patient with advanced dementia do not think will be able to sign 201- 302 has been signed  · Also try to wrap a call bell around her neck to commit suicide she is expressing suicidal ideations now patient has been 302   · Patient is on a one-to-one for safety reasons  · [facility] psychiatry declined the patient stating that she has vascular dementia and there is nothing they can do from psychiatric point of view  Patient still remain very uncomfortable behavior, tapping her head, blaming PCA-for stealing her baby  Patient already on Seroquel 100 mg at nighttime, patient was taking Depakote sprinklers 500 mg at nighttime, as per psychiatry recommendation, we will switch to Depakote  mg at night-continue BuSpar, IM Zyprexa as needed  If above regimen does not effective, will reach out to psychiatry for further recommendation  Ongoing discussion regarding patient goals of care, most likely hospice care will be appropriate in this case      We will discuss with son regarding more appropriate discharge planning

## 2023-06-09 NOTE — PLAN OF CARE
Problem: MOBILITY - ADULT  Goal: Maintain or return to baseline ADL function  Description: INTERVENTIONS:  -  Assess patient's ability to carry out ADLs; assess patient's baseline for ADL function and identify physical deficits which impact ability to perform ADLs (bathing, care of mouth/teeth, toileting, grooming, dressing, etc )  - Assess/evaluate cause of self-care deficits   - Assess range of motion  - Assess patient's mobility; develop plan if impaired  - Assess patient's need for assistive devices and provide as appropriate  - Encourage maximum independence but intervene and supervise when necessary  - Involve family in performance of ADLs  - Assess for home care needs following discharge   - Consider OT consult to assist with ADL evaluation and planning for discharge  - Provide patient education as appropriate  Outcome: Progressing  Goal: Maintains/Returns to pre admission functional level  Description: INTERVENTIONS:  - Perform BMAT or MOVE assessment daily    - Set and communicate daily mobility goal to care team and patient/family/caregiver  - Collaborate with rehabilitation services on mobility goals if consulted  - Perform Range of Motion 2 times a day  - Reposition patient every 2 hours    - Dangle patient 2 times a day  - Stand patient 2 times a day  - Ambulate patient 2 times a day  - Out of bed to chair 2 times a day   - Out of bed for meals 2 times a day  - Out of bed for toileting  - Record patient progress and toleration of activity level   Outcome: Progressing     Problem: PAIN - ADULT  Goal: Verbalizes/displays adequate comfort level or baseline comfort level  Description: Interventions:  - Encourage patient to monitor pain and request assistance  - Assess pain using appropriate pain scale  - Administer analgesics based on type and severity of pain and evaluate response  - Implement non-pharmacological measures as appropriate and evaluate response  - Consider cultural and social influences on pain and pain management  - Notify physician/advanced practitioner if interventions unsuccessful or patient reports new pain  Outcome: Progressing     Problem: INFECTION - ADULT  Goal: Absence or prevention of progression during hospitalization  Description: INTERVENTIONS:  - Assess and monitor for signs and symptoms of infection  - Monitor lab/diagnostic results  - Monitor all insertion sites, i e  indwelling lines, tubes, and drains  - Monitor endotracheal if appropriate and nasal secretions for changes in amount and color  - [place] appropriate cooling/warming therapies per order  - Administer medications as ordered  - Instruct and encourage patient and family to use good hand hygiene technique  - Identify and instruct in appropriate isolation precautions for identified infection/condition  Outcome: Progressing  Goal: Absence of fever/infection during neutropenic period  Description: INTERVENTIONS:  - Monitor WBC    Outcome: Progressing     Problem: SAFETY ADULT  Goal: Maintain or return to baseline ADL function  Description: INTERVENTIONS:  -  Assess patient's ability to carry out ADLs; assess patient's baseline for ADL function and identify physical deficits which impact ability to perform ADLs (bathing, care of mouth/teeth, toileting, grooming, dressing, etc )  - Assess/evaluate cause of self-care deficits   - Assess range of motion  - Assess patient's mobility; develop plan if impaired  - Assess patient's need for assistive devices and provide as appropriate  - Encourage maximum independence but intervene and supervise when necessary  - Involve family in performance of ADLs  - Assess for home care needs following discharge   - Consider OT consult to assist with ADL evaluation and planning for discharge  - Provide patient education as appropriate  Outcome: Progressing  Goal: Maintains/Returns to pre admission functional level  Description: INTERVENTIONS:  - Perform BMAT or MOVE assessment daily    - Set and communicate daily mobility goal to care team and patient/family/caregiver  - Collaborate with rehabilitation services on mobility goals if consulted  - Perform Range of Motion 2 times a day  - Reposition patient every 2 hours    - Dangle patient 2 times a day  - Stand patient 2 times a day  - Ambulate patient 2 times a day  - Out of bed to chair 2 times a day   - Out of bed for meals 2 times a day  - Out of bed for toileting  - Record patient progress and toleration of activity level   Outcome: Progressing  Goal: Patient will remain free of falls  Description: INTERVENTIONS:  - Educate patient/family on patient safety including physical limitations  - Instruct patient to call for assistance with activity   - Consult OT/PT to assist with strengthening/mobility   - Keep Call bell within reach  - Keep bed low and locked with side rails adjusted as appropriate  - Keep care items and personal belongings within reach  - Initiate and maintain comfort rounds  - Make Fall Risk Sign visible to staff  - Offer Toileting every 3 Hours, in advance of need  - Initiate/Maintain bed alarm  - Apply yellow socks and bracelet for high fall risk patients  - Consider moving patient to room near nurses station  Outcome: Progressing     Problem: DISCHARGE PLANNING  Goal: Discharge to home or other facility with appropriate resources  Description: INTERVENTIONS:  - Identify barriers to discharge w/patient and caregiver  - Arrange for needed discharge resources and transportation as appropriate  - Identify discharge learning needs (meds, wound care, etc )  - Arrange for interpretive services to assist at discharge as needed  - Refer to Case Management Department for coordinating discharge planning if the patient needs post-hospital services based on physician/advanced practitioner order or complex needs related to functional status, cognitive ability, or social support system  Outcome: Progressing     Problem: Knowledge Deficit  Goal: Patient/family/caregiver demonstrates understanding of disease process, treatment plan, medications, and discharge instructions  Description: Complete learning assessment and assess knowledge base    Interventions:  - Provide teaching at level of understanding  - Provide teaching via preferred learning methods  Outcome: Progressing     Problem: RESPIRATORY - ADULT  Goal: Achieves optimal ventilation and oxygenation  Description: INTERVENTIONS:  - Assess for changes in respiratory status  - Assess for changes in mentation and behavior  - Position to facilitate oxygenation and minimize respiratory effort  - Oxygen administered by appropriate delivery if ordered  - Initiate smoking cessation education as indicated  - Encourage broncho-pulmonary hygiene including cough, deep breathe, Incentive Spirometry  - Assess the need for suctioning and aspirate as needed  - Assess and instruct to report SOB or any respiratory difficulty  - Respiratory Therapy support as indicated  Outcome: Progressing     Problem: GASTROINTESTINAL - ADULT  Goal: Minimal or absence of nausea and/or vomiting  Description: INTERVENTIONS:  - Administer IV fluids if ordered to ensure adequate hydration  - Maintain NPO status until nausea and vomiting are resolved  - Nasogastric tube if ordered  - Administer ordered antiemetic medications as needed  - Provide nonpharmacologic comfort measures as appropriate  - Advance diet as tolerated, if ordered  - Consider nutrition services referral to assist patient with adequate nutrition and appropriate food choices  Outcome: Progressing  Goal: Maintains or returns to baseline bowel function  Description: INTERVENTIONS:  - Assess bowel function  - Encourage oral fluids to ensure adequate hydration  - Administer IV fluids if ordered to ensure adequate hydration  - Administer ordered medications as needed  - Encourage mobilization and activity  - Consider nutritional services referral to assist patient with adequate nutrition and appropriate food choices  Outcome: Progressing  Goal: Maintains adequate nutritional intake  Description: INTERVENTIONS:  - Monitor percentage of each meal consumed  - Identify factors contributing to decreased intake, treat as appropriate  - Assist with meals as needed  - Monitor I&O, weight, and lab values if indicated  - Obtain nutrition services referral as needed  Outcome: Progressing  Goal: Establish and maintain optimal ostomy function  Description: INTERVENTIONS:  - Assess bowel function  - Encourage oral fluids to ensure adequate hydration  - Administer IV fluids if ordered to ensure adequate hydration   - Administer ordered medications as needed  - Encourage mobilization and activity  - Nutrition services referral to assist patient with appropriate food choices  - Assess stoma site  - Consider wound care consult   Outcome: Progressing  Goal: Oral mucous membranes remain intact  Description: INTERVENTIONS  - Assess oral mucosa and hygiene practices  - Implement preventative oral hygiene regimen  - Implement oral medicated treatments as ordered  - Initiate Nutrition services referral as needed  Outcome: Progressing     Problem: GENITOURINARY - ADULT  Goal: Maintains or returns to baseline urinary function  Description: INTERVENTIONS:  - Assess urinary function  - Encourage oral fluids to ensure adequate hydration if ordered  - Administer IV fluids as ordered to ensure adequate hydration  - Administer ordered medications as needed  - Offer frequent toileting  - Follow urinary retention protocol if ordered  Outcome: Progressing  Goal: Absence of urinary retention  Description: INTERVENTIONS:  - Assess patient’s ability to void and empty bladder  - Monitor I/O  - Bladder scan as needed  - Discuss with physician/AP medications to alleviate retention as needed  - Discuss catheterization for long term situations as appropriate  Outcome: Progressing     Problem: METABOLIC, FLUID AND ELECTROLYTES - ADULT  Goal: Electrolytes maintained within normal limits  Description: INTERVENTIONS:  - Monitor labs and assess patient for signs and symptoms of electrolyte imbalances  - Administer electrolyte replacement as ordered  - Monitor response to electrolyte replacements, including repeat lab results as appropriate  - Instruct patient on fluid and nutrition as appropriate  Outcome: Progressing  Goal: Fluid balance maintained  Description: INTERVENTIONS:  - Monitor labs   - Monitor I/O and WT  - Instruct patient on fluid and nutrition as appropriate  - Assess for signs & symptoms of volume excess or deficit  Outcome: Progressing  Goal: Glucose maintained within target range  Description: INTERVENTIONS:  - Monitor Blood Glucose as ordered  - Assess for signs and symptoms of hyperglycemia and hypoglycemia  - Administer ordered medications to maintain glucose within target range  - Assess nutritional intake and initiate nutrition service referral as needed  Outcome: Progressing     Problem: SKIN/TISSUE INTEGRITY - ADULT  Goal: Skin Integrity remains intact(Skin Breakdown Prevention)  Description: Assess:  -Perform Dhaval assessment every shift  -Clean and moisturize skin every shift  -Inspect skin when repositioning, toileting, and assisting with ADLS  -Assess under medical devices  -Assess extremities for adequate circulation and sensation     Bed Management:  -Have minimal linens on bed & keep smooth, unwrinkled  -Change linens as needed when moist or perspiring  -Avoid sitting or lying in one position for more than 2 hours while in bed  -Keep HOB at 30degrees     Toileting:  -Offer bedside commode  -Assess for incontinence  -Use incontinent care products after each incontinent episodes    Activity:  -Mobilize patient 3 times a day  -Encourage activity and walks on unit  -Encourage or provide ROM exercises   -Turn and reposition patient every 2 Hours  -Use appropriate equipment to lift or move patient in bed  -Instruct/ Assist with weight shifting every 2 when out of bed in chair  -Consider limitation of chair time 4 hour intervals    Skin Care:  -Avoid use of baby powder, tape, friction and shearing, hot water or constrictive clothing  -Relieve pressure over bony prominences  -Do not massage red bony areas    Next Steps:  -Teach patient strategies to minimize risks   -Consider consults to  interdisciplinary teams  Outcome: Progressing  Goal: Incision(s), wounds(s) or drain site(s) healing without S/S of infection  Description: INTERVENTIONS  - Assess and document dressing, incision, wound bed, drain sites and surrounding tissue  - Provide patient and family education  - Perform skin care/dressing changes  Outcome: Progressing  Goal: Pressure injury heals and does not worsen  Description: Interventions:  - Implement low air loss mattress or specialty surface (Criteria met)  - Apply silicone foam dressing  - Instruct/assist with weight shifting every 120 minutes when in chair   - Limit chair time to 4 hour intervals  - Use special pressure reducing interventions such as cushion when in chair   - Apply fecal or urinary incontinence containment device   - Perform passive or active ROM  - Turn and reposition patient & offload bony prominences   - Utilize friction reducing device or surface for transfers   - Consider consults to  interdisciplinary teams  - Use incontinent care products after each incontinent episode  - Consider nutrition services referral as needed  Outcome: Progressing     Problem: HEMATOLOGIC - ADULT  Goal: Maintains hematologic stability  Description: INTERVENTIONS  - Assess for signs and symptoms of bleeding or hemorrhage  - Monitor labs  - Administer supportive blood products/factors as ordered and appropriate  Outcome: Progressing     Problem: Prexisting or High Potential for Compromised Skin Integrity  Goal: Skin integrity is maintained or improved  Description: INTERVENTIONS:  - Identify patients at risk for skin breakdown  - Assess and monitor skin integrity  - Assess and monitor nutrition and hydration status  - Monitor labs   - Assess for incontinence   - Turn and reposition patient  - Assist with mobility/ambulation  - Relieve pressure over bony prominences  - Avoid friction and shearing  - Provide appropriate hygiene as needed including keeping skin clean and dry  - Evaluate need for skin moisturizer/barrier cream  - Collaborate with interdisciplinary team   - Patient/family teaching  - Consider wound care consult   Outcome: Progressing     Problem: Nutrition/Hydration-ADULT  Goal: Nutrient/Hydration intake appropriate for improving, restoring or maintaining nutritional needs  Description: Monitor and assess patient's nutrition/hydration status for malnutrition  Collaborate with interdisciplinary team and initiate plan and interventions as ordered  Monitor patient's weight and dietary intake as ordered or per policy  Utilize nutrition screening tool and intervene as necessary  Determine patient's food preferences and provide high-protein, high-caloric foods as appropriate       INTERVENTIONS:  - Monitor oral intake, urinary output, labs, and treatment plans  - Assess nutrition and hydration status and recommend course of action  - Evaluate amount of meals eaten  - Assist patient with eating if necessary   - Allow adequate time for meals  - Recommend/ encourage appropriate diets, oral nutritional supplements, and vitamin/mineral supplements  - Order, calculate, and assess calorie counts as needed  - Recommend, monitor, and adjust tube feedings and TPN/PPN based on assessed needs  - Assess need for intravenous fluids  - Provide specific nutrition/hydration education as appropriate  - Include patient/family/caregiver in decisions related to nutrition  Outcome: Progressing     Problem: COPING  Goal: Pt/Family able to verbalize concerns and demonstrate effective coping strategies  Description: INTERVENTIONS:  - Assist patient/family to identify coping skills, available support systems and cultural and spiritual values  - Provide emotional support, including active listening and acknowledgement of concerns of patient and caregivers  - Reduce environmental stimuli, as able  - Provide patient education  - Assess for spiritual pain/suffering and initiate spiritual care, including notification of Pastoral Care or [facility] based community as needed  - Assess effectiveness of coping strategies  Outcome: Progressing     Problem: CONFUSION/THOUGHT DISTURBANCE  Goal: Thought disturbances (confusion, delirium, depression, dementia or psychosis) are managed to maintain or return to baseline mental status and functional level  Description: INTERVENTIONS:  - Assess for possible contributors to  thought disturbance, including but not limited to medications, infection, impaired vision or hearing, underlying metabolic abnormalities, dehydration, respiratory compromise,  psychiatric diagnoses and notify attending PHYSICAN/AP  - Monitor and intervene to maintain adequate nutrition, hydration, elimination, sleep and activity  - Decrease environmental stimuli, including noise as appropriate  - Provide frequent contacts to provide refocusing, direction and reassurance as needed  Approach patient calmly with eye contact and at their level    - [place] high risk fall precautions, aspiration precautions and other safety measures, as indicated  - If delirium suspected, notify physician/AP of change in condition and request immediate in-person evaluation  - Pursue consults as appropriate including Geriatric (campus dependent), OT for cognitive evaluation/activity planning, psychiatric, pastoral care, etc   Outcome: Progressing     Problem: SELF HARM  Goal: Effect of psychiatric condition will be minimized and patient will be protected from self harm  Description: INTERVENTIONS:  - Assess impact of patient's symptoms on level of functioning, self-care needs and offer support as indicated  - Assess patient/family knowledge of depression, impact on illness and need for teaching  - Provide emotional support, presence and reassurance  - Assess for possible suicidal thoughts, ideation or self-harm  If patient expresses suicidal thoughts or statements do not leave alone, notify physician/AP immediately, initiate suicide precautions, and determine need for continual observation    - initiate consults and referrals as appropriate (a mental health professional, Spiritual Care  Outcome: Progressing     Problem: SPIRITUAL CARE  Goal: Patient feels balance and connection with others and/or higher power that empowers the self during times of loss, guilt and fear  Description: INTERVENTIONS:  - Create safety for patient through empathic presence and non-judgmental listening  - Encourage patient to explore his/her values, beliefs and/or spiritual images and practices  - Encourage use of breath work, imagery, meditation, relaxation, reiki to ease distress and provide healing  - Encourage use of cultural and spiritual celebrations and rituals  - Facilitate discussion that helps patient sort out spiritual concerns  - Help patient identify where meaning/hope/comfort & strength are in his/her life  - Refer patient to [facility] community for assistance, as appropriate  - Respond to patient/family need for prayer/ritual/sacrament/ceremony  Outcome: Progressing

## 2023-06-10 PROCEDURE — 99232 SBSQ HOSP IP/OBS MODERATE 35: CPT | Performed by: FAMILY MEDICINE

## 2023-06-10 RX ADMIN — ACETAMINOPHEN 650 MG: 325 TABLET ORAL at 22:06

## 2023-06-10 RX ADMIN — QUETIAPINE FUMARATE 25 MG: 25 TABLET ORAL at 17:58

## 2023-06-10 RX ADMIN — TIMOLOL MALEATE 1 DROP: 5 SOLUTION OPHTHALMIC at 18:06

## 2023-06-10 RX ADMIN — ASPIRIN 81 MG: 81 TABLET, COATED ORAL at 12:08

## 2023-06-10 RX ADMIN — GLYCERIN 1 SUPPOSITORY: 2 SUPPOSITORY RECTAL at 15:03

## 2023-06-10 RX ADMIN — SENNOSIDES 17.2 MG: 8.6 TABLET, FILM COATED ORAL at 21:01

## 2023-06-10 RX ADMIN — TIMOLOL MALEATE 1 DROP: 5 SOLUTION OPHTHALMIC at 09:23

## 2023-06-10 RX ADMIN — ATORVASTATIN CALCIUM 40 MG: 40 TABLET, FILM COATED ORAL at 17:58

## 2023-06-10 RX ADMIN — QUETIAPINE FUMARATE 100 MG: 100 TABLET ORAL at 21:01

## 2023-06-10 RX ADMIN — LATANOPROST 1 DROP: 50 SOLUTION OPHTHALMIC at 21:11

## 2023-06-10 RX ADMIN — ENOXAPARIN SODIUM 40 MG: 40 INJECTION SUBCUTANEOUS at 09:23

## 2023-06-10 RX ADMIN — DIVALPROEX SODIUM 500 MG: 125 CAPSULE, COATED PELLETS ORAL at 21:01

## 2023-06-10 RX ADMIN — BUSPIRONE HYDROCHLORIDE 5 MG: 5 TABLET ORAL at 12:08

## 2023-06-10 RX ADMIN — FAMOTIDINE 20 MG: 20 TABLET ORAL at 12:10

## 2023-06-10 RX ADMIN — BUSPIRONE HYDROCHLORIDE 5 MG: 5 TABLET ORAL at 17:58

## 2023-06-10 RX ADMIN — SERTRALINE 100 MG: 100 TABLET, FILM COATED ORAL at 12:10

## 2023-06-10 NOTE — ASSESSMENT & PLAN NOTE
· Patient discharged from outside hospital earlier in day and 5/28 after admission for vomiting  · Evaluated by psychiatry as she was transferred from the behavioral health unit to [facility] for the vomiting, son was requesting hospice and took the patient home with 24/7 care provided by family while awaiting services to be arranged  Review of the discharge summary the internal medicine team and psychiatry team voiced their concerns regarding the son taking the patient home but he was adamant  · Patient was brought to this facility on the same day as discharge from previous facility  Son stated to ER staff he cannot care for patient at home  · Evaluated by psychiatry on 5/30 and deemed to lack appropriate judgement and is danger to self and needs inpatient psychiatric treatment, patient with advanced dementia do not think will be able to sign [id number] has been signed  · Also try to wrap a call bell around her neck to commit suicide she is expressing suicidal ideations now patient has been 302   · Patient is on a one-to-one for safety reasons  · [facility] psychiatry declined the patient stating that she has vascular dementia and there is nothing they can do from psychiatric point of view  Medication dose adjusted as per psychiatry recommendation-psychiatry also recommending inpatient psych placement

## 2023-06-10 NOTE — PLAN OF CARE
Problem: MOBILITY - ADULT  Goal: Maintain or return to baseline ADL function  Description: INTERVENTIONS:  -  Assess patient's ability to carry out ADLs; assess patient's baseline for ADL function and identify physical deficits which impact ability to perform ADLs (bathing, care of mouth/teeth, toileting, grooming, dressing, etc )  - Assess/evaluate cause of self-care deficits   - Assess range of motion  - Assess patient's mobility; develop plan if impaired  - Assess patient's need for assistive devices and provide as appropriate  - Encourage maximum independence but intervene and supervise when necessary  - Involve family in performance of ADLs  - Assess for home care needs following discharge   - Consider OT consult to assist with ADL evaluation and planning for discharge  - Provide patient education as appropriate  Outcome: Progressing  Goal: Maintains/Returns to pre admission functional level  Description: INTERVENTIONS:  - Perform BMAT or MOVE assessment daily    - Set and communicate daily mobility goal to care team and patient/family/caregiver  - Collaborate with rehabilitation services on mobility goals if consulted  - Perform Range of Motion 2 times a day  - Reposition patient every 2 hours    - Dangle patient 2 times a day  - Stand patient 2 times a day  - Ambulate patient 2 times a day  - Out of bed to chair 2 times a day   - Out of bed for meals 2 times a day  - Out of bed for toileting  - Record patient progress and toleration of activity level   Outcome: Progressing     Problem: PAIN - ADULT  Goal: Verbalizes/displays adequate comfort level or baseline comfort level  Description: Interventions:  - Encourage patient to monitor pain and request assistance  - Assess pain using appropriate pain scale  - Administer analgesics based on type and severity of pain and evaluate response  - Implement non-pharmacological measures as appropriate and evaluate response  - Consider cultural and social influences on pain and pain management  - Notify physician/advanced practitioner if interventions unsuccessful or patient reports new pain  Outcome: Progressing     Problem: INFECTION - ADULT  Goal: Absence or prevention of progression during hospitalization  Description: INTERVENTIONS:  - Assess and monitor for signs and symptoms of infection  - Monitor lab/diagnostic results  - Monitor all insertion sites, i e  indwelling lines, tubes, and drains  - Monitor endotracheal if appropriate and nasal secretions for changes in amount and color  - [place] appropriate cooling/warming therapies per order  - Administer medications as ordered  - Instruct and encourage patient and family to use good hand hygiene technique  - Identify and instruct in appropriate isolation precautions for identified infection/condition  Outcome: Progressing  Goal: Absence of fever/infection during neutropenic period  Description: INTERVENTIONS:  - Monitor WBC    Outcome: Progressing     Problem: SAFETY ADULT  Goal: Maintain or return to baseline ADL function  Description: INTERVENTIONS:  -  Assess patient's ability to carry out ADLs; assess patient's baseline for ADL function and identify physical deficits which impact ability to perform ADLs (bathing, care of mouth/teeth, toileting, grooming, dressing, etc )  - Assess/evaluate cause of self-care deficits   - Assess range of motion  - Assess patient's mobility; develop plan if impaired  - Assess patient's need for assistive devices and provide as appropriate  - Encourage maximum independence but intervene and supervise when necessary  - Involve family in performance of ADLs  - Assess for home care needs following discharge   - Consider OT consult to assist with ADL evaluation and planning for discharge  - Provide patient education as appropriate  Outcome: Progressing  Goal: Maintains/Returns to pre admission functional level  Description: INTERVENTIONS:  - Perform BMAT or MOVE assessment daily    - Set and communicate daily mobility goal to care team and patient/family/caregiver  - Collaborate with rehabilitation services on mobility goals if consulted  - Perform Range of Motion 2 times a day  - Reposition patient every 2 hours    - Dangle patient 2 times a day  - Stand patient 2 times a day  - Ambulate patient 2 times a day  - Out of bed to chair 2 times a day   - Out of bed for meals 2 times a day  - Out of bed for toileting  - Record patient progress and toleration of activity level   Outcome: Progressing  Goal: Patient will remain free of falls  Description: INTERVENTIONS:  - Educate patient/family on patient safety including physical limitations  - Instruct patient to call for assistance with activity   - Consult OT/PT to assist with strengthening/mobility   - Keep Call bell within reach  - Keep bed low and locked with side rails adjusted as appropriate  - Keep care items and personal belongings within reach  - Initiate and maintain comfort rounds  - Make Fall Risk Sign visible to staff  - Offer Toileting every 3 Hours, in advance of need  - Initiate/Maintain bed alarm  - Apply yellow socks and bracelet for high fall risk patients  - Consider moving patient to room near nurses station  Outcome: Progressing     Problem: DISCHARGE PLANNING  Goal: Discharge to home or other facility with appropriate resources  Description: INTERVENTIONS:  - Identify barriers to discharge w/patient and caregiver  - Arrange for needed discharge resources and transportation as appropriate  - Identify discharge learning needs (meds, wound care, etc )  - Arrange for interpretive services to assist at discharge as needed  - Refer to Case Management Department for coordinating discharge planning if the patient needs post-hospital services based on physician/advanced practitioner order or complex needs related to functional status, cognitive ability, or social support system  Outcome: Progressing     Problem: Knowledge Deficit  Goal: Patient/family/caregiver demonstrates understanding of disease process, treatment plan, medications, and discharge instructions  Description: Complete learning assessment and assess knowledge base  Interventions:  - Provide teaching at level of understanding  - Provide teaching via preferred learning methods  Outcome: Progressing     Problem: NEUROSENSORY - ADULT  Goal: Achieves stable or improved neurological status  Description: INTERVENTIONS  - Monitor and report changes in neurological status  - Monitor vital signs such as temperature, blood pressure, glucose, and any other labs ordered   - Initiate measures to prevent increased intracranial pressure  - Monitor for seizure activity and implement precautions if appropriate      Outcome: Progressing  Goal: Remains free of injury related to seizures activity  Description: INTERVENTIONS  - Maintain airway, patient safety  and administer oxygen as ordered  - Monitor patient for seizure activity, document and report duration and description of seizure to physician/advanced practitioner  - If seizure occurs,  ensure patient safety during seizure  - Reorient patient post seizure  - Seizure pads on all 4 side rails  - Instruct patient/family to notify RN of any seizure activity including if an aura is experienced  - Instruct patient/family to call for assistance with activity based on nursing assessment  - Administer anti-seizure medications if ordered    Outcome: Progressing  Goal: Achieves maximal functionality and self care  Description: INTERVENTIONS  - Monitor swallowing and airway patency with patient fatigue and changes in neurological status  - Encourage and assist patient to increase activity and self care     - Encourage visually impaired, hearing impaired and aphasic patients to use assistive/communication devices  Outcome: Progressing     Problem: RESPIRATORY - ADULT  Goal: Achieves optimal ventilation and oxygenation  Description: INTERVENTIONS:  - Assess for changes in respiratory status  - Assess for changes in mentation and behavior  - Position to facilitate oxygenation and minimize respiratory effort  - Oxygen administered by appropriate delivery if ordered  - Initiate smoking cessation education as indicated  - Encourage broncho-pulmonary hygiene including cough, deep breathe, Incentive Spirometry  - Assess the need for suctioning and aspirate as needed  - Assess and instruct to report SOB or any respiratory difficulty  - Respiratory Therapy support as indicated  Outcome: Progressing     Problem: GASTROINTESTINAL - ADULT  Goal: Minimal or absence of nausea and/or vomiting  Description: INTERVENTIONS:  - Administer IV fluids if ordered to ensure adequate hydration  - Maintain NPO status until nausea and vomiting are resolved  - Nasogastric tube if ordered  - Administer ordered antiemetic medications as needed  - Provide nonpharmacologic comfort measures as appropriate  - Advance diet as tolerated, if ordered  - Consider nutrition services referral to assist patient with adequate nutrition and appropriate food choices  Outcome: Progressing  Goal: Maintains or returns to baseline bowel function  Description: INTERVENTIONS:  - Assess bowel function  - Encourage oral fluids to ensure adequate hydration  - Administer IV fluids if ordered to ensure adequate hydration  - Administer ordered medications as needed  - Encourage mobilization and activity  - Consider nutritional services referral to assist patient with adequate nutrition and appropriate food choices  Outcome: Progressing  Goal: Maintains adequate nutritional intake  Description: INTERVENTIONS:  - Monitor percentage of each meal consumed  - Identify factors contributing to decreased intake, treat as appropriate  - Assist with meals as needed  - Monitor I&O, weight, and lab values if indicated  - Obtain nutrition services referral as needed  Outcome: Progressing  Goal: Establish and maintain optimal ostomy function  Description: INTERVENTIONS:  - Assess bowel function  - Encourage oral fluids to ensure adequate hydration  - Administer IV fluids if ordered to ensure adequate hydration   - Administer ordered medications as needed  - Encourage mobilization and activity  - Nutrition services referral to assist patient with appropriate food choices  - Assess stoma site  - Consider wound care consult   Outcome: Progressing  Goal: Oral mucous membranes remain intact  Description: INTERVENTIONS  - Assess oral mucosa and hygiene practices  - Implement preventative oral hygiene regimen  - Implement oral medicated treatments as ordered  - Initiate Nutrition services referral as needed  Outcome: Progressing     Problem: GENITOURINARY - ADULT  Goal: Maintains or returns to baseline urinary function  Description: INTERVENTIONS:  - Assess urinary function  - Encourage oral fluids to ensure adequate hydration if ordered  - Administer IV fluids as ordered to ensure adequate hydration  - Administer ordered medications as needed  - Offer frequent toileting  - Follow urinary retention protocol if ordered  Outcome: Progressing  Goal: Absence of urinary retention  Description: INTERVENTIONS:  - Assess patient’s ability to void and empty bladder  - Monitor I/O  - Bladder scan as needed  - Discuss with physician/AP medications to alleviate retention as needed  - Discuss catheterization for long term situations as appropriate  Outcome: Progressing     Problem: METABOLIC, FLUID AND ELECTROLYTES - ADULT  Goal: Electrolytes maintained within normal limits  Description: INTERVENTIONS:  - Monitor labs and assess patient for signs and symptoms of electrolyte imbalances  - Administer electrolyte replacement as ordered  - Monitor response to electrolyte replacements, including repeat lab results as appropriate  - Instruct patient on fluid and nutrition as appropriate  Outcome: Progressing  Goal: Fluid balance maintained  Description: INTERVENTIONS:  - Monitor labs   - Monitor I/O and WT  - Instruct patient on fluid and nutrition as appropriate  - Assess for signs & symptoms of volume excess or deficit  Outcome: Progressing  Goal: Glucose maintained within target range  Description: INTERVENTIONS:  - Monitor Blood Glucose as ordered  - Assess for signs and symptoms of hyperglycemia and hypoglycemia  - Administer ordered medications to maintain glucose within target range  - Assess nutritional intake and initiate nutrition service referral as needed  Outcome: Progressing     Problem: SKIN/TISSUE INTEGRITY - ADULT  Goal: Skin Integrity remains intact(Skin Breakdown Prevention)  Description: Assess:  -Perform Dhaval assessment every shift  -Clean and moisturize skin every shift  -Inspect skin when repositioning, toileting, and assisting with ADLS  -Assess extremities for adequate circulation and sensation     Bed Management:  -Have minimal linens on bed & keep smooth, unwrinkled  -Change linens as needed when moist or perspiring  -Avoid sitting or lying in one position for more than 2 hours while in bed  -Keep HOB at 30degrees     Toileting:  -Offer bedside commode  -Assess for incontinence every 2 hrs  -Use incontinent care products after each incontinent episode such as pad    Activity:  -Mobilize patient 3 times a day  -Encourage activity and walks on unit  -Encourage or provide ROM exercises   -Turn and reposition patient every 2 Hours  -Use appropriate equipment to lift or move patient in bed  -Instruct/ Assist with weight shifting every hr when out of bed in chair  -Consider limitation of chair time 2 hour intervals    Skin Care:  -Avoid use of baby powder, tape, friction and shearing, hot water or constrictive clothing    -Do not massage red bony areas    Next Steps:     -Consider consults to  interdisciplinary teams such as PT  Outcome: Progressing  Goal: Incision(s), wounds(s) or drain site(s) healing without S/S of infection  Description: INTERVENTIONS  - Assess and document dressing, incision, wound bed, drain sites and surrounding tissue  - Provide patient and family education    Outcome: Progressing  Goal: Pressure injury heals and does not worsen  Description: Interventions:  - Implement low air loss mattress or specialty surface (Criteria met)  - Apply silicone foam dressing  - Instruct/assist with weight shifting every 60 minutes when in chair   - Limit chair time to 2 hour intervals  - Use special pressure reducing interventions such as reposition when in chair   - Apply fecal or urinary incontinence containment device     - Turn and reposition patient & offload bony prominences every 2 hours   - Utilize friction reducing device or surface for transfers   - Consider consults to  interdisciplinary teams such as PT  - Use incontinent care products after each incontinent episode such as pad  - Consider nutrition services referral as needed  Outcome: Progressing     Problem: HEMATOLOGIC - ADULT  Goal: Maintains hematologic stability  Description: INTERVENTIONS  - Assess for signs and symptoms of bleeding or hemorrhage  - Monitor labs  - Administer supportive blood products/factors as ordered and appropriate  Outcome: Progressing     Problem: MUSCULOSKELETAL - ADULT  Goal: Maintain or return mobility to safest level of function  Description: INTERVENTIONS:  - Assess patient's ability to carry out ADLs; assess patient's baseline for ADL function and identify physical deficits which impact ability to perform ADLs (bathing, care of mouth/teeth, toileting, grooming, dressing, etc )  - Assess/evaluate cause of self-care deficits   - Assess range of motion  - Assess patient's mobility  - Assess patient's need for assistive devices and provide as appropriate  - Encourage maximum independence but intervene and supervise when necessary  - Involve family in performance of ADLs  - Assess for home care needs following discharge   - Consider OT consult to assist with ADL evaluation and planning for discharge  - Provide patient education as appropriate  Outcome: Progressing  Goal: Maintain proper alignment of affected body part  Description: INTERVENTIONS:  - Support, maintain and protect limb and body alignment  - Provide patient/ family with appropriate education  Outcome: Progressing     Problem: Prexisting or High Potential for Compromised Skin Integrity  Goal: Skin integrity is maintained or improved  Description: INTERVENTIONS:  - Identify patients at risk for skin breakdown  - Assess and monitor skin integrity  - Assess and monitor nutrition and hydration status  - Monitor labs   - Assess for incontinence   - Turn and reposition patient  - Assist with mobility/ambulation  - Relieve pressure over bony prominences  - Avoid friction and shearing  - Provide appropriate hygiene as needed including keeping skin clean and dry  - Evaluate need for skin moisturizer/barrier cream  - Collaborate with interdisciplinary team   - Patient/family teaching  - Consider wound care consult   Outcome: Progressing     Problem: Nutrition/Hydration-ADULT  Goal: Nutrient/Hydration intake appropriate for improving, restoring or maintaining nutritional needs  Description: Monitor and assess patient's nutrition/hydration status for malnutrition  Collaborate with interdisciplinary team and initiate plan and interventions as ordered  Monitor patient's weight and dietary intake as ordered or per policy  Utilize nutrition screening tool and intervene as necessary  Determine patient's food preferences and provide high-protein, high-caloric foods as appropriate       INTERVENTIONS:  - Monitor oral intake, urinary output, labs, and treatment plans  - Assess nutrition and hydration status and recommend course of action  - Evaluate amount of meals eaten  - Assist patient with eating if necessary   - Allow adequate time for meals  - Recommend/ encourage appropriate diets, oral nutritional supplements, and vitamin/mineral supplements  - Order, calculate, and assess calorie counts as needed  - Recommend, monitor, and adjust tube feedings and TPN/PPN based on assessed needs  - Assess need for intravenous fluids  - Provide specific nutrition/hydration education as appropriate  - Include patient/family/caregiver in decisions related to nutrition  Outcome: Progressing     Problem: COPING  Goal: Pt/Family able to verbalize concerns and demonstrate effective coping strategies  Description: INTERVENTIONS:  - Assist patient/family to identify coping skills, available support systems and cultural and spiritual values  - Provide emotional support, including active listening and acknowledgement of concerns of patient and caregivers  - Reduce environmental stimuli, as able  - Provide patient education  - Assess for spiritual pain/suffering and initiate spiritual care, including notification of Pastoral Care or [facility] based community as needed  - Assess effectiveness of coping strategies  Outcome: Progressing     Problem: CONFUSION/THOUGHT DISTURBANCE  Goal: Thought disturbances (confusion, delirium, depression, dementia or psychosis) are managed to maintain or return to baseline mental status and functional level  Description: INTERVENTIONS:  - Assess for possible contributors to  thought disturbance, including but not limited to medications, infection, impaired vision or hearing, underlying metabolic abnormalities, dehydration, respiratory compromise,  psychiatric diagnoses and notify attending PHYSICAN/AP  - Monitor and intervene to maintain adequate nutrition, hydration, elimination, sleep and activity  - Decrease environmental stimuli, including noise as appropriate  - Provide frequent contacts to provide refocusing, direction and reassurance as needed  Approach patient calmly with eye contact and at their level    - [place] high risk fall precautions, aspiration precautions and other safety measures, as indicated  - If delirium suspected, notify physician/AP of change in condition and request immediate in-person evaluation  - Pursue consults as appropriate including Geriatric (campus dependent), OT for cognitive evaluation/activity planning, psychiatric, pastoral care, etc   Outcome: Progressing     Problem: SELF HARM  Goal: Effect of psychiatric condition will be minimized and patient will be protected from self harm  Description: INTERVENTIONS:  - Assess impact of patient's symptoms on level of functioning, self-care needs and offer support as indicated  - Assess patient/family knowledge of depression, impact on illness and need for teaching  - Provide emotional support, presence and reassurance  - Assess for possible suicidal thoughts, ideation or self-harm  If patient expresses suicidal thoughts or statements do not leave alone, notify physician/AP immediately, initiate suicide precautions, and determine need for continual observation    - initiate consults and referrals as appropriate (a mental health professional, Spiritual Care  Outcome: Progressing     Problem: SPIRITUAL CARE  Goal: Patient feels balance and connection with others and/or higher power that empowers the self during times of loss, guilt and fear  Description: INTERVENTIONS:  - Create safety for patient through empathic presence and non-judgmental listening  - Encourage patient to explore his/her values, beliefs and/or spiritual images and practices  - Encourage use of breath work, imagery, meditation, relaxation, reiki to ease distress and provide healing  - Encourage use of cultural and spiritual celebrations and rituals  - Facilitate discussion that helps patient sort out spiritual concerns  - Help patient identify where meaning/hope/comfort & strength are in his/her life  - Refer patient to [facility] community for assistance, as appropriate  - Respond to patient/family need for prayer/ritual/sacrament/ceremony  Outcome: Progressing

## 2023-06-10 NOTE — ASSESSMENT & PLAN NOTE
· Hb has been fluctuating since beg of may when she was admitted to [facility] anywhere between 9 8-11  · Pt hb has remained stable at 10 2 for 2 consecutive times - no evidence of gross bleed no hematemesis and no melena no hematochezia  · She has poor nutritional aspect that can contribute to anemia   · She needs no scopes or transfusion at this time as there is no evidence of gross bleed hb stable for 2 days and in her range of base

## 2023-06-10 NOTE — PROGRESS NOTES
[address]  Progress Note  Name: Ti Godfrey  MRN: [id number]  Unit/Bed#: -01 I Date of Admission: 5/28/2023   Date of Service: 6/10/2023  Hospital Day: 13    Assessment/Plan   Paranoia (psychosis) [facility])  Assessment & Plan  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-she wants to eat regular food, when was still that we are giving  But for safety, patient started screaming and banging her head with her hand  Per nurse's note, overnight patient was screaming, choking herself, and saying she was going to kill herself  She was also trying to bite herself and  punching herself in the head and stomach  Psychiatry consult appreciated, dose adjusted  Maintain sleep awake psych-patient awake during daytime,  Patient is much calm and cooperative today-son on the bedside  As per son, patient mood fluctuates-when patient gets anxious or frustrated, patient can tap her head toward her stomach which is normal for patient per son  Severe vascular dementia with psychotic disturbance ([facility] [address] )  Assessment & Plan  · Patient discharged from outside hospital earlier in day and 5/28 after admission for vomiting  · Evaluated by psychiatry as she was transferred from the behavioral health unit to [facility] for the vomiting, son was requesting hospice and took the patient home with 24/7 care provided by family while awaiting services to be arranged    Review of the discharge summary the internal medicine team and psychiatry team voiced their concerns regarding the son taking the patient home but he was andrew  · Patient was brought to this facility on the same day as discharge from previous facility  Son stated to ER staff he cannot care for patient at home  · Evaluated by psychiatry on 5/30 and deemed to lack appropriate judgement and is danger to self and needs inpatient psychiatric treatment, patient with advanced dementia do not think will be able to sign [id number] 302 has been signed  · Also try to wrap a call bell around her neck to commit suicide she is expressing suicidal ideations now patient has been 302   · Patient is on a one-to-one for safety reasons  · [facility] psychiatry declined the patient stating that she has vascular dementia and there is nothing they can do from psychiatric point of view  Medication dose adjusted as per psychiatry recommendation-psychiatry also recommending inpatient psych placement  * Encephalopathy acute  Assessment & Plan  · On severe vascular dementia POA initially was psychotic features- but since seeing her yesterday and today she is somnolent poor po intake will wake up to name and then falls asleep  · Ct brain neg   · No infection evident on admission  · depakote level checked nml therapeutic   · No prns overnight   · tsh nml in beg of may 5/19   · Electrolytes stable  Patient is still remains significantly delirious  Blaming PCA for stealing her baby  Also blaming PCA that PCA was trying to hurt her  Patient was tapping her head-because she wants to get rid of some ideas from her head  Psychiatric medication adjusted as per psychiatry recommendation  Continue current treatment    Maintain sleep awake cycle (patient sleeps all day, and awake all night)-advised the nurse and PCA to keep patient awake during the daytime    Chronic anemia  Assessment & Plan  · Hb has been fluctuating since beg of may when she was admitted to 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 and dysphagia  · Was discharged to SNF then sent to inpatient geropsych, unclear extent of physical rehabilitation patient received or was amenable to participate  · Continue puréed diet with nectar thick liquids  · Appreciate PT/OT/case management- needs inpatient psych   · Continue aspirin and statin             VTE Pharmacologic Prophylaxis: VTE Score: 3 Moderate Risk (Score 3-4) - Pharmacological DVT Prophylaxis Ordered: enoxaparin (Lovenox)  Patient Centered Rounds: I performed bedside rounds with nursing staff today  Discussions with Specialists or Other Care Team Provider: None    Education and Discussions with Family / Patient: Updated  (son) at bedside  Total Time Spent on Date of Encounter in care of patient: 10 minutes This time was spent on one or more of the following: performing physical exam; counseling and coordination of care; obtaining or reviewing history; documenting in the medical record; reviewing/ordering tests, medications or procedures; communicating with other healthcare professionals and discussing with patient's family/caregivers  Current Length of Stay: 13 day(s)  Current Patient Status: Inpatient   Certification Statement: The patient will continue to require additional inpatient hospital stay due to To monitor above condition  Discharge Plan: To be determined    Code Status: Level 3 - DNAR and DNI    Subjective:   Seen and evaluated)  Patient is much more calm and quiet and cooperative  Patient's son on the bedside feeding the patient  Denies any significant complaint      Objective:     Vitals: Temp (24hrs), Av 8 °F (36 6 °C), Min:97 7 °F (36 5 °C), Max:97 9 °F (36 6 °C)    Temp:  [97 7 °F (36 5 °C)-97 9 °F (36 6 °C)] 97 7 °F (36 5 °C)  HR:  [66-71] 71  Resp:  [14-20] 14  BP: (105-153)/(59-79) 119/70  SpO2:  [94 %-98 %] 98 %  Body mass index is 23 58 kg/m²  Input and Output Summary (last 24 hours): Intake/Output Summary (Last 24 hours) at 6/10/2023 1353  Last data filed at 6/10/2023 0339  Gross per 24 hour   Intake 540 ml   Output 300 ml   Net 240 ml       Physical Exam:   Physical Exam  Vitals and nursing note reviewed  Constitutional:       Appearance: She is not diaphoretic  HENT:      Mouth/Throat:      Mouth: Mucous membranes are moist       Pharynx: No oropharyngeal exudate or posterior oropharyngeal erythema  Eyes:      Extraocular Movements: Extraocular movements intact  Conjunctiva/sclera: Conjunctivae normal       Pupils: Pupils are equal, round, and reactive to light  Cardiovascular:      Rate and Rhythm: Normal rate  Heart sounds: No friction rub  No gallop  Pulmonary:      Effort: Pulmonary effort is normal  No respiratory distress  Breath sounds: No stridor  No wheezing or rhonchi  Abdominal:      General: Abdomen is flat  Bowel sounds are normal  There is no distension  Palpations: There is no mass  Tenderness: There is no abdominal tenderness  Hernia: No hernia is present  Neurological:      Mental Status: She is alert  Motor: Weakness (left upper and lower extremity-from previous stroke) present  Comments: Oriented to self (cannot recall her name date of birth, her son's name )   Psychiatric:         Mood and Affect: Mood normal          Behavior: Behavior is cooperative  Cognition and Memory: Memory is impaired           Additional Data:     Labs:  Results from last 7 days   Lab Units 23  0531   EOS PCT % 3   HEMATOCRIT % 29 2*   HEMOGLOBIN g/dL 9 3*   LYMPHS PCT % 22   MONOS PCT % 15*   NEUTROS PCT % 60 PLATELETS Thousands/uL 188   WBC Thousand/uL 4 69     Results from last 7 days   Lab Units 06/09/23  0531   ANION GAP mmol/L 3*   ALBUMIN g/dL 2 2*   ALK PHOS U/L 44   ALT U/L 9   AST U/L 19   BUN mg/dL 10   CALCIUM mg/dL 7 9*   CHLORIDE mmol/L 108   CO2 mmol/L 28   CREATININE mg/dL 0 46*   GLUCOSE RANDOM mg/dL 89   POTASSIUM mmol/L 3 2*   SODIUM mmol/L 139   TOTAL BILIRUBIN mg/dL 0 42                       Lines/Drains:  Invasive Devices     None                       Imaging: No pertinent imaging reviewed  Recent Cultures (last 7 days):         Last 24 Hours Medication List:   Current Facility-Administered Medications   Medication Dose Route Frequency Provider Last Rate   • acetaminophen  650 mg Oral Q8H PRN Ashley José MD     • aspirin  81 mg Oral Daily Magnolia S Parker, CRNP     • atorvastatin  40 mg Oral QPM Magnolia S Parker, CRNP     • busPIRone  5 mg Oral BID Ashley José MD     • calcium carbonate  500 mg Oral BID PRN RAYSA Fam     • divalproex sodium  500 mg Oral HS Magnolia S Parker, CRNP     • enoxaparin  40 mg Subcutaneous Daily Magnolia S Parker, CRNP     • famotidine  20 mg Oral Daily Magnolia S Parker, CRNP     • latanoprost  1 drop Both Eyes HS Magnolia S Parker, CRNP     • OLANZapine  2 5 mg Intramuscular Q4H PRN Ashley José MD     • polyethylene glycol  17 g Oral Daily PRN Magnolia S Parker, CRNP     • QUEtiapine  100 mg Oral HS Magnolia S Parker, CRNP     • QUEtiapine  25 mg Oral Daily Sterling Meyer MD     • senna  2 tablet Oral HS Magnolia S Parker, CRNP     • sertraline  100 mg Oral Daily Magnolia S Parker, CRNP     • timolol  1 drop Both Eyes BID Magnolia S Parker, CRNP     • trimethobenzamide  100 mg Intramuscular Q6H PRN RAYSA Fam          Today, Patient Was Seen By: Rina Ireland MD    **Please Note: This note may have been constructed using a voice recognition system  **

## 2023-06-10 NOTE — ASSESSMENT & PLAN NOTE
· On severe vascular dementia POA initially was psychotic features- but since seeing her yesterday and today she is somnolent poor po intake will wake up to name and then falls asleep  · Ct brain neg   · No infection evident on admission  · depakote level checked nml therapeutic   · No prns overnight   · tsh nml in beg of may 5/19   · Electrolytes stable  Patient is still remains significantly delirious  Blaming PCA for stealing her baby  Also blaming PCA that PCA was trying to hurt her  Patient was tapping her head-because she wants to get rid of some ideas from her head  Psychiatric medication adjusted as per psychiatry recommendation  Continue current treatment    Maintain sleep awake cycle (patient sleeps all day, and awake all night)-advised the nurse and PCA to keep patient awake during the daytime

## 2023-06-10 NOTE — ASSESSMENT & PLAN NOTE
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-she wants to eat regular food, when was still that we are giving  But for safety, patient started screaming and banging her head with her hand  Per nurse's note, overnight patient was screaming, choking herself, and saying she was going to kill herself  She was also trying to bite herself and  punching herself in the head and stomach  Psychiatry consult appreciated, dose adjusted  Maintain sleep awake psych-patient awake during daytime,  Patient is much calm and cooperative today-son on the bedside  As per son, patient mood fluctuates-when patient gets anxious or frustrated, patient can tap her head toward her stomach which is normal for patient per son

## 2023-06-10 NOTE — PLAN OF CARE
Problem: MOBILITY - ADULT  Goal: Maintain or return to baseline ADL function  Description: INTERVENTIONS:  -  Assess patient's ability to carry out ADLs; assess patient's baseline for ADL function and identify physical deficits which impact ability to perform ADLs (bathing, care of mouth/teeth, toileting, grooming, dressing, etc )  - Assess/evaluate cause of self-care deficits   - Assess range of motion  - Assess patient's mobility; develop plan if impaired  - Assess patient's need for assistive devices and provide as appropriate  - Encourage maximum independence but intervene and supervise when necessary  - Involve family in performance of ADLs  - Assess for home care needs following discharge   - Consider OT consult to assist with ADL evaluation and planning for discharge  - Provide patient education as appropriate  Outcome: Progressing  Goal: Maintains/Returns to pre admission functional level  Description: INTERVENTIONS:  - Perform BMAT or MOVE assessment daily    - Set and communicate daily mobility goal to care team and patient/family/caregiver  - Collaborate with rehabilitation services on mobility goals if consulted  - Perform Range of Motion 2 times a day  - Reposition patient every 2 hours    - Dangle patient 2 times a day  - Stand patient 2 times a day  - Ambulate patient 2 times a day  - Out of bed to chair 2 times a day   - Out of bed for meals 2 times a day  - Out of bed for toileting  - Record patient progress and toleration of activity level   Outcome: Progressing     Problem: PAIN - ADULT  Goal: Verbalizes/displays adequate comfort level or baseline comfort level  Description: Interventions:  - Encourage patient to monitor pain and request assistance  - Assess pain using appropriate pain scale  - Administer analgesics based on type and severity of pain and evaluate response  - Implement non-pharmacological measures as appropriate and evaluate response  - Consider cultural and social influences [facility] ENDOSCOPY COLONOSCOPY PRE-OPERATIVE INSTRUCTIONS    Procedure date__10/19/2022_______Arrival time__1230__________        Surgery time__1330_________       Clear liquids the day before the procedure. Do not eat or drink anything within 5 hours of your procedure. This includes water chewing gum, mints and ice chips. You may brush your teeth and gargle the morning of your surgery, but do not swallow the water    You may be asked to stop blood thinners such as Coumadin, Plavix, Fragmin, Lovenox, etc., or any anti-inflammatories such as:  Aspirin, Ibuprofen, Advil, Naproxen prior to your procedure. We also ask that you stop any OTC medications such as fish oil, vitamin E, glucosamine, garlic, Multivitamins, COQ 10, etc.    You must make arrangements for a responsible adult to arrive with you and stay in our waiting area during your procedure. They will also need to take you home after your procedure. For your safety you will not be allowed to leave alone or drive yourself home. Also for your safety, it is strongly suggested that someone stay with you the first 24 hours after your procedure. For your comfort, please wear simple loose fitting clothing to the center. Please do not bring valuables. If you have a living will and a durable power of  for healthcare, please bring in a copy.      You will need to bring a photo ID and insurance card    Our goal is to provide you with excellent care so if you have any questions, please contact us at the [facility] at [phone]         Please note these are generalized instructions for all colonoscopy cases, you may be provided with more specific instructions if necessary on pain and pain management  - Notify physician/advanced practitioner if interventions unsuccessful or patient reports new pain  Outcome: Progressing     Problem: INFECTION - ADULT  Goal: Absence or prevention of progression during hospitalization  Description: INTERVENTIONS:  - Assess and monitor for signs and symptoms of infection  - Monitor lab/diagnostic results  - Monitor all insertion sites, i e  indwelling lines, tubes, and drains  - Monitor endotracheal if appropriate and nasal secretions for changes in amount and color  - [place] appropriate cooling/warming therapies per order  - Administer medications as ordered  - Instruct and encourage patient and family to use good hand hygiene technique  - Identify and instruct in appropriate isolation precautions for identified infection/condition  Outcome: Progressing  Goal: Absence of fever/infection during neutropenic period  Description: INTERVENTIONS:  - Monitor WBC    Outcome: Progressing     Problem: SAFETY ADULT  Goal: Maintain or return to baseline ADL function  Description: INTERVENTIONS:  -  Assess patient's ability to carry out ADLs; assess patient's baseline for ADL function and identify physical deficits which impact ability to perform ADLs (bathing, care of mouth/teeth, toileting, grooming, dressing, etc )  - Assess/evaluate cause of self-care deficits   - Assess range of motion  - Assess patient's mobility; develop plan if impaired  - Assess patient's need for assistive devices and provide as appropriate  - Encourage maximum independence but intervene and supervise when necessary  - Involve family in performance of ADLs  - Assess for home care needs following discharge   - Consider OT consult to assist with ADL evaluation and planning for discharge  - Provide patient education as appropriate  Outcome: Progressing  Goal: Maintains/Returns to pre admission functional level  Description: INTERVENTIONS:  - Perform BMAT or MOVE assessment daily    - Set and communicate daily mobility goal to care team and patient/family/caregiver  - Collaborate with rehabilitation services on mobility goals if consulted  - Perform Range of Motion 2 times a day  - Reposition patient every 2 hours    - Dangle patient 2 times a day  - Stand patient 2 times a day  - Ambulate patient 2 times a day  - Out of bed to chair 2 times a day   - Out of bed for meals 2 times a day  - Out of bed for toileting  - Record patient progress and toleration of activity level   Outcome: Progressing  Goal: Patient will remain free of falls  Description: INTERVENTIONS:  - Educate patient/family on patient safety including physical limitations  - Instruct patient to call for assistance with activity   - Consult OT/PT to assist with strengthening/mobility   - Keep Call bell within reach  - Keep bed low and locked with side rails adjusted as appropriate  - Keep care items and personal belongings within reach  - Initiate and maintain comfort rounds  - Make Fall Risk Sign visible to staff  - Offer Toileting every 3 Hours, in advance of need  - Initiate/Maintain bed alarm  - Apply yellow socks and bracelet for high fall risk patients  - Consider moving patient to room near nurses station  Outcome: Progressing     Problem: DISCHARGE PLANNING  Goal: Discharge to home or other facility with appropriate resources  Description: INTERVENTIONS:  - Identify barriers to discharge w/patient and caregiver  - Arrange for needed discharge resources and transportation as appropriate  - Identify discharge learning needs (meds, wound care, etc )  - Arrange for interpretive services to assist at discharge as needed  - Refer to Case Management Department for coordinating discharge planning if the patient needs post-hospital services based on physician/advanced practitioner order or complex needs related to functional status, cognitive ability, or social support system  Outcome: Progressing     Problem: Knowledge Deficit  Goal: Patient/family/caregiver demonstrates understanding of disease process, treatment plan, medications, and discharge instructions  Description: Complete learning assessment and assess knowledge base  Interventions:  - Provide teaching at level of understanding  - Provide teaching via preferred learning methods  Outcome: Progressing     Problem: NEUROSENSORY - ADULT  Goal: Achieves stable or improved neurological status  Description: INTERVENTIONS  - Monitor and report changes in neurological status  - Monitor vital signs such as temperature, blood pressure, glucose, and any other labs ordered   - Initiate measures to prevent increased intracranial pressure  - Monitor for seizure activity and implement precautions if appropriate      Outcome: Progressing  Goal: Remains free of injury related to seizures activity  Description: INTERVENTIONS  - Maintain airway, patient safety  and administer oxygen as ordered  - Monitor patient for seizure activity, document and report duration and description of seizure to physician/advanced practitioner  - If seizure occurs,  ensure patient safety during seizure  - Reorient patient post seizure  - Seizure pads on all 4 side rails  - Instruct patient/family to notify RN of any seizure activity including if an aura is experienced  - Instruct patient/family to call for assistance with activity based on nursing assessment  - Administer anti-seizure medications if ordered    Outcome: Progressing  Goal: Achieves maximal functionality and self care  Description: INTERVENTIONS  - Monitor swallowing and airway patency with patient fatigue and changes in neurological status  - Encourage and assist patient to increase activity and self care     - Encourage visually impaired, hearing impaired and aphasic patients to use assistive/communication devices  Outcome: Progressing     Problem: RESPIRATORY - ADULT  Goal: Achieves optimal ventilation and oxygenation  Description: INTERVENTIONS:  - Assess for changes in respiratory status  - Assess for changes in mentation and behavior  - Position to facilitate oxygenation and minimize respiratory effort  - Oxygen administered by appropriate delivery if ordered  - Initiate smoking cessation education as indicated  - Encourage broncho-pulmonary hygiene including cough, deep breathe, Incentive Spirometry  - Assess the need for suctioning and aspirate as needed  - Assess and instruct to report SOB or any respiratory difficulty  - Respiratory Therapy support as indicated  Outcome: Progressing     Problem: GASTROINTESTINAL - ADULT  Goal: Minimal or absence of nausea and/or vomiting  Description: INTERVENTIONS:  - Administer IV fluids if ordered to ensure adequate hydration  - Maintain NPO status until nausea and vomiting are resolved  - Nasogastric tube if ordered  - Administer ordered antiemetic medications as needed  - Provide nonpharmacologic comfort measures as appropriate  - Advance diet as tolerated, if ordered  - Consider nutrition services referral to assist patient with adequate nutrition and appropriate food choices  Outcome: Progressing  Goal: Maintains or returns to baseline bowel function  Description: INTERVENTIONS:  - Assess bowel function  - Encourage oral fluids to ensure adequate hydration  - Administer IV fluids if ordered to ensure adequate hydration  - Administer ordered medications as needed  - Encourage mobilization and activity  - Consider nutritional services referral to assist patient with adequate nutrition and appropriate food choices  Outcome: Progressing  Goal: Maintains adequate nutritional intake  Description: INTERVENTIONS:  - Monitor percentage of each meal consumed  - Identify factors contributing to decreased intake, treat as appropriate  - Assist with meals as needed  - Monitor I&O, weight, and lab values if indicated  - Obtain nutrition services referral as needed  Outcome: Progressing  Goal: Establish and maintain optimal ostomy function  Description: INTERVENTIONS:  - Assess bowel function  - Encourage oral fluids to ensure adequate hydration  - Administer IV fluids if ordered to ensure adequate hydration   - Administer ordered medications as needed  - Encourage mobilization and activity  - Nutrition services referral to assist patient with appropriate food choices  - Assess stoma site  - Consider wound care consult   Outcome: Progressing  Goal: Oral mucous membranes remain intact  Description: INTERVENTIONS  - Assess oral mucosa and hygiene practices  - Implement preventative oral hygiene regimen  - Implement oral medicated treatments as ordered  - Initiate Nutrition services referral as needed  Outcome: Progressing     Problem: GENITOURINARY - ADULT  Goal: Maintains or returns to baseline urinary function  Description: INTERVENTIONS:  - Assess urinary function  - Encourage oral fluids to ensure adequate hydration if ordered  - Administer IV fluids as ordered to ensure adequate hydration  - Administer ordered medications as needed  - Offer frequent toileting  - Follow urinary retention protocol if ordered  Outcome: Progressing  Goal: Absence of urinary retention  Description: INTERVENTIONS:  - Assess patient’s ability to void and empty bladder  - Monitor I/O  - Bladder scan as needed  - Discuss with physician/AP medications to alleviate retention as needed  - Discuss catheterization for long term situations as appropriate  Outcome: Progressing     Problem: METABOLIC, FLUID AND ELECTROLYTES - ADULT  Goal: Electrolytes maintained within normal limits  Description: INTERVENTIONS:  - Monitor labs and assess patient for signs and symptoms of electrolyte imbalances  - Administer electrolyte replacement as ordered  - Monitor response to electrolyte replacements, including repeat lab results as appropriate  - Instruct patient on fluid and nutrition as appropriate  Outcome: Progressing  Goal: Fluid balance maintained  Description: INTERVENTIONS:  - Monitor labs   - Monitor I/O and WT  - Instruct patient on fluid and nutrition as appropriate  - Assess for signs & symptoms of volume excess or deficit  Outcome: Progressing  Goal: Glucose maintained within target range  Description: INTERVENTIONS:  - Monitor Blood Glucose as ordered  - Assess for signs and symptoms of hyperglycemia and hypoglycemia  - Administer ordered medications to maintain glucose within target range  - Assess nutritional intake and initiate nutrition service referral as needed  Outcome: Progressing     Problem: SKIN/TISSUE INTEGRITY - ADULT  Goal: Skin Integrity remains intact(Skin Breakdown Prevention)  Description: Assess:  -Perform Dhaval assessment every    -Clean and moisturize skin every    -Inspect skin when repositioning, toileting, and assisting with ADLS  -Assess under medical devices such as   every    -Assess extremities for adequate circulation and sensation     Bed Management:  -Have minimal linens on bed & keep smooth, unwrinkled  -Change linens as needed when moist or perspiring  -Avoid sitting or lying in one position for more than   hours while in bed  -Keep HOB at  degrees     Toileting:  -Offer bedside commode  -Assess for incontinence every   -Use incontinent care products after each incontinent episode such as   Activity:  -Mobilize patient   times a day  -Encourage activity and walks on unit  -Encourage or provide ROM exercises   -Turn and reposition patient every   Hours  -Use appropriate equipment to lift or move patient in bed  -Instruct/ Assist with weight shifting every   when out of bed in chair  -Consider limitation of chair time    hour intervals    Skin Care:  -Avoid use of baby powder, tape, friction and shearing, hot water or constrictive clothing  -Relieve pressure over bony prominences using    -Do not massage red bony areas    Next Steps:  -Teach patient strategies to minimize risks such as     -Consider consults to  interdisciplinary teams such as   Outcome: Progressing  Goal: Incision(s), wounds(s) or drain site(s) healing without S/S of infection  Description: INTERVENTIONS  - Assess and document dressing, incision, wound bed, drain sites and surrounding tissue  - Provide patient and family education  - Perform skin care/dressing changes every   Outcome: Progressing  Goal: Pressure injury heals and does not worsen  Description: Interventions:  - Implement low air loss mattress or specialty surface (Criteria met)  - Apply silicone foam dressing  - Instruct/assist with weight shifting every   minutes when in chair   - Limit chair time to   hour intervals  - Use special pressure reducing interventions such as   when in chair   - Apply fecal or urinary incontinence containment device   - Perform passive or active ROM every   - Turn and reposition patient & offload bony prominences every    hours   - Utilize friction reducing device or surface for transfers   - Consider consults to  interdisciplinary teams such as    - Use incontinent care products after each incontinent episode such as    - Consider nutrition services referral as needed  Outcome: Progressing     Problem: HEMATOLOGIC - ADULT  Goal: Maintains hematologic stability  Description: INTERVENTIONS  - Assess for signs and symptoms of bleeding or hemorrhage  - Monitor labs  - Administer supportive blood products/factors as ordered and appropriate  Outcome: Progressing     Problem: MUSCULOSKELETAL - ADULT  Goal: Maintain or return mobility to safest level of function  Description: INTERVENTIONS:  - Assess patient's ability to carry out ADLs; assess patient's baseline for ADL function and identify physical deficits which impact ability to perform ADLs (bathing, care of mouth/teeth, toileting, grooming, dressing, etc )  - Assess/evaluate cause of self-care deficits   - Assess range of motion  - Assess patient's mobility  - Assess patient's need for assistive devices and provide as appropriate  - Encourage maximum independence but intervene and supervise when necessary  - Involve family in performance of ADLs  - Assess for home care needs following discharge   - Consider OT consult to assist with ADL evaluation and planning for discharge  - Provide patient education as appropriate  Outcome: Progressing  Goal: Maintain proper alignment of affected body part  Description: INTERVENTIONS:  - Support, maintain and protect limb and body alignment  - Provide patient/ family with appropriate education  Outcome: Progressing     Problem: Prexisting or High Potential for Compromised Skin Integrity  Goal: Skin integrity is maintained or improved  Description: INTERVENTIONS:  - Identify patients at risk for skin breakdown  - Assess and monitor skin integrity  - Assess and monitor nutrition and hydration status  - Monitor labs   - Assess for incontinence   - Turn and reposition patient  - Assist with mobility/ambulation  - Relieve pressure over bony prominences  - Avoid friction and shearing  - Provide appropriate hygiene as needed including keeping skin clean and dry  - Evaluate need for skin moisturizer/barrier cream  - Collaborate with interdisciplinary team   - Patient/family teaching  - Consider wound care consult   Outcome: Progressing     Problem: Nutrition/Hydration-ADULT  Goal: Nutrient/Hydration intake appropriate for improving, restoring or maintaining nutritional needs  Description: Monitor and assess patient's nutrition/hydration status for malnutrition  Collaborate with interdisciplinary team and initiate plan and interventions as ordered  Monitor patient's weight and dietary intake as ordered or per policy  Utilize nutrition screening tool and intervene as necessary  Determine patient's food preferences and provide high-protein, high-caloric foods as appropriate       INTERVENTIONS:  - Monitor oral intake, urinary output, labs, and treatment plans  - Assess nutrition and hydration status and recommend course of action  - Evaluate amount of meals eaten  - Assist patient with eating if necessary   - Allow adequate time for meals  - Recommend/ encourage appropriate diets, oral nutritional supplements, and vitamin/mineral supplements  - Order, calculate, and assess calorie counts as needed  - Recommend, monitor, and adjust tube feedings and TPN/PPN based on assessed needs  - Assess need for intravenous fluids  - Provide specific nutrition/hydration education as appropriate  - Include patient/family/caregiver in decisions related to nutrition  Outcome: Progressing     Problem: COPING  Goal: Pt/Family able to verbalize concerns and demonstrate effective coping strategies  Description: INTERVENTIONS:  - Assist patient/family to identify coping skills, available support systems and cultural and spiritual values  - Provide emotional support, including active listening and acknowledgement of concerns of patient and caregivers  - Reduce environmental stimuli, as able  - Provide patient education  - Assess for spiritual pain/suffering and initiate spiritual care, including notification of Pastoral Care or [facility] based community as needed  - Assess effectiveness of coping strategies  Outcome: Progressing     Problem: CONFUSION/THOUGHT DISTURBANCE  Goal: Thought disturbances (confusion, delirium, depression, dementia or psychosis) are managed to maintain or return to baseline mental status and functional level  Description: INTERVENTIONS:  - Assess for possible contributors to  thought disturbance, including but not limited to medications, infection, impaired vision or hearing, underlying metabolic abnormalities, dehydration, respiratory compromise,  psychiatric diagnoses and notify attending PHYSICAN/AP  - Monitor and intervene to maintain adequate nutrition, hydration, elimination, sleep and activity  - Decrease environmental stimuli, including noise as appropriate    - Provide frequent contacts to provide refocusing, direction and reassurance as needed  Approach patient calmly with eye contact and at their level  - [place] high risk fall precautions, aspiration precautions and other safety measures, as indicated  - If delirium suspected, notify physician/AP of change in condition and request immediate in-person evaluation  - Pursue consults as appropriate including Geriatric (campus dependent), OT for cognitive evaluation/activity planning, psychiatric, pastoral care, etc   Outcome: Progressing     Problem: SELF HARM  Goal: Effect of psychiatric condition will be minimized and patient will be protected from self harm  Description: INTERVENTIONS:  - Assess impact of patient's symptoms on level of functioning, self-care needs and offer support as indicated  - Assess patient/family knowledge of depression, impact on illness and need for teaching  - Provide emotional support, presence and reassurance  - Assess for possible suicidal thoughts, ideation or self-harm  If patient expresses suicidal thoughts or statements do not leave alone, notify physician/AP immediately, initiate suicide precautions, and determine need for continual observation    - initiate consults and referrals as appropriate (a mental health professional, Spiritual Care  Outcome: Progressing     Problem: SPIRITUAL CARE  Goal: Patient feels balance and connection with others and/or higher power that empowers the self during times of loss, guilt and fear  Description: INTERVENTIONS:  - Create safety for patient through empathic presence and non-judgmental listening  - Encourage patient to explore his/her values, beliefs and/or spiritual images and practices  - Encourage use of breath work, imagery, meditation, relaxation, reiki to ease distress and provide healing  - Encourage use of cultural and spiritual celebrations and rituals  - Facilitate discussion that helps patient sort out spiritual concerns  - Help patient identify where meaning/hope/comfort & strength are in his/her life  - Refer patient to [facility] community for assistance, as appropriate  - Respond to patient/family need for prayer/ritual/sacrament/ceremony  Outcome: Progressing

## 2023-06-11 LAB
ALBUMIN SERPL BCP-MCNC: 2.1 G/DL (ref 3.5–5)
ALP SERPL-CCNC: 42 U/L (ref 34–104)
ALT SERPL W P-5'-P-CCNC: 4 U/L (ref 7–52)
ANION GAP SERPL CALCULATED.3IONS-SCNC: 6 MMOL/L (ref 4–13)
AST SERPL W P-5'-P-CCNC: 16 U/L (ref 13–39)
BASOPHILS # BLD AUTO: 0.01 THOUSANDS/ÂΜL (ref 0–0.1)
BASOPHILS NFR BLD AUTO: 0 % (ref 0–1)
BILIRUB SERPL-MCNC: 0.37 MG/DL (ref 0.2–1)
BUN SERPL-MCNC: 7 MG/DL (ref 5–25)
CALCIUM ALBUM COR SERPL-MCNC: 9.4 MG/DL (ref 8.3–10.1)
CALCIUM SERPL-MCNC: 7.9 MG/DL (ref 8.4–10.2)
CHLORIDE SERPL-SCNC: 109 MMOL/L (ref 96–108)
CO2 SERPL-SCNC: 23 MMOL/L (ref 21–32)
CREAT SERPL-MCNC: 0.45 MG/DL (ref 0.6–1.3)
EOSINOPHIL # BLD AUTO: 0.3 THOUSAND/ÂΜL (ref 0–0.61)
EOSINOPHIL NFR BLD AUTO: 5 % (ref 0–6)
ERYTHROCYTE [DISTWIDTH] IN BLOOD BY AUTOMATED COUNT: 16.1 % (ref 11.6–15.1)
GFR SERPL CREATININE-BSD FRML MDRD: 95 ML/MIN/1.73SQ M
GLUCOSE SERPL-MCNC: 75 MG/DL (ref 65–140)
HCT VFR BLD AUTO: 33.4 % (ref 34.8–46.1)
HGB BLD-MCNC: 10.4 G/DL (ref 11.5–15.4)
IMM GRANULOCYTES # BLD AUTO: 0.03 THOUSAND/UL (ref 0–0.2)
IMM GRANULOCYTES NFR BLD AUTO: 1 % (ref 0–2)
LYMPHOCYTES # BLD AUTO: 1.58 THOUSANDS/ÂΜL (ref 0.6–4.47)
LYMPHOCYTES NFR BLD AUTO: 26 % (ref 14–44)
MCH RBC QN AUTO: 30.7 PG (ref 26.8–34.3)
MCHC RBC AUTO-ENTMCNC: 31.1 G/DL (ref 31.4–37.4)
MCV RBC AUTO: 99 FL (ref 82–98)
MONOCYTES # BLD AUTO: 0.67 THOUSAND/ÂΜL (ref 0.17–1.22)
MONOCYTES NFR BLD AUTO: 11 % (ref 4–12)
NEUTROPHILS # BLD AUTO: 3.59 THOUSANDS/ÂΜL (ref 1.85–7.62)
NEUTS SEG NFR BLD AUTO: 57 % (ref 43–75)
NRBC BLD AUTO-RTO: 0 /100 WBCS
PLATELET # BLD AUTO: 214 THOUSANDS/UL (ref 149–390)
PMV BLD AUTO: 9.2 FL (ref 8.9–12.7)
POTASSIUM SERPL-SCNC: 3.5 MMOL/L (ref 3.5–5.3)
PROT SERPL-MCNC: 5.4 G/DL (ref 6.4–8.4)
RBC # BLD AUTO: 3.39 MILLION/UL (ref 3.81–5.12)
SODIUM SERPL-SCNC: 138 MMOL/L (ref 135–147)
WBC # BLD AUTO: 6.18 THOUSAND/UL (ref 4.31–10.16)

## 2023-06-11 PROCEDURE — 85025 COMPLETE CBC W/AUTO DIFF WBC: CPT | Performed by: FAMILY MEDICINE

## 2023-06-11 PROCEDURE — 99232 SBSQ HOSP IP/OBS MODERATE 35: CPT | Performed by: FAMILY MEDICINE

## 2023-06-11 PROCEDURE — 80053 COMPREHEN METABOLIC PANEL: CPT | Performed by: FAMILY MEDICINE

## 2023-06-11 RX ADMIN — TIMOLOL MALEATE 1 DROP: 5 SOLUTION OPHTHALMIC at 17:26

## 2023-06-11 RX ADMIN — LATANOPROST 1 DROP: 50 SOLUTION OPHTHALMIC at 21:27

## 2023-06-11 RX ADMIN — BUSPIRONE HYDROCHLORIDE 5 MG: 5 TABLET ORAL at 09:37

## 2023-06-11 RX ADMIN — QUETIAPINE FUMARATE 125 MG: 100 TABLET ORAL at 17:20

## 2023-06-11 RX ADMIN — ASPIRIN 81 MG: 81 TABLET, COATED ORAL at 09:37

## 2023-06-11 RX ADMIN — TIMOLOL MALEATE 1 DROP: 5 SOLUTION OPHTHALMIC at 09:31

## 2023-06-11 RX ADMIN — DIVALPROEX SODIUM 500 MG: 125 CAPSULE, COATED PELLETS ORAL at 21:20

## 2023-06-11 RX ADMIN — FAMOTIDINE 20 MG: 20 TABLET ORAL at 09:37

## 2023-06-11 RX ADMIN — ATORVASTATIN CALCIUM 40 MG: 40 TABLET, FILM COATED ORAL at 17:20

## 2023-06-11 RX ADMIN — BUSPIRONE HYDROCHLORIDE 5 MG: 5 TABLET ORAL at 17:20

## 2023-06-11 RX ADMIN — ENOXAPARIN SODIUM 40 MG: 40 INJECTION SUBCUTANEOUS at 09:32

## 2023-06-11 RX ADMIN — SERTRALINE 100 MG: 100 TABLET, FILM COATED ORAL at 09:37

## 2023-06-11 RX ADMIN — SENNOSIDES 17.2 MG: 8.6 TABLET, FILM COATED ORAL at 21:20

## 2023-06-11 NOTE — ASSESSMENT & PLAN NOTE
· Hb has been fluctuating since beg of may when she was admitted to [facility] anywhere between 9 8-11  · Pt hb has remained stable at 10 2 for 2 consecutive times - no evidence of gross bleed no hematemesis and no melena no hematochezia  · She has poor nutritional aspect that can contribute to anemia   · She needs no scopes or transfusion at this time as there is no evidence of gross bleed hb stable for 2 days and in her range of base

## 2023-06-11 NOTE — ASSESSMENT & PLAN NOTE
· On severe vascular dementia POA initially was psychotic features- but since seeing her yesterday and today she is somnolent poor po intake will wake up to name and then falls asleep  · Ct brain neg   · No infection evident on admission  · depakote level checked nml therapeutic   · No prns overnight   · tsh nml in beg of may 5/19   · Electrolytes stable  Patient is still remains significantly delirious  Blaming PCA for stealing her baby  Also blaming PCA that PCA was trying to hurt her  Patient was tapping her head-because she wants to get rid of some ideas from her head  Psychiatric medication adjusted as per psychiatry recommendation  Continue current treatment  Maintain sleep awake cycle (patient sleeps all day, and awake all night)-advised the nurse and PCA to keep patient awake during the daytime  As per nurse documentation, yesterday and overnight, patient was much calmer compared to previous days  Patient was taking Seroquel 100 mg at 10 PM, and Seroquel 25 mg at 5 PM -We will adjust the timing and give in total of 125 mg of Seroquel at 5 PM-and observe the response

## 2023-06-11 NOTE — PROGRESS NOTES
[address]  Progress Note  Name: Dario lAlred  MRN: [id number]  Unit/Bed#: -01 I Date of Admission: 5/28/2023   Date of Service: 6/11/2023  Hospital Day: 14    Assessment/Plan   Paranoia (psychosis) [facility])  Assessment & Plan  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-she wants to eat regular food, when was still that we are giving  But for safety, patient started screaming and banging her head with her hand  Per nurse's note, overnight patient was screaming, choking herself, and saying she was going to kill herself  She was also trying to bite herself and  punching herself in the head and stomach  Psychiatry consult appreciated, dose adjusted  Maintain sleep awake psych-patient awake during daytime,  Patient is much calm and cooperative today-son on the bedside  As per son, patient mood fluctuates-when patient gets anxious or frustrated, patient can tap her head toward her stomach which is normal for patient per son  As per nurse documentation, yesterday and overnight, patient was much calmer compared to previous days  Patient was taking Seroquel 100 mg at 10 PM, and Seroquel 25 mg at 5 PM -We will adjust the timing and give in total of 125 mg of Seroquel at 5 PM-and observe the response        Severe vascular dementia with psychotic disturbance ([facility] [address] )  Assessment & Plan  · Patient discharged from outside hospital earlier in day and 5/28 after admission for vomiting  · Evaluated by psychiatry as she was transferred from the behavioral health unit to [facility] for the vomiting, son was requesting hospice and took the patient home with 24/7 care provided by family while awaiting services to be arranged  Review of the discharge summary the internal medicine team and psychiatry team voiced their concerns regarding the son taking the patient home but he was adamant  · Patient was brought to this facility on the same day as discharge from previous facility  Son stated to ER staff he cannot care for patient at home  · Evaluated by psychiatry on 5/30 and deemed to lack appropriate judgement and is danger to self and needs inpatient psychiatric treatment, patient with advanced dementia do not think will be able to sign [id number] 302 has been signed  · Also try to wrap a call bell around her neck to commit suicide she is expressing suicidal ideations now patient has been 302   · Patient is on a one-to-one for safety reasons  · [facility] psychiatry declined the patient stating that she has vascular dementia and there is nothing they can do from psychiatric point of view  Medication dose adjusted as per psychiatry recommendation-psychiatry also recommending inpatient psych placement  As per nurse documentation, yesterday and overnight, patient was much calmer compared to previous days  Patient was taking Seroquel 100 mg at 10 PM, and Seroquel 25 mg at 5 PM -We will adjust the timing and give in total of 125 mg of Seroquel at 5 PM-and observe the response  * Encephalopathy acute  Assessment & Plan  · On severe vascular dementia POA initially was psychotic features- but since seeing her yesterday and today she is somnolent poor po intake will wake up to name and then falls asleep  · Ct brain neg   · No infection evident on admission  · depakote level checked nml therapeutic   · No prns overnight   · tsh nml in beg of may 5/19   · Electrolytes stable  Patient is still remains significantly delirious  Blaming PCA for stealing her baby  Also blaming PCA that PCA was trying to hurt her    Patient was tapping her head-because she wants to get rid of some ideas from her head  Psychiatric medication adjusted as per psychiatry recommendation  Continue current treatment  Maintain sleep awake cycle (patient sleeps all day, and awake all night)-advised the nurse and PCA to keep patient awake during the daytime  As per nurse documentation, yesterday and overnight, patient was much calmer compared to previous days  Patient was taking Seroquel 100 mg at 10 PM, and Seroquel 25 mg at 5 PM -We will adjust the timing and give in total of 125 mg of Seroquel at 5 PM-and observe the response  Chronic anemia  Assessment & Plan  · Hb has been fluctuating since beg of may when she was admitted to [facility] anywhere between 9 8-11  · Pt hb has remained stable at 10 2 for 2 consecutive times - no evidence of gross bleed no hematemesis and no melena no hematochezia  · She has poor nutritional aspect that can contribute to anemia   · She needs no scopes or transfusion at this time as there is no evidence of gross bleed hb stable for 2 days and in her range of base    Status post CVA  Assessment & Plan  · 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 and dysphagia  · Was discharged to SNF then sent to inpatient geropsych, unclear extent of physical rehabilitation patient received or was amenable to participate  · Continue puréed diet with nectar thick liquids  · Appreciate PT/OT/case management- needs inpatient psych   · Continue aspirin and statin           VTE Pharmacologic Prophylaxis: VTE Score: 3 Moderate Risk (Score 3-4) - Pharmacological DVT Prophylaxis Ordered: enoxaparin (Lovenox)  Patient Centered Rounds: I performed bedside rounds with nursing staff today  Discussions with Specialists or Other Care Team Provider: None    Education and Discussions with Family / Patient: none       Total Time Spent on Date of Encounter in care of patient: 10 minutes This time was spent on one or more of the following: performing physical exam; counseling and coordination of care; obtaining or reviewing history; documenting in the medical record; reviewing/ordering tests, medications or procedures; communicating with other healthcare professionals and discussing with patient's family/caregivers  Current Length of Stay: 14 day(s)  Current Patient Status: Inpatient   Certification Statement: The patient will continue to require additional inpatient hospital stay due to To monitor for conditions  Discharge Plan: To be determine    Code Status: Level 3 - DNAR and DNI    Subjective:   Seen and evaluated during the rounds  Patient is awake and alert  Reporting she wants to see her family, her son and daughter  Also asking when she can go home  Still blaming PCA-that the PCA is trying to hit her, and God will take care of her  When this provider was responding with patient that if she converts and take medications and feels better only she can go home and asking to cooperate-patient was responding she will take the medication and food  Objective:     Vitals:   Temp (24hrs), Av 7 °F (36 5 °C), Min:97 7 °F (36 5 °C), Max:97 7 °F (36 5 °C)    Temp:  [97 7 °F (36 5 °C)] 97 7 °F (36 5 °C)  HR:  [59-60] 60  Resp:  [14] 14  BP: (116-119)/(52-58) 116/52  SpO2:  [94 %-96 %] 96 %  Body mass index is 23 58 kg/m²  Input and Output Summary (last 24 hours):   No intake or output data in the 24 hours ending 23 1638    Physical Exam:   Physical Exam  Vitals and nursing note reviewed  Constitutional:       Appearance: She is not diaphoretic  HENT:      Mouth/Throat:      Pharynx: No oropharyngeal exudate  Eyes:      General: No scleral icterus  Left eye: No discharge  Extraocular Movements: Extraocular movements intact  Conjunctiva/sclera: Conjunctivae normal       Pupils: Pupils are equal, round, and reactive to light  Cardiovascular:      Rate and Rhythm: Normal rate        Heart sounds: No murmur heard  No friction rub  No gallop  Pulmonary:      Effort: Pulmonary effort is normal  No respiratory distress  Breath sounds: No stridor  No wheezing or rhonchi  Abdominal:      General: Abdomen is flat  Bowel sounds are normal  There is no distension  Palpations: There is no mass  Tenderness: There is no abdominal tenderness  Hernia: No hernia is present  Musculoskeletal:      Cervical back: Normal range of motion  Neurological:      Mental Status: She is alert  Motor: Weakness (left upper and lower extremity) present  Coordination: Coordination normal    Psychiatric:         Mood and Affect: Mood normal          Additional Data:     Labs:  Results from last 7 days   Lab Units 06/11/23  0827   EOS PCT % 5   HEMATOCRIT % 33 4*   HEMOGLOBIN g/dL 10 4*   LYMPHS PCT % 26   MONOS PCT % 11   NEUTROS PCT % 57   PLATELETS Thousands/uL 214   WBC Thousand/uL 6 18     Results from last 7 days   Lab Units 06/11/23  0611   ANION GAP mmol/L 6   ALBUMIN g/dL 2 1*   ALK PHOS U/L 42   ALT U/L 4*   AST U/L 16   BUN mg/dL 7   CALCIUM mg/dL 7 9*   CHLORIDE mmol/L 109*   CO2 mmol/L 23   CREATININE mg/dL 0 45*   GLUCOSE RANDOM mg/dL 75   POTASSIUM mmol/L 3 5   SODIUM mmol/L 138   TOTAL BILIRUBIN mg/dL 0 37                       Lines/Drains:  Invasive Devices     None                       Imaging: No pertinent imaging reviewed      Recent Cultures (last 7 days):         Last 24 Hours Medication List:   Current Facility-Administered Medications   Medication Dose Route Frequency Provider Last Rate   • acetaminophen  650 mg Oral Q8H PRN Catracho Davalos MD     • aspirin  81 mg Oral Daily Magnolia S Parker, CRNP     • atorvastatin  40 mg Oral QPM Magnolia S Parker, CRNP     • busPIRone  5 mg Oral BID Catracho Davalos MD     • calcium carbonate  500 mg Oral BID PRN RAYSA Marc     • divalproex sodium  500 mg Oral HS Magnolai S Parker, CRNP     • enoxaparin  40 mg Subcutaneous Daily Magnolia S Parker, CRNP     • famotidine  20 mg Oral Daily Magnolia S Parker, CRNP     • latanoprost  1 drop Both Eyes HS Magnolia S Parker, CRNP     • OLANZapine  2 5 mg Intramuscular Q4H PRN Ligia Barr MD     • polyethylene glycol  17 g Oral Daily PRN Magnolia S Parker, CRNP     • QUEtiapine  125 mg Oral Daily Sterling Meyer MD     • senna  2 tablet Oral HS Magnolia S Parker, CRNP     • sertraline  100 mg Oral Daily Magnolia S Parker, CRNP     • timolol  1 drop Both Eyes BID Magnolia S Parker, CRNP     • trimethobenzamide  100 mg Intramuscular Q6H PRN RAYSA Kam          Today, Patient Was Seen By: Gadiel Zhu MD    **Please Note: This note may have been constructed using a voice recognition system  **

## 2023-06-11 NOTE — ASSESSMENT & PLAN NOTE
· Patient discharged from outside hospital earlier in day and 5/28 after admission for vomiting  · Evaluated by psychiatry as she was transferred from the behavioral health unit to [facility] for the vomiting, son was requesting hospice and took the patient home with 24/7 care provided by family while awaiting services to be arranged  Review of the discharge summary the internal medicine team and psychiatry team voiced their concerns regarding the son taking the patient home but he was adamant  · Patient was brought to this facility on the same day as discharge from previous facility  Son stated to ER staff he cannot care for patient at home  · Evaluated by psychiatry on 5/30 and deemed to lack appropriate judgement and is danger to self and needs inpatient psychiatric treatment, patient with advanced dementia do not think will be able to sign 201- 302 has been signed  · Also try to wrap a call bell around her neck to commit suicide she is expressing suicidal ideations now patient has been 302   · Patient is on a one-to-one for safety reasons  · [facility] psychiatry declined the patient stating that she has vascular dementia and there is nothing they can do from psychiatric point of view  Medication dose adjusted as per psychiatry recommendation-psychiatry also recommending inpatient psych placement  As per nurse documentation, yesterday and overnight, patient was much calmer compared to previous days  Patient was taking Seroquel 100 mg at 10 PM, and Seroquel 25 mg at 5 PM -We will adjust the timing and give in total of 125 mg of Seroquel at 5 PM-and observe the response

## 2023-06-11 NOTE — PLAN OF CARE
Problem: MOBILITY - ADULT  Goal: Maintain or return to baseline ADL function  Description: INTERVENTIONS:  -  Assess patient's ability to carry out ADLs; assess patient's baseline for ADL function and identify physical deficits which impact ability to perform ADLs (bathing, care of mouth/teeth, toileting, grooming, dressing, etc )  - Assess/evaluate cause of self-care deficits   - Assess range of motion  - Assess patient's mobility; develop plan if impaired  - Assess patient's need for assistive devices and provide as appropriate  - Encourage maximum independence but intervene and supervise when necessary  - Involve family in performance of ADLs  - Assess for home care needs following discharge   - Consider OT consult to assist with ADL evaluation and planning for discharge  - Provide patient education as appropriate  Outcome: Progressing  Goal: Maintains/Returns to pre admission functional level  Description: INTERVENTIONS:  - Perform BMAT or MOVE assessment daily    - Set and communicate daily mobility goal to care team and patient/family/caregiver  - Collaborate with rehabilitation services on mobility goals if consulted  - Perform Range of Motion 2 times a day  - Reposition patient every 2 hours    - Dangle patient 2 times a day  - Stand patient 2 times a day  - Ambulate patient 2 times a day  - Out of bed to chair 2 times a day   - Out of bed for meals 2 times a day  - Out of bed for toileting  - Record patient progress and toleration of activity level   Outcome: Progressing     Problem: PAIN - ADULT  Goal: Verbalizes/displays adequate comfort level or baseline comfort level  Description: Interventions:  - Encourage patient to monitor pain and request assistance  - Assess pain using appropriate pain scale  - Administer analgesics based on type and severity of pain and evaluate response  - Implement non-pharmacological measures as appropriate and evaluate response  - Consider cultural and social influences on pain and pain management  - Notify physician/advanced practitioner if interventions unsuccessful or patient reports new pain  Outcome: Progressing     Problem: INFECTION - ADULT  Goal: Absence or prevention of progression during hospitalization  Description: INTERVENTIONS:  - Assess and monitor for signs and symptoms of infection  - Monitor lab/diagnostic results  - Monitor all insertion sites, i e  indwelling lines, tubes, and drains  - Monitor endotracheal if appropriate and nasal secretions for changes in amount and color  - [place] appropriate cooling/warming therapies per order  - Administer medications as ordered  - Instruct and encourage patient and family to use good hand hygiene technique  - Identify and instruct in appropriate isolation precautions for identified infection/condition  Outcome: Progressing  Goal: Absence of fever/infection during neutropenic period  Description: INTERVENTIONS:  - Monitor WBC    Outcome: Progressing     Problem: SAFETY ADULT  Goal: Maintain or return to baseline ADL function  Description: INTERVENTIONS:  -  Assess patient's ability to carry out ADLs; assess patient's baseline for ADL function and identify physical deficits which impact ability to perform ADLs (bathing, care of mouth/teeth, toileting, grooming, dressing, etc )  - Assess/evaluate cause of self-care deficits   - Assess range of motion  - Assess patient's mobility; develop plan if impaired  - Assess patient's need for assistive devices and provide as appropriate  - Encourage maximum independence but intervene and supervise when necessary  - Involve family in performance of ADLs  - Assess for home care needs following discharge   - Consider OT consult to assist with ADL evaluation and planning for discharge  - Provide patient education as appropriate  Outcome: Progressing  Goal: Maintains/Returns to pre admission functional level  Description: INTERVENTIONS:  - Perform BMAT or MOVE assessment daily    - Set and communicate daily mobility goal to care team and patient/family/caregiver  - Collaborate with rehabilitation services on mobility goals if consulted  - Perform Range of Motion 2 times a day  - Reposition patient every 2 hours    - Dangle patient 2 times a day  - Stand patient 2 times a day  - Ambulate patient 2 times a day  - Out of bed to chair 2 times a day   - Out of bed for meals 2 times a day  - Out of bed for toileting  - Record patient progress and toleration of activity level   Outcome: Progressing  Goal: Patient will remain free of falls  Description: INTERVENTIONS:  - Educate patient/family on patient safety including physical limitations  - Instruct patient to call for assistance with activity   - Consult OT/PT to assist with strengthening/mobility   - Keep Call bell within reach  - Keep bed low and locked with side rails adjusted as appropriate  - Keep care items and personal belongings within reach  - Initiate and maintain comfort rounds  - Make Fall Risk Sign visible to staff  - Offer Toileting every 3 Hours, in advance of need  - Initiate/Maintain bed alarm  - Apply yellow socks and bracelet for high fall risk patients  - Consider moving patient to room near nurses station  Outcome: Progressing     Problem: DISCHARGE PLANNING  Goal: Discharge to home or other facility with appropriate resources  Description: INTERVENTIONS:  - Identify barriers to discharge w/patient and caregiver  - Arrange for needed discharge resources and transportation as appropriate  - Identify discharge learning needs (meds, wound care, etc )  - Arrange for interpretive services to assist at discharge as needed  - Refer to Case Management Department for coordinating discharge planning if the patient needs post-hospital services based on physician/advanced practitioner order or complex needs related to functional status, cognitive ability, or social support system  Outcome: Progressing     Problem: Knowledge Deficit  Goal: Patient/family/caregiver demonstrates understanding of disease process, treatment plan, medications, and discharge instructions  Description: Complete learning assessment and assess knowledge base  Interventions:  - Provide teaching at level of understanding  - Provide teaching via preferred learning methods  Outcome: Progressing     Problem: NEUROSENSORY - ADULT  Goal: Achieves stable or improved neurological status  Description: INTERVENTIONS  - Monitor and report changes in neurological status  - Monitor vital signs such as temperature, blood pressure, glucose, and any other labs ordered   - Initiate measures to prevent increased intracranial pressure  - Monitor for seizure activity and implement precautions if appropriate      Outcome: Progressing  Goal: Remains free of injury related to seizures activity  Description: INTERVENTIONS  - Maintain airway, patient safety  and administer oxygen as ordered  - Monitor patient for seizure activity, document and report duration and description of seizure to physician/advanced practitioner  - If seizure occurs,  ensure patient safety during seizure  - Reorient patient post seizure  - Seizure pads on all 4 side rails  - Instruct patient/family to notify RN of any seizure activity including if an aura is experienced  - Instruct patient/family to call for assistance with activity based on nursing assessment  - Administer anti-seizure medications if ordered    Outcome: Progressing  Goal: Achieves maximal functionality and self care  Description: INTERVENTIONS  - Monitor swallowing and airway patency with patient fatigue and changes in neurological status  - Encourage and assist patient to increase activity and self care     - Encourage visually impaired, hearing impaired and aphasic patients to use assistive/communication devices  Outcome: Progressing     Problem: RESPIRATORY - ADULT  Goal: Achieves optimal ventilation and oxygenation  Description: INTERVENTIONS:  - Assess for changes in respiratory status  - Assess for changes in mentation and behavior  - Position to facilitate oxygenation and minimize respiratory effort  - Oxygen administered by appropriate delivery if ordered  - Initiate smoking cessation education as indicated  - Encourage broncho-pulmonary hygiene including cough, deep breathe, Incentive Spirometry  - Assess the need for suctioning and aspirate as needed  - Assess and instruct to report SOB or any respiratory difficulty  - Respiratory Therapy support as indicated  Outcome: Progressing     Problem: GASTROINTESTINAL - ADULT  Goal: Minimal or absence of nausea and/or vomiting  Description: INTERVENTIONS:  - Administer IV fluids if ordered to ensure adequate hydration  - Maintain NPO status until nausea and vomiting are resolved  - Nasogastric tube if ordered  - Administer ordered antiemetic medications as needed  - Provide nonpharmacologic comfort measures as appropriate  - Advance diet as tolerated, if ordered  - Consider nutrition services referral to assist patient with adequate nutrition and appropriate food choices  Outcome: Progressing  Goal: Maintains or returns to baseline bowel function  Description: INTERVENTIONS:  - Assess bowel function  - Encourage oral fluids to ensure adequate hydration  - Administer IV fluids if ordered to ensure adequate hydration  - Administer ordered medications as needed  - Encourage mobilization and activity  - Consider nutritional services referral to assist patient with adequate nutrition and appropriate food choices  Outcome: Progressing  Goal: Maintains adequate nutritional intake  Description: INTERVENTIONS:  - Monitor percentage of each meal consumed  - Identify factors contributing to decreased intake, treat as appropriate  - Assist with meals as needed  - Monitor I&O, weight, and lab values if indicated  - Obtain nutrition services referral as needed  Outcome: Progressing  Goal: Establish and maintain optimal ostomy function  Description: INTERVENTIONS:  - Assess bowel function  - Encourage oral fluids to ensure adequate hydration  - Administer IV fluids if ordered to ensure adequate hydration   - Administer ordered medications as needed  - Encourage mobilization and activity  - Nutrition services referral to assist patient with appropriate food choices  - Assess stoma site  - Consider wound care consult   Outcome: Progressing  Goal: Oral mucous membranes remain intact  Description: INTERVENTIONS  - Assess oral mucosa and hygiene practices  - Implement preventative oral hygiene regimen  - Implement oral medicated treatments as ordered  - Initiate Nutrition services referral as needed  Outcome: Progressing     Problem: GENITOURINARY - ADULT  Goal: Maintains or returns to baseline urinary function  Description: INTERVENTIONS:  - Assess urinary function  - Encourage oral fluids to ensure adequate hydration if ordered  - Administer IV fluids as ordered to ensure adequate hydration  - Administer ordered medications as needed  - Offer frequent toileting  - Follow urinary retention protocol if ordered  Outcome: Progressing  Goal: Absence of urinary retention  Description: INTERVENTIONS:  - Assess patient’s ability to void and empty bladder  - Monitor I/O  - Bladder scan as needed  - Discuss with physician/AP medications to alleviate retention as needed  - Discuss catheterization for long term situations as appropriate  Outcome: Progressing     Problem: METABOLIC, FLUID AND ELECTROLYTES - ADULT  Goal: Electrolytes maintained within normal limits  Description: INTERVENTIONS:  - Monitor labs and assess patient for signs and symptoms of electrolyte imbalances  - Administer electrolyte replacement as ordered  - Monitor response to electrolyte replacements, including repeat lab results as appropriate  - Instruct patient on fluid and nutrition as appropriate  Outcome: Progressing  Goal: Fluid balance maintained  Description: INTERVENTIONS:  - Monitor labs   - Monitor I/O and WT  - Instruct patient on fluid and nutrition as appropriate  - Assess for signs & symptoms of volume excess or deficit  Outcome: Progressing  Goal: Glucose maintained within target range  Description: INTERVENTIONS:  - Monitor Blood Glucose as ordered  - Assess for signs and symptoms of hyperglycemia and hypoglycemia  - Administer ordered medications to maintain glucose within target range  - Assess nutritional intake and initiate nutrition service referral as needed  Outcome: Progressing     Problem: SKIN/TISSUE INTEGRITY - ADULT  Goal: Skin Integrity remains intact(Skin Breakdown Prevention)  Description: Assess:  -Perform Dhaval assessment every shift  -Clean and moisturize skin every shift  -Inspect skin when repositioning, toileting, and assisting with ADL  -Assess extremities for adequate circulation and sensation     Bed Management:  -Have minimal linens on bed & keep smooth, unwrinkled  -Change linens as needed when moist or perspiring  -Avoid sitting or lying in one position for more than 2 hours while in bed  -Keep HOB at 30degrees     Toileting:  -Offer bedside commode  -Assess for incontinence every 2 hrs  -Use incontinent care products after each incontinent episode such as pad    Activity:  -Mobilize patient 3 times a day  -Encourage activity and walks on unit  -Encourage or provide ROM exercises   -Turn and reposition patient every 2 Hours  -Use appropriate equipment to lift or move patient in bed    -Consider limitation of chair time 2 hour intervals    Skin Care:  -Avoid use of baby powder, tape, friction and shearing, hot water or constrictive clothing    -Do not massage red bony areas    Next Steps:   -Consider consults to  interdisciplinary teams such as PT  Outcome: Progressing  Goal: Incision(s), wounds(s) or drain site(s) healing without S/S of infection  Description: INTERVENTIONS  - Assess and document dressing, incision, wound bed, drain sites and surrounding tissue  - Provide patient and family education  - Perform skin care/dressing changes every shift  Outcome: Progressing  Goal: Pressure injury heals and does not worsen  Description: Interventions:  - Implement low air loss mattress or specialty surface (Criteria met)  - Apply silicone foam dressing  - Instruct/assist with weight shifting every 60 minutes when in chair   - Limit chair time to 2 hour intervals  - Use special pressure reducing interventions such as pad when in chair   - Apply fecal or urinary incontinence containment device     - Turn and reposition patient & offload bony prominences every 2 hours   - Utilize friction reducing device or surface for transfers   - Consider consults to  interdisciplinary teams such as PT  - Use incontinent care products after each incontinent episode such as pad  - Consider nutrition services referral as needed  Outcome: Progressing     Problem: HEMATOLOGIC - ADULT  Goal: Maintains hematologic stability  Description: INTERVENTIONS  - Assess for signs and symptoms of bleeding or hemorrhage  - Monitor labs  - Administer supportive blood products/factors as ordered and appropriate  Outcome: Progressing     Problem: MUSCULOSKELETAL - ADULT  Goal: Maintain or return mobility to safest level of function  Description: INTERVENTIONS:  - Assess patient's ability to carry out ADLs; assess patient's baseline for ADL function and identify physical deficits which impact ability to perform ADLs (bathing, care of mouth/teeth, toileting, grooming, dressing, etc )  - Assess/evaluate cause of self-care deficits   - Assess range of motion  - Assess patient's mobility  - Assess patient's need for assistive devices and provide as appropriate  - Encourage maximum independence but intervene and supervise when necessary  - Involve family in performance of ADLs  - Assess for home care needs following discharge   - Consider OT consult to assist with ADL evaluation and planning for discharge  - Provide patient education as appropriate  Outcome: Progressing  Goal: Maintain proper alignment of affected body part  Description: INTERVENTIONS:  - Support, maintain and protect limb and body alignment  - Provide patient/ family with appropriate education  Outcome: Progressing     Problem: Prexisting or High Potential for Compromised Skin Integrity  Goal: Skin integrity is maintained or improved  Description: INTERVENTIONS:  - Identify patients at risk for skin breakdown  - Assess and monitor skin integrity  - Assess and monitor nutrition and hydration status  - Monitor labs   - Assess for incontinence   - Turn and reposition patient  - Assist with mobility/ambulation  - Relieve pressure over bony prominences  - Avoid friction and shearing  - Provide appropriate hygiene as needed including keeping skin clean and dry  - Evaluate need for skin moisturizer/barrier cream  - Collaborate with interdisciplinary team   - Patient/family teaching  - Consider wound care consult   Outcome: Progressing     Problem: Nutrition/Hydration-ADULT  Goal: Nutrient/Hydration intake appropriate for improving, restoring or maintaining nutritional needs  Description: Monitor and assess patient's nutrition/hydration status for malnutrition  Collaborate with interdisciplinary team and initiate plan and interventions as ordered  Monitor patient's weight and dietary intake as ordered or per policy  Utilize nutrition screening tool and intervene as necessary  Determine patient's food preferences and provide high-protein, high-caloric foods as appropriate       INTERVENTIONS:  - Monitor oral intake, urinary output, labs, and treatment plans  - Assess nutrition and hydration status and recommend course of action  - Evaluate amount of meals eaten  - Assist patient with eating if necessary   - Allow adequate time for meals  - Recommend/ encourage appropriate diets, oral nutritional supplements, and vitamin/mineral supplements  - Order, calculate, and assess calorie counts as needed  - Recommend, monitor, and adjust tube feedings and TPN/PPN based on assessed needs  - Assess need for intravenous fluids  - Provide specific nutrition/hydration education as appropriate  - Include patient/family/caregiver in decisions related to nutrition  Outcome: Progressing     Problem: COPING  Goal: Pt/Family able to verbalize concerns and demonstrate effective coping strategies  Description: INTERVENTIONS:  - Assist patient/family to identify coping skills, available support systems and cultural and spiritual values  - Provide emotional support, including active listening and acknowledgement of concerns of patient and caregivers  - Reduce environmental stimuli, as able  - Provide patient education  - Assess for spiritual pain/suffering and initiate spiritual care, including notification of Pastoral Care or [facility] based community as needed  - Assess effectiveness of coping strategies  Outcome: Progressing     Problem: CONFUSION/THOUGHT DISTURBANCE  Goal: Thought disturbances (confusion, delirium, depression, dementia or psychosis) are managed to maintain or return to baseline mental status and functional level  Description: INTERVENTIONS:  - Assess for possible contributors to  thought disturbance, including but not limited to medications, infection, impaired vision or hearing, underlying metabolic abnormalities, dehydration, respiratory compromise,  psychiatric diagnoses and notify attending PHYSICAN/AP  - Monitor and intervene to maintain adequate nutrition, hydration, elimination, sleep and activity  - Decrease environmental stimuli, including noise as appropriate  - Provide frequent contacts to provide refocusing, direction and reassurance as needed  Approach patient calmly with eye contact and at their level    - [place] high risk fall precautions, aspiration precautions and other safety measures, as indicated  - If delirium suspected, notify [phone] of change in condition and request immediate in-person evaluation  - Pursue consults as appropriate including Geriatric (campus dependent), OT for cognitive evaluation/activity planning, psychiatric, pastoral care, etc   Outcome: Progressing     Problem: SELF HARM  Goal: Effect of psychiatric condition will be minimized and patient will be protected from self harm  Description: INTERVENTIONS:  - Assess impact of patient's symptoms on level of functioning, self-care needs and offer support as indicated  - Assess patient/family knowledge of depression, impact on illness and need for teaching  - Provide emotional support, presence and reassurance  - Assess for possible suicidal thoughts, ideation or self-harm  If patient expresses suicidal thoughts or statements do not leave alone, notify physician/AP immediately, initiate suicide precautions, and determine need for continual observation    - initiate consults and referrals as appropriate (a mental health professional, Spiritual Care  Outcome: Progressing     Problem: SPIRITUAL CARE  Goal: Patient feels balance and connection with others and/or higher power that empowers the self during times of loss, guilt and fear  Description: INTERVENTIONS:  - Create safety for patient through empathic presence and non-judgmental listening  - Encourage patient to explore his/her values, beliefs and/or spiritual images and practices  - Encourage use of breath work, imagery, meditation, relaxation, reiki to ease distress and provide healing  - Encourage use of cultural and spiritual celebrations and rituals  - Facilitate discussion that helps patient sort out spiritual concerns  - Help patient identify where meaning/hope/comfort & strength are in his/her life  - Refer patient to [facility] community for assistance, as appropriate  - Respond to patient/family need for prayer/ritual/sacrament/ceremony  Outcome: Progressing

## 2023-06-11 NOTE — PLAN OF CARE
Problem: MOBILITY - ADULT  Goal: Maintain or return to baseline ADL function  Description: INTERVENTIONS:  -  Assess patient's ability to carry out ADLs; assess patient's baseline for ADL function and identify physical deficits which impact ability to perform ADLs (bathing, care of mouth/teeth, toileting, grooming, dressing, etc )  - Assess/evaluate cause of self-care deficits   - Assess range of motion  - Assess patient's mobility; develop plan if impaired  - Assess patient's need for assistive devices and provide as appropriate  - Encourage maximum independence but intervene and supervise when necessary  - Involve family in performance of ADLs  - Assess for home care needs following discharge   - Consider OT consult to assist with ADL evaluation and planning for discharge  - Provide patient education as appropriate  Outcome: Progressing  Goal: Maintains/Returns to pre admission functional level  Description: INTERVENTIONS:  - Perform BMAT or MOVE assessment daily    - Set and communicate daily mobility goal to care team and patient/family/caregiver  - Collaborate with rehabilitation services on mobility goals if consulted  - Perform Range of Motion 2 times a day  - Reposition patient every 2 hours    - Dangle patient 2 times a day  - Stand patient 2 times a day  - Ambulate patient 2 times a day  - Out of bed to chair 2 times a day   - Out of bed for meals 2 times a day  - Out of bed for toileting  - Record patient progress and toleration of activity level   Outcome: Progressing     Problem: PAIN - ADULT  Goal: Verbalizes/displays adequate comfort level or baseline comfort level  Description: Interventions:  - Encourage patient to monitor pain and request assistance  - Assess pain using appropriate pain scale  - Administer analgesics based on type and severity of pain and evaluate response  - Implement non-pharmacological measures as appropriate and evaluate response  - Consider cultural and social influences on pain and pain management  - Notify physician/advanced practitioner if interventions unsuccessful or patient reports new pain  Outcome: Progressing     Problem: INFECTION - ADULT  Goal: Absence or prevention of progression during hospitalization  Description: INTERVENTIONS:  - Assess and monitor for signs and symptoms of infection  - Monitor lab/diagnostic results  - Monitor all insertion sites, i e  indwelling lines, tubes, and drains  - Monitor endotracheal if appropriate and nasal secretions for changes in amount and color  - [place] appropriate cooling/warming therapies per order  - Administer medications as ordered  - Instruct and encourage patient and family to use good hand hygiene technique  - Identify and instruct in appropriate isolation precautions for identified infection/condition  Outcome: Progressing  Goal: Absence of fever/infection during neutropenic period  Description: INTERVENTIONS:  - Monitor WBC    Outcome: Progressing     Problem: SAFETY ADULT  Goal: Maintain or return to baseline ADL function  Description: INTERVENTIONS:  -  Assess patient's ability to carry out ADLs; assess patient's baseline for ADL function and identify physical deficits which impact ability to perform ADLs (bathing, care of mouth/teeth, toileting, grooming, dressing, etc )  - Assess/evaluate cause of self-care deficits   - Assess range of motion  - Assess patient's mobility; develop plan if impaired  - Assess patient's need for assistive devices and provide as appropriate  - Encourage maximum independence but intervene and supervise when necessary  - Involve family in performance of ADLs  - Assess for home care needs following discharge   - Consider OT consult to assist with ADL evaluation and planning for discharge  - Provide patient education as appropriate  Outcome: Progressing  Goal: Maintains/Returns to pre admission functional level  Description: INTERVENTIONS:  - Perform BMAT or MOVE assessment daily    - Set and communicate daily mobility goal to care team and patient/family/caregiver  - Collaborate with rehabilitation services on mobility goals if consulted  - Perform Range of Motion 2 times a day  - Reposition patient every 2 hours    - Dangle patient 2 times a day  - Stand patient 2 times a day  - Ambulate patient 2 times a day  - Out of bed to chair 2 times a day   - Out of bed for meals 2 times a day  - Out of bed for toileting  - Record patient progress and toleration of activity level   Outcome: Progressing  Goal: Patient will remain free of falls  Description: INTERVENTIONS:  - Educate patient/family on patient safety including physical limitations  - Instruct patient to call for assistance with activity   - Consult OT/PT to assist with strengthening/mobility   - Keep Call bell within reach  - Keep bed low and locked with side rails adjusted as appropriate  - Keep care items and personal belongings within reach  - Initiate and maintain comfort rounds  - Make Fall Risk Sign visible to staff  - Offer Toileting every 3 Hours, in advance of need  - Initiate/Maintain bed alarm  - Apply yellow socks and bracelet for high fall risk patients  - Consider moving patient to room near nurses station  Outcome: Progressing     Problem: DISCHARGE PLANNING  Goal: Discharge to home or other facility with appropriate resources  Description: INTERVENTIONS:  - Identify barriers to discharge w/patient and caregiver  - Arrange for needed discharge resources and transportation as appropriate  - Identify discharge learning needs (meds, wound care, etc )  - Arrange for interpretive services to assist at discharge as needed  - Refer to Case Management Department for coordinating discharge planning if the patient needs post-hospital services based on physician/advanced practitioner order or complex needs related to functional status, cognitive ability, or social support system  Outcome: Progressing     Problem: Knowledge Deficit  Goal: Patient/family/caregiver demonstrates understanding of disease process, treatment plan, medications, and discharge instructions  Description: Complete learning assessment and assess knowledge base  Interventions:  - Provide teaching at level of understanding  - Provide teaching via preferred learning methods  Outcome: Progressing     Problem: NEUROSENSORY - ADULT  Goal: Achieves stable or improved neurological status  Description: INTERVENTIONS  - Monitor and report changes in neurological status  - Monitor vital signs such as temperature, blood pressure, glucose, and any other labs ordered   - Initiate measures to prevent increased intracranial pressure  - Monitor for seizure activity and implement precautions if appropriate      Outcome: Progressing  Goal: Remains free of injury related to seizures activity  Description: INTERVENTIONS  - Maintain airway, patient safety  and administer oxygen as ordered  - Monitor patient for seizure activity, document and report duration and description of seizure to physician/advanced practitioner  - If seizure occurs,  ensure patient safety during seizure  - Reorient patient post seizure  - Seizure pads on all 4 side rails  - Instruct patient/family to notify RN of any seizure activity including if an aura is experienced  - Instruct patient/family to call for assistance with activity based on nursing assessment  - Administer anti-seizure medications if ordered    Outcome: Progressing  Goal: Achieves maximal functionality and self care  Description: INTERVENTIONS  - Monitor swallowing and airway patency with patient fatigue and changes in neurological status  - Encourage and assist patient to increase activity and self care     - Encourage visually impaired, hearing impaired and aphasic patients to use assistive/communication devices  Outcome: Progressing     Problem: RESPIRATORY - ADULT  Goal: Achieves optimal ventilation and oxygenation  Description: INTERVENTIONS:  - Assess for changes in respiratory status  - Assess for changes in mentation and behavior  - Position to facilitate oxygenation and minimize respiratory effort  - Oxygen administered by appropriate delivery if ordered  - Initiate smoking cessation education as indicated  - Encourage broncho-pulmonary hygiene including cough, deep breathe, Incentive Spirometry  - Assess the need for suctioning and aspirate as needed  - Assess and instruct to report SOB or any respiratory difficulty  - Respiratory Therapy support as indicated  Outcome: Progressing     Problem: GASTROINTESTINAL - ADULT  Goal: Minimal or absence of nausea and/or vomiting  Description: INTERVENTIONS:  - Administer IV fluids if ordered to ensure adequate hydration  - Maintain NPO status until nausea and vomiting are resolved  - Nasogastric tube if ordered  - Administer ordered antiemetic medications as needed  - Provide nonpharmacologic comfort measures as appropriate  - Advance diet as tolerated, if ordered  - Consider nutrition services referral to assist patient with adequate nutrition and appropriate food choices  Outcome: Progressing  Goal: Maintains or returns to baseline bowel function  Description: INTERVENTIONS:  - Assess bowel function  - Encourage oral fluids to ensure adequate hydration  - Administer IV fluids if ordered to ensure adequate hydration  - Administer ordered medications as needed  - Encourage mobilization and activity  - Consider nutritional services referral to assist patient with adequate nutrition and appropriate food choices  Outcome: Progressing  Goal: Maintains adequate nutritional intake  Description: INTERVENTIONS:  - Monitor percentage of each meal consumed  - Identify factors contributing to decreased intake, treat as appropriate  - Assist with meals as needed  - Monitor I&O, weight, and lab values if indicated  - Obtain nutrition services referral as needed  Outcome: Progressing  Goal: Establish and maintain optimal ostomy function  Description: INTERVENTIONS:  - Assess bowel function  - Encourage oral fluids to ensure adequate hydration  - Administer IV fluids if ordered to ensure adequate hydration   - Administer ordered medications as needed  - Encourage mobilization and activity  - Nutrition services referral to assist patient with appropriate food choices  - Assess stoma site  - Consider wound care consult   Outcome: Progressing  Goal: Oral mucous membranes remain intact  Description: INTERVENTIONS  - Assess oral mucosa and hygiene practices  - Implement preventative oral hygiene regimen  - Implement oral medicated treatments as ordered  - Initiate Nutrition services referral as needed  Outcome: Progressing     Problem: GENITOURINARY - ADULT  Goal: Maintains or returns to baseline urinary function  Description: INTERVENTIONS:  - Assess urinary function  - Encourage oral fluids to ensure adequate hydration if ordered  - Administer IV fluids as ordered to ensure adequate hydration  - Administer ordered medications as needed  - Offer frequent toileting  - Follow urinary retention protocol if ordered  Outcome: Progressing  Goal: Absence of urinary retention  Description: INTERVENTIONS:  - Assess patient’s ability to void and empty bladder  - Monitor I/O  - Bladder scan as needed  - Discuss with physician/AP medications to alleviate retention as needed  - Discuss catheterization for long term situations as appropriate  Outcome: Progressing     Problem: METABOLIC, FLUID AND ELECTROLYTES - ADULT  Goal: Electrolytes maintained within normal limits  Description: INTERVENTIONS:  - Monitor labs and assess patient for signs and symptoms of electrolyte imbalances  - Administer electrolyte replacement as ordered  - Monitor response to electrolyte replacements, including repeat lab results as appropriate  - Instruct patient on fluid and nutrition as appropriate  Outcome: Progressing  Goal: Fluid balance maintained  Description: INTERVENTIONS:  - Monitor labs   - Monitor I/O and WT  - Instruct patient on fluid and nutrition as appropriate  - Assess for signs & symptoms of volume excess or deficit  Outcome: Progressing  Goal: Glucose maintained within target range  Description: INTERVENTIONS:  - Monitor Blood Glucose as ordered  - Assess for signs and symptoms of hyperglycemia and hypoglycemia  - Administer ordered medications to maintain glucose within target range  - Assess nutritional intake and initiate nutrition service referral as needed  Outcome: Progressing     Problem: SKIN/TISSUE INTEGRITY - ADULT  Goal: Skin Integrity remains intact(Skin Breakdown Prevention)  Description: Assess:  -Perform Dhaval assessment every    -Clean and moisturize skin every    -Inspect skin when repositioning, toileting, and assisting with ADLS  -Assess under medical devices such as   every    -Assess extremities for adequate circulation and sensation     Bed Management:  -Have minimal linens on bed & keep smooth, unwrinkled  -Change linens as needed when moist or perspiring  -Avoid sitting or lying in one position for more than   hours while in bed  -Keep HOB at  degrees     Toileting:  -Offer bedside commode  -Assess for incontinence every   -Use incontinent care products after each incontinent episode such as   Activity:  -Mobilize patient   times a day  -Encourage activity and walks on unit  -Encourage or provide ROM exercises   -Turn and reposition patient every   Hours  -Use appropriate equipment to lift or move patient in bed  -Instruct/ Assist with weight shifting every   when out of bed in chair  -Consider limitation of chair time    hour intervals    Skin Care:  -Avoid use of baby powder, tape, friction and shearing, hot water or constrictive clothing  -Relieve pressure over bony prominences using    -Do not massage red bony areas    Next Steps:  -Teach patient strategies to minimize risks such as     -Consider consults to  interdisciplinary teams such as   Outcome: Progressing  Goal: Incision(s), wounds(s) or drain site(s) healing without S/S of infection  Description: INTERVENTIONS  - Assess and document dressing, incision, wound bed, drain sites and surrounding tissue  - Provide patient and family education  - Perform skin care/dressing changes every   Outcome: Progressing  Goal: Pressure injury heals and does not worsen  Description: Interventions:  - Implement low air loss mattress or specialty surface (Criteria met)  - Apply silicone foam dressing  - Instruct/assist with weight shifting every   minutes when in chair   - Limit chair time to   hour intervals  - Use special pressure reducing interventions such as   when in chair   - Apply fecal or urinary incontinence containment device   - Perform passive or active ROM every   - Turn and reposition patient & offload bony prominences every    hours   - Utilize friction reducing device or surface for transfers   - Consider consults to  interdisciplinary teams such as    - Use incontinent care products after each incontinent episode such as    - Consider nutrition services referral as needed  Outcome: Progressing     Problem: HEMATOLOGIC - ADULT  Goal: Maintains hematologic stability  Description: INTERVENTIONS  - Assess for signs and symptoms of bleeding or hemorrhage  - Monitor labs  - Administer supportive blood products/factors as ordered and appropriate  Outcome: Progressing     Problem: MUSCULOSKELETAL - ADULT  Goal: Maintain or return mobility to safest level of function  Description: INTERVENTIONS:  - Assess patient's ability to carry out ADLs; assess patient's baseline for ADL function and identify physical deficits which impact ability to perform ADLs (bathing, care of mouth/teeth, toileting, grooming, dressing, etc )  - Assess/evaluate cause of self-care deficits   - Assess range of motion  - Assess patient's mobility  - Assess patient's need for assistive devices and provide as appropriate  - Encourage maximum independence but intervene and supervise when necessary  - Involve family in performance of ADLs  - Assess for home care needs following discharge   - Consider OT consult to assist with ADL evaluation and planning for discharge  - Provide patient education as appropriate  Outcome: Progressing  Goal: Maintain proper alignment of affected body part  Description: INTERVENTIONS:  - Support, maintain and protect limb and body alignment  - Provide patient/ family with appropriate education  Outcome: Progressing     Problem: Prexisting or High Potential for Compromised Skin Integrity  Goal: Skin integrity is maintained or improved  Description: INTERVENTIONS:  - Identify patients at risk for skin breakdown  - Assess and monitor skin integrity  - Assess and monitor nutrition and hydration status  - Monitor labs   - Assess for incontinence   - Turn and reposition patient  - Assist with mobility/ambulation  - Relieve pressure over bony prominences  - Avoid friction and shearing  - Provide appropriate hygiene as needed including keeping skin clean and dry  - Evaluate need for skin moisturizer/barrier cream  - Collaborate with interdisciplinary team   - Patient/family teaching  - Consider wound care consult   Outcome: Progressing     Problem: Nutrition/Hydration-ADULT  Goal: Nutrient/Hydration intake appropriate for improving, restoring or maintaining nutritional needs  Description: Monitor and assess patient's nutrition/hydration status for malnutrition  Collaborate with interdisciplinary team and initiate plan and interventions as ordered  Monitor patient's weight and dietary intake as ordered or per policy  Utilize nutrition screening tool and intervene as necessary  Determine patient's food preferences and provide high-protein, high-caloric foods as appropriate       INTERVENTIONS:  - Monitor oral intake, urinary output, labs, and treatment plans  - Assess nutrition and hydration status and recommend course of action  - Evaluate amount of meals eaten  - Assist patient with eating if necessary   - Allow adequate time for meals  - Recommend/ encourage appropriate diets, oral nutritional supplements, and vitamin/mineral supplements  - Order, calculate, and assess calorie counts as needed  - Recommend, monitor, and adjust tube feedings and TPN/PPN based on assessed needs  - Assess need for intravenous fluids  - Provide specific nutrition/hydration education as appropriate  - Include patient/family/caregiver in decisions related to nutrition  Outcome: Progressing     Problem: COPING  Goal: Pt/Family able to verbalize concerns and demonstrate effective coping strategies  Description: INTERVENTIONS:  - Assist patient/family to identify coping skills, available support systems and cultural and spiritual values  - Provide emotional support, including active listening and acknowledgement of concerns of patient and caregivers  - Reduce environmental stimuli, as able  - Provide patient education  - Assess for spiritual pain/suffering and initiate spiritual care, including notification of Pastoral Care or [facility] based community as needed  - Assess effectiveness of coping strategies  Outcome: Progressing     Problem: CONFUSION/THOUGHT DISTURBANCE  Goal: Thought disturbances (confusion, delirium, depression, dementia or psychosis) are managed to maintain or return to baseline mental status and functional level  Description: INTERVENTIONS:  - Assess for possible contributors to  thought disturbance, including but not limited to medications, infection, impaired vision or hearing, underlying metabolic abnormalities, dehydration, respiratory compromise,  psychiatric diagnoses and notify attending PHYSICAN/AP  - Monitor and intervene to maintain adequate nutrition, hydration, elimination, sleep and activity  - Decrease environmental stimuli, including noise as appropriate    - Provide frequent contacts to provide refocusing, direction and reassurance as needed  Approach patient calmly with eye contact and at their level  - [place] high risk fall precautions, aspiration precautions and other safety measures, as indicated  - If delirium suspected, notify physician/AP of change in condition and request immediate in-person evaluation  - Pursue consults as appropriate including Geriatric (campus dependent), OT for cognitive evaluation/activity planning, psychiatric, pastoral care, etc   Outcome: Progressing     Problem: SELF HARM  Goal: Effect of psychiatric condition will be minimized and patient will be protected from self harm  Description: INTERVENTIONS:  - Assess impact of patient's symptoms on level of functioning, self-care needs and offer support as indicated  - Assess patient/family knowledge of depression, impact on illness and need for teaching  - Provide emotional support, presence and reassurance  - Assess for possible suicidal thoughts, ideation or self-harm  If patient expresses suicidal thoughts or statements do not leave alone, notify physician/AP immediately, initiate suicide precautions, and determine need for continual observation    - initiate consults and referrals as appropriate (a mental health professional, Spiritual Care  Outcome: Progressing     Problem: SPIRITUAL CARE  Goal: Patient feels balance and connection with others and/or higher power that empowers the self during times of loss, guilt and fear  Description: INTERVENTIONS:  - Create safety for patient through empathic presence and non-judgmental listening  - Encourage patient to explore his/her values, beliefs and/or spiritual images and practices  - Encourage use of breath work, imagery, meditation, relaxation, reiki to ease distress and provide healing  - Encourage use of cultural and spiritual celebrations and rituals  - Facilitate discussion that helps patient sort out spiritual concerns  - Help patient identify where meaning/hope/comfort & strength are in his/her life  - Refer patient to [facility] community for assistance, as appropriate  - Respond to patient/family need for prayer/ritual/sacrament/ceremony  Outcome: Progressing

## 2023-06-11 NOTE — ASSESSMENT & PLAN NOTE
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-she wants to eat regular food, when was still that we are giving  But for safety, patient started screaming and banging her head with her hand  Per nurse's note, overnight patient was screaming, choking herself, and saying she was going to kill herself  She was also trying to bite herself and  punching herself in the head and stomach  Psychiatry consult appreciated, dose adjusted  Maintain sleep awake psych-patient awake during daytime,  Patient is much calm and cooperative today-son on the bedside  As per son, patient mood fluctuates-when patient gets anxious or frustrated, patient can tap her head toward her stomach which is normal for patient per son  As per nurse documentation, yesterday and overnight, patient was much calmer compared to previous days  Patient was taking Seroquel 100 mg at 10 PM, and Seroquel 25 mg at 5 PM -We will adjust the timing and give in total of 125 mg of Seroquel at 5 PM-and observe the response

## 2023-06-12 PROCEDURE — 99232 SBSQ HOSP IP/OBS MODERATE 35: CPT | Performed by: FAMILY MEDICINE

## 2023-06-12 PROCEDURE — 92526 ORAL FUNCTION THERAPY: CPT

## 2023-06-12 RX ADMIN — BUSPIRONE HYDROCHLORIDE 5 MG: 5 TABLET ORAL at 17:09

## 2023-06-12 RX ADMIN — SERTRALINE 100 MG: 100 TABLET, FILM COATED ORAL at 09:22

## 2023-06-12 RX ADMIN — ASPIRIN 81 MG: 81 TABLET, COATED ORAL at 09:22

## 2023-06-12 RX ADMIN — FAMOTIDINE 20 MG: 20 TABLET ORAL at 09:22

## 2023-06-12 RX ADMIN — QUETIAPINE FUMARATE 125 MG: 100 TABLET ORAL at 17:09

## 2023-06-12 RX ADMIN — DIVALPROEX SODIUM 500 MG: 125 CAPSULE, COATED PELLETS ORAL at 21:01

## 2023-06-12 RX ADMIN — TIMOLOL MALEATE 1 DROP: 5 SOLUTION OPHTHALMIC at 17:10

## 2023-06-12 RX ADMIN — TRIMETHOBENZAMIDE HYDROCHLORIDE 100 MG: 100 INJECTION INTRAMUSCULAR at 21:50

## 2023-06-12 RX ADMIN — TIMOLOL MALEATE 1 DROP: 5 SOLUTION OPHTHALMIC at 09:30

## 2023-06-12 RX ADMIN — ATORVASTATIN CALCIUM 40 MG: 40 TABLET, FILM COATED ORAL at 17:09

## 2023-06-12 RX ADMIN — LATANOPROST 1 DROP: 50 SOLUTION OPHTHALMIC at 21:02

## 2023-06-12 RX ADMIN — BUSPIRONE HYDROCHLORIDE 5 MG: 5 TABLET ORAL at 09:22

## 2023-06-12 RX ADMIN — ENOXAPARIN SODIUM 40 MG: 40 INJECTION SUBCUTANEOUS at 09:29
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
## 2023-06-12 NOTE — PLAN OF CARE
Problem: MOBILITY - ADULT  Goal: Maintain or return to baseline ADL function  Description: INTERVENTIONS:  -  Assess patient's ability to carry out ADLs; assess patient's baseline for ADL function and identify physical deficits which impact ability to perform ADLs (bathing, care of mouth/teeth, toileting, grooming, dressing, etc )  - Assess/evaluate cause of self-care deficits   - Assess range of motion  - Assess patient's mobility; develop plan if impaired  - Assess patient's need for assistive devices and provide as appropriate  - Encourage maximum independence but intervene and supervise when necessary  - Involve family in performance of ADLs  - Assess for home care needs following discharge   - Consider OT consult to assist with ADL evaluation and planning for discharge  - Provide patient education as appropriate  Outcome: Progressing  Goal: Maintains/Returns to pre admission functional level  Description: INTERVENTIONS:  - Perform BMAT or MOVE assessment daily    - Set and communicate daily mobility goal to care team and patient/family/caregiver  - Collaborate with rehabilitation services on mobility goals if consulted  - Perform Range of Motion 2 times a day  - Reposition patient every 2 hours    - Dangle patient 2 times a day  - Stand patient 2 times a day  - Ambulate patient 2 times a day  - Out of bed to chair 2 times a day   - Out of bed for meals 2 times a day  - Out of bed for toileting  - Record patient progress and toleration of activity level   Outcome: Progressing     Problem: PAIN - ADULT  Goal: Verbalizes/displays adequate comfort level or baseline comfort level  Description: Interventions:  - Encourage patient to monitor pain and request assistance  - Assess pain using appropriate pain scale  - Administer analgesics based on type and severity of pain and evaluate response  - Implement non-pharmacological measures as appropriate and evaluate response  - Consider cultural and social influences on pain and pain management  - Notify physician/advanced practitioner if interventions unsuccessful or patient reports new pain  Outcome: Progressing     Problem: INFECTION - ADULT  Goal: Absence or prevention of progression during hospitalization  Description: INTERVENTIONS:  - Assess and monitor for signs and symptoms of infection  - Monitor lab/diagnostic results  - Monitor all insertion sites, i e  indwelling lines, tubes, and drains  - Monitor endotracheal if appropriate and nasal secretions for changes in amount and color  - [place] appropriate cooling/warming therapies per order  - Administer medications as ordered  - Instruct and encourage patient and family to use good hand hygiene technique  - Identify and instruct in appropriate isolation precautions for identified infection/condition  Outcome: Progressing  Goal: Absence of fever/infection during neutropenic period  Description: INTERVENTIONS:  - Monitor WBC    Outcome: Progressing     Problem: SAFETY ADULT  Goal: Maintain or return to baseline ADL function  Description: INTERVENTIONS:  -  Assess patient's ability to carry out ADLs; assess patient's baseline for ADL function and identify physical deficits which impact ability to perform ADLs (bathing, care of mouth/teeth, toileting, grooming, dressing, etc )  - Assess/evaluate cause of self-care deficits   - Assess range of motion  - Assess patient's mobility; develop plan if impaired  - Assess patient's need for assistive devices and provide as appropriate  - Encourage maximum independence but intervene and supervise when necessary  - Involve family in performance of ADLs  - Assess for home care needs following discharge   - Consider OT consult to assist with ADL evaluation and planning for discharge  - Provide patient education as appropriate  Outcome: Progressing  Goal: Maintains/Returns to pre admission functional level  Description: INTERVENTIONS:  - Perform BMAT or MOVE assessment daily    - Set and communicate daily mobility goal to care team and patient/family/caregiver  - Collaborate with rehabilitation services on mobility goals if consulted  - Perform Range of Motion 2 times a day  - Reposition patient every 2 hours    - Dangle patient 2 times a day  - Stand patient 2 times a day  - Ambulate patient 2 times a day  - Out of bed to chair 2 times a day   - Out of bed for meals 2 times a day  - Out of bed for toileting  - Record patient progress and toleration of activity level   Outcome: Progressing  Goal: Patient will remain free of falls  Description: INTERVENTIONS:  - Educate patient/family on patient safety including physical limitations  - Instruct patient to call for assistance with activity   - Consult OT/PT to assist with strengthening/mobility   - Keep Call bell within reach  - Keep bed low and locked with side rails adjusted as appropriate  - Keep care items and personal belongings within reach  - Initiate and maintain comfort rounds  - Make Fall Risk Sign visible to staff  - Offer Toileting every 3 Hours, in advance of need  - Initiate/Maintain bed alarm  - Apply yellow socks and bracelet for high fall risk patients  - Consider moving patient to room near nurses station  Outcome: Progressing     Problem: DISCHARGE PLANNING  Goal: Discharge to home or other facility with appropriate resources  Description: INTERVENTIONS:  - Identify barriers to discharge w/patient and caregiver  - Arrange for needed discharge resources and transportation as appropriate  - Identify discharge learning needs (meds, wound care, etc )  - Arrange for interpretive services to assist at discharge as needed  - Refer to Case Management Department for coordinating discharge planning if the patient needs post-hospital services based on physician/advanced practitioner order or complex needs related to functional status, cognitive ability, or social support system  Outcome: Progressing     Problem: Knowledge Deficit  Goal: Patient/family/caregiver demonstrates understanding of disease process, treatment plan, medications, and discharge instructions  Description: Complete learning assessment and assess knowledge base  Interventions:  - Provide teaching at level of understanding  - Provide teaching via preferred learning methods  Outcome: Progressing     Problem: NEUROSENSORY - ADULT  Goal: Achieves stable or improved neurological status  Description: INTERVENTIONS  - Monitor and report changes in neurological status  - Monitor vital signs such as temperature, blood pressure, glucose, and any other labs ordered   - Initiate measures to prevent increased intracranial pressure  - Monitor for seizure activity and implement precautions if appropriate      Outcome: Progressing  Goal: Remains free of injury related to seizures activity  Description: INTERVENTIONS  - Maintain airway, patient safety  and administer oxygen as ordered  - Monitor patient for seizure activity, document and report duration and description of seizure to physician/advanced practitioner  - If seizure occurs,  ensure patient safety during seizure  - Reorient patient post seizure  - Seizure pads on all 4 side rails  - Instruct patient/family to notify RN of any seizure activity including if an aura is experienced  - Instruct patient/family to call for assistance with activity based on nursing assessment  - Administer anti-seizure medications if ordered    Outcome: Progressing  Goal: Achieves maximal functionality and self care  Description: INTERVENTIONS  - Monitor swallowing and airway patency with patient fatigue and changes in neurological status  - Encourage and assist patient to increase activity and self care     - Encourage visually impaired, hearing impaired and aphasic patients to use assistive/communication devices  Outcome: Progressing     Problem: RESPIRATORY - ADULT  Goal: Achieves optimal ventilation and oxygenation  Description: INTERVENTIONS:  - Assess for changes in respiratory status  - Assess for changes in mentation and behavior  - Position to facilitate oxygenation and minimize respiratory effort  - Oxygen administered by appropriate delivery if ordered  - Initiate smoking cessation education as indicated  - Encourage broncho-pulmonary hygiene including cough, deep breathe, Incentive Spirometry  - Assess the need for suctioning and aspirate as needed  - Assess and instruct to report SOB or any respiratory difficulty  - Respiratory Therapy support as indicated  Outcome: Progressing     Problem: GASTROINTESTINAL - ADULT  Goal: Minimal or absence of nausea and/or vomiting  Description: INTERVENTIONS:  - Administer IV fluids if ordered to ensure adequate hydration  - Maintain NPO status until nausea and vomiting are resolved  - Nasogastric tube if ordered  - Administer ordered antiemetic medications as needed  - Provide nonpharmacologic comfort measures as appropriate  - Advance diet as tolerated, if ordered  - Consider nutrition services referral to assist patient with adequate nutrition and appropriate food choices  Outcome: Progressing  Goal: Maintains or returns to baseline bowel function  Description: INTERVENTIONS:  - Assess bowel function  - Encourage oral fluids to ensure adequate hydration  - Administer IV fluids if ordered to ensure adequate hydration  - Administer ordered medications as needed  - Encourage mobilization and activity  - Consider nutritional services referral to assist patient with adequate nutrition and appropriate food choices  Outcome: Progressing  Goal: Maintains adequate nutritional intake  Description: INTERVENTIONS:  - Monitor percentage of each meal consumed  - Identify factors contributing to decreased intake, treat as appropriate  - Assist with meals as needed  - Monitor I&O, weight, and lab values if indicated  - Obtain nutrition services referral as needed  Outcome: Progressing  Goal: Establish and maintain optimal ostomy function  Description: INTERVENTIONS:  - Assess bowel function  - Encourage oral fluids to ensure adequate hydration  - Administer IV fluids if ordered to ensure adequate hydration   - Administer ordered medications as needed  - Encourage mobilization and activity  - Nutrition services referral to assist patient with appropriate food choices  - Assess stoma site  - Consider wound care consult   Outcome: Progressing  Goal: Oral mucous membranes remain intact  Description: INTERVENTIONS  - Assess oral mucosa and hygiene practices  - Implement preventative oral hygiene regimen  - Implement oral medicated treatments as ordered  - Initiate Nutrition services referral as needed  Outcome: Progressing     Problem: GENITOURINARY - ADULT  Goal: Maintains or returns to baseline urinary function  Description: INTERVENTIONS:  - Assess urinary function  - Encourage oral fluids to ensure adequate hydration if ordered  - Administer IV fluids as ordered to ensure adequate hydration  - Administer ordered medications as needed  - Offer frequent toileting  - Follow urinary retention protocol if ordered  Outcome: Progressing  Goal: Absence of urinary retention  Description: INTERVENTIONS:  - Assess patient’s ability to void and empty bladder  - Monitor I/O  - Bladder scan as needed  - Discuss with physician/AP medications to alleviate retention as needed  - Discuss catheterization for long term situations as appropriate  Outcome: Progressing     Problem: METABOLIC, FLUID AND ELECTROLYTES - ADULT  Goal: Electrolytes maintained within normal limits  Description: INTERVENTIONS:  - Monitor labs and assess patient for signs and symptoms of electrolyte imbalances  - Administer electrolyte replacement as ordered  - Monitor response to electrolyte replacements, including repeat lab results as appropriate  - Instruct patient on fluid and nutrition as appropriate  Outcome: Progressing  Goal: Fluid balance maintained  Description: INTERVENTIONS:  - Monitor labs   - Monitor I/O and WT  - Instruct patient on fluid and nutrition as appropriate  - Assess for signs & symptoms of volume excess or deficit  Outcome: Progressing  Goal: Glucose maintained within target range  Description: INTERVENTIONS:  - Monitor Blood Glucose as ordered  - Assess for signs and symptoms of hyperglycemia and hypoglycemia  - Administer ordered medications to maintain glucose within target range  - Assess nutritional intake and initiate nutrition service referral as needed  Outcome: Progressing     Problem: SKIN/TISSUE INTEGRITY - ADULT  Goal: Skin Integrity remains intact(Skin Breakdown Prevention)  Description: Assess:  -Perform Dhaval assessment every    -Clean and moisturize skin every    -Inspect skin when repositioning, toileting, and assisting with ADLS  -Assess under medical devices such as   every    -Assess extremities for adequate circulation and sensation     Bed Management:  -Have minimal linens on bed & keep smooth, unwrinkled  -Change linens as needed when moist or perspiring  -Avoid sitting or lying in one position for more than   hours while in bed  -Keep HOB at  degrees     Toileting:  -Offer bedside commode  -Assess for incontinence every   -Use incontinent care products after each incontinent episode such as   Activity:  -Mobilize patient   times a day  -Encourage activity and walks on unit  -Encourage or provide ROM exercises   -Turn and reposition patient every   Hours  -Use appropriate equipment to lift or move patient in bed  -Instruct/ Assist with weight shifting every   when out of bed in chair  -Consider limitation of chair time    hour intervals    Skin Care:  -Avoid use of baby powder, tape, friction and shearing, hot water or constrictive clothing  -Relieve pressure over bony prominences using    -Do not massage red bony areas    Next Steps:  -Teach patient strategies to minimize risks such as     -Consider consults to  interdisciplinary teams such as   Outcome: Progressing  Goal: Incision(s), wounds(s) or drain site(s) healing without S/S of infection  Description: INTERVENTIONS  - Assess and document dressing, incision, wound bed, drain sites and surrounding tissue  - Provide patient and family education  - Perform skin care/dressing changes every   Outcome: Progressing  Goal: Pressure injury heals and does not worsen  Description: Interventions:  - Implement low air loss mattress or specialty surface (Criteria met)  - Apply silicone foam dressing  - Instruct/assist with weight shifting every   minutes when in chair   - Limit chair time to   hour intervals  - Use special pressure reducing interventions such as   when in chair   - Apply fecal or urinary incontinence containment device   - Perform passive or active ROM every   - Turn and reposition patient & offload bony prominences every    hours   - Utilize friction reducing device or surface for transfers   - Consider consults to  interdisciplinary teams such as    - Use incontinent care products after each incontinent episode such as    - Consider nutrition services referral as needed  Outcome: Progressing     Problem: HEMATOLOGIC - ADULT  Goal: Maintains hematologic stability  Description: INTERVENTIONS  - Assess for signs and symptoms of bleeding or hemorrhage  - Monitor labs  - Administer supportive blood products/factors as ordered and appropriate  Outcome: Progressing     Problem: MUSCULOSKELETAL - ADULT  Goal: Maintain or return mobility to safest level of function  Description: INTERVENTIONS:  - Assess patient's ability to carry out ADLs; assess patient's baseline for ADL function and identify physical deficits which impact ability to perform ADLs (bathing, care of mouth/teeth, toileting, grooming, dressing, etc )  - Assess/evaluate cause of self-care deficits   - Assess range of motion  - Assess patient's mobility  - Assess patient's need for assistive devices and provide as appropriate  - Encourage maximum independence but intervene and supervise when necessary  - Involve family in performance of ADLs  - Assess for home care needs following discharge   - Consider OT consult to assist with ADL evaluation and planning for discharge  - Provide patient education as appropriate  Outcome: Progressing  Goal: Maintain proper alignment of affected body part  Description: INTERVENTIONS:  - Support, maintain and protect limb and body alignment  - Provide patient/ family with appropriate education  Outcome: Progressing     Problem: Prexisting or High Potential for Compromised Skin Integrity  Goal: Skin integrity is maintained or improved  Description: INTERVENTIONS:  - Identify patients at risk for skin breakdown  - Assess and monitor skin integrity  - Assess and monitor nutrition and hydration status  - Monitor labs   - Assess for incontinence   - Turn and reposition patient  - Assist with mobility/ambulation  - Relieve pressure over bony prominences  - Avoid friction and shearing  - Provide appropriate hygiene as needed including keeping skin clean and dry  - Evaluate need for skin moisturizer/barrier cream  - Collaborate with interdisciplinary team   - Patient/family teaching  - Consider wound care consult   Outcome: Progressing     Problem: Nutrition/Hydration-ADULT  Goal: Nutrient/Hydration intake appropriate for improving, restoring or maintaining nutritional needs  Description: Monitor and assess patient's nutrition/hydration status for malnutrition  Collaborate with interdisciplinary team and initiate plan and interventions as ordered  Monitor patient's weight and dietary intake as ordered or per policy  Utilize nutrition screening tool and intervene as necessary  Determine patient's food preferences and provide high-protein, high-caloric foods as appropriate       INTERVENTIONS:  - Monitor oral intake, urinary output, labs, and treatment plans  - Assess nutrition and hydration status and recommend course of action  - Evaluate amount of meals eaten  - Assist patient with eating if necessary   - Allow adequate time for meals  - Recommend/ encourage appropriate diets, oral nutritional supplements, and vitamin/mineral supplements  - Order, calculate, and assess calorie counts as needed  - Recommend, monitor, and adjust tube feedings and TPN/PPN based on assessed needs  - Assess need for intravenous fluids  - Provide specific nutrition/hydration education as appropriate  - Include patient/family/caregiver in decisions related to nutrition  Outcome: Progressing     Problem: COPING  Goal: Pt/Family able to verbalize concerns and demonstrate effective coping strategies  Description: INTERVENTIONS:  - Assist patient/family to identify coping skills, available support systems and cultural and spiritual values  - Provide emotional support, including active listening and acknowledgement of concerns of patient and caregivers  - Reduce environmental stimuli, as able  - Provide patient education  - Assess for spiritual pain/suffering and initiate spiritual care, including notification of Pastoral Care or [facility] based community as needed  - Assess effectiveness of coping strategies  Outcome: Progressing     Problem: CONFUSION/THOUGHT DISTURBANCE  Goal: Thought disturbances (confusion, delirium, depression, dementia or psychosis) are managed to maintain or return to baseline mental status and functional level  Description: INTERVENTIONS:  - Assess for possible contributors to  thought disturbance, including but not limited to medications, infection, impaired vision or hearing, underlying metabolic abnormalities, dehydration, respiratory compromise,  psychiatric diagnoses and notify attending PHYSICAN/AP  - Monitor and intervene to maintain adequate nutrition, hydration, elimination, sleep and activity  - Decrease environmental stimuli, including noise as appropriate    - Provide frequent contacts to provide refocusing, direction and reassurance as needed  Approach patient calmly with eye contact and at their level  - [place] high risk fall precautions, aspiration precautions and other safety measures, as indicated  - If delirium suspected, notify physician/AP of change in condition and request immediate in-person evaluation  - Pursue consults as appropriate including Geriatric (campus dependent), OT for cognitive evaluation/activity planning, psychiatric, pastoral care, etc   Outcome: Progressing     Problem: SELF HARM  Goal: Effect of psychiatric condition will be minimized and patient will be protected from self harm  Description: INTERVENTIONS:  - Assess impact of patient's symptoms on level of functioning, self-care needs and offer support as indicated  - Assess patient/family knowledge of depression, impact on illness and need for teaching  - Provide emotional support, presence and reassurance  - Assess for possible suicidal thoughts, ideation or self-harm  If patient expresses suicidal thoughts or statements do not leave alone, notify physician/AP immediately, initiate suicide precautions, and determine need for continual observation    - initiate consults and referrals as appropriate (a mental health professional, Spiritual Care  Outcome: Progressing     Problem: SPIRITUAL CARE  Goal: Patient feels balance and connection with others and/or higher power that empowers the self during times of loss, guilt and fear  Description: INTERVENTIONS:  - Create safety for patient through empathic presence and non-judgmental listening  - Encourage patient to explore his/her values, beliefs and/or spiritual images and practices  - Encourage use of breath work, imagery, meditation, relaxation, reiki to ease distress and provide healing  - Encourage use of cultural and spiritual celebrations and rituals  - Facilitate discussion that helps patient sort out spiritual concerns  - Help patient identify where meaning/hope/comfort & strength are in his/her life  - Refer patient to [facility] community for assistance, as appropriate  - Respond to patient/family need for prayer/ritual/sacrament/ceremony  Outcome: Progressing

## 2023-06-12 NOTE — PROGRESS NOTES
[address]  Progress Note  Name: Shasha Randolph  MRN: [id number]  Unit/Bed#: -01 I Date of Admission: 5/28/2023   Date of Service: 6/12/2023  Hospital Day: 15    Assessment/Plan   Paranoia (psychosis) [facility])  Assessment & Plan  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-she wants to eat regular food, when was still that we are giving  But for safety, patient started screaming and banging her head with her hand  Per nurse's note, overnight patient was screaming, choking herself, and saying she was going to kill herself  She was also trying to bite herself and  punching herself in the head and stomach  Psychiatry consult appreciated, dose adjusted  Maintain sleep awake psych-patient awake during daytime,  Patient is much calm and cooperative today-son on the bedside  As per son, patient mood fluctuates-when patient gets anxious or frustrated, patient can tap her head toward her stomach which is normal for patient per son  Medication dose adjusted as per psychiatry recommendation, also medication timing adjusted to help patient fall asleep at nighttime  Patient is still remaining paranoia, blaming staff says that there is telling her baby and trying to kill the baby  Also blaming the staff that there trying to hit her    Continue one-to-one observation at this time  36 signed for inpatient psych placement as per psychiatry recommendation      Severe vascular dementia with psychotic disturbance ([facility] [address] )  Assessment & Plan  · Patient discharged from outside hospital earlier in day and 5/28 after admission for vomiting  · Evaluated by psychiatry as she was transferred from the behavioral health unit to [facility] for the vomiting, son was requesting hospice and took the patient home with 24/7 care provided by family while awaiting services to be arranged  Review of the discharge summary the internal medicine team and psychiatry team voiced their concerns regarding the son taking the patient home but he was adamant  · Patient was brought to this facility on the same day as discharge from previous facility  Son stated to ER staff he cannot care for patient at home  · Evaluated by psychiatry on 5/30 and deemed to lack appropriate judgement and is danger to self and needs inpatient psychiatric treatment, patient with advanced dementia do not think will be able to sign 201- 302 has been signed  · Also try to wrap a call bell around her neck to commit suicide she is expressing suicidal ideations now patient has been 302   · Patient is on a one-to-one for safety reasons  · [facility] declined the patient stating that she has vascular dementia and there is nothing they can do from psychiatric point of view  Medication dose adjusted as per psychiatry recommendation-psychiatry also recommending inpatient psych placement  As per nurse documentation, yesterday and overnight, patient was much calmer compared to previous days  Patient was taking Seroquel 100 mg at 10 PM, and Seroquel 25 mg at 5 PM -We will adjust the timing and give in total of 125 mg of Seroquel at 5 PM-and observe the response  Patient is still remained awake till early morning, then fall asleep at early morning    Encouraged staff to keep patient awake during daytime as much as possible        * Encephalopathy acute  Assessment & Plan  · On severe vascular dementia POA initially was psychotic features- but since seeing her yesterday and today she is somnolent poor po intake will wake up to name and then falls asleep  · Ct brain neg · No infection evident on admission  · depakote level checked nml therapeutic   · No prns overnight   · tsh nml in beg of may 5/19   · Electrolytes stable  Patient is still remains significantly delirious  Blaming PCA for stealing her baby  Also blaming PCA that PCA was trying to hurt her  Patient was tapping her head-because she wants to get rid of some ideas from her head  Psychiatric medication adjusted as per psychiatry recommendation  Continue current treatment  Maintain sleep awake cycle (patient sleeps all day, and awake all night)-advised the nurse and PCA to keep patient awake during the daytime  As per nurse documentation, yesterday and overnight, patient was much calmer compared to previous days  Patient was taking Seroquel 100 mg at 10 PM, and Seroquel 25 mg at 5 PM -We will adjust the timing and give in total of 125 mg of Seroquel at 5 PM    Chronic anemia  Assessment & Plan  · Hb has been fluctuating since beg of may when she was admitted to [facility] anywhere between 9 8-11  · Pt hb has remained stable at 10 2 for 2 consecutive times - no evidence of gross bleed no hematemesis and no melena no hematochezia  · She has poor nutritional aspect that can contribute to anemia   · She needs no scopes or transfusion at this time as there is no evidence of gross bleed hb stable for 2 days and in her range of base    Status post CVA  Assessment & Plan  · 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 and dysphagia  · Was discharged to SNF then sent to inpatient geropsych, unclear extent of physical rehabilitation patient received or was amenable to participate  · Continue puréed diet with nectar thick liquids  · Appreciate PT/OT/case management- needs inpatient psych   · Continue aspirin and statin             VTE Pharmacologic Prophylaxis: VTE Score: 3 Moderate Risk (Score 3-4) - Pharmacological DVT Prophylaxis Ordered: enoxaparin (Lovenox)      Patient Centered Rounds: I performed bedside rounds with nursing staff today  Discussions with Specialists or Other Care Team Provider: None    Education and Discussions with Family / Patient: Updated  (son) via phone  Total Time Spent on Date of Encounter in care of patient: 10 minutes This time was spent on one or more of the following: performing physical exam; counseling and coordination of care; obtaining or reviewing history; documenting in the medical record; reviewing/ordering tests, medications or procedures; communicating with other healthcare professionals and discussing with patient's family/caregivers  Current Length of Stay: 15 day(s)  Current Patient Status: Inpatient   Certification Statement: The patient will continue to require additional inpatient hospital stay due to To monitor above conditions  Discharge Plan: To be determined    Code Status: Level 3 - DNAR and DNI    Subjective:   Seen and evaluated and examined  Patient remained awake and arousable, answer simple's questions  As per nursing note, patient was awake till early morning and then fell asleep  Objective:     Vitals:   Temp (24hrs), Av 8 °F (36 6 °C), Min:97 7 °F (36 5 °C), Max:97 9 °F (36 6 °C)    Temp:  [97 7 °F (36 5 °C)-97 9 °F (36 6 °C)] 97 8 °F (36 6 °C)  HR:  [58-82] 82  Resp:  [14-18] 18  BP: (110-167)/(52-76) 167/76  SpO2:  [94 %-97 %] 94 %  Body mass index is 23 58 kg/m²  Input and Output Summary (last 24 hours): Intake/Output Summary (Last 24 hours) at 2023 1034  Last data filed at 2023 0122  Gross per 24 hour   Intake 720 ml   Output 616 ml   Net 104 ml       Physical Exam:   Physical Exam  Vitals and nursing note reviewed  Constitutional:       Appearance: She is not diaphoretic  Eyes:      General: No scleral icterus  Left eye: No discharge  Extraocular Movements: Extraocular movements intact        Conjunctiva/sclera: Conjunctivae normal       Pupils: Pupils are equal, round, and reactive to light  Cardiovascular:      Rate and Rhythm: Normal rate  Heart sounds: Murmur heard  No gallop  Pulmonary:      Effort: Pulmonary effort is normal  No respiratory distress  Breath sounds: No stridor  No wheezing or rhonchi  Abdominal:      General: Abdomen is flat  Bowel sounds are normal  There is no distension  Palpations: There is no mass  Hernia: No hernia is present  Musculoskeletal:      Cervical back: Normal range of motion  No rigidity  Lymphadenopathy:      Cervical: No cervical adenopathy  Neurological:      Mental Status: She is alert  Motor: Weakness (left upper and lower extremity) present  Psychiatric:      Comments: Patient is arousable and answer simple question  Additional Data:     Labs:  Results from last 7 days   Lab Units 06/11/23  0827   EOS PCT % 5   HEMATOCRIT % 33 4*   HEMOGLOBIN g/dL 10 4*   LYMPHS PCT % 26   MONOS PCT % 11   NEUTROS PCT % 57   PLATELETS Thousands/uL 214   WBC Thousand/uL 6 18     Results from last 7 days   Lab Units 06/11/23  0611   ANION GAP mmol/L 6   ALBUMIN g/dL 2 1*   ALK PHOS U/L 42   ALT U/L 4*   AST U/L 16   BUN mg/dL 7   CALCIUM mg/dL 7 9*   CHLORIDE mmol/L 109*   CO2 mmol/L 23   CREATININE mg/dL 0 45*   GLUCOSE RANDOM mg/dL 75   POTASSIUM mmol/L 3 5   SODIUM mmol/L 138   TOTAL BILIRUBIN mg/dL 0 37                       Lines/Drains:  Invasive Devices     None                       Imaging: No pertinent imaging reviewed      Recent Cultures (last 7 days):         Last 24 Hours Medication List:   Current Facility-Administered Medications   Medication Dose Route Frequency Provider Last Rate   • acetaminophen  650 mg Oral Q8H PRN Rama Carson MD     • aspirin  81 mg Oral Daily RAYSA Dominguez     • atorvastatin  40 mg Oral QPM RAYSA Dominguez     • busPIRone  5 mg Oral BID Rama Carson MD     • calcium carbonate  500 mg Oral BID PRN RAYSA Dominique     • divalproex sodium  500 mg Oral HS Magnolia S Parker, CRNP     • enoxaparin  40 mg Subcutaneous Daily Magnolia S Parker, CRNP     • famotidine  20 mg Oral Daily Magnolia S Parker, CRNP     • latanoprost  1 drop Both Eyes HS Magnolia S Parker, CRNP     • OLANZapine  2 5 mg Intramuscular Q4H PRN Ligia Barr MD     • polyethylene glycol  17 g Oral Daily PRN Magnolia S Parker, CRNP     • QUEtiapine  125 mg Oral Daily Sterling Meyer MD     • senna  2 tablet Oral HS Magnolia S Parker, CRNP     • sertraline  100 mg Oral Daily Magnolia S Parker, CRNP     • timolol  1 drop Both Eyes BID Magnolia S Parker, CRNP     • trimethobenzamide  100 mg Intramuscular Q6H PRN RAYSA Kam          Today, Patient Was Seen By: Gadiel Zhu MD    **Please Note: This note may have been constructed using a voice recognition system  **

## 2023-06-12 NOTE — CASE MANAGEMENT
Case Management Discharge Planning Note    Patient name Anup Torres  Location [address] 221/-46 MRN [id number]  : 1945 Date 2023       Current Admission Date: 2023  Current Admission Diagnosis:Encephalopathy acute   Patient Active Problem List    Diagnosis Date Noted   • Paranoia (psychosis) ([facility] [address] ) 2023   • Chronic anemia 2023   • Encephalopathy acute 2023   • Status post CVA 2023   • Recurrent falls 2023   • Tobacco abuse 2023   • Acute ischemic CVA 2023   • Severe vascular dementia with psychotic disturbance ([facility] [address] ) 2023   • Generalized anxiety disorder 2023   • Hypertensive urgency 2023      LOS (days): 15  Geometric Mean LOS (GMLOS) (days): 4 70  Days to GMLOS:-10 1     OBJECTIVE:  Risk of Unplanned Readmission Score: 32 44         Current admission status: Inpatient   Preferred Pharmacy:   [address] [place] [address]  Phone: [phone] Fax: [phone]    Primary Care Provider: Laretta Buerger, DO    Primary Insurance: MEDICARE  Secondary Insurance: BLUE CROSS    DISCHARGE DETAILS:     CM called [facility] to confirm receipt of the Delphina Bravo and notes faxed on Friday  [facility] stated that they didn't receive them  CM attempted refax but line was busy  CM to follow up and try again

## 2023-06-12 NOTE — CASE MANAGEMENT
Case Management Discharge Planning Note    Patient name Alberto Fagan  Location [address] 221/-49 MRN [id number]  : 1945 Date 2023       Current Admission Date: 2023  Current Admission Diagnosis:Encephalopathy acute   Patient Active Problem List    Diagnosis Date Noted   • Paranoia (psychosis) ([facility] [address] ) 2023   • Chronic anemia 2023   • Encephalopathy acute 2023   • Status post CVA 2023   • Recurrent falls 2023   • Tobacco abuse 2023   • Acute ischemic CVA 2023   • Severe vascular dementia with psychotic disturbance ([facility] [address] ) 2023   • Generalized anxiety disorder 2023   • Hypertensive urgency 2023      LOS (days): 15  Geometric Mean LOS (GMLOS) (days): 4 70  Days to GMLOS:-10     OBJECTIVE:  Risk of Unplanned Readmission Score: 32 44         Current admission status: Inpatient   Preferred Pharmacy:   [address] [place] [address]  Phone: [phone] Fax: [phone]    Primary Care Provider: Chayo Babb DO    Primary Insurance: MEDICARE  Secondary Insurance: BLUE CROSS    DISCHARGE DETAILS:    CM completed 302 bed search:  [facility]- message left, CM faxed referral [phone] [facility] location full, message left for [facility] location  [facility] - cannot meet patients needs  Ginny Torres- faxed referral to [phone] - emailed referral to [email or web address]  [facility] - geriatric closed     0861 CM received [facility] - denied patients admission     1550 CM received [facility] - patient denied admission

## 2023-06-12 NOTE — PLAN OF CARE
Problem: MOBILITY - ADULT  Goal: Maintain or return to baseline ADL function  Description: INTERVENTIONS:  -  Assess patient's ability to carry out ADLs; assess patient's baseline for ADL function and identify physical deficits which impact ability to perform ADLs (bathing, care of mouth/teeth, toileting, grooming, dressing, etc )  - Assess/evaluate cause of self-care deficits   - Assess range of motion  - Assess patient's mobility; develop plan if impaired  - Assess patient's need for assistive devices and provide as appropriate  - Encourage maximum independence but intervene and supervise when necessary  - Involve family in performance of ADLs  - Assess for home care needs following discharge   - Consider OT consult to assist with ADL evaluation and planning for discharge  - Provide patient education as appropriate  Outcome: Progressing  Goal: Maintains/Returns to pre admission functional level  Description: INTERVENTIONS:  - Perform BMAT or MOVE assessment daily    - Set and communicate daily mobility goal to care team and patient/family/caregiver  - Collaborate with rehabilitation services on mobility goals if consulted  - Perform Range of Motion 2 times a day  - Reposition patient every 2 hours    - Dangle patient 2 times a day  - Stand patient 2 times a day  - Ambulate patient 2 times a day  - Out of bed to chair 2 times a day   - Out of bed for meals 2 times a day  - Out of bed for toileting  - Record patient progress and toleration of activity level   Outcome: Progressing     Problem: PAIN - ADULT  Goal: Verbalizes/displays adequate comfort level or baseline comfort level  Description: Interventions:  - Encourage patient to monitor pain and request assistance  - Assess pain using appropriate pain scale  - Administer analgesics based on type and severity of pain and evaluate response  - Implement non-pharmacological measures as appropriate and evaluate response  - Consider cultural and social influences on pain and pain management  - Notify physician/advanced practitioner if interventions unsuccessful or patient reports new pain  Outcome: Progressing     Problem: INFECTION - ADULT  Goal: Absence or prevention of progression during hospitalization  Description: INTERVENTIONS:  - Assess and monitor for signs and symptoms of infection  - Monitor lab/diagnostic results  - Monitor all insertion sites, i e  indwelling lines, tubes, and drains  - Monitor endotracheal if appropriate and nasal secretions for changes in amount and color  - [place] appropriate cooling/warming therapies per order  - Administer medications as ordered  - Instruct and encourage patient and family to use good hand hygiene technique  - Identify and instruct in appropriate isolation precautions for identified infection/condition  Outcome: Progressing  Goal: Absence of fever/infection during neutropenic period  Description: INTERVENTIONS:  - Monitor WBC    Outcome: Progressing     Problem: SAFETY ADULT  Goal: Maintain or return to baseline ADL function  Description: INTERVENTIONS:  -  Assess patient's ability to carry out ADLs; assess patient's baseline for ADL function and identify physical deficits which impact ability to perform ADLs (bathing, care of mouth/teeth, toileting, grooming, dressing, etc )  - Assess/evaluate cause of self-care deficits   - Assess range of motion  - Assess patient's mobility; develop plan if impaired  - Assess patient's need for assistive devices and provide as appropriate  - Encourage maximum independence but intervene and supervise when necessary  - Involve family in performance of ADLs  - Assess for home care needs following discharge   - Consider OT consult to assist with ADL evaluation and planning for discharge  - Provide patient education as appropriate  Outcome: Progressing  Goal: Maintains/Returns to pre admission functional level  Description: INTERVENTIONS:  - Perform BMAT or MOVE assessment daily    - Set and communicate daily mobility goal to care team and patient/family/caregiver  - Collaborate with rehabilitation services on mobility goals if consulted  - Perform Range of Motion 2 times a day  - Reposition patient every 2 hours    - Dangle patient 2 times a day  - Stand patient 2 times a day  - Ambulate patient 2 times a day  - Out of bed to chair 2 times a day   - Out of bed for meals 2 times a day  - Out of bed for toileting  - Record patient progress and toleration of activity level   Outcome: Progressing  Goal: Patient will remain free of falls  Description: INTERVENTIONS:  - Educate patient/family on patient safety including physical limitations  - Instruct patient to call for assistance with activity   - Consult OT/PT to assist with strengthening/mobility   - Keep Call bell within reach  - Keep bed low and locked with side rails adjusted as appropriate  - Keep care items and personal belongings within reach  - Initiate and maintain comfort rounds  - Make Fall Risk Sign visible to staff  - Offer Toileting every 3 Hours, in advance of need  - Initiate/Maintain bed alarm  - Apply yellow socks and bracelet for high fall risk patients  - Consider moving patient to room near nurses station  Outcome: Progressing     Problem: DISCHARGE PLANNING  Goal: Discharge to home or other facility with appropriate resources  Description: INTERVENTIONS:  - Identify barriers to discharge w/patient and caregiver  - Arrange for needed discharge resources and transportation as appropriate  - Identify discharge learning needs (meds, wound care, etc )  - Arrange for interpretive services to assist at discharge as needed  - Refer to Case Management Department for coordinating discharge planning if the patient needs post-hospital services based on physician/advanced practitioner order or complex needs related to functional status, cognitive ability, or social support system  Outcome: Progressing     Problem: Knowledge Deficit  Goal: Patient/family/caregiver demonstrates understanding of disease process, treatment plan, medications, and discharge instructions  Description: Complete learning assessment and assess knowledge base  Interventions:  - Provide teaching at level of understanding  - Provide teaching via preferred learning methods  Outcome: Progressing     Problem: NEUROSENSORY - ADULT  Goal: Achieves stable or improved neurological status  Description: INTERVENTIONS  - Monitor and report changes in neurological status  - Monitor vital signs such as temperature, blood pressure, glucose, and any other labs ordered   - Initiate measures to prevent increased intracranial pressure  - Monitor for seizure activity and implement precautions if appropriate      Outcome: Progressing  Goal: Remains free of injury related to seizures activity  Description: INTERVENTIONS  - Maintain airway, patient safety  and administer oxygen as ordered  - Monitor patient for seizure activity, document and report duration and description of seizure to physician/advanced practitioner  - If seizure occurs,  ensure patient safety during seizure  - Reorient patient post seizure  - Seizure pads on all 4 side rails  - Instruct patient/family to notify RN of any seizure activity including if an aura is experienced  - Instruct patient/family to call for assistance with activity based on nursing assessment  - Administer anti-seizure medications if ordered    Outcome: Progressing  Goal: Achieves maximal functionality and self care  Description: INTERVENTIONS  - Monitor swallowing and airway patency with patient fatigue and changes in neurological status  - Encourage and assist patient to increase activity and self care     - Encourage visually impaired, hearing impaired and aphasic patients to use assistive/communication devices  Outcome: Progressing     Problem: RESPIRATORY - ADULT  Goal: Achieves optimal ventilation and oxygenation  Description: INTERVENTIONS:  - Assess for changes in respiratory status  - Assess for changes in mentation and behavior  - Position to facilitate oxygenation and minimize respiratory effort  - Oxygen administered by appropriate delivery if ordered  - Initiate smoking cessation education as indicated  - Encourage broncho-pulmonary hygiene including cough, deep breathe, Incentive Spirometry  - Assess the need for suctioning and aspirate as needed  - Assess and instruct to report SOB or any respiratory difficulty  - Respiratory Therapy support as indicated  Outcome: Progressing     Problem: GASTROINTESTINAL - ADULT  Goal: Minimal or absence of nausea and/or vomiting  Description: INTERVENTIONS:  - Administer IV fluids if ordered to ensure adequate hydration  - Maintain NPO status until nausea and vomiting are resolved  - Nasogastric tube if ordered  - Administer ordered antiemetic medications as needed  - Provide nonpharmacologic comfort measures as appropriate  - Advance diet as tolerated, if ordered  - Consider nutrition services referral to assist patient with adequate nutrition and appropriate food choices  Outcome: Progressing  Goal: Maintains or returns to baseline bowel function  Description: INTERVENTIONS:  - Assess bowel function  - Encourage oral fluids to ensure adequate hydration  - Administer IV fluids if ordered to ensure adequate hydration  - Administer ordered medications as needed  - Encourage mobilization and activity  - Consider nutritional services referral to assist patient with adequate nutrition and appropriate food choices  Outcome: Progressing  Goal: Maintains adequate nutritional intake  Description: INTERVENTIONS:  - Monitor percentage of each meal consumed  - Identify factors contributing to decreased intake, treat as appropriate  - Assist with meals as needed  - Monitor I&O, weight, and lab values if indicated  - Obtain nutrition services referral as needed  Outcome: Progressing  Goal: Establish and maintain optimal ostomy function  Description: INTERVENTIONS:  - Assess bowel function  - Encourage oral fluids to ensure adequate hydration  - Administer IV fluids if ordered to ensure adequate hydration   - Administer ordered medications as needed  - Encourage mobilization and activity  - Nutrition services referral to assist patient with appropriate food choices  - Assess stoma site  - Consider wound care consult   Outcome: Progressing  Goal: Oral mucous membranes remain intact  Description: INTERVENTIONS  - Assess oral mucosa and hygiene practices  - Implement preventative oral hygiene regimen  - Implement oral medicated treatments as ordered  - Initiate Nutrition services referral as needed  Outcome: Progressing     Problem: METABOLIC, FLUID AND ELECTROLYTES - ADULT  Goal: Electrolytes maintained within normal limits  Description: INTERVENTIONS:  - Monitor labs and assess patient for signs and symptoms of electrolyte imbalances  - Administer electrolyte replacement as ordered  - Monitor response to electrolyte replacements, including repeat lab results as appropriate  - Instruct patient on fluid and nutrition as appropriate  Outcome: Progressing  Goal: Fluid balance maintained  Description: INTERVENTIONS:  - Monitor labs   - Monitor I/O and WT  - Instruct patient on fluid and nutrition as appropriate  - Assess for signs & symptoms of volume excess or deficit  Outcome: Progressing  Goal: Glucose maintained within target range  Description: INTERVENTIONS:  - Monitor Blood Glucose as ordered  - Assess for signs and symptoms of hyperglycemia and hypoglycemia  - Administer ordered medications to maintain glucose within target range  - Assess nutritional intake and initiate nutrition service referral as needed  Outcome: Progressing     Problem: SKIN/TISSUE INTEGRITY - ADULT  Goal: Skin Integrity remains intact(Skin Breakdown Prevention)  Description: Assess:  -Perform Dhaval assessment every shift  -Clean and moisturize skin every 2 hours  -Inspect skin when repositioning, toileting, and assisting with ADLS  -Assess under medical devices such as nibp every shift  -Assess extremities for adequate circulation and sensation     Bed Management:  -Have minimal linens on bed & keep smooth, unwrinkled  -Change linens as needed when moist or perspiring  -Avoid sitting or lying in one position for more than 2 hours while in bed  -Keep HOB at 45degrees     Toileting:  -Offer bedside commode  -Assess for incontinence every 2 hours  -Use incontinent care products after each incontinent episode such as barrier ointment    Activity:  -Mobilize patient 2 times a day  -Encourage activity and walks on unit  -Encourage or provide ROM exercises   -Turn and reposition patient every 2 Hours  -Use appropriate equipment to lift or move patient in bed  -Instruct/ Assist with weight shifting every 15 min when out of bed in chair  -Consider limitation of chair time 2 hour intervals    Skin Care:  -Avoid use of baby powder, tape, friction and shearing, hot water or constrictive clothing  -Relieve pressure over bony prominences using foam  -Do not massage red bony areas    Next Steps:  -Teach patient strategies to minimize risks such as repo   -Consider consults to  interdisciplinary teams such as wound, nutrition  Outcome: Progressing  Goal: Incision(s), wounds(s) or drain site(s) healing without S/S of infection  Description: INTERVENTIONS  - Assess and document dressing, incision, wound bed, drain sites and surrounding tissue  - Provide patient and family education  - Perform skin care/dressing changes every day  Outcome: Progressing  Goal: Pressure injury heals and does not worsen  Description: Interventions:  - Implement low air loss mattress or specialty surface (Criteria met)  - Apply silicone foam dressing  - Instruct/assist with weight shifting every 15 minutes when in chair   - Limit chair time to 2 hour intervals  - Use special pressure reducing interventions such as waffle cushion when in chair   - Apply fecal or urinary incontinence containment device   - Perform passive or active ROM every 4 hours  - Turn and reposition patient & offload bony prominences every 2 hours   - Utilize friction reducing device or surface for transfers   - Consider consults to  interdisciplinary teams such as wound, nutrition  - Use incontinent care products after each incontinent episode such as barrier ointment  - Consider nutrition services referral as needed  Outcome: Progressing     Problem: MUSCULOSKELETAL - ADULT  Goal: Maintain or return mobility to safest level of function  Description: INTERVENTIONS:  - Assess patient's ability to carry out ADLs; assess patient's baseline for ADL function and identify physical deficits which impact ability to perform ADLs (bathing, care of mouth/teeth, toileting, grooming, dressing, etc )  - Assess/evaluate cause of self-care deficits   - Assess range of motion  - Assess patient's mobility  - Assess patient's need for assistive devices and provide as appropriate  - Encourage maximum independence but intervene and supervise when necessary  - Involve family in performance of ADLs  - Assess for home care needs following discharge   - Consider OT consult to assist with ADL evaluation and planning for discharge  - Provide patient education as appropriate  Outcome: Progressing  Goal: Maintain proper alignment of affected body part  Description: INTERVENTIONS:  - Support, maintain and protect limb and body alignment  - Provide patient/ family with appropriate education  Outcome: Progressing     Problem: Nutrition/Hydration-ADULT  Goal: Nutrient/Hydration intake appropriate for improving, restoring or maintaining nutritional needs  Description: Monitor and assess patient's nutrition/hydration status for malnutrition  Collaborate with interdisciplinary team and initiate plan and interventions as ordered    Monitor patient's weight and dietary intake as ordered or per policy  Utilize nutrition screening tool and intervene as necessary  Determine patient's food preferences and provide high-protein, high-caloric foods as appropriate       INTERVENTIONS:  - Monitor oral intake, urinary output, labs, and treatment plans  - Assess nutrition and hydration status and recommend course of action  - Evaluate amount of meals eaten  - Assist patient with eating if necessary   - Allow adequate time for meals  - Recommend/ encourage appropriate diets, oral nutritional supplements, and vitamin/mineral supplements  - Order, calculate, and assess calorie counts as needed  - Recommend, monitor, and adjust tube feedings and TPN/PPN based on assessed needs  - Assess need for intravenous fluids  - Provide specific nutrition/hydration education as appropriate  - Include patient/family/caregiver in decisions related to nutrition  Outcome: Progressing     Problem: COPING  Goal: Pt/Family able to verbalize concerns and demonstrate effective coping strategies  Description: INTERVENTIONS:  - Assist patient/family to identify coping skills, available support systems and cultural and spiritual values  - Provide emotional support, including active listening and acknowledgement of concerns of patient and caregivers  - Reduce environmental stimuli, as able  - Provide patient education  - Assess for spiritual pain/suffering and initiate spiritual care, including notification of Pastoral Care or yahaira based community as needed  - Assess effectiveness of coping strategies  Outcome: Progressing     Problem: SELF HARM  Goal: Effect of psychiatric condition will be minimized and patient will be protected from self harm  Description: INTERVENTIONS:  - Assess impact of patient's symptoms on level of functioning, self-care needs and offer support as indicated  - Assess patient/family knowledge of depression, impact on illness and need for teaching  - Provide emotional support, presence and reassurance  - Assess for possible suicidal thoughts, ideation or self-harm  If patient expresses suicidal thoughts or statements do not leave alone, notify physician/AP immediately, initiate suicide precautions, and determine need for continual observation    - initiate consults and referrals as appropriate (a mental health professional, Spiritual Care  Outcome: Progressing     Problem: SPIRITUAL CARE  Goal: Patient feels balance and connection with others and/or higher power that empowers the self during times of loss, guilt and fear  Description: INTERVENTIONS:  - Create safety for patient through empathic presence and non-judgmental listening  - Encourage patient to explore his/her values, beliefs and/or spiritual images and practices  - Encourage use of breath work, imagery, meditation, relaxation, reiki to ease distress and provide healing  - Encourage use of cultural and spiritual celebrations and rituals  - Facilitate discussion that helps patient sort out spiritual concerns  - Help patient identify where meaning/hope/comfort & strength are in his/her life  - Refer patient to yahaira community for assistance, as appropriate  - Respond to patient/family need for prayer/ritual/sacrament/ceremony  Outcome: Progressing

## 2023-06-12 NOTE — ASSESSMENT & PLAN NOTE
· On severe vascular dementia POA initially was psychotic features- but since seeing her yesterday and today she is somnolent poor po intake will wake up to name and then falls asleep  · Ct brain neg   · No infection evident on admission  · depakote level checked nml therapeutic   · No prns overnight   · tsh nml in beg of may 5/19   · Electrolytes stable  Patient is still remains significantly delirious  Blaming PCA for stealing her baby  Also blaming PCA that PCA was trying to hurt her  Patient was tapping her head-because she wants to get rid of some ideas from her head  Psychiatric medication adjusted as per psychiatry recommendation  Continue current treatment  Maintain sleep awake cycle (patient sleeps all day, and awake all night)-advised the nurse and PCA to keep patient awake during the daytime  As per nurse documentation, yesterday and overnight, patient was much calmer compared to previous days    Patient was taking Seroquel 100 mg at 10 PM, and Seroquel 25 mg at 5 PM -We will adjust the timing and give in total of 125 mg of Seroquel at 5 PM

## 2023-06-12 NOTE — CASE MANAGEMENT
Case Management Discharge Planning Note    Patient name Anup Torres  Location [address] [id number] MRN [id number]  : 1945 Date 2023       Current Admission Date: 2023  Current Admission Diagnosis:Encephalopathy acute   Patient Active Problem List    Diagnosis Date Noted   • Paranoia (psychosis) ([facility] [address] ) 2023   • Chronic anemia 2023   • Encephalopathy acute 2023   • Status post CVA 2023   • Recurrent falls 2023   • Tobacco abuse 2023   • Acute ischemic CVA 2023   • Severe vascular dementia with psychotic disturbance ([facility] [address] ) 2023   • Generalized anxiety disorder 2023   • Hypertensive urgency 2023      LOS (days): 15  Geometric Mean LOS (GMLOS) (days): 4 70  Days to GMLOS:-9 9     OBJECTIVE:  Risk of Unplanned Readmission Score: 32 38         Current admission status: Inpatient   Preferred Pharmacy:   [address] [place] [address]  Phone: [phone] Fax: [phone]    Primary Care Provider: Laretta Buerger, DO    Primary Insurance: MEDICARE  Secondary Insurance: BLUE CROSS    DISCHARGE DETAILS:     CM called pt's son to update him on Psychiatry's recommendation of 36 commitment  CM informed pt's son that two of our Physicians would initiate the 302  Pt's son verbalized understanding

## 2023-06-12 NOTE — ASSESSMENT & PLAN NOTE
· Patient discharged from outside hospital earlier in day and 5/28 after admission for vomiting  · Evaluated by psychiatry as she was transferred from the behavioral health unit to [facility] for the vomiting, son was requesting hospice and took the patient home with 24/7 care provided by family while awaiting services to be arranged  Review of the discharge summary the internal medicine team and psychiatry team voiced their concerns regarding the son taking the patient home but he was adamant  · Patient was brought to this facility on the same day as discharge from previous facility  Son stated to ER staff he cannot care for patient at home  · Evaluated by psychiatry on 5/30 and deemed to lack appropriate judgement and is danger to self and needs inpatient psychiatric treatment, patient with advanced dementia do not think will be able to sign [id number] 302 has been signed  · Also try to wrap a call bell around her neck to commit suicide she is expressing suicidal ideations now patient has been 302   · Patient is on a one-to-one for safety reasons  · [facility] psychiatry declined the patient stating that she has vascular dementia and there is nothing they can do from psychiatric point of view  Medication dose adjusted as per psychiatry recommendation-psychiatry also recommending inpatient psych placement  As per nurse documentation, yesterday and overnight, patient was much calmer compared to previous days  Patient was taking Seroquel 100 mg at 10 PM, and Seroquel 25 mg at 5 PM -We will adjust the timing and give in total of 125 mg of Seroquel at 5 PM-and observe the response  Patient is still remained awake till early morning, then fall asleep at early morning    Encouraged staff to keep patient awake during daytime as much as possible

## 2023-06-12 NOTE — SPEECH THERAPY NOTE
"Speech Language/Pathology    Speech/Language Pathology Progress Note    Patient Name: Alberto ONEAL Date: 6/12/2023       Subjective:  \"When can I leave here? \"    Objective:  Assess diet tolerance/ability to upgrade  Assessment:  Attempted to see pt multiple times  Pt fatigued and frequently sleeping  Nursing got pt out to recliner chair which improved alertness level  Agreeable to trial lunch  Currently on puree and thin liquids, was on nectar thick but upgraded to thin so pt can have ice cream and improve QOL  Upgrade trial completed w/ soft cookie- pt taking rapid, large bites and frequently talking w/ unmasticated food in her mouth, resulting in coughing x1  Cough x2 on thins via straw, again noted to be talking with bolus still in mouth  Pt consumed 100% of cookie  Took one bite of pureed carrots/meat, declined further  Consumed 75% of ice cream w/ verbal cues  Plan/Recommendations:  Recommend to continue puree/thin liquids  Meds crushed in puree    Pt's main limitation to consuming solids is verbosity and talking w/ food in mouth, increasing choking risk  SLP will continue to follow    Sho Lara 89  6/12/2023       "

## 2023-06-12 NOTE — ASSESSMENT & PLAN NOTE
· Hb has been fluctuating since beg of may when she was admitted to [facility] anywhere between 9 8-11  · Pt hb has remained stable at 10 2 for 2 consecutive times - no evidence of gross bleed no hematemesis and no melena no hematochezia  · She has poor nutritional aspect that can contribute to anemia   · She needs no scopes or transfusion at this time as there is no evidence of gross bleed hb stable for 2 days and in her range of base

## 2023-06-13 VITALS
OXYGEN SATURATION: 99 % | RESPIRATION RATE: 18 BRPM | WEIGHT: 107.58 LBS | SYSTOLIC BLOOD PRESSURE: 152 MMHG | TEMPERATURE: 97.9 F | BODY MASS INDEX: 20.31 KG/M2 | HEART RATE: 84 BPM | DIASTOLIC BLOOD PRESSURE: 76 MMHG | HEIGHT: 61 IN

## 2023-06-13 LAB
ALBUMIN SERPL BCP-MCNC: 2.8 G/DL (ref 3.5–5)
ALP SERPL-CCNC: 60 U/L (ref 34–104)
ALT SERPL W P-5'-P-CCNC: 6 U/L (ref 7–52)
ANION GAP SERPL CALCULATED.3IONS-SCNC: 6 MMOL/L (ref 4–13)
AST SERPL W P-5'-P-CCNC: 20 U/L (ref 13–39)
BACTERIA UR QL AUTO: NORMAL /HPF
BASOPHILS # BLD AUTO: 0.02 THOUSANDS/ÂΜL (ref 0–0.1)
BASOPHILS NFR BLD AUTO: 0 % (ref 0–1)
BILIRUB SERPL-MCNC: 0.42 MG/DL (ref 0.2–1)
BILIRUB UR QL STRIP: NEGATIVE
BUN SERPL-MCNC: 5 MG/DL (ref 5–25)
CALCIUM ALBUM COR SERPL-MCNC: 9.7 MG/DL (ref 8.3–10.1)
CALCIUM SERPL-MCNC: 8.7 MG/DL (ref 8.4–10.2)
CHLORIDE SERPL-SCNC: 106 MMOL/L (ref 96–108)
CLARITY UR: CLEAR
CO2 SERPL-SCNC: 27 MMOL/L (ref 21–32)
COLOR UR: YELLOW
CREAT SERPL-MCNC: 0.48 MG/DL (ref 0.6–1.3)
EOSINOPHIL # BLD AUTO: 0.14 THOUSAND/ÂΜL (ref 0–0.61)
EOSINOPHIL NFR BLD AUTO: 2 % (ref 0–6)
ERYTHROCYTE [DISTWIDTH] IN BLOOD BY AUTOMATED COUNT: 16.2 % (ref 11.6–15.1)
GFR SERPL CREATININE-BSD FRML MDRD: 93 ML/MIN/1.73SQ M
GLUCOSE SERPL-MCNC: 112 MG/DL (ref 65–140)
GLUCOSE UR STRIP-MCNC: NEGATIVE MG/DL
HCT VFR BLD AUTO: 39.2 % (ref 34.8–46.1)
HGB BLD-MCNC: 12.4 G/DL (ref 11.5–15.4)
HGB UR QL STRIP.AUTO: NEGATIVE
IMM GRANULOCYTES # BLD AUTO: 0.05 THOUSAND/UL (ref 0–0.2)
IMM GRANULOCYTES NFR BLD AUTO: 1 % (ref 0–2)
KETONES UR STRIP-MCNC: NEGATIVE MG/DL
LEUKOCYTE ESTERASE UR QL STRIP: ABNORMAL
LYMPHOCYTES # BLD AUTO: 1.05 THOUSANDS/ÂΜL (ref 0.6–4.47)
LYMPHOCYTES NFR BLD AUTO: 15 % (ref 14–44)
MCH RBC QN AUTO: 30.8 PG (ref 26.8–34.3)
MCHC RBC AUTO-ENTMCNC: 31.6 G/DL (ref 31.4–37.4)
MCV RBC AUTO: 97 FL (ref 82–98)
MONOCYTES # BLD AUTO: 0.53 THOUSAND/ÂΜL (ref 0.17–1.22)
MONOCYTES NFR BLD AUTO: 8 % (ref 4–12)
NEUTROPHILS # BLD AUTO: 5.01 THOUSANDS/ÂΜL (ref 1.85–7.62)
NEUTS SEG NFR BLD AUTO: 74 % (ref 43–75)
NITRITE UR QL STRIP: NEGATIVE
NON-SQ EPI CELLS URNS QL MICRO: NORMAL /HPF
NRBC BLD AUTO-RTO: 0 /100 WBCS
PH UR STRIP.AUTO: 6.5 [PH]
PLATELET # BLD AUTO: 250 THOUSANDS/UL (ref 149–390)
PMV BLD AUTO: 9.3 FL (ref 8.9–12.7)
POTASSIUM SERPL-SCNC: 4 MMOL/L (ref 3.5–5.3)
PROT SERPL-MCNC: 6.7 G/DL (ref 6.4–8.4)
PROT UR STRIP-MCNC: NEGATIVE MG/DL
RBC # BLD AUTO: 4.03 MILLION/UL (ref 3.81–5.12)
RBC #/AREA URNS AUTO: NORMAL /HPF
SODIUM SERPL-SCNC: 139 MMOL/L (ref 135–147)
SP GR UR STRIP.AUTO: 1.01 (ref 1–1.03)
TSH SERPL DL<=0.05 MIU/L-ACNC: 6.04 UIU/ML (ref 0.45–4.5)
UROBILINOGEN UR QL STRIP.AUTO: 1 E.U./DL
WBC # BLD AUTO: 6.8 THOUSAND/UL (ref 4.31–10.16)
WBC #/AREA URNS AUTO: NORMAL /HPF

## 2023-06-13 PROCEDURE — 93005 ELECTROCARDIOGRAM TRACING: CPT

## 2023-06-13 PROCEDURE — 81001 URINALYSIS AUTO W/SCOPE: CPT | Performed by: FAMILY MEDICINE

## 2023-06-13 PROCEDURE — 85025 COMPLETE CBC W/AUTO DIFF WBC: CPT | Performed by: FAMILY MEDICINE

## 2023-06-13 PROCEDURE — 99232 SBSQ HOSP IP/OBS MODERATE 35: CPT | Performed by: FAMILY MEDICINE

## 2023-06-13 PROCEDURE — 80053 COMPREHEN METABOLIC PANEL: CPT | Performed by: FAMILY MEDICINE

## 2023-06-13 PROCEDURE — 84443 ASSAY THYROID STIM HORMONE: CPT | Performed by: FAMILY MEDICINE

## 2023-06-13 RX ORDER — SERTRALINE HYDROCHLORIDE 100 MG/1
100 TABLET, FILM COATED ORAL DAILY
Status: CANCELLED | OUTPATIENT
Start: 2023-06-14

## 2023-06-13 RX ORDER — BUSPIRONE HYDROCHLORIDE 5 MG/1
5 TABLET ORAL 2 TIMES DAILY
Status: CANCELLED | OUTPATIENT
Start: 2023-06-13

## 2023-06-13 RX ORDER — HALOPERIDOL 5 MG/ML
5 INJECTION INTRAMUSCULAR
Status: CANCELLED | OUTPATIENT
Start: 2023-06-13

## 2023-06-13 RX ORDER — RISPERIDONE 0.25 MG/1
0.25 TABLET ORAL
Status: CANCELLED | OUTPATIENT
Start: 2023-06-13

## 2023-06-13 RX ORDER — RISPERIDONE 1 MG/1
1 TABLET ORAL
Status: CANCELLED | OUTPATIENT
Start: 2023-06-13

## 2023-06-13 RX ORDER — HYDROXYZINE HYDROCHLORIDE 25 MG/1
50 TABLET, FILM COATED ORAL
Status: CANCELLED | OUTPATIENT
Start: 2023-06-13

## 2023-06-13 RX ORDER — MELATONIN
1000 DAILY
Status: CANCELLED | OUTPATIENT
Start: 2023-06-14

## 2023-06-13 RX ORDER — LORAZEPAM 2 MG/ML
1 INJECTION INTRAMUSCULAR
Status: CANCELLED | OUTPATIENT
Start: 2023-06-13

## 2023-06-13 RX ORDER — RISPERIDONE 0.25 MG/1
0.5 TABLET ORAL
Status: CANCELLED | OUTPATIENT
Start: 2023-06-13

## 2023-06-13 RX ORDER — HYDROXYZINE HYDROCHLORIDE 25 MG/1
25 TABLET, FILM COATED ORAL
Status: CANCELLED | OUTPATIENT
Start: 2023-06-13

## 2023-06-13 RX ORDER — ACETAMINOPHEN 325 MG/1
650 TABLET ORAL EVERY 4 HOURS PRN
Status: CANCELLED | OUTPATIENT
Start: 2023-06-13

## 2023-06-13 RX ORDER — DIVALPROEX SODIUM 125 MG/1
500 CAPSULE, COATED PELLETS ORAL
Status: CANCELLED | OUTPATIENT
Start: 2023-06-13

## 2023-06-13 RX ORDER — OLANZAPINE 10 MG/1
5 INJECTION, POWDER, LYOPHILIZED, FOR SOLUTION INTRAMUSCULAR ONCE
Status: COMPLETED | OUTPATIENT
Start: 2023-06-13 | End: 2023-06-13

## 2023-06-13 RX ORDER — ACETAMINOPHEN 325 MG/1
975 TABLET ORAL EVERY 6 HOURS PRN
Status: CANCELLED | OUTPATIENT
Start: 2023-06-13

## 2023-06-13 RX ORDER — LORAZEPAM 1 MG/1
1 TABLET ORAL
Status: CANCELLED | OUTPATIENT
Start: 2023-06-13

## 2023-06-13 RX ORDER — TRAZODONE HYDROCHLORIDE 50 MG/1
50 TABLET ORAL
Status: CANCELLED | OUTPATIENT
Start: 2023-06-13

## 2023-06-13 RX ADMIN — OLANZAPINE 2.5 MG: 10 INJECTION, POWDER, FOR SOLUTION INTRAMUSCULAR at 18:09

## 2023-06-13 RX ADMIN — OLANZAPINE 2.5 MG: 10 INJECTION, POWDER, FOR SOLUTION INTRAMUSCULAR at 09:17

## 2023-06-13 RX ADMIN — LATANOPROST 1 DROP: 50 SOLUTION OPHTHALMIC at 22:51

## 2023-06-13 RX ADMIN — SERTRALINE 100 MG: 100 TABLET, FILM COATED ORAL at 09:23

## 2023-06-13 RX ADMIN — BUSPIRONE HYDROCHLORIDE 5 MG: 5 TABLET ORAL at 17:31

## 2023-06-13 RX ADMIN — ENOXAPARIN SODIUM 40 MG: 40 INJECTION SUBCUTANEOUS at 09:23

## 2023-06-13 RX ADMIN — QUETIAPINE FUMARATE 125 MG: 100 TABLET ORAL at 17:32

## 2023-06-13 RX ADMIN — SENNOSIDES 17.2 MG: 8.6 TABLET, FILM COATED ORAL at 22:44

## 2023-06-13 RX ADMIN — ASPIRIN 81 MG: 81 TABLET, COATED ORAL at 09:23

## 2023-06-13 RX ADMIN — DIVALPROEX SODIUM 500 MG: 125 CAPSULE, COATED PELLETS ORAL at 22:44

## 2023-06-13 RX ADMIN — OLANZAPINE 5 MG: 10 INJECTION, POWDER, FOR SOLUTION INTRAMUSCULAR at 03:17

## 2023-06-13 RX ADMIN — ATORVASTATIN CALCIUM 40 MG: 40 TABLET, FILM COATED ORAL at 17:31

## 2023-06-13 RX ADMIN — FAMOTIDINE 20 MG: 20 TABLET ORAL at 09:23

## 2023-06-13 RX ADMIN — BUSPIRONE HYDROCHLORIDE 5 MG: 5 TABLET ORAL at 09:23

## 2023-06-13 NOTE — PLAN OF CARE
Problem: MOBILITY - ADULT  Goal: Maintain or return to baseline ADL function  Description: INTERVENTIONS:  -  Assess patient's ability to carry out ADLs; assess patient's baseline for ADL function and identify physical deficits which impact ability to perform ADLs (bathing, care of mouth/teeth, toileting, grooming, dressing, etc )  - Assess/evaluate cause of self-care deficits   - Assess range of motion  - Assess patient's mobility; develop plan if impaired  - Assess patient's need for assistive devices and provide as appropriate  - Encourage maximum independence but intervene and supervise when necessary  - Involve family in performance of ADLs  - Assess for home care needs following discharge   - Consider OT consult to assist with ADL evaluation and planning for discharge  - Provide patient education as appropriate  Outcome: Progressing     Problem: PAIN - ADULT  Goal: Verbalizes/displays adequate comfort level or baseline comfort level  Description: Interventions:  - Encourage patient to monitor pain and request assistance  - Assess pain using appropriate pain scale  - Administer analgesics based on type and severity of pain and evaluate response  - Implement non-pharmacological measures as appropriate and evaluate response  - Consider cultural and social influences on pain and pain management  - Notify physician/advanced practitioner if interventions unsuccessful or patient reports new pain  Outcome: Progressing     Problem: INFECTION - ADULT  Goal: Absence or prevention of progression during hospitalization  Description: INTERVENTIONS:  - Assess and monitor for signs and symptoms of infection  - Monitor lab/diagnostic results  - Monitor all insertion sites, i e  indwelling lines, tubes, and drains  - Monitor endotracheal if appropriate and nasal secretions for changes in amount and color  - [place] appropriate cooling/warming therapies per order  - Administer medications as ordered  - Instruct and encourage patient and family to use good hand hygiene technique  - Identify and instruct in appropriate isolation precautions for identified infection/condition  Outcome: Progressing     Problem: SAFETY ADULT  Goal: Maintain or return to baseline ADL function  Description: INTERVENTIONS:  -  Assess patient's ability to carry out ADLs; assess patient's baseline for ADL function and identify physical deficits which impact ability to perform ADLs (bathing, care of mouth/teeth, toileting, grooming, dressing, etc )  - Assess/evaluate cause of self-care deficits   - Assess range of motion  - Assess patient's mobility; develop plan if impaired  - Assess patient's need for assistive devices and provide as appropriate  - Encourage maximum independence but intervene and supervise when necessary  - Involve family in performance of ADLs  - Assess for home care needs following discharge   - Consider OT consult to assist with ADL evaluation and planning for discharge  - Provide patient education as appropriate  Outcome: Progressing
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
## 2023-06-13 NOTE — PLAN OF CARE
Problem: MOBILITY - ADULT  Goal: Maintain or return to baseline ADL function  Description: INTERVENTIONS:  -  Assess patient's ability to carry out ADLs; assess patient's baseline for ADL function and identify physical deficits which impact ability to perform ADLs (bathing, care of mouth/teeth, toileting, grooming, dressing, etc )  - Assess/evaluate cause of self-care deficits   - Assess range of motion  - Assess patient's mobility; develop plan if impaired  - Assess patient's need for assistive devices and provide as appropriate  - Encourage maximum independence but intervene and supervise when necessary  - Involve family in performance of ADLs  - Assess for home care needs following discharge   - Consider OT consult to assist with ADL evaluation and planning for discharge  - Provide patient education as appropriate  Outcome: Progressing  Goal: Maintains/Returns to pre admission functional level  Description: INTERVENTIONS:  - Perform BMAT or MOVE assessment daily    - Set and communicate daily mobility goal to care team and patient/family/caregiver  - Collaborate with rehabilitation services on mobility goals if consulted  - Perform Range of Motion 2 times a day  - Reposition patient every 2 hours    - Dangle patient 2 times a day  - Stand patient 2 times a day  - Ambulate patient 2 times a day  - Out of bed to chair 2 times a day   - Out of bed for meals 2 times a day  - Out of bed for toileting  - Record patient progress and toleration of activity level   Outcome: Progressing     Problem: PAIN - ADULT  Goal: Verbalizes/displays adequate comfort level or baseline comfort level  Description: Interventions:  - Encourage patient to monitor pain and request assistance  - Assess pain using appropriate pain scale  - Administer analgesics based on type and severity of pain and evaluate response  - Implement non-pharmacological measures as appropriate and evaluate response  - Consider cultural and social influences on pain and pain management  - Notify physician/advanced practitioner if interventions unsuccessful or patient reports new pain  Outcome: Progressing     Problem: INFECTION - ADULT  Goal: Absence or prevention of progression during hospitalization  Description: INTERVENTIONS:  - Assess and monitor for signs and symptoms of infection  - Monitor lab/diagnostic results  - Monitor all insertion sites, i e  indwelling lines, tubes, and drains  - Monitor endotracheal if appropriate and nasal secretions for changes in amount and color  - Ulen appropriate cooling/warming therapies per order  - Administer medications as ordered  - Instruct and encourage patient and family to use good hand hygiene technique  - Identify and instruct in appropriate isolation precautions for identified infection/condition  Outcome: Progressing  Goal: Absence of fever/infection during neutropenic period  Description: INTERVENTIONS:  - Monitor WBC    Outcome: Progressing     Problem: SAFETY ADULT  Goal: Maintain or return to baseline ADL function  Description: INTERVENTIONS:  -  Assess patient's ability to carry out ADLs; assess patient's baseline for ADL function and identify physical deficits which impact ability to perform ADLs (bathing, care of mouth/teeth, toileting, grooming, dressing, etc )  - Assess/evaluate cause of self-care deficits   - Assess range of motion  - Assess patient's mobility; develop plan if impaired  - Assess patient's need for assistive devices and provide as appropriate  - Encourage maximum independence but intervene and supervise when necessary  - Involve family in performance of ADLs  - Assess for home care needs following discharge   - Consider OT consult to assist with ADL evaluation and planning for discharge  - Provide patient education as appropriate  Outcome: Progressing  Goal: Maintains/Returns to pre admission functional level  Description: INTERVENTIONS:  - Perform BMAT or MOVE assessment daily    - Set and communicate daily mobility goal to care team and patient/family/caregiver  - Collaborate with rehabilitation services on mobility goals if consulted  - Perform Range of Motion 2 times a day  - Reposition patient every 2 hours    - Dangle patient 2 times a day  - Stand patient 2 times a day  - Ambulate patient 2 times a day  - Out of bed to chair 2 times a day   - Out of bed for meals 2 times a day  - Out of bed for toileting  - Record patient progress and toleration of activity level   Outcome: Progressing  Goal: Patient will remain free of falls  Description: INTERVENTIONS:  - Educate patient/family on patient safety including physical limitations  - Instruct patient to call for assistance with activity   - Consult OT/PT to assist with strengthening/mobility   - Keep Call bell within reach  - Keep bed low and locked with side rails adjusted as appropriate  - Keep care items and personal belongings within reach  - Initiate and maintain comfort rounds  - Make Fall Risk Sign visible to staff  - Offer Toileting every 3 Hours, in advance of need  - Initiate/Maintain bed alarm  - Apply yellow socks and bracelet for high fall risk patients  - Consider moving patient to room near nurses station  Outcome: Progressing     Problem: DISCHARGE PLANNING  Goal: Discharge to home or other facility with appropriate resources  Description: INTERVENTIONS:  - Identify barriers to discharge w/patient and caregiver  - Arrange for needed discharge resources and transportation as appropriate  - Identify discharge learning needs (meds, wound care, etc )  - Arrange for interpretive services to assist at discharge as needed  - Refer to Case Management Department for coordinating discharge planning if the patient needs post-hospital services based on physician/advanced practitioner order or complex needs related to functional status, cognitive ability, or social support system  Outcome: Progressing     Problem: Knowledge Deficit  Goal: Patient/family/caregiver demonstrates understanding of disease process, treatment plan, medications, and discharge instructions  Description: Complete learning assessment and assess knowledge base  Interventions:  - Provide teaching at level of understanding  - Provide teaching via preferred learning methods  Outcome: Progressing     Problem: NEUROSENSORY - ADULT  Goal: Achieves stable or improved neurological status  Description: INTERVENTIONS  - Monitor and report changes in neurological status  - Monitor vital signs such as temperature, blood pressure, glucose, and any other labs ordered   - Initiate measures to prevent increased intracranial pressure  - Monitor for seizure activity and implement precautions if appropriate      Outcome: Progressing  Goal: Remains free of injury related to seizures activity  Description: INTERVENTIONS  - Maintain airway, patient safety  and administer oxygen as ordered  - Monitor patient for seizure activity, document and report duration and description of seizure to physician/advanced practitioner  - If seizure occurs,  ensure patient safety during seizure  - Reorient patient post seizure  - Seizure pads on all 4 side rails  - Instruct patient/family to notify RN of any seizure activity including if an aura is experienced  - Instruct patient/family to call for assistance with activity based on nursing assessment  - Administer anti-seizure medications if ordered    Outcome: Progressing  Goal: Achieves maximal functionality and self care  Description: INTERVENTIONS  - Monitor swallowing and airway patency with patient fatigue and changes in neurological status  - Encourage and assist patient to increase activity and self care     - Encourage visually impaired, hearing impaired and aphasic patients to use assistive/communication devices  Outcome: Progressing     Problem: RESPIRATORY - ADULT  Goal: Achieves optimal ventilation and oxygenation  Description: INTERVENTIONS:  - Assess for changes in respiratory status  - Assess for changes in mentation and behavior  - Position to facilitate oxygenation and minimize respiratory effort  - Oxygen administered by appropriate delivery if ordered  - Initiate smoking cessation education as indicated  - Encourage broncho-pulmonary hygiene including cough, deep breathe, Incentive Spirometry  - Assess the need for suctioning and aspirate as needed  - Assess and instruct to report SOB or any respiratory difficulty  - Respiratory Therapy support as indicated  Outcome: Progressing     Problem: GASTROINTESTINAL - ADULT  Goal: Minimal or absence of nausea and/or vomiting  Description: INTERVENTIONS:  - Administer IV fluids if ordered to ensure adequate hydration  - Maintain NPO status until nausea and vomiting are resolved  - Nasogastric tube if ordered  - Administer ordered antiemetic medications as needed  - Provide nonpharmacologic comfort measures as appropriate  - Advance diet as tolerated, if ordered  - Consider nutrition services referral to assist patient with adequate nutrition and appropriate food choices  Outcome: Progressing  Goal: Maintains or returns to baseline bowel function  Description: INTERVENTIONS:  - Assess bowel function  - Encourage oral fluids to ensure adequate hydration  - Administer IV fluids if ordered to ensure adequate hydration  - Administer ordered medications as needed  - Encourage mobilization and activity  - Consider nutritional services referral to assist patient with adequate nutrition and appropriate food choices  Outcome: Progressing  Goal: Maintains adequate nutritional intake  Description: INTERVENTIONS:  - Monitor percentage of each meal consumed  - Identify factors contributing to decreased intake, treat as appropriate  - Assist with meals as needed  - Monitor I&O, weight, and lab values if indicated  - Obtain nutrition services referral as needed  Outcome: Progressing  Goal: Establish and maintain optimal ostomy function  Description: INTERVENTIONS:  - Assess bowel function  - Encourage oral fluids to ensure adequate hydration  - Administer IV fluids if ordered to ensure adequate hydration   - Administer ordered medications as needed  - Encourage mobilization and activity  - Nutrition services referral to assist patient with appropriate food choices  - Assess stoma site  - Consider wound care consult   Outcome: Progressing  Goal: Oral mucous membranes remain intact  Description: INTERVENTIONS  - Assess oral mucosa and hygiene practices  - Implement preventative oral hygiene regimen  - Implement oral medicated treatments as ordered  - Initiate Nutrition services referral as needed  Outcome: Progressing     Problem: GENITOURINARY - ADULT  Goal: Maintains or returns to baseline urinary function  Description: INTERVENTIONS:  - Assess urinary function  - Encourage oral fluids to ensure adequate hydration if ordered  - Administer IV fluids as ordered to ensure adequate hydration  - Administer ordered medications as needed  - Offer frequent toileting  - Follow urinary retention protocol if ordered  Outcome: Progressing  Goal: Absence of urinary retention  Description: INTERVENTIONS:  - Assess patient’s ability to void and empty bladder  - Monitor I/O  - Bladder scan as needed  - Discuss with physician/AP medications to alleviate retention as needed  - Discuss catheterization for long term situations as appropriate  Outcome: Progressing     Problem: METABOLIC, FLUID AND ELECTROLYTES - ADULT  Goal: Electrolytes maintained within normal limits  Description: INTERVENTIONS:  - Monitor labs and assess patient for signs and symptoms of electrolyte imbalances  - Administer electrolyte replacement as ordered  - Monitor response to electrolyte replacements, including repeat lab results as appropriate  - Instruct patient on fluid and nutrition as appropriate  Outcome: Progressing  Goal: Fluid balance maintained  Description: INTERVENTIONS:  - Monitor labs   - Monitor I/O and WT  - Instruct patient on fluid and nutrition as appropriate  - Assess for signs & symptoms of volume excess or deficit  Outcome: Progressing  Goal: Glucose maintained within target range  Description: INTERVENTIONS:  - Monitor Blood Glucose as ordered  - Assess for signs and symptoms of hyperglycemia and hypoglycemia  - Administer ordered medications to maintain glucose within target range  - Assess nutritional intake and initiate nutrition service referral as needed  Outcome: Progressing     Problem: SKIN/TISSUE INTEGRITY - ADULT  Goal: Skin Integrity remains intact(Skin Breakdown Prevention)  Description: Assess:  -Perform Dhaval assessment every     -Clean and moisturize skin every     -Inspect skin when repositioning, toileting, and assisting with ADLS  -Assess under medical devices such as    every     -Assess extremities for adequate circulation and sensation     Bed Management:  -Have minimal linens on bed & keep smooth, unwrinkled  -Change linens as needed when moist or perspiring  -Avoid sitting or lying in one position for more than    hours while in bed  -Keep HOB at   degrees     Toileting:  -Offer bedside commode  -Assess for incontinence every     -Use incontinent care products after each incontinent episode such as       Activity:  -Mobilize patient    times a day  -Encourage activity and walks on unit  -Encourage or provide ROM exercises   -Turn and reposition patient every    Hours  -Use appropriate equipment to lift or move patient in bed  -Instruct/ Assist with weight shifting every    when out of bed in chair  -Consider limitation of chair time    hour intervals    Skin Care:  -Avoid use of baby powder, tape, friction and shearing, hot water or constrictive clothing  -Relieve pressure over bony prominences using     -Do not massage red bony areas    Next Steps:  -Teach patient strategies to minimize risks such as      -Consider consults to interdisciplinary teams such as     Outcome: Progressing  Goal: Incision(s), wounds(s) or drain site(s) healing without S/S of infection  Description: INTERVENTIONS  - Assess and document dressing, incision, wound bed, drain sites and surrounding tissue  - Provide patient and family education  - Perform skin care/dressing changes every     Outcome: Progressing  Goal: Pressure injury heals and does not worsen  Description: Interventions:  - Implement low air loss mattress or specialty surface (Criteria met)  - Apply silicone foam dressing  - Instruct/assist with weight shifting every    minutes when in chair   - Limit chair time to    hour intervals  - Use special pressure reducing interventions such as    when in chair   - Apply fecal or urinary incontinence containment device   - Perform passive or active ROM every     - Turn and reposition patient & offload bony prominences every    hours   - Utilize friction reducing device or surface for transfers   - Consider consults to  interdisciplinary teams such as     - Use incontinent care products after each incontinent episode such as             - Consider nutrition services referral as needed  Outcome: Progressing     Problem: HEMATOLOGIC - ADULT  Goal: Maintains hematologic stability  Description: INTERVENTIONS  - Assess for signs and symptoms of bleeding or hemorrhage  - Monitor labs  - Administer supportive blood products/factors as ordered and appropriate  Outcome: Progressing     Problem: MUSCULOSKELETAL - ADULT  Goal: Maintain or return mobility to safest level of function  Description: INTERVENTIONS:  - Assess patient's ability to carry out ADLs; assess patient's baseline for ADL function and identify physical deficits which impact ability to perform ADLs (bathing, care of mouth/teeth, toileting, grooming, dressing, etc )  - Assess/evaluate cause of self-care deficits   - Assess range of motion  - Assess patient's mobility  - Assess patient's need for assistive devices and provide as appropriate  - Encourage maximum independence but intervene and supervise when necessary  - Involve family in performance of ADLs  - Assess for home care needs following discharge   - Consider OT consult to assist with ADL evaluation and planning for discharge  - Provide patient education as appropriate  Outcome: Progressing  Goal: Maintain proper alignment of affected body part  Description: INTERVENTIONS:  - Support, maintain and protect limb and body alignment  - Provide patient/ family with appropriate education  Outcome: Progressing     Problem: Prexisting or High Potential for Compromised Skin Integrity  Goal: Skin integrity is maintained or improved  Description: INTERVENTIONS:  - Identify patients at risk for skin breakdown  - Assess and monitor skin integrity  - Assess and monitor nutrition and hydration status  - Monitor labs   - Assess for incontinence   - Turn and reposition patient  - Assist with mobility/ambulation  - Relieve pressure over bony prominences  - Avoid friction and shearing  - Provide appropriate hygiene as needed including keeping skin clean and dry  - Evaluate need for skin moisturizer/barrier cream  - Collaborate with interdisciplinary team   - Patient/family teaching  - Consider wound care consult   Outcome: Progressing     Problem: Nutrition/Hydration-ADULT  Goal: Nutrient/Hydration intake appropriate for improving, restoring or maintaining nutritional needs  Description: Monitor and assess patient's nutrition/hydration status for malnutrition  Collaborate with interdisciplinary team and initiate plan and interventions as ordered  Monitor patient's weight and dietary intake as ordered or per policy  Utilize nutrition screening tool and intervene as necessary  Determine patient's food preferences and provide high-protein, high-caloric foods as appropriate       INTERVENTIONS:  - Monitor oral intake, urinary output, labs, and treatment plans  - Assess nutrition and hydration status and recommend course of action  - Evaluate amount of meals eaten  - Assist patient with eating if necessary   - Allow adequate time for meals  - Recommend/ encourage appropriate diets, oral nutritional supplements, and vitamin/mineral supplements  - Order, calculate, and assess calorie counts as needed  - Recommend, monitor, and adjust tube feedings and TPN/PPN based on assessed needs  - Assess need for intravenous fluids  - Provide specific nutrition/hydration education as appropriate  - Include patient/family/caregiver in decisions related to nutrition  Outcome: Progressing     Problem: COPING  Goal: Pt/Family able to verbalize concerns and demonstrate effective coping strategies  Description: INTERVENTIONS:  - Assist patient/family to identify coping skills, available support systems and cultural and spiritual values  - Provide emotional support, including active listening and acknowledgement of concerns of patient and caregivers  - Reduce environmental stimuli, as able  - Provide patient education  - Assess for spiritual pain/suffering and initiate spiritual care, including notification of Pastoral Care or [facility] based community as needed  - Assess effectiveness of coping strategies  Outcome: Progressing     Problem: CONFUSION/THOUGHT DISTURBANCE  Goal: Thought disturbances (confusion, delirium, depression, dementia or psychosis) are managed to maintain or return to baseline mental status and functional level  Description: INTERVENTIONS:  - Assess for possible contributors to  thought disturbance, including but not limited to medications, infection, impaired vision or hearing, underlying metabolic abnormalities, dehydration, respiratory compromise,  psychiatric diagnoses and notify attending PHYSICAN/AP  - Monitor and intervene to maintain adequate nutrition, hydration, elimination, sleep and activity  - Decrease environmental stimuli, including noise as appropriate    - Provide frequent contacts to provide refocusing, direction and reassurance as needed  Approach patient calmly with eye contact and at their level  - [place] high risk fall precautions, aspiration precautions and other safety measures, as indicated  - If delirium suspected, notify physician/AP of change in condition and request immediate in-person evaluation  - Pursue consults as appropriate including Geriatric (campus dependent), OT for cognitive evaluation/activity planning, psychiatric, pastoral care, etc   Outcome: Progressing     Problem: SELF HARM  Goal: Effect of psychiatric condition will be minimized and patient will be protected from self harm  Description: INTERVENTIONS:  - Assess impact of patient's symptoms on level of functioning, self-care needs and offer support as indicated  - Assess patient/family knowledge of depression, impact on illness and need for teaching  - Provide emotional support, presence and reassurance  - Assess for possible suicidal thoughts, ideation or self-harm  If patient expresses suicidal thoughts or statements do not leave alone, notify physician/AP immediately, initiate suicide precautions, and determine need for continual observation    - initiate consults and referrals as appropriate (a mental health professional, Spiritual Care  Outcome: Progressing     Problem: SPIRITUAL CARE  Goal: Patient feels balance and connection with others and/or higher power that empowers the self during times of loss, guilt and fear  Description: INTERVENTIONS:  - Create safety for patient through empathic presence and non-judgmental listening  - Encourage patient to explore his/her values, beliefs and/or spiritual images and practices  - Encourage use of breath work, imagery, meditation, relaxation, reiki to ease distress and provide healing  - Encourage use of cultural and spiritual celebrations and rituals  - Facilitate discussion that helps patient sort out spiritual concerns  - Help patient identify where meaning/hope/comfort & strength are in his/her life  - Refer patient to [facility] community for assistance, as appropriate  - Respond to patient/family need for prayer/ritual/sacrament/ceremony  Outcome: Progressing
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
## 2023-06-13 NOTE — CASE MANAGEMENT
Case Management Discharge Planning Note    Patient name Jay Araujo  Location [address] 221/-53 MRN [id number]  : 1945 Date 2023       Current Admission Date: 2023  Current Admission Diagnosis:Encephalopathy acute   Patient Active Problem List    Diagnosis Date Noted   • Paranoia (psychosis) ([facility] [address] ) 2023   • Chronic anemia 2023   • Encephalopathy acute 2023   • Status post CVA 2023   • Recurrent falls 2023   • Tobacco abuse 2023   • Acute ischemic CVA 2023   • Severe vascular dementia with psychotic disturbance ([facility] [address] ) 2023   • Generalized anxiety disorder 2023   • Hypertensive urgency 2023      LOS (days): 16  Geometric Mean LOS (GMLOS) (days): 4 70  Days to GMLOS:-10 8     OBJECTIVE:  Risk of Unplanned Readmission Score: 33 04         Current admission status: Inpatient   Preferred Pharmacy:   [address] [place] [address]  Phone: [phone] Fax: [phone]    Primary Care Provider: Marino Abbott DO    Primary Insurance: MEDICARE  Secondary Insurance: BLUE CROSS    DISCHARGE DETAILS:     0915 CM contacted [facility] to check bed availability - full     1510 CM contacted [facility] at [phone] and [phone], no answer at either number   CM contacted [facility] - no longer have davian psych unti   CM contacted [facility] - full, check back tomorrow

## 2023-06-13 NOTE — PLAN OF CARE
Problem: PAIN - ADULT  Goal: Verbalizes/displays adequate comfort level or baseline comfort level  Description: Interventions:  - Encourage patient to monitor pain and request assistance  - Assess pain using appropriate pain scale  - Administer analgesics based on type and severity of pain and evaluate response  - Implement non-pharmacological measures as appropriate and evaluate response  - Consider cultural and social influences on pain and pain management  - Notify physician/advanced practitioner if interventions unsuccessful or patient reports new pain  Outcome: Progressing     Problem: INFECTION - ADULT  Goal: Absence or prevention of progression during hospitalization  Description: INTERVENTIONS:  - Assess and monitor for signs and symptoms of infection  - Monitor lab/diagnostic results  - Monitor all insertion sites, i e  indwelling lines, tubes, and drains  - Monitor endotracheal if appropriate and nasal secretions for changes in amount and color  - [place] appropriate cooling/warming therapies per order  - Administer medications as ordered  - Instruct and encourage patient and family to use good hand hygiene technique  - Identify and instruct in appropriate isolation precautions for identified infection/condition  Outcome: Progressing
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
## 2023-06-13 NOTE — ASSESSMENT & PLAN NOTE
· Patient discharged from outside hospital earlier in day and 5/28 after admission for vomiting  · Evaluated by psychiatry as she was transferred from the behavioral health unit to [facility] for the vomiting, son was requesting hospice and took the patient home with 24/7 care provided by family while awaiting services to be arranged  Review of the discharge summary the internal medicine team and psychiatry team voiced their concerns regarding the son taking the patient home but he was adamant  · Patient was brought to this facility on the same day as discharge from previous facility  Son stated to ER staff he cannot care for patient at home  · Evaluated by psychiatry on 5/30 and deemed to lack appropriate judgement and is danger to self and needs inpatient psychiatric treatment, patient with advanced dementia do not think will be able to sign 201- 302 has been signed  · Also try to wrap a call bell around her neck to commit suicide she is expressing suicidal ideations now patient has been 302   · Patient is on a one-to-one for safety reasons  · [facility] psychiatry declined the patient stating that she has vascular dementia and there is nothing they can do from psychiatric point of view  Medication dose adjusted as per psychiatry recommendation-psychiatry also recommending inpatient psych placement  As per nurse documentation, yesterday and overnight, patient was much calmer compared to previous days  Patient was taking Seroquel 100 mg at 10 PM, and Seroquel 25 mg at 5 PM -We will adjust the timing and give in total of 125 mg of Seroquel at 5 PM-and observe the response  Patient is still remained awake till early morning, then fall asleep at early morning    Encouraged staff to keep patient awake during daytime as much as possible

## 2023-06-14 PROBLEM — Z86.010 HISTORY OF COLON POLYPS: Status: ACTIVE | Noted: 2018-11-02

## 2023-06-14 PROBLEM — R45.851 SUICIDAL IDEATION: Status: ACTIVE | Noted: 2023-05-24

## 2023-06-14 PROBLEM — F01.C0: Status: ACTIVE | Noted: 2023-06-14

## 2023-06-14 PROBLEM — R29.6 REPEATED FALLS: Status: ACTIVE | Noted: 2023-04-20

## 2023-06-14 PROBLEM — F17.200 NICOTINE DEPENDENCE, UNSPECIFIED, UNCOMPLICATED: Status: ACTIVE | Noted: 2023-04-20

## 2023-06-14 PROBLEM — T84.051A PERIPROSTHETIC OSTEOLYSIS OF INTERNAL PROSTHETIC LEFT HIP JOINT (HCC): Status: ACTIVE | Noted: 2021-12-02

## 2023-06-14 PROBLEM — F33.2 MAJOR DEPRESSIVE DISORDER, RECURRENT SEVERE WITHOUT PSYCHOTIC FEATURES (HCC): Status: ACTIVE | Noted: 2023-05-14

## 2023-06-14 PROBLEM — H40.1132 PRIMARY OPEN ANGLE GLAUCOMA OF BOTH EYES, MODERATE STAGE: Status: ACTIVE | Noted: 2017-10-09

## 2023-06-14 PROBLEM — B19.20 HEPATITIS C TEST POSITIVE: Status: ACTIVE | Noted: 2023-05-14

## 2023-06-14 PROBLEM — K44.9 HIATAL HERNIA: Status: ACTIVE | Noted: 2023-05-19

## 2023-06-14 PROBLEM — I70.0 AORTIC ATHEROSCLEROSIS (HCC): Status: ACTIVE | Noted: 2018-09-18

## 2023-06-14 PROBLEM — M51.36 LUMBAR DEGENERATIVE DISC DISEASE: Status: ACTIVE | Noted: 2017-01-17

## 2023-06-14 PROBLEM — E78.5 DYSLIPIDEMIA, GOAL LDL BELOW 100: Status: ACTIVE | Noted: 2019-04-10

## 2023-06-14 PROBLEM — Z00.8 MEDICAL CLEARANCE FOR PSYCHIATRIC ADMISSION: Status: ACTIVE | Noted: 2023-06-14

## 2023-06-14 PROBLEM — I69.354 HEMIPLEGIA AND HEMIPARESIS FOLLOWING CEREBRAL INFARCTION AFFECTING LEFT NON-DOMINANT SIDE (HCC): Status: ACTIVE | Noted: 2023-04-20

## 2023-06-14 LAB
ATRIAL RATE: 81 BPM
P AXIS: 68 DEGREES
PR INTERVAL: 168 MS
QRS AXIS: 55 DEGREES
QRSD INTERVAL: 68 MS
QT INTERVAL: 384 MS
QTC INTERVAL: 446 MS
T WAVE AXIS: 47 DEGREES
VENTRICULAR RATE: 81 BPM

## 2023-06-14 PROCEDURE — 99239 HOSP IP/OBS DSCHRG MGMT >30: CPT | Performed by: FAMILY MEDICINE

## 2023-06-14 NOTE — ASSESSMENT & PLAN NOTE
· S/P ischemic stroke right MCA in April 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 and lower extremity weakness and dysphagia    · Was discharged to SNF then to inpatient geripsych - unclear extent of physical rehab patient received  · Continue pureed diet with nectar thick liquids  · Continue ASA 81mg daily and atorvastatin 40mg daily  · Offer supportive care

## 2023-06-14 NOTE — ASSESSMENT & PLAN NOTE
· Hgb stable at 12  4  baseline labile ranging from 10's - 13's  · Monitor Hgb weekly while inpatient

## 2023-06-14 NOTE — ASSESSMENT & PLAN NOTE
· Patient discharged from outside hospital earlier in day and 5/28 after admission for vomiting  · Evaluated by psychiatry as she was transferred from the behavioral health unit to [facility] for the vomiting, son was requesting hospice and took the patient home with 24/7 care provided by family while awaiting services to be arranged  Review of the discharge summary the internal medicine team and psychiatry team voiced their concerns regarding the son taking the patient home but he was adamant  · Patient was brought to this facility on the same day as discharge from previous facility  Son stated to ER staff he cannot care for patient at home  · Evaluated by psychiatry on 5/30 and deemed to lack appropriate judgement and is danger to self and needs inpatient psychiatric treatment, patient with advanced dementia do not think will be able to sign 201- 302 has been signed  · Also try to wrap a call bell around her neck to commit suicide she is expressing suicidal ideations now patient has been 302   · Patient is on a one-to-one for safety reasons  · [facility] psychiatry declined the patient stating that she has vascular dementia and there is nothing they can do from psychiatric point of view  Medication dose adjusted as per psychiatry recommendation-psychiatry also recommending inpatient psych placement  As per nurse documentation, yesterday and overnight, patient was much calmer compared to previous days  Patient was taking Seroquel 100 mg at 10 PM, and Seroquel 25 mg at 5 PM -We will adjust the timing and give in total of 125 mg of Seroquel at 5 PM-and observe the response  Patient is still remained awake till early morning, then fall asleep at early morning    Encouraged staff to keep patient awake during daytime as much as possible  As patient is a being acute paranoid psychosis,-patient will need inpatient psych treatment first for that reason, patient is discharging to [address] [place] [address] behavioral unit
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
## 2023-06-14 NOTE — CASE MANAGEMENT
Case Management Discharge Planning Note    Patient name Arely Johnson  Location [address] 221/-20 MRN [id number]  : 1945 Date 2023       Current Admission Date: 2023  Current Admission Diagnosis:Paranoia (psychosis) [facility])   Patient Active Problem List    Diagnosis Date Noted   • Paranoia (psychosis) ([facility] [address] ) 2023   • Chronic anemia 2023   • Encephalopathy acute 2023   • Status post CVA 2023   • Recurrent falls 2023   • Tobacco abuse 2023   • Acute ischemic CVA 2023   • Severe vascular dementia with psychotic disturbance ([facility] ) 2023   • Generalized anxiety disorder 2023   • Hypertensive urgency 2023      LOS (days): 17  Geometric Mean LOS (GMLOS) (days): 4 70  Days to GMLOS:-11 8     OBJECTIVE:  Risk of Unplanned Readmission Score: 27 36         Current admission status: Inpatient   Preferred Pharmacy:   [address] [place] [address]  Phone: [phone] Fax: [phone]    Primary Care Provider: Paulette Spears DO    Primary Insurance: MEDICARE  Secondary Insurance: BLUE CROSS    DISCHARGE DETAILS:     CM informed through [employer] that pt was accepted to [facility]  Transport booked through [facility] accepted for 1400  CM called pt's son to update on acceptance to [address] at [address] [place] [address] and transport time of 1400                                                                                                    Accepting Facility Name, [facility] : [address]  Receiving Facility/Agency Phone Number: [phone]

## 2023-06-14 NOTE — DISCHARGE SUMMARY
[address]  Discharge- Gwynda Sep 1945, 76 y o  female MRN: [id number]  Unit/Bed#: -01 Encounter: [id number]  Primary Care Provider: Lakshmi Manley DO   Date and time admitted to hospital: 5/28/2023  5:57 PM    Encephalopathy acute  Assessment & Plan  On severe vascular dementia POA initially was psychotic features- but since seeing her yesterday and today she is somnolent poor po intake will wake up to name and then falls asleep  Ct brain neg   No infection evident on admission  depakote level checked nml therapeutic   No prns overnight   tsh nml in beg of may 5/19   Electrolytes stable  Patient is still remains significantly delirious  Blaming PCA for stealing her baby  Also blaming PCA that PCA was trying to hurt her  Patient was tapping her head-because she wants to get rid of some ideas from her head  Psychiatric medication adjusted as per psychiatry recommendation  Continue current treatment  Maintain sleep awake cycle (patient sleeps all day, and awake all night)-advised the nurse and PCA to keep patient awake during the daytime  As per nurse documentation, yesterday and overnight, patient was much calmer compared to previous days  Patient was taking Seroquel 100 mg at 10 PM, and Seroquel 25 mg at 5 PM -We will adjust the timing and give in total of 125 mg of Seroquel at 5 PM    As patient is a being acute paranoid psychosis,-patient will need inpatient psych treatment first for that reason, patient is discharging to [address] [place] [address] behavioral unit      Severe vascular dementia with psychotic disturbance [facility])  Assessment & Plan  Patient discharged from outside hospital earlier in day and 5/28 after admission for vomiting  Evaluated by psychiatry as she was transferred from the behavioral health unit to tertiary [facility] for the vomiting, son was requesting hospice and took the patient home with 24/7 care provided by family while awaiting services to be arranged  Review of the discharge summary the internal medicine team and psychiatry team voiced their concerns regarding the son taking the patient home but he was adamant  Patient was brought to this facility on the same day as discharge from previous facility  Son stated to ER staff he cannot care for patient at home  Evaluated by psychiatry on 5/30 and deemed to lack appropriate judgement and is danger to self and needs inpatient psychiatric treatment, patient with advanced dementia do not think will be able to sign 201- 302 has been signed  Also try to wrap a call bell around her neck to commit suicide she is expressing suicidal ideations now patient has been 302   Patient is on a one-to-one for safety reasons  [address] [place] [address] psychiatry declined the patient stating that she has vascular dementia and there is nothing they can do from psychiatric point of view  Medication dose adjusted as per psychiatry recommendation-psychiatry also recommending inpatient psych placement  As per nurse documentation, yesterday and overnight, patient was much calmer compared to previous days  Patient was taking Seroquel 100 mg at 10 PM, and Seroquel 25 mg at 5 PM -We will adjust the timing and give in total of 125 mg of Seroquel at 5 PM-and observe the response  Patient is still remained awake till early morning, then fall asleep at early morning  Encouraged staff to keep patient awake during daytime as much as possible  As patient is a being acute paranoid psychosis,-patient will need inpatient psych treatment first for that reason, patient is discharging to [address] [place] [address] behavioral unit  * Paranoia (psychosis) [facility])  Assessment & Plan  Patient is hallucinating-blaming PCA that they are hitting her, patient also reports that PCAs are stealing her baby and trying to kill the baby  Patient is continuously banging her head with her hand to get rid of some thoughts in her mind    Patient also interfering with medical treatment-by refusing and not cooperating with medical staff  Patient reported she has been tortured by God for last 3 years-she wants to eat regular food, when was still that we are giving  But for safety, patient started screaming and banging her head with her hand  Per nurse's note, overnight patient was screaming, choking herself, and saying she was going to kill herself  She was also trying to bite herself and  punching herself in the head and stomach  Psychiatry consult appreciated, dose adjusted  Maintain sleep awake psych-patient awake during daytime,  Patient is much calm and cooperative today-son on the bedside  As per son, patient mood fluctuates-when patient gets anxious or frustrated, patient can tap her head toward her stomach which is normal for patient per son  Medication dose adjusted as per psychiatry recommendation, also medication timing adjusted to help patient fall asleep at nighttime  Patient is still remaining paranoia, blaming staff says that there is telling her baby and trying to kill the baby  Also blaming the staff that there trying to hit her  Continue one-to-one observation at this time  [id number] signed for inpatient psych placement as per psychiatry recommendation  Patient remain very aggressive-this morning patient scratch PCA's eye and displaced her contact lens  Overnight, patient remained very aggressive and violent  At this time, considering overall conditions, patient will benefit from inpatient psych facility to control her psychosis and behavior  Patient is getting discharged to [address] [place] [address] behavioral unit for inpatient psychiatric treatment  Patient remained medically stable for inpatient psych facility        Chronic anemia  Assessment & Plan  Improved    Status post CVA  Assessment & Plan  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 and dysphagia  Was discharged to SNF then sent to inpatient geropsych, unclear extent of physical rehabilitation patient received or was amenable to participate  Continue puréed diet with nectar thick liquids  Appreciate PT/OT/case management- needs inpatient psych   Continue aspirin and statin        Medical Problems       Resolved Problems  Date Reviewed: 6/5/2023   None       Discharging Physician / Practitioner: Jonathan Peacock MD  PCP: Paulette Spears DO  Admission Date:   Admission Orders (From admission, onward)       Ordered        05/28/23 2123  INPATIENT ADMISSION  Once            Signed and Held  ED TO DIFFERENT CAMPUS [address] UNIT or INPATIENT MEDICAL UNIT to [place] [address] (using Discharge Readmit Navigator) - Admit Patient to [address] [place] [address]  Once                          Discharge Date: 06/14/23    Consultations During Hospital Stay:  Psychiatry    Procedures Performed:   XR hip/pelv 2-3 vws left if performed   Final Result by Kodak Patel MD (06/01 1459)      No acute osseous abnormality  Workstation performed: [id number]         CT head without contrast   Final Result by Claudean Snooks, MD (05/28 2017)   Chronic ischemic changes as above  No acute intracranial abnormality                    Workstation performed: [id number]               Significant Findings / Test Results:   Lab Results   Component Value Date    HCT 39 2 06/13/2023    HGB 12 4 06/13/2023    MCV 97 06/13/2023     06/13/2023    WBC 6 80 06/13/2023     Lab Results   Component Value Date    AGAP 6 06/13/2023    ALKPHOS 60 06/13/2023    ALT 6 (L) 06/13/2023    AST 20 06/13/2023    BUN 5 06/13/2023    CALCIUM 8 7 06/13/2023     06/13/2023    CO2 27 06/13/2023    CREATININE 0 48 (L) 06/13/2023    EGFR 93 06/13/2023    GLUC 112 06/13/2023    K 4 0 06/13/2023    SODIUM 139 06/13/2023    TBILI 0 42 06/13/2023    TP 6 7 06/13/2023         Incidental Findings:   As mentioned above  I reviewed the above mentioned incidental findings with the patient and/or family and they expressed understanding  Test Results Pending at Discharge (will require follow up):   none     Outpatient Tests Requested:  none    Complications:  none    Reason for Admission: Glenn Rodríguez Course:   Jorge A Cisneros is a 76 y o  female patient who originally presented to the hospital on 5/28/2023 due to behavioral issue  Patient with history of 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  Recently got discharged from different hospital to skilled nursing facility  Unfortunately at the skilled nursing facility patient behavior were uncontrollable and she verbalized suicidal ideation  At that time, patient was transferred to [facility] inpatient [facility] psych unit  While patient in the [facility] psych unit she developed nausea and vomiting and suspected gastric outlet obstruction and was transferred to [facility] for surgical intervention  She did not require surgical intervention, patient condition improved with conservative management  During that time, patient was evaluated by psychiatry and recommendation was to placed her back to inpatient psych facility  But as per record, son, Nisha Johnson requested to take her home with 24/7 home care  Patient was discharged against the recommendations of psychiatry and internal medicine to home with her son  On 5/28/2023  The son then brought the patient to this facility later that evening after the patient was uncontrollable at the home  Ports none he cannot care for her at home  Patient was not found to have any acute medical reason for her agitation with normal lab findings  During the hospitalization, patient placed on continuous observation, multiple psych evaluation done since patient was threatening and making statements of suicide, trying to wrap call bell wire to her neck, her gown around her neck  Received multiple dose of medication in different form without any effect    Patient remain in paranoid psychosis-since she "is constantly blaming the medical staff that they are going to hurt her, they are going to steal her baby and trying to kill her baby  Patient also remain aggressive, attacking medical staff-patient is scratch medical staff so I Antispas her contact lens  Constantly interfering her medical treatment  Patient's psychiatric medications adjusted multiple times as per psychiatry recommendation without any significant effect  As per psychiatry recommendation, patient need inpatient psychiatry placement  302 form filled out  Patient remained medically stable to discharge to inpatient psychiatric facility  Patient is getting discharged to [address] behavioral unit  Please see above list of diagnoses and related plan for additional information  Condition at Discharge:  Patient remained in paranoid psychosis state  Discharge Day Visit / Exam:   Subjective: Seen and evaluated during the event  Still hallucinating, blaming medical stops  Vitals: Blood Pressure: 152/76 (06/13/23 2221)  Pulse: 84 (06/13/23 2221)  Temperature: 97 9 °F (36 6 °C) (06/13/23 2221)  Temp Source: Axillary (06/13/23 1458)  Respirations: 18 (06/13/23 2221)  Height: 5' 1\" (154 9 cm) (05/30/23 1121)  Weight - Scale: 48 8 kg (107 lb 9 4 oz) (06/12/23 1331)  SpO2: 99 % (06/13/23 2221)  Exam:   Physical Exam  Vitals and nursing note reviewed  Exam conducted with a chaperone present  HENT:      Head: Normocephalic  Mouth/Throat:      Mouth: Mucous membranes are moist       Pharynx: No oropharyngeal exudate or posterior oropharyngeal erythema  Eyes:      General: No scleral icterus  Left eye: No discharge  Extraocular Movements: Extraocular movements intact  Conjunctiva/sclera: Conjunctivae normal       Pupils: Pupils are equal, round, and reactive to light  Cardiovascular:      Rate and Rhythm: Normal rate  Heart sounds: No friction rub  No gallop     Pulmonary:      Effort: Pulmonary " effort is normal  No respiratory distress  Breath sounds: No stridor  No wheezing or rhonchi  Abdominal:      General: Abdomen is flat  Bowel sounds are normal  There is no distension  Palpations: There is no mass  Tenderness: There is no abdominal tenderness  Hernia: No hernia is present  Musculoskeletal:         General: No swelling or signs of injury  Cervical back: Normal range of motion  Right lower leg: No edema  Left lower leg: No edema  Neurological:      Mental Status: She is alert  Motor: Weakness (left upper extremity from previous CVA) present  Psychiatric:         Behavior: Behavior is agitated and aggressive  Thought Content: Thought content is paranoid and delusional          Cognition and Memory: Cognition is impaired  Judgment: Judgment is impulsive  Discussion with Family:  Patient's son was updated by case management        Discharge instructions/Information to patient and family:   See after visit summary for information provided to patient and family  Provisions for Follow-Up Care:  See after visit summary for information related to follow-up care and any pertinent home health orders  Disposition:   Inpatient Psychiatry at [facility] Readmission: none     Discharge Statement:  Greater than 50% of the total time was spent examining patient, answering all patient questions, arranging and discussing plan of care with patient as well as directly providing post-discharge instructions  Additional time then spent on discharge activities  Discharge Medications:  See after visit summary for reconciled discharge medications provided to patient and/or family        **Please Note: This note may have been constructed using a voice recognition system**

## 2023-06-14 NOTE — CONSULTS
[facility]  Consult  Name: Fabio Russell 76 y o  female I MRN: [id number]  Unit/Bed#: Jasmeet Thrasher 436-20 I Date of Admission: 6/14/2023   Date of Service: 6/14/2023 I Hospital Day: 0    Inpatient consult for Medical Clearance for [facility] patient  Consult performed by: Brooke Napoles PA-C  Consult ordered by: RAYSA More          Assessment/Plan   Medical clearance for psychiatric admission  Assessment & Plan  VSS  Reviewed inpatient labs from yesterday  Admission labs pending at the time of this consultation  Patient is medically cleared for admission to the [facility] for treatment of the underlying psychiatric illness  SLIM will sign off  Please call with questions or concerns     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, without behavioral disturbance, severe (HCC)  Assessment & Plan  · Management per primary team    Chronic anemia  Assessment & Plan  · Hgb stable at 12  4  baseline labile ranging from 10's - 13's  · Monitor Hgb weekly while inpatient    Status post CVA  Assessment & Plan  · S/P ischemic stroke right MCA in April 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 and lower extremity weakness and dysphagia  · Was discharged to SNF then to inpatient geripsych - unclear extent of physical rehab patient received  · Continue pureed diet with nectar thick liquids  · Continue ASA 81mg daily and atorvastatin 40mg daily  · Offer supportive care    * Major depressive disorder, recurrent severe without psychotic features [facility])  Assessment & Plan  · Management per primary team             Counseling / Coordination of Care Time: 45 minutes  Greater than 50% of total time spent on patient counseling and coordination of care  Collaboration of Care: Were Recommendations Directly Discussed with Primary Treatment Team? - No     History of Present Illness:    Fabio Russell is a 76 y o  female who is originally admitted to the psychiatry service due to aggressive behavior   We are consulted for medical clearance for admission to [facility] Unit and treatment of underlying psychiatric illness  This patient has a past medical history significant for CVA in 4/2023, and anemia  After her stroke, she was discharged to SNF for rehab, however the facility was unable to care for this patient due to her behavior and transferred to [facility] psych  While on the psych unit she developed nausea and vomiting which was believed to be 2/2 to gastric outlet obstruction and was transferred to [facility] 1620  She was managed conservatively and psychiatry recommeded transferring patient back to inpatient psych  Pt son refused and took patient home  Later returned to the hospital as he was unable to care for her  She was admitted to med surg, 302 was petitioned and patient is now transferred to [facility]  Review of Systems:    Review of Systems   Unable to perform ROS: Dementia       Past Medical and Surgical History:     Past Medical History:   Diagnosis Date   • Dementia ([facility] [address] )    • No abnormality of hip joint detected on examination    • Stroke [facility])        No past surgical history on file  Meds/Allergies:    all medications and allergies reviewed    Allergies:    Allergies   Allergen Reactions   • Lorazepam Other (See Comments)     Increased agitation         Social History:     Marital Status: /Civil Union    Substance Use History:   Social History     Substance and Sexual Activity   Alcohol Use Yes    Comment: ocassional     Social History     Tobacco Use   Smoking Status Every Day   • Packs/day: 0 25   • Types: Cigarettes   Smokeless Tobacco Never     Social History     Substance and Sexual Activity   Drug Use Never       Family History:    non-contributory    Physical Exam:     Vitals:   Blood Pressure: 153/76 (06/14/23 1604)  Pulse: 81 (06/14/23 1604)  Temperature: 97 5 °F (36 4 °C) (06/14/23 1604)  Temp Source: Temporal (06/14/23 1604)  Respirations: 18 (06/14/23 1604)  SpO2: 94 % (06/14/23 1604)    Physical Exam  Constitutional:       General: She is not in acute distress  Appearance: She is normal weight  She is not ill-appearing, toxic-appearing or diaphoretic  HENT:      Nose: Nose normal       Mouth/Throat:      Mouth: Mucous membranes are moist    Cardiovascular:      Rate and Rhythm: Normal rate and regular rhythm  Heart sounds: Normal heart sounds  Pulmonary:      Effort: Pulmonary effort is normal       Breath sounds: Normal breath sounds  Abdominal:      General: Abdomen is flat  Bowel sounds are normal       Palpations: Abdomen is soft  Skin:     General: Skin is warm  Comments: Skin tears ecchymosis on right arm   Psychiatric:      Comments: Sedated           Additional Data:     Lab Results: I have personally reviewed pertinent reports  Results from last 7 days   Lab Units 06/13/23  1546   EOS PCT % 2   HEMATOCRIT % 39 2   HEMOGLOBIN g/dL 12 4   LYMPHS PCT % 15   MONOS PCT % 8   NEUTROS PCT % 74   PLATELETS Thousands/uL 250   WBC Thousand/uL 6 80     Results from last 7 days   Lab Units 06/13/23  1546   ANION GAP mmol/L 6   ALBUMIN g/dL 2 8*   ALK PHOS U/L 60   ALT U/L 6*   AST U/L 20   BUN mg/dL 5   CALCIUM mg/dL 8 7   CHLORIDE mmol/L 106   CO2 mmol/L 27   CREATININE mg/dL 0 48*   GLUCOSE RANDOM mg/dL 112   POTASSIUM mmol/L 4 0   SODIUM mmol/L 139   TOTAL BILIRUBIN mg/dL 0 42             Lab Results   Component Value Date/Time    HGBA1C 5 5 05/14/2023 07:33 AM    HGBA1C 5 9 (H) 04/15/2023 04:34 AM           EKG, Pathology, and Other Studies Reviewed on Admission:   · EKG on 06/14/23 shows NSR with nonspecific ST and T wave abnormality  ** Please Note: This note has been constructed using a voice recognition system   **

## 2023-06-14 NOTE — H&P
Psychiatric Evaluation - Behavioral Health     Identification Data:Melissa Zaragoza 76 y o  female MRN: [id number]  Unit/Bed#: Jasmeet Thrasher 798-94 Encounter: [id number]    Chief Complaint:     History of present illness:    Fabio Russell is a 76 y o   female, domiciled w/ son prior to the admission, w/ PMH of multiple medical conditions including CVA (MCA stroke in April 2023), hemiparesis and dysphagia, encephalopathy, aortic atherosclerosis, HLD, positive hep C test, hiatal hernia, lumbar DJD, recurrent falls, s/p TKR, vit D def, primary open angle glaucoma of both eyes, and severe vascular dementia with psychotic disturbance, and PPH of depression, MNCD, no prior psychiatric admissions, no prior SA, no h/o self-injurious behavior, multiple ED/inpatient medical admission over past 6 months (more than 6) for medical issues and deteriorating health conditions, who was brought in to the [facility] ED on 5/28/23 by her son the same day the patient discharged home with hospice care as the son reportedly could not take care of her at home  Psychiatry consult team followed up the patient during multiple admissions, and medications adjusted  The patient was 302'ed by the medical team as reportedly the patient reportedly tried to wrap a call bell around her neck and was reportedly aggressive towards the staff, and admitted to the inpatient psychiatry unit 6B for further psychiatric stabilization  The pt was visited on the unit; chart reviewed  The patient has been confused, requires frequent redirection and reorientation by the staff and assistance with all ADLs  She remains intermittently agitated, aggressive towards staff, trying to get out of the bed without any insight into her hemiplegia, removing the dressing and required frequent IM meds and restraint       Upon evaluation at the bedside, the patient was A&O to self and partially to place, knew that she was in the hospital, but not to the time, stated: "\"It's Easter Sunday\" and when was asked about the year, stated: \"1959\"  She reported feeling good, and had no insight into her behavioral issues, and could not recall any aggression  She reported VH as seeing her son next to the bed, but denied AH  Denied SI/HI, intent or plan upon direct inquiry at this time  The patient remains on 1:1 for safety and unpredictable behavior  VPA was 55 as checked yesterday  Maintained on Zoloft 100 mg daily, Seroquel 125 mg nightly and Depakote 500 mg nightly; Depakote dose could be increased for higher therapeutic level  Buspar discontinued to avoid polypharmacy  The patient needs higher level of care, and  will explore NH placement vs  Hospice care upon further stabilization         Psychiatric Review Of Systems:  Pertinent items are noted in HPI; all others negative    Historical Information     Past Psychiatric History:   PPH of depression, MNCD, no prior psychiatric admissions, no prior SA, no h/o self-injurious behavior, multiple ED/inpatient medical admission over past 6 months (more than 6) for medical issues and deteriorating health conditions  AS per chart review:  Past Inpatient Psychiatric Treatment:   No history of past inpatient psychiatric admissions  Past Outpatient Psychiatric Treatment:    Most recently in outpatient psychiatric treatment with a family physician  Past Suicide Attempts: no  Past Violent Behavior: yes  Past Psychiatric Medication Trials: patient does not remember    Substance Abuse History:     Social History     Substance and Sexual Activity   Alcohol Use Yes    Comment: ocassional     Social History     Substance and Sexual Activity   Drug Use Never         Family Psychiatric History:   Family History   Problem Relation Age of Onset   • Arthritis Mother        Social History:  Social History     Socioeconomic History   • Marital status: /Civil Union     Spouse name: Not on file   • Number of children: Not on file   • Years of " education: Not on file   • Highest education level: Not on file   Occupational History   • Not on file   Tobacco Use   • Smoking status: Every Day     Packs/day: 0 25     Types: Cigarettes   • Smokeless tobacco: Never   Vaping Use   • Vaping Use: Never used   Substance and Sexual Activity   • Alcohol use: Yes     Comment: ocassional   • Drug use: Never   • Sexual activity: Not on file   Other Topics Concern   • Not on file   Social History Narrative   • Not on file     Social Determinants of Health     Financial Resource Strain: Not on file   Food Insecurity: No Food Insecurity (5/31/2023)    Hunger Vital Sign    • Worried About Running Out of Food in the Last Year: Never true    • Ran Out of Food in the Last Year: Never true   Transportation Needs: No Transportation Needs (5/31/2023)    PRAPARE - Transportation    • Lack of Transportation (Medical): No    • Lack of Transportation (Non-Medical):  No   Physical Activity: Not on file   Stress: Not on file   Social Connections: Not on file   Intimate Partner Violence: Not on file   Housing Stability: Low Risk  (5/31/2023)    Housing Stability Vital Sign    • Unable to Pay for Housing in the Last Year: No    • Number of Places Lived in the Last Year: 1    • Unstable Housing in the Last Year: No       Developmental:  Education: unable to assess due to patient's baseline  Marital history: unknown  Children: has a son as per chart review  Living arrangement, social support: son  Access to firearms: not reported    Traumatic History:   Abuse: unable to assess  Other Traumatic Events: unable to assess    Past Medical History:   Diagnosis Date   • Dementia ([facility] [address] )    • No abnormality of hip joint detected on examination    • Stroke [facility])        Medical Review Of Systems:  Pertinent items are noted in HPI; all others negative    Meds/Allergies   all current active meds have been reviewed, current meds:   Current Facility-Administered Medications   Medication Dose Route Frequency • acetaminophen (TYLENOL) tablet 650 mg  650 mg Oral Q4H PRN   • acetaminophen (TYLENOL) tablet 650 mg  650 mg Oral Q4H PRN   • acetaminophen (TYLENOL) tablet 975 mg  975 mg Oral Q6H PRN   • cholecalciferol (VITAMIN D3) tablet 1,000 Units  1,000 Units Oral Daily   • divalproex sodium (DEPAKOTE SPRINKLE) capsule 500 mg  500 mg Oral HS   • hydrOXYzine HCL (ATARAX) tablet 25 mg  25 mg Oral Q6H PRN Max 4/day   • hydrOXYzine HCL (ATARAX) tablet 50 mg  50 mg Oral Q6H PRN Max 4/day   • magnesium Oxide (MAG-OX) tablet 400 mg  400 mg Oral BID   • melatonin tablet 3 mg  3 mg Oral HS   • OLANZapine (ZyPREXA) tablet 5 mg  5 mg Oral Q6H PRN    Or   • OLANZapine (ZyPREXA) IM injection 2 5 mg  2 5 mg Intramuscular Q6H PRN   • OLANZapine (ZyPREXA) tablet 7 5 mg  7 5 mg Oral Q6H PRN    Or   • OLANZapine (ZyPREXA) IM injection 5 mg  5 mg Intramuscular Q6H PRN   • OLANZapine (ZyPREXA) tablet 10 mg  10 mg Oral Q6H PRN    Or   • OLANZapine (ZyPREXA) IM injection 7 5 mg  7 5 mg Intramuscular Q6H PRN   • QUEtiapine (SEROquel) tablet 125 mg  125 mg Oral HS   • sertraline (ZOLOFT) tablet 100 mg  100 mg Oral Daily   • traZODone (DESYREL) tablet 50 mg  50 mg Oral HS PRN    and PTA meds:   Prior to Admission Medications   Prescriptions Last Dose Informant Patient Reported? Taking?    QUEtiapine (SEROquel) 25 mg tablet Unknown  No No   Sig: Take 0 5 tablets (12 5 mg total) by mouth 2 (two) times a day   Patient taking differently: Take 50 mg by mouth 2 (two) times a day   QUEtiapine (SEROquel) 25 mg tablet Unknown  Yes No   Sig: Take 25 mg by mouth 3 (three) times a day as needed (agitation)   acetaminophen (TYLENOL) 325 mg tablet Unknown  No No   Sig: Take 2 tablets (650 mg total) by mouth every 6 (six) hours as needed for mild pain, headaches or fever   Patient taking differently: Take 1,000 mg by mouth 2 (two) times a day   aspirin (ECOTRIN LOW STRENGTH) 81 mg EC tablet Unknown Self Yes No   Sig: Take 81 mg by mouth daily   atorvastatin "(LIPITOR) 40 mg tablet Unknown  No No   Sig: Take 1 tablet (40 mg total) by mouth every evening   busPIRone (BUSPAR) 10 mg tablet Unknown  No No   Sig: Take 1 tablet (10 mg total) by mouth 3 (three) times a day   clonazePAM (KlonoPIN) 0 5 mg tablet   No No   Sig: Take 0 5 tablets (0 25 mg total) by mouth 2 (two) times a day as needed for anxiety for up to 10 days   famotidine (PEPCID) 20 mg tablet Unknown  Yes No   Sig: Take 20 mg by mouth 2 (two) times a day   latanoprost (XALATAN) 0 005 % ophthalmic solution Unknown  Yes No   Sig: Administer 1 drop to both eyes daily at bedtime   sertraline (ZOLOFT) 100 mg tablet Unknown  No No   Sig: Take 1 tablet (100 mg total) by mouth daily Do not start before April 21, 2023     timolol (TIMOPTIC) 0 5 % ophthalmic solution Unknown  Yes No   Sig: Apply 1 drop to eye 2 (two) times a day   valproic acid (DEPAKENE) 250 MG/5ML soln Unknown  Yes No   Sig: Take 250 mg by mouth 2 (two) times a day      Facility-Administered Medications: None     Allergies   Allergen Reactions   • Lorazepam Other (See Comments)     Increased agitation       Objective      Mental Status Evaluation:  Appearance and attitude: appeared as stated age, dressed in hospital attire, with fair hygiene  Eye contact: poor  Motor Function: within normal limits, No PMA/PMR  Gait/station: Not observed  Speech: normal for rate, rhythm, volume, with increased latency and decreased amount and incoherent at times  Language: No overt abnormality  Mood/affect: \"good\" / Affect was constricted, on irritable edge  Thought Processes: slowing of thoughts  Thought content: denied suicidal ideations or homicidal ideations, poverty of thought  Associations: thought blocking  Perceptual disturbances: no auditory hallucinations, visual hallucinations  Orientation: oriented to person and partially to the place, but not to the time  Cognitive Function: confused  Memory: grossly impaired; not cooperative with formal MMSE  Intellect: " "unable to assess  Fund of knowledge: diminished  Impulse control: impulsive at times  Insight/judgment: impaired/impaired    Lab Results: I have personally reviewed pertinent lab results  MCV   Date Value Ref Range Status   06/14/2023 96 82 - 98 fL Final     WBC   Date Value Ref Range Status   06/14/2023 6 72 4 31 - 10 16 Thousand/uL Final     WBC, UA   Date Value Ref Range Status   06/13/2023 1-2 None Seen, 0-1, 1-2, 0-5, 2-4 /hpf Final     Lab Results   Component Value Date    BUN 7 06/14/2023    CO2 29 06/14/2023    SODIUM 142 06/14/2023     Lab Results   Component Value Date    ALKPHOS 44 06/14/2023     No results found for: \"CKMB\"  No results found for: \"TSH\"  INR   Date Value Ref Range Status   04/24/2023 1 03 0 84 - 1 19 Final   04/14/2023 0 97 0 84 - 1 19 Final   10/15/2022 1 05 0 84 - 1 19 Final     No results found for: \"APTT\"  No results found for: \"PHENO\"  BUN   Date Value Ref Range Status   06/14/2023 7 5 - 25 mg/dL Final     Creatinine   Date Value Ref Range Status   06/14/2023 0 51 (L) 0 60 - 1 30 mg/dL Final     Comment:     Standardized to IDMS reference method     Sodium   Date Value Ref Range Status   06/14/2023 142 135 - 147 mmol/L Final     TSH 3RD GENERATON   Date Value Ref Range Status   06/14/2023 2 102 0 450 - 4 500 uIU/mL Final     Comment:     The recommended reference ranges for TSH during pregnancy are as follows:   First trimester 0 1 to 2 5 uIU/mL   Second trimester  0 2 to 3 0 uIU/mL   Third trimester 0 3 to 3 0 uIU/m    Note: Normal ranges may not apply to patients who are transgender, non-binary, or whose legal sex, sex at birth, and gender identity differ  Adult TSH (3rd generation) reference range follows the recommended guidelines of the American Thy[employer] Association, January, 2020       WBC   Date Value Ref Range Status   06/14/2023 6 72 4 31 - 10 16 Thousand/uL Final     WBC, UA   Date Value Ref Range Status   06/13/2023 1-2 None Seen, 0-1, 1-2, 0-5, 2-4 /hpf Final     No " "components found for: \"B12\"  Lab Results   Component Value Date    FOLATE 6 5 06/14/2023     No results found for: \"RPR\"      Imaging Studies: reviewed    EKG, Pathology, and Other Studies: reviewed    Code Status:Do Not Resuscitate(DNR)    Patient Strengths/Assets: family ties    Patient Barriers/Limitations: difficulty adapting, impaired cognition, limited support system, poor insight, poor past treatment response, poor physical health, poor reasoning ability, poor self-care, poor support system    Suicide/Homicide Risk Assessment:    Risk of Harm to Self:   Nursing Suicide Risk Assessment Last 24 hours: C-SSRS Risk (Since Last Contact)  Calculated C-SSRS Risk Score (Since Last Contact): Low Risk  Current Specific Risk Factors include: recent suicidal gesture, self injurious behavior, unable to contract for safety on the unit, mental illness diagnosis, presence of hallucinations, poor self care, poor reasoning, poor impulse control  Protective Factors: no current suicidal ideation  Based on today's assessment, Diogenes Holcomb presents the following risk of harm to self: high    Risk of Harm to Others:  Nursing Homicide Risk Assessment: Violence Risk to Others: Yes- Within the last 6 months  Current Specific Risk Factors include: current aggressive behavior, current agitation, does not respond to redirection, poor impulse control, impaired cognition  Protective Factors: no current homicidal ideation  Based on today's assessment, Diogenes Holcomb presents the following risk of harm to others: high    The following interventions are recommended: 1:1 for safety and UPB    Assessment/Plan     Principal Problem:    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)  Active Problems:    Status post CVA    Chronic anemia    Major depressive disorder, recurrent severe without psychotic features ([facility] [address] )    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, without behavioral disturbance, severe [facility]    Medical clearance for " psychiatric admission    Plan:   Risks, benefits and possible side effects of Medications:   Patient does not verbalize understanding at this time and will require further explanation  - 1:1 for safety and UPB  - f/u SLIM recs regarding the medical problems  - PT eval  - Encourage early mobility and having a structured day  - Provide frequent re-orientation, and cognitive stimulation  - Ensure assistive devices are in proper working order (eye-glasses, hearing aids)  - Encourage adequate hydration, nutrition and monitor bowel movements  - Maintain sleep-wake cycle: Uninterrupted sleep time; low-level lighting at night  - Fall precaution  - Continue medication titration and treatment plan; adjust medication to optimize treatment response and as clinically indicated       Scheduled medications:  Current Facility-Administered Medications   Medication Dose Route Frequency Provider Last Rate   • acetaminophen  650 mg Oral Q4H PRN [place] RAYSA Tanner     • acetaminophen  650 mg Oral Q4H PRN [place] ADAN TannerNP     • acetaminophen  975 mg Oral Q6H PRN [place] ADAN TannerNP     • busPIRone  5 mg Oral BID [place] RAYSA Tanner     • cholecalciferol  1,000 Units Oral Daily [place] RAYSA Tanner     • divalproex sodium  500 mg Oral HS Makeda Jang MD     • hydrOXYzine HCL  25 mg Oral Q6H PRN Max 4/day [place] ADAN TannerNP     • hydrOXYzine HCL  50 mg Oral Q6H PRN Max 4/day [place] ADAN TannerNP     • magnesium Oxide  400 mg Oral BID Yamil Cummings PA-C     • melatonin  3 mg Oral HS Makeda Jang MD     • OLANZapine  5 mg Oral Q6H PRN Makeda Jang MD      Or   • OLANZapine  2 5 mg Intramuscular Q6H PRMARYANN Jang MD     • OLANZapine  7 5 mg Oral Q6H PRN Makeda Jang MD      Or   • OLANZapine  5 mg Intramuscular Q6H PRMARYANN Jang MD     • OLANZapine  10 mg Oral Q6H PRMARYANN Jang MD      Or   • OLANZapine  7 5 mg Intramuscular Q6H PRMARYANN Jang MD     • QUEtiapine  125 mg Oral HS Yfn Kramer Bill Swann MD     • sertraline  100 mg Oral Daily RAYSA Henry     • traZODone  50 mg Oral HS PRN RAYSA Henry          PRN:  •  acetaminophen  •  acetaminophen  •  acetaminophen  •  hydrOXYzine HCL  •  hydrOXYzine HCL  •  OLANZapine **OR** OLANZapine  •  OLANZapine **OR** OLANZapine  •  OLANZapine **OR** OLANZapine  •  traZODone    - Observation: 1:1 for safety and UPB; waist restraint for fall risk and safety    - VS: as per unit protocol  - Legal status:   302 (303 hearing tomorrow)  - Diet: Dysphagia diet  - Encourage group attendance and milieu therapy     - The pt was educated and agreed to verbalize any suicidal thoughts, frustrations or concerns to the nursing staff, immediately  - Dispo:  To be determined       Next of Kin  Extended Emergency Contact Information  Primary Emergency Contact: SATHYA GOULD  Mobile Phone: [phone]  Relation: Son  Secondary Emergency Contact: [address] [facility]  Mobile Phone: [phone]  Relation: Daughter    Jonathan Gar MD  Attending Psychiatrist   [address]

## 2023-06-14 NOTE — NURSING NOTE
"PRN risperdal appears to have been minimally effective, patient continues to shout \"I have to commit suicide! \" intermittently and uncooperative with staff assisting with ADLs     "

## 2023-06-14 NOTE — ASSESSMENT & PLAN NOTE
· On severe vascular dementia POA initially was psychotic features- but since seeing her yesterday and today she is somnolent poor po intake will wake up to name and then falls asleep  · Ct brain neg   · No infection evident on admission  · depakote level checked nml therapeutic   · No prns overnight   · tsh nml in beg of may 5/19   · Electrolytes stable  Patient is still remains significantly delirious  Blaming PCA for stealing her baby  Also blaming PCA that PCA was trying to hurt her  Patient was tapping her head-because she wants to get rid of some ideas from her head  Psychiatric medication adjusted as per psychiatry recommendation  Continue current treatment  Maintain sleep awake cycle (patient sleeps all day, and awake all night)-advised the nurse and PCA to keep patient awake during the daytime  As per nurse documentation, yesterday and overnight, patient was much calmer compared to previous days  Patient was taking Seroquel 100 mg at 10 PM, and Seroquel 25 mg at 5 PM -We will adjust the timing and give in total of 125 mg of Seroquel at 5 PM    As patient is a being acute paranoid psychosis,-patient will need inpatient psych treatment first for that reason, patient is discharging to [address] [place] [address] behavioral unit

## 2023-06-14 NOTE — PLAN OF CARE
Problem: Prexisting or High Potential for Compromised Skin Integrity  Goal: Skin integrity is maintained or improved  Description: INTERVENTIONS:  - Identify patients at risk for skin breakdown  - Assess and monitor skin integrity  - Assess and monitor nutrition and hydration status  - Monitor labs   - Assess for incontinence   - Turn and reposition patient  - Assist with mobility/ambulation  - Relieve pressure over bony prominences  - Avoid friction and shearing  - Provide appropriate hygiene as needed including keeping skin clean and dry  - Evaluate need for skin moisturizer/barrier cream  - Collaborate with interdisciplinary team   - Patient/family teaching  - Consider wound care consult   Outcome: Not Progressing     Problem: Ineffective Coping  Goal: Cooperates with admission process  Description: Interventions:   - Complete admission process  Outcome: Not Progressing  Goal: Identifies ineffective coping skills  Outcome: Not Progressing  Goal: Identifies healthy coping skills  Outcome: Not Progressing  Goal: Demonstrates healthy coping skills  Outcome: Not Progressing  Goal: Patient/Family participate in treatment and DC plans  Description: Interventions:  - Provide therapeutic environment  Outcome: Not Progressing  Goal: Patient/Family verbalizes awareness of resources  Outcome: Not Progressing  Goal: Understands least restrictive measures  Description: Interventions:  - Utilize least restrictive behavior  Outcome: Not Progressing  Goal: Free from restraint events  Description: - Utilize least restrictive measures   - Provide behavioral interventions   - Redirect inappropriate behaviors   Outcome: Not Progressing     Problem: Risk for Self Injury/Neglect  Goal: Treatment Goal: Remain safe during length of stay, learn and adopt new coping skills, and be free of self-injurious ideation, impulses and acts at the time of discharge  Outcome: Not Progressing  Goal: Verbalize thoughts and feelings  Description: Interventions:  - Assess and re-assess patient's lethality and potential for self-injury  - Engage patient in 1:1 interactions, daily, for a minimum of 15 minutes  - Encourage patient to express feelings, fears, frustrations, hopes  - Establish rapport/trust with patient   Outcome: Not Progressing  Goal: Refrain from harming self  Description: Interventions:  - Monitor patient closely, per order  - Develop a trusting relationship  - Supervise medication ingestion, monitor effects and side effects   Outcome: Not Progressing  Goal: Recognize maladaptive responses and adopt new coping mechanisms  Outcome: Not Progressing  Goal: Complete daily ADLs, including personal hygiene independently, as able  Description: Interventions:  - Observe, teach, and assist patient with ADLS  - Monitor and promote a balance of rest/activity, with adequate nutrition and elimination  Outcome: Not Progressing     Problem: Risk for Violence/Aggression Toward Others  Goal: Treatment Goal: Refrain from acts of violence/aggression during length of stay, and demonstrate improved impulse control at the time of discharge  Outcome: Not Progressing  Goal: Verbalize thoughts and feelings  Description: Interventions:  - Assess and re-assess patient's level of risk, every waking shift  - Engage patient in 1:1 interactions, daily, for a minimum of 15 minutes   - Allow patient to express feelings and frustrations in a safe and non-threatening manner   - Establish rapport/trust with patient   Outcome: Not Progressing  Goal: Refrain from harming others  Outcome: Not Progressing  Goal: Refrain from destructive acts on the environment or property  Outcome: Not Progressing  Goal: Control angry outbursts  Description: Interventions:  - Monitor patient closely, per order  - Ensure early verbal de-escalation  - Monitor prn medication needs  - Set reasonable/therapeutic limits, outline behavioral expectations, and consequences   - Provide a non-threatening milieu, utilizing the least restrictive interventions   Outcome: Not Progressing  Goal: Identify appropriate positive anger management techniques  Description: Interventions:  - Offer anger management and coping skills groups   - Staff will provide positive feedback for appropriate anger control  Outcome: Not Progressing

## 2023-06-14 NOTE — ASSESSMENT & PLAN NOTE
VSS  Reviewed inpatient labs from yesterday  Admission labs pending at the time of this consultation  Patient is medically cleared for admission to the [facility] for treatment of the underlying psychiatric illness  SLIM will sign off   Please call with questions or concerns

## 2023-06-15 NOTE — CASE MANAGEMENT
Pt was sound asleep when approached to review 303 rights and CM was advised she absolutely will not understand same       Request for 303 has been emailed to [email or web address]

## 2023-06-15 NOTE — PLAN OF CARE
Pt did not attend group when prompted or offered       Problem: Alteration in Thoughts and Perception  Goal: Attend and participate in unit activities, including therapeutic, recreational, and educational groups  Description: Interventions:  -Encourage Visitation and family involvement in care  Outcome: Progressing

## 2023-06-15 NOTE — CMS CERTIFICATION NOTE
Certification: Based upon physical, mental and social evaluations, I certify that inpatient psychiatric services are medically necessary for this patient for a duration of 21 midnights for the treatment of Severe vascular dementia with psychotic disturbance (Sherice Najera)    Available alternative community resources do not meet the patient's mental health care needs  I further attest that an established written individualized plan of care has been implemented and is outlined in the patient's medical records

## 2023-06-15 NOTE — MALNUTRITION/BMI
"This medical record reflects one or more clinical indicators suggestive of malnutrition and/or morbid obesity  Malnutrition Findings:      Adult Degree of Malnutrition: Other severe protein calorie malnutrition  Malnutrition Characteristics: Fat loss, Muscle loss, Weight loss                  360 Statement: Pt exhibits visible wasting of fat and muscle stores at clavicle, ribs, orbits, with a 14# wt loss x 4 months indicating severe malnutrition to be treated with diet and supplements    BMI Findings:  BMI 20 3 Per 6/12 wt of 107#, ht 5'1\"           There is no height or weight on file to calculate BMI  See full Nutrition assessment note dated 6/15/2023 in flow sheets for additional details      "

## 2023-06-15 NOTE — NURSING NOTE
"Patient attempting to kick staff when approached, shouting \"youre going to hurt me! \" Patient difficult to reassure, appears fearful  Henriquez score 20  PRN atarax given at 2147    "

## 2023-06-15 NOTE — NURSING NOTE
Patient severly agitated and combative this morning  Remained on soft limb restraints  Disoriented, unable to follow directions  Remained confused  Patient was combative to self and staff  Remained in room most of tour  Restrains continued

## 2023-06-15 NOTE — PROGRESS NOTES
Violevt, screaming, hitting staff, in restraints, restless, Hx CVA, meds barely help agitation, 303 scheduled for 6/16 06/15/23 0920   Team Meeting   Meeting Type Daily Rounds   Initial Conference Date 06/15/23   Next Conference Date 06/16/23   Team Members Present   Team Members Present Physician;Nurse;;   Patient/Family Present   Patient Present No   Patient's Family Present No     BARI Duckworthe Leyden - violent, screaming, hitting at staff-now in restraints, 303 scheduled for 6/16, Hx of CVA

## 2023-06-15 NOTE — NURSING NOTE
Patient awake throughout the night  Continues to hit self, yell out and grab and kick at staff while performing ADL'S

## 2023-06-15 NOTE — NURSING NOTE
"Patient admitted to unit under 302 status from [facility] for increased agitation and paranoia  Per report patient has a hx of vascular dementia, after had experienced a stroke in April 2023 patient has had an increase in psychotic symptoms since  Patient had multiple incidences of self harm while on medsurg floor, including hitting self and choking self  Patient is disoriented x4 on admission  Patient unable to answer assessment questions, however continues to repetitively state \"I want to commit suicide\"  Patient frequently hitting self in the head despite redirection  Per report patient is awake 2-3 days at a time and has difficulty sleeping, as well as poor appetite at meals  Patient has two skin tears on the R forearm, otherwise skin intact     "

## 2023-06-15 NOTE — TREATMENT PLAN
TREATMENT PLAN REVIEW - [address] [facility] Gary Koenig 76 y o  1945 female MRN: [id number]    [address] [facility] Encounter: [id number]          Admit Date/Time:  6/14/2023  3:37 PM    Treatment Team: Attending Provider: Lui Choi MD; Consulting Physician: Cammie Gonzalez MD; Patient Care Assistant: Yulisa Bautista; Registered Nurse: Wilber Silverman, AMADO; Nurse Practitioner: RAYSA Draper; Patient Care Assistant: Kavin Pruett; Nurse Manager: Adryan Hannah, AMADO; Patient Care Assistant: Carolyn Stephens    Diagnosis: Principal Problem:    Severe vascular dementia with psychotic disturbance [facility])  Active Problems:    Status post CVA    Chronic anemia    Major depressive disorder, recurrent severe without psychotic features ([facility] [address] )    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, without behavioral disturbance, severe ([facility] )    Medical clearance for psychiatric admission      Patient Strengths/Assets: family ties    Patient Barriers/Limitations: difficulty adapting, impaired cognition, limited support system, poor insight, poor physical health, poor support system, self-care deficit, unable to express basic needs, uncooperative    Short Term Goals: decrease in self abusive behaviors, decrease in level of agitation, ability to stay safe on the unit, ability to stay free of restraints, improvement in ability to express basic needs, improvement in insight, sleep improvement, mood stabilization    Long Term Goals: stabilization of mood, free of suicidal thoughts, free of homicidal thoughts, no self abusive behavior, improvement in reality testing, improved insight, able to express basic needs, adequate self care, adequate sleep, adequate appetite    Progress Towards Goals: starting psychiatric medications as prescribed    Recommended Treatment: medication management, patient medication education, group therapy, milieu therapy, continued Behavioral Health psychiatric evaluation/assessment process    Treatment Frequency: daily medication monitoring, group and milieu therapy daily, monitoring through interdisciplinary rounds, monitoring through weekly patient care conferences    Expected Discharge Date:  21 days    Discharge Plan: placement in alternative living arrangement, placement in nursing home, referral for outpatient medication management with a psychiatrist    Treatment Plan Created/Updated By: Rose Parada MD

## 2023-06-15 NOTE — NURSING NOTE
Patient restless, pulling at wrist restraints, hitting self, loud pressured speech  Patient given pudding and water, attempted to reorient and redirect  Patient medicated with risperdal 1 mg  Continues with restlessness and pulling at restraints,  talking continually with occasional loud pressured speech

## 2023-06-16 PROBLEM — E43 SEVERE PROTEIN-CALORIE MALNUTRITION (HCC): Status: ACTIVE | Noted: 2023-06-16

## 2023-06-16 NOTE — NURSING NOTE
Patient continued to pull on his soft mitt restraint and hit her hand against the chair, she was also noted biting on the mittens  She kept attempting to bite mitt of right hand and remove it  Provider notified, new order for soft wrist restraints received, patient assisted back to bed and soft limb restraints applied and tolerated well

## 2023-06-16 NOTE — PROGRESS NOTES
06/16/23 0757   Team Meeting   Meeting Type Tx Team Meeting   Initial Conference Date 06/16/23   Next Conference Date 07/14/23   Patient/Family Present   Patient Present No  (Pt is unable to understand or sign)   Patient's Family Present No  (Will revoiew with family via [facility])     Dr Colletta Geralds Eual Loll Savacool

## 2023-06-16 NOTE — NURSING NOTE
Pt restless and yelling  O x name only  Accepted po meds crushed in pudding with some resistance  Poor appetite at breakfast, fed  VSS  Pt trying to hit self on head and bite self when soft bilat wrist restraints released for ROM  Pt turned every two hours  Monitored on continuous, close observation for safety and support

## 2023-06-16 NOTE — PLAN OF CARE
Pt did not attend group when prompted or offered      Problem: Alteration in Thoughts and Perception  Goal: Attend and participate in unit activities, including therapeutic, recreational, and educational groups  Description: Interventions:  -Encourage Visitation and family involvement in care  Outcome: Not Progressing

## 2023-06-16 NOTE — PROGRESS NOTES
Team Meeting   Meeting Type Daily Rounds   Initial Conference Date 06/16/23   Next Conference Date 06/17/23   Team Members Present   Team Members Present Physician;Nurse;;   Patient/Family Present   Patient Present No   Patient's Family Present No   BARI Escamilla   Zebedee Armor - agitated- in soft wrsit restraints, Zyprexa helps somewhat, has also been in posty with mits as she was scratching at self, plan to increase Depakote, now 303

## 2023-06-16 NOTE — PROGRESS NOTES
06/16/23 1443   Patient Information   Primary Caregiver   (Pt had been helping to care for her spouse who has dementia prior to her own CVA 4/14/23)   Support System Extended family  (Children and friends)   Legal Information   Tx Plan Signed No (Comment)  (Pt unable to understand or sign same  Has been reviewed with her son Joanna Dear)   Advance Directives Power of  for finance; Power of  for health care;Living will  (All are now on file)   Advance Directives Status Copy on chart   Activities of Daily Living Prior to Admission   Assistive Device Other (Comment)  (Non ambulatory)   Living Arrangement House   Ambulation Totally dependent   Access to Firearms   Access to Firearms No   Income [address] [place] SSI/SSD  (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

## 2023-06-16 NOTE — TREATMENT TEAM
"   06/16/23 1900   Broset Violence Checklist   Assessment type Reassessment   Irritability 1   Confusion 1   Boisterousness 1   Threatening physical violence 1   Verbal threats 0   Violence 1   Broset score 5   Patient wakens from nap, shouting, kicking, and attempting to hit staff  Patient attempted to be redirected, continues to shout \"I'm going to kill myself! Let me kill myself! \" Patient given PRN Haldol 5mg PO crushed in pudding at 1924, Patient also placed in 2pt soft restraints RA LA at this time for safety    Patient continues to scream and is not redirectable, attempting to choke self when staff attempted to adjust restraints  PRN Haldol appears to have not been effective    "

## 2023-06-16 NOTE — NURSING NOTE
Patient was assisted to davian-chair with 2 person assist  Patient able to stand and pivot but unsteady,unable to bare weight on her own  Patient was placed in the chair in posey lap belt  Patient continued to hit herself, and attempt to choke herself  Order obtained for soft mitt restraints to prevent patient from attemption self harm  Patient continued to verbalize she wants to die, remained combative towards staff and was unable to be redirected  Multiple interventions to redirect patient were utilized

## 2023-06-16 NOTE — NURSING NOTE
Skin tear dressing change to right arm completed with resistance  Pt hitting her head and biting self when wrist restraints removed for ROM and wound care  Zyprexa 10 mg po prn given at 1305c with minimal effect  Continues with restlessness with less screaming episodes  Pt stating that we cannot kill her, responded to reality presented and verbal support  Monitored on Continuous Observation for safety and support

## 2023-06-16 NOTE — NURSING NOTE
Patient restless, pulling at restraints, yelling loudly, banging against side of bed; unable to follow verbal direction  Medicated with zyprexa 5mg po with mild effectiveness  Medicated with trazodone 50 mg  Currently resting quietly in bed

## 2023-06-16 NOTE — CASE MANAGEMENT
Pt participated minimally this AM in her [address] 303 hearing  [address] represented pt   Attvenkat Lea was the Mental Health Review Officer and Milo Campbell is the [employer], while Dr Manuela Torres testified  The request for 303 was upheld and will exp   7/6/23

## 2023-06-16 NOTE — DISCHARGE INSTR - OTHER ORDERS
Skin Care Plan:  1- Right Arm Skin tears: Cleanse with NS and pat dry  Apply dermagran to wound beds, cover with ABDs and secure with malu wrap  Change every other day or PRN  2-Turn/reposition q2h or when medically stable for pressure re-distribution on skin   3-Elevate heels to offload pressure  4-Moisturize skin daily with skin nourishing cream  5-Ehob cushion in chair when out of bed  6-Preventative Hydraguard to bilateral heels and sacro-buttocks BID and PRN

## 2023-06-16 NOTE — WOUND OSTOMY CARE
Consult Note - Wound   Reinaldo Gooden 76 y o  female MRN: [id number]  Unit/Bed#: [facility] 813-67 Encounter: [id number]        History and Present Illness:  Patient is a 77 yo female that was admitted to [address] [place] [address] for treatment of severe vascular dementia with psychotic disturbance  Patient has a PMH of CVA (MCA stroke in April 2023), hemiparesis and dysphagia, encephalopathy, aortic atherosclerosis, HLD, positive hep C test, hiatal hernia, lumbar DJD, recurrent falls, s/p TKR, vit D def, primary open angle glaucoma of both eyes  Patient is a moderate/ max assist for turning and repositioning  Posey belt restraint in place  Patient is incontinent at times of bowel and bladder  On assessment, patient is seen sitting OOB in recliner  On continual observation  Wound Care was consulted for multiple wounds  Assessment Findings:   B/L heels are dry intact and stevie with no skin loss or wounds present  Recommend preventative Hydraguard Cream and proper offloading/ repositioning  B/L sacro-buttocks is dry, intact, pink in color and blanches  No skin loss or wounds present  Recommend preventative hydragaurd to area due to incontinence  1  Right poster forearm and wrist skin tears: 2 areas of partial thickness skin loss measured together  Wound beds are beefy red in color  Ashely-wounds are fragile  Scant serosanguineous drainage noted  Recommend dermagran to wound bed, covering area with an ABD and securing with malu wrap  No induration, fluctuance, odor, warmth/temperature differences, redness, or purulence noted to the above noted wounds and skin areas assessed  New dressings applied per orders listed below  Patient tolerated well- no s/s of non-verbal pain or discomfort observed during the encounter  Bedside nurse aware of plan of care  See flow sheets for more detailed assessment findings  Orders listed below and wound care will sign off, call or tiger text with questions       Skin Care Plan:  1- Right Arm Skin tears: Cleanse with NS and pat dry  Apply dermagran to wound beds, cover with ABDs and secure with malu wrap  Change every other day or PRN  2-Turn/reposition q2h or when medically stable for pressure re-distribution on skin   3-Elevate heels to offload pressure  4-Moisturize skin daily with skin nourishing cream  5-Ehob cushion in chair when out of bed  6-Preventative Hydraguard to bilateral heels and sacro-buttocks BID and PRN  Wounds:  Wound 06/14/23 Skin Tear Arm Posterior;Right; Inferior; Lower (Active)   Wound Image   06/16/23 1042   Wound Description Beefy red;Fragile 06/16/23 1042   Ashely-wound Assessment Fragile 06/16/23 1042   Wound Length (cm) 15 cm 06/16/23 1042   Wound Width (cm) 1 4 cm 06/16/23 1042   Wound Depth (cm) 0 1 cm 06/16/23 1042   Wound Surface Area (cm^2) 21 cm^2 06/16/23 1042   Wound Volume (cm^3) 2 1 cm^3 06/16/23 1042   Calculated Wound Volume (cm^3) 2 1 cm^3 06/16/23 1042   Drainage Amount Scant 06/16/23 1042   Drainage Description Serosanguineous 06/16/23 1042   Non-staged Wound Description Partial thickness 06/16/23 1042   Treatments Cleansed;Irrigation with NSS;Site care 06/16/23 1042   Dressing Lucrezia January gauze;ABD;Dry dressing 06/16/23 1042   Dressing Changed New 06/16/23 1042   Patient Tolerance Tolerated well 06/16/23 1042   Dressing Status Dry;Clean; Intact 06/16/23 646 Dale Obrien RN, BSN, Madhu Marrero

## 2023-06-16 NOTE — PLAN OF CARE
Problem: Prexisting or High Potential for Compromised Skin Integrity  Goal: Skin integrity is maintained or improved  Description: INTERVENTIONS:  - Identify patients at risk for skin breakdown  - Assess and monitor skin integrity  - Assess and monitor nutrition and hydration status  - Monitor labs   - Assess for incontinence   - Turn and reposition patient  - Assist with mobility/ambulation  - Relieve pressure over bony prominences  - Avoid friction and shearing  - Provide appropriate hygiene as needed including keeping skin clean and dry  - Evaluate need for skin moisturizer/barrier cream  - Collaborate with interdisciplinary team   - Patient/family teaching  - Consider wound care consult   Outcome: Progressing     Problem: Ineffective Coping  Goal: Cooperates with admission process  Description: Interventions:   - Complete admission process  Outcome: Progressing  Goal: Identifies ineffective coping skills  Outcome: Progressing  Goal: Identifies healthy coping skills  Outcome: Progressing  Goal: Demonstrates healthy coping skills  Outcome: Progressing  Goal: Patient/Family participate in treatment and DC plans  Description: Interventions:  - Provide therapeutic environment  Outcome: Progressing  Goal: Patient/Family verbalizes awareness of resources  Outcome: Progressing  Goal: Understands least restrictive measures  Description: Interventions:  - Utilize least restrictive behavior  Outcome: Progressing  Goal: Free from restraint events  Description: - Utilize least restrictive measures   - Provide behavioral interventions   - Redirect inappropriate behaviors   Outcome: Progressing     Problem: Risk for Self Injury/Neglect  Goal: Treatment Goal: Remain safe during length of stay, learn and adopt new coping skills, and be free of self-injurious ideation, impulses and acts at the time of discharge  Outcome: Progressing  Goal: Verbalize thoughts and feelings  Description: Interventions:  - Assess and re-assess patient's lethality and potential for self-injury  - Engage patient in 1:1 interactions, daily, for a minimum of 15 minutes  - Encourage patient to express feelings, fears, frustrations, hopes  - Establish rapport/trust with patient   Outcome: Progressing  Goal: Refrain from harming self  Description: Interventions:  - Monitor patient closely, per order  - Develop a trusting relationship  - Supervise medication ingestion, monitor effects and side effects   Outcome: Progressing  Goal: Recognize maladaptive responses and adopt new coping mechanisms  Outcome: Progressing  Goal: Complete daily ADLs, including personal hygiene independently, as able  Description: Interventions:  - Observe, teach, and assist patient with ADLS  - Monitor and promote a balance of rest/activity, with adequate nutrition and elimination  Outcome: Progressing     Problem: Risk for Violence/Aggression Toward Others  Goal: Treatment Goal: Refrain from acts of violence/aggression during length of stay, and demonstrate improved impulse control at the time of discharge  Outcome: Progressing  Goal: Verbalize thoughts and feelings  Description: Interventions:  - Assess and re-assess patient's level of risk, every waking shift  - Engage patient in 1:1 interactions, daily, for a minimum of 15 minutes   - Allow patient to express feelings and frustrations in a safe and non-threatening manner   - Establish rapport/trust with patient   Outcome: Progressing  Goal: Refrain from harming others  Outcome: Progressing  Goal: Refrain from destructive acts on the environment or property  Outcome: Progressing  Goal: Control angry outbursts  Description: Interventions:  - Monitor patient closely, per order  - Ensure early verbal de-escalation  - Monitor prn medication needs  - Set reasonable/therapeutic limits, outline behavioral expectations, and consequences   - Provide a non-threatening milieu, utilizing the least restrictive interventions   Outcome: Progressing  Goal: Identify appropriate positive anger management techniques  Description: Interventions:  - Offer anger management and coping skills groups   - Staff will provide positive feedback for appropriate anger control  Outcome: Progressing     Problem: SAFETY, RESTRAINT - VIOLENT/SELF-DESTRUCTIVE  Goal: Remains free of harm/injury from restraints (Restraint for Violent/Self-Destructive Behavior)  Description: INTERVENTIONS:  - Instruct patient/family regarding restraint use   - Assess and monitor physiologic and psychological status   - Provide interventions and comfort measures to meet assessed patient needs   - Ensure continuous in person monitoring is provided   - Identify and implement measures to help patient regain control  - Assess readiness for release of restraint  Outcome: Progressing  Goal: Returns to optimal restraint-free functioning  Description: INTERVENTIONS:  - Assess the patient's behavior and symptoms that indicate continued need for restraint  - Identify and implement measures to help patient regain control  - Assess readiness for release of restraint   Outcome: Progressing     Problem: SAFETY,RESTRAINT: NV/NON-SELF DESTRUCTIVE BEHAVIOR  Goal: Remains free of harm/injury (restraint for non violent/non self-detsructive behavior)  Description: INTERVENTIONS:  - Instruct patient/family regarding restraint use   - Assess and monitor physiologic and psychological status   - Provide interventions and comfort measures to meet assessed patient needs   - Identify and implement measures to help patient regain control  - Assess readiness for release of restraint   Outcome: Progressing  Goal: Returns to optimal restraint-free functioning  Description: INTERVENTIONS:  - Assess the patient's behavior and symptoms that indicate continued need for restraint  - Identify and implement measures to help patient regain control  - Assess readiness for release of restraint   Outcome: Progressing     Problem: Nutrition/Hydration-ADULT  Goal: Nutrient/Hydration intake appropriate for improving, restoring or maintaining nutritional needs  Description: Monitor and assess patient's nutrition/hydration status for malnutrition  Collaborate with interdisciplinary team and initiate plan and interventions as ordered  Monitor patient's weight and dietary intake as ordered or per policy  Utilize nutrition screening tool and intervene as necessary  Determine patient's food preferences and provide high-protein, high-caloric foods as appropriate       INTERVENTIONS:  - Monitor oral intake, urinary output, labs, and treatment plans  - Assess nutrition and hydration status and recommend course of action  - Evaluate amount of meals eaten  - Assist patient with eating if necessary   - Allow adequate time for meals  - Recommend/ encourage appropriate diets, oral nutritional supplements, and vitamin/mineral supplements  - Order, calculate, and assess calorie counts as needed  - Recommend, monitor, and adjust tube feedings and TPN/PPN based on assessed needs  - Assess need for intravenous fluids  - Provide specific nutrition/hydration education as appropriate  - Include patient/family/caregiver in decisions related to nutrition  Outcome: Progressing

## 2023-06-16 NOTE — CASE MANAGEMENT
Case Management Assessment    Patient name Martin Goncalves  Location [facility] 954-02 MRN [id number]  : 1945 Date 2023       Current Admission Date: 2023  Current Admission Diagnosis:Severe vascular dementia with psychotic disturbance [facility])   Patient Active Problem List    Diagnosis Date Noted   • Severe protein-calorie malnutrition ([facility] ) 2023   • 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, without behavioral disturbance, severe ([facility] ) 2023   • Medical clearance for psychiatric admission 2023   • Paranoia (psychosis) ([facility] ) 2023   • Chronic anemia 2023   • Encephalopathy acute 2023   • Status post CVA 2023   • Suicidal ideation 2023   • Hiatal hernia 2023   • Hepatitis C test positive 2023   • Major depressive disorder, recurrent severe without psychotic features ([facility] ) 2023   • Hemiplegia and hemiparesis following cerebral infarction affecting left non-dominant side ([facility] ) 2023   • Nicotine dependence, unspecified, uncomplicated    • Repeated falls 2023   • Recurrent falls 2023   • Tobacco abuse 2023   • Acute ischemic CVA 2023   • Severe vascular dementia with psychotic disturbance ([facility] ) 2023   • Generalized anxiety disorder 2023   • Hypertensive urgency 2023   • 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 ([facility] ) 2021   • Dyslipidemia, goal LDL below 100 04/10/2019   • History of colon polyps 2018   • Aortic atherosclerosis ([facility] ) 2018   • Primary open angle glaucoma of both eyes, moderate stage 10/09/2017   • Lumbar degenerative disc disease 2017   • High risk for fracture due to osteoporosis by DEXA scan 2016   • S/P TKR (total knee replacement) 2015   • Unequal leg length 2011   • Generalized osteoarthritis of multiple sites 2010   • Vitamin D deficiency 2010      LOS (days): 2  Geometric Mean LOS (GMLOS) (days):   Days to GMLOS:     OBJECTIVE:    Risk of Unplanned Readmission Score: 34 46         Current admission status: Inpatient Psych  Referral Reason: Psych    Preferred Pharmacy:   [address] [address]  Phone: [phone] Fax: [phone]    Primary Care Provider: Effie Euceda DO    Primary Insurance: MEDICARE  Secondary Insurance: BLUE CROSS    ASSESSMENT:  [address] Proxies    There are no active Health Care Proxies on file  Advance Directives  Advance Directives: Power of  for finance, Power of  for health care, Living will (All are now on file)              Patient Information  Primary Caregiver:  (Pt had been helping to care for her spouse who has dementia prior to her own CVA 4/14/23)              Patient Information Continued  Income Source: SSI/SSD (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     Pt is currently confused and disoriented and unable to participate in this intake  CM placed a call to pt's son, Princess Arreguin - [phone]  He was very pleasant, forthcoming and informative  Atlee Levo reports pt lived in her own home and had been assisting in caring for her own , driving, and managing the finances until she was found lying outside her home and had a CVA  Since this time pt has not been able to participate in any of the above  Pt was then taken to the hospital and the CVA was documented and they also pieced together that she may have had smaller CVA's 2 years prior  From that hospital visit she was transferred to a Rehab in [facility] where she only received therapy 1 hour qd  Pt soon became combative and was transferred to [facility] and was 302'd They then allowed son to sign 201 for continued Tx  Pt then developed an pneumonia and was throwing up black substance that was later deemed to be blood and a nasogastric tube was placed to drain that blood  "Juliet Wiseman soon became frustrated and tok his mother home with hospice services set in place - at home pt did not recognize being home and began kicking and screaming and was unmanagable and was taken to [facility] in [facility] where she was treated for 15 daysand then was transferred here for additional medication management  Juliet Wiseman reports an \"Inside waiver\" was being applied for by the  at [employer] in hopes of having funding to provide care for his mother  He will get us the Social workers name for further information  Juliet Wiseman reports that topics that may comfort his mother include talk about [employer] and there train and bumper cars  Talk about [place], about her two grandchildren Osmany Camara and Clara Kat  This info has been posted in her room for staff to refer to for calming discussions  Juliet Wiseman was informed he can make arrangements to come and visit his mother with advanced permission  He and his sister are now planning to visit on Sunday 6/18 at 1:00  They know there is a 30 minute limit to visits  Staff have been informed of same  Juliet Wiseman has also sent us copies of the Durable POA and pt's Living Will which are now on file  Nursing has been alerted to this also  IMM Rights have been reviewed with son during a PC and he is supportive         "

## 2023-06-17 NOTE — NURSING NOTE
Patient slept through breakfast and refused AM medications  Psychiatry to be notified on rounds  Patient would not open her mouth or respond to staff when verbally prompted  Patient did respond to touch  Patient refused to answer any assessment questions and has severe vascular Dementia per nursing report  No agitation, aggression or yelling this shift with no PRN medication given  Will continue to monitor patient for changes in mood or behavior

## 2023-06-17 NOTE — TREATMENT TEAM
06/17/23 0100   Assessment   Alternative Measures Calming methods; De-escalation;Repositioning   Alternative measures evaluation Effective   Restraint Type (NV)   R Wrist Soft Restraint Discontinued   L Wrist Soft Restraint Discontinued   Education   Discontinuation Criteria Resolving behavior     Pt noted asleep for past 30 minutes  No attempts to self injure and absence of restlessness noted  Pt met physician's criteria to discontinue L and R wrist soft restraints  Continuous closed proximity observation by staff ongoing for safety

## 2023-06-17 NOTE — NURSING NOTE
Patient shouting intermittently throughout the shift, difficult to reassure and redirect  Patient continues to demonstrate poor safety awareness and attempting to hit staff when restraints released and reapplied  Patient compliant with medications crushed in pudding  PRN Trazodone given at 2239  Patient also reporting back pain during dressing change, given PRN Tylenol 650 given at 2300

## 2023-06-17 NOTE — NURSING NOTE
"Patient was up for her dinner meal around 1630 and promptly started to hit herself in the head  The dressing on her arm was dislodged and also needed to be changed as it was not covering the wound  Patient was swinging her arms at staff, kicking at staff and saying she \"wants to die\" and Emma Avilest is God doing this to me\"  Attempts made at verbal redirection were ineffective and the patient just yelled louder  PRN Haldol given at 95 76 89 with some effectiveness noted at this time  Patient did stop hitting herself and the yelling decreased in volume and frequency  Patient did eat 100% of one meal today as she slept through breakfast and lunch despite multiple attempts at waking her up  Dressing changed with the assistance of two staff plus this nurse  Patient continues with Severe Vascular Dementia with Psychotic Disturbance and is disoriented x 4  Will continue to monitor patient status    "

## 2023-06-17 NOTE — PROGRESS NOTES
Progress Note - Behavioral Health   Martin Goncalves 76 y o  female MRN: [id number]  Unit/Bed#: [facility] 344-72 Encounter: [id number]       Patient was visited on unit for continuing care; chart reviewed and discussed with the nursing staff  On approach, the patient was calm, confused and sleeping in her room, waking up upon calling her name several times, with limited cooperation with the evaluation upon several attempts  The patient was oriented to person, but not to the place or time  Denied SI/HI, intent or plan upon direct inquiry at this time  The patient remains confused, with episodes of agitation and aggressive behavior in the context of sun-downing, requires frequent redirects, reorientation and asistance with ADL's by the staff and received several PRN meds over past 24 hours  The patient slept from 1 AM and was sleepy throughout the day, reportedly did not eat her breakfast and refused AM scheduled meds  No adverse effects of medications noted or reported  VPA level was 55 on 6/14, and dose was increased to 625 mg nightly; VPA level to be checked on Monday  Zyprexa PRN switched to Haldol PRN due to poor response to Zyprexa  The patient has to remain in the hospital while awaiting placement- as their mental illness does not allow for them to be treated at a lower level of care  Case management is assertively/actively working on securing the necessary level of care upon further stabilization  Remains on 1:1 for safety and unpredictable behavior      Current Mental Status Evaluation:  Appearance and attitude: appeared as stated age, dressed in hospital attire, with fair hygiene  Eye contact: poor  Motor Function: within normal limits, No PMA/PMR  Gait/station: Not observed  Speech: incoherent   Language: No overt abnormality  Mood/affect: dysphoric / Affect was constricted  Thought Processes: slowing of thoughts  Thought content: denied suicidal ideations or homicidal ideations, paucity of thought  Associations: blocking  Perceptual disturbances: No A/VH reported  Orientation: oriented to only oriented to self  Cognitive Function: confused  Memory: grossly impaired  Intellect: unable to assess  Fund of knowledge: diminished  Impulse control: impulsive at times  Insight/judgment: impaired/impaired    Vital signs in last 24 hours:    Temp:  [98 °F (36 7 °C)] 98 °F (36 7 °C)  HR:  [63] 63  Resp:  [16] 16  BP: (117)/(74) 117/74    Laboratory results: I have personally reviewed all pertinent laboratory/tests results    Results from the past 24 hours: No results found for this or any previous visit (from the past 24 hour(s))  Progress Toward Goals: no significant improvement    Assessment:  Principal Problem:    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)  Active Problems:    Status post CVA    Chronic anemia    Major depressive disorder, recurrent severe without psychotic features ([facility] )    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, without behavioral disturbance, severe ([facility] )    Medical clearance for psychiatric admission    Severe protein-calorie malnutrition ([facility] )        Plan:  - f/u SLIM recs regarding the medical problems  - Check VPA level on Monday  - 1:1 for safety and UPB   - Encourage early mobility and having a structured day  - Provide frequent re-orientation, and cognitive stimulation  - Ensure assistive devices are in proper working order (eye-glasses, hearing aids)  - Encourage adequate hydration, nutrition and monitor bowel movements  - Maintain sleep-wake cycle: Uninterrupted sleep time; low-level lighting at night  - Fall precaution  - Continue medication titration and treatment plan; adjust medication to optimize treatment response and as clinically indicated       Scheduled medications:  Current Facility-Administered Medications   Medication Dose Route Frequency Provider Last Rate   • acetaminophen  650 mg Oral Q4H PRN R[address]     • acetaminophen 650 mg Oral Q4H PRN Steve Pacer, CRNP     • acetaminophen  975 mg Oral Q6H PRN Steve Pacer, CRNP     • benztropine  1 mg Intramuscular BID PRN Avelino Willson MD     • bisacodyl  5 mg Oral Daily PRN Avelino Willson MD     • divalproex sodium  625 mg Oral HS Avelino Willson MD     • haloperidol  1 mg Oral Q6H PRN Avelino Willson MD      Or   • haloperidol lactate  1 mg Intramuscular Q6H PRN Avelino Willson MD     • haloperidol  2 mg Oral Q6H PRN Avelino Willson MD      Or   • haloperidol lactate  2 mg Intramuscular Q6H PRN Avelino Willson MD     • haloperidol  5 mg Oral Q6H PRN Avelino Willson MD      Or   • haloperidol lactate  5 mg Intramuscular Q6H PRN Avelino Willson MD     • hydrOXYzine HCL  25 mg Oral Q6H PRN Max 4/day Steve Pacer, CRNP     • hydrOXYzine HCL  50 mg Oral Q6H PRN Max 4/day Steve Pacer, CRNP     • magnesium Oxide  400 mg Oral BID Marie Gomez PA-C     • melatonin  3 mg Oral HS Avelino Willson MD     • polyethylene glycol  17 g Oral Daily PRN Avelino Willson MD     • QUEtiapine  125 mg Oral HS Avelino Willson MD     • sertraline  100 mg Oral Daily Steve Pacer, CRNP     • traZODone  50 mg Oral HS PRN Steve Pacer, CRNP          PRN:  •  acetaminophen  •  acetaminophen  •  acetaminophen  •  benztropine  •  bisacodyl  •  haloperidol **OR** haloperidol lactate  •  haloperidol **OR** haloperidol lactate  •  haloperidol **OR** haloperidol lactate  •  hydrOXYzine HCL  •  hydrOXYzine HCL  •  polyethylene glycol  •  traZODone    - Observation: 1:1 for safety and UPB   - VS: as per unit protocol  - Diet: Regular diet  - Encourage group attendance and milieu therapy    - The pt was educated and agreed to verbalize any suicidal thoughts, frustrations or concerns to the nursing staff, immediately      - Dispo: TBD       Next of Kin  · Extended Emergency Contact Information  · Primary Emergency Contact: SATHYA GOULD  · Mobile Phone: [phone]  · Relation: Son  · Secondary Emergency Contact: [address] [facility]  · Mobile Phone: [phone]  · Relation: Daughter      Counseling / Coordination of Care  Patient's progress reviewed with nursing staff  Reassurance and supportive therapy provided  Reoriented to reality and reassured  Encouraged participation in milieu and group therapy on the unit  At this time patient has limited understanding of diagnosis, medication changes and treatment plan and will require further explanation       Samia Pereira MD  Attending [address]

## 2023-06-17 NOTE — PLAN OF CARE
Problem: SAFETY, RESTRAINT - VIOLENT/SELF-DESTRUCTIVE  Goal: Remains free of harm/injury from restraints (Restraint for Violent/Self-Destructive Behavior)  Description: INTERVENTIONS:  - Instruct patient/family regarding restraint use   - Assess and monitor physiologic and psychological status   - Provide interventions and comfort measures to meet assessed patient needs   - Ensure continuous in person monitoring is provided   - Identify and implement measures to help patient regain control  - Assess readiness for release of restraint  Outcome: Completed  Goal: Returns to optimal restraint-free functioning  Description: INTERVENTIONS:  - Assess the patient's behavior and symptoms that indicate continued need for restraint  - Identify and implement measures to help patient regain control  - Assess readiness for release of restraint   Outcome: Completed     Problem: SAFETY,RESTRAINT: NV/NON-SELF DESTRUCTIVE BEHAVIOR  Goal: Remains free of harm/injury (restraint for non violent/non self-detsructive behavior)  Description: INTERVENTIONS:  - Instruct patient/family regarding restraint use   - Assess and monitor physiologic and psychological status   - Provide interventions and comfort measures to meet assessed patient needs   - Identify and implement measures to help patient regain control  - Assess readiness for release of restraint   Outcome: Completed  Goal: Returns to optimal restraint-free functioning  Description: INTERVENTIONS:  - Assess the patient's behavior and symptoms that indicate continued need for restraint  - Identify and implement measures to help patient regain control  - Assess readiness for release of restraint   Outcome: Completed

## 2023-06-17 NOTE — PLAN OF CARE
Problem: Prexisting or High Potential for Compromised Skin Integrity  Goal: Skin integrity is maintained or improved  Description: INTERVENTIONS:  - Identify patients at risk for skin breakdown  - Assess and monitor skin integrity  - Assess and monitor nutrition and hydration status  - Monitor labs   - Assess for incontinence   - Turn and reposition patient  - Assist with mobility/ambulation  - Relieve pressure over bony prominences  - Avoid friction and shearing  - Provide appropriate hygiene as needed including keeping skin clean and dry  - Evaluate need for skin moisturizer/barrier cream  - Collaborate with interdisciplinary team   - Patient/family teaching  - Consider wound care consult   Outcome: Not Progressing     Problem: Ineffective Coping  Goal: Identifies ineffective coping skills  Outcome: Not Progressing  Goal: Identifies healthy coping skills  Outcome: Not Progressing  Goal: Demonstrates healthy coping skills  Outcome: Not Progressing  Goal: Patient/Family participate in treatment and DC plans  Description: Interventions:  - Provide therapeutic environment  Outcome: Not Progressing  Goal: Patient/Family verbalizes awareness of resources  Outcome: Not Progressing  Goal: Understands least restrictive measures  Description: Interventions:  - Utilize least restrictive behavior  Outcome: Not Progressing  Goal: Free from restraint events  Description: - Utilize least restrictive measures   - Provide behavioral interventions   - Redirect inappropriate behaviors   Outcome: Not Progressing

## 2023-06-18 PROBLEM — T17.908A ASPIRATION INTO AIRWAY: Status: RESOLVED | Noted: 2023-06-18 | Resolved: 2023-06-18

## 2023-06-18 PROBLEM — J69.0 ASPIRATION PNEUMONIA OF BOTH LUNGS (HCC): Status: ACTIVE | Noted: 2023-06-18

## 2023-06-18 PROBLEM — T17.908A ASPIRATION INTO AIRWAY: Status: ACTIVE | Noted: 2023-06-18

## 2023-06-18 PROBLEM — J96.01 ACUTE RESPIRATORY FAILURE WITH HYPOXIA (HCC): Status: ACTIVE | Noted: 2023-06-18

## 2023-06-18 PROBLEM — F01.C0: Status: RESOLVED | Noted: 2023-06-14 | Resolved: 2023-06-18

## 2023-06-18 NOTE — PLAN OF CARE
Problem: Prexisting or High Potential for Compromised Skin Integrity  Goal: Skin integrity is maintained or improved  Description: INTERVENTIONS:  - Identify patients at risk for skin breakdown  - Assess and monitor skin integrity  - Assess and monitor nutrition and hydration status  - Monitor labs   - Assess for incontinence   - Turn and reposition patient  - Assist with mobility/ambulation  - Relieve pressure over bony prominences  - Avoid friction and shearing  - Provide appropriate hygiene as needed including keeping skin clean and dry  - Evaluate need for skin moisturizer/barrier cream  - Collaborate with interdisciplinary team   - Patient/family teaching  - Consider wound care consult   Outcome: Adequate for Discharge     Problem: Ineffective Coping  Goal: Cooperates with admission process  Description: Interventions:   - Complete admission process  Outcome: Adequate for Discharge  Goal: Identifies ineffective coping skills  Outcome: Adequate for Discharge  Goal: Identifies healthy coping skills  Outcome: Adequate for Discharge  Goal: Demonstrates healthy coping skills  Outcome: Adequate for Discharge  Goal: Patient/Family participate in treatment and DC plans  Description: Interventions:  - Provide therapeutic environment  Outcome: Adequate for Discharge  Goal: Patient/Family verbalizes awareness of resources  Outcome: Adequate for Discharge  Goal: Understands least restrictive measures  Description: Interventions:  - Utilize least restrictive behavior  Outcome: Adequate for Discharge  Goal: Free from restraint events  Description: - Utilize least restrictive measures   - Provide behavioral interventions   - Redirect inappropriate behaviors   Outcome: Adequate for Discharge     Problem: Risk for Self Injury/Neglect  Goal: Treatment Goal: Remain safe during length of stay, learn and adopt new coping skills, and be free of self-injurious ideation, impulses and acts at the time of discharge  Outcome: Adequate for Discharge  Goal: Verbalize thoughts and feelings  Description: Interventions:  - Assess and re-assess patient's lethality and potential for self-injury  - Engage patient in 1:1 interactions, daily, for a minimum of 15 minutes  - Encourage patient to express feelings, fears, frustrations, hopes  - Establish rapport/trust with patient   Outcome: Adequate for Discharge  Goal: Refrain from harming self  Description: Interventions:  - Monitor patient closely, per order  - Develop a trusting relationship  - Supervise medication ingestion, monitor effects and side effects   Outcome: Adequate for Discharge  Goal: Recognize maladaptive responses and adopt new coping mechanisms  Outcome: Adequate for Discharge  Goal: Complete daily ADLs, including personal hygiene independently, as able  Description: Interventions:  - Observe, teach, and assist patient with ADLS  - Monitor and promote a balance of rest/activity, with adequate nutrition and elimination  Outcome: Adequate for Discharge     Problem: Risk for Violence/Aggression Toward Others  Goal: Treatment Goal: Refrain from acts of violence/aggression during length of stay, and demonstrate improved impulse control at the time of discharge  Outcome: Adequate for Discharge  Goal: Verbalize thoughts and feelings  Description: Interventions:  - Assess and re-assess patient's level of risk, every waking shift  - Engage patient in 1:1 interactions, daily, for a minimum of 15 minutes   - Allow patient to express feelings and frustrations in a safe and non-threatening manner   - Establish rapport/trust with patient   Outcome: Adequate for Discharge  Goal: Refrain from harming others  Outcome: Adequate for Discharge  Goal: Refrain from destructive acts on the environment or property  Outcome: Adequate for Discharge  Goal: Control angry outbursts  Description: Interventions:  - Monitor patient closely, per order  - Ensure early verbal de-escalation  - Monitor prn medication needs  - Set reasonable/therapeutic limits, outline behavioral expectations, and consequences   - Provide a non-threatening milieu, utilizing the least restrictive interventions   Outcome: Adequate for Discharge  Goal: Identify appropriate positive anger management techniques  Description: Interventions:  - Offer anger management and coping skills groups   - Staff will provide positive feedback for appropriate anger control  Outcome: Adequate for Discharge     Problem: Alteration in Thoughts and Perception  Goal: Attend and participate in unit activities, including therapeutic, recreational, and educational groups  Description: Interventions:  -Encourage Visitation and family involvement in care  Outcome: Adequate for Discharge     Problem: Nutrition/Hydration-ADULT  Goal: Nutrient/Hydration intake appropriate for improving, restoring or maintaining nutritional needs  Description: Monitor and assess patient's nutrition/hydration status for malnutrition  Collaborate with interdisciplinary team and initiate plan and interventions as ordered  Monitor patient's weight and dietary intake as ordered or per policy  Utilize nutrition screening tool and intervene as necessary  Determine patient's food preferences and provide high-protein, high-caloric foods as appropriate       INTERVENTIONS:  - Monitor oral intake, urinary output, labs, and treatment plans  - Assess nutrition and hydration status and recommend course of action  - Evaluate amount of meals eaten  - Assist patient with eating if necessary   - Allow adequate time for meals  - Recommend/ encourage appropriate diets, oral nutritional supplements, and vitamin/mineral supplements  - Order, calculate, and assess calorie counts as needed  - Recommend, monitor, and adjust tube feedings and TPN/PPN based on assessed needs  - Assess need for intravenous fluids  - Provide specific nutrition/hydration education as appropriate  - Include patient/family/caregiver in decisions related to nutrition  Outcome: Adequate for Discharge     Problem: DISCHARGE PLANNING  Goal: Discharge to home or other facility with appropriate resources  Description: INTERVENTIONS:  - Identify barriers to discharge w/patient and caregiver  - Arrange for needed discharge resources and transportation as appropriate  - Identify discharge learning needs (meds, wound care, etc )  - Arrange for interpretive services to assist at discharge as needed  - Refer to Case Management Department for coordinating discharge planning if the patient needs post-hospital services based on physician/advanced practitioner order or complex needs related to functional status, cognitive ability, or social support system  Outcome: Adequate for Discharge     Problem: SKIN/TISSUE INTEGRITY - ADULT  Goal: Skin Integrity remains intact(Skin Breakdown Prevention)  Description: Assess:  -Perform Dhaval assessment every shift  -Clean and moisturize skin every day  -Inspect skin when repositioning, toileting, and assisting with ADLS  -Assess extremities for adequate circulation and sensation     Bed Management:  -Have minimal linens on bed & keep smooth, unwrinkled  -Change linens as needed when moist or perspiring  -Avoid sitting or lying in one position for more than 2 hours while in bed  -Keep HOB at 90 degrees     Toileting:  -Offer bedside commode  -Assess for incontinence every 2 hours  -Use incontinent care products after each incontinent episode     Activity:  -Mobilize patient 3 times a day  -Encourage activity and walks on unit  -Encourage or provide ROM exercises   -Turn and reposition patient every 2 Hours  -Use appropriate equipment to lift or move patient in bed  Skin Care:  -Avoid use of baby powder, tape, friction and shearing, hot water or constrictive clothing  -Relieve pressure over bony prominences using pillows or wedges  -Do not massage red bony areas    Outcome: Adequate for Discharge  Goal: Incision(s), wounds(s) or drain site(s) healing without S/S of infection  Description: INTERVENTIONS  - Assess and document dressing, incision, wound bed, drain sites and surrounding tissue  - Provide patient and family education  - Perform skin care/dressing changes every other day  Outcome: Adequate for Discharge  Goal: Pressure injury heals and does not worsen  Description: Interventions:  - Implement low air loss mattress or specialty surface (Criteria met)      - Utilize friction reducing device or surface for transfers   - Consider consults to  interdisciplinary team  - Use incontinent care products after each incontinent episode   - Consider nutrition services referral as needed  Outcome: Adequate for Discharge

## 2023-06-18 NOTE — DISCHARGE SUMMARY
Discharge Summary - [address] [facility] 76 y o  female MRN: [id number]  Unit/Bed#: [facility] 723-43 Encounter: [id number]     Admission Date:   Admission Orders (From admission, onward)     Ordered        06/14/23 1540  ED TO DIFFERENT CAMPUS IP [address] [facility] [address] UNIT or INPATIENT MEDICAL UNIT to [facility] UNIT (using Discharge Readmit Navigator) - Admit Patient to [address] [place] [address]  Once                            Discharge Date: 6/18/2023  Attending Psychiatrist: Adrien Villalpando MD    Reason for Admission/HPI:   History of Present Illness     As per H&P by this writer on 6/15/23:  Rosario Eagle is a 76 y o   female, domiciled w/ son prior to the admission, w/ PMH of multiple medical conditions including CVA (MCA stroke in April 2023), hemiparesis and dysphagia, encephalopathy, aortic atherosclerosis, HLD, positive hep C test, hiatal hernia, lumbar DJD, recurrent falls, s/p TKR, vit D def, primary open angle glaucoma of both eyes, and severe vascular dementia with psychotic disturbance, and PPH of depression, MNCD, no prior psychiatric admissions, no prior SA, no h/o self-injurious behavior, multiple ED/inpatient medical admission over past 6 months (more than 6) for medical issues and deteriorating health conditions, who was brought in to the Loc[facility] ED on 5/28/23 by her son the same day the patient discharged home with hospice care as the son reportedly could not take care of her at home  Psychiatry consult team followed up the patient during multiple admissions, and medications adjusted  The patient was 302'ed by the medical team as reportedly the patient reportedly tried to wrap a call bell around her neck and was reportedly aggressive towards the staff, and admitted to the inpatient psychiatry unit 6B for further psychiatric stabilization        The pt was visited on the unit; chart reviewed   The patient has been confused, requires frequent redirection and reorientation by the staff and "assistance with all ADLs  She remains intermittently agitated, aggressive towards staff, trying to get out of the bed without any insight into her hemiplegia, removing the dressing and required frequent IM meds and restraint       Upon evaluation at the bedside, the patient was A&O to self and partially to place, knew that she was in the hospital, but not to the time, stated: \"It's Easter Sunday\" and when was asked about the year, stated: \"1959\"  She reported feeling good, and had no insight into her behavioral issues, and could not recall any aggression  She reported VH as seeing her son next to the bed, but denied AH  Denied SI/HI, intent or plan upon direct inquiry at this time  The patient remains on 1:1 for safety and unpredictable behavior  VPA was 55 as checked yesterday  Maintained on Zoloft 100 mg daily, Seroquel 125 mg nightly and Depakote 500 mg nightly; Depakote dose could be increased for higher therapeutic level  Buspar discontinued to avoid polypharmacy  The patient needs higher level of care, and  will explore NH placement vs  Hospice care upon further stabilization  Hospital Course: The patient was admitted to the inpatient psychiatric unit and started on every 15 minutes precautions and 1:1 for unpredictable behavior and safety  A treatment plan was formed with focus on pharmacotherapy and milieu therapy, group therapy and individual psychotherapy when indicated  Psychiatric medications were titrated over the hospital stay and milieu therapy was utilized  The patient remained confused due to severe major neurocognitive disorder, requiring frequent redirection, reorientation and assistance with all ADL's by the staff  The patient had episodes of irritability and agitation, and received IM PRN meds and restraints at times  VPA level was 55 on 6/14, and dose was increased to 625 mg nightly; VPA level to be checked on Monday   Zyprexa PRN switched to Haldol PRN due to poor " response to Zyprexa  The patient reportedly had episodes of choking while eating pudding in the middle of the night on 6/18/23, and then had episodes of dark colored vomiting despite NPO and receiving Zofran as recommended by SLIM  O2 sat dropped to 86% with low grade fever and CXR on 6/18/23 showed RLL infiltrate concerning for aspiration  The patient is not medically stable at this time and requires proper medical care, and is being transferred to ED as per [facility] recs and coordination  The primary team may contact psychiatry consult team while the patient is in the ED / inpatient medical unit       Mental Status at time of Discharge:     Appearance and attitude: appeared as stated age, dressed in hospital attire, with fair hygiene, actively vomiting  Eye contact: poor  Motor Function: No PMA/PMR  Gait/station: Not observed  Speech: incoherent   Language: Unable to assess due to patient factors  Mood/affect: irritable / Affect was constricted  Thought Processes: slowing of thoughts  Thought content: unable to assess due to the patient's factors  Associations: unable to assess due to the patient's factors  Perceptual disturbances: unable to assess due to the patient's factors  Orientation: oriented to self, but not to the place or person  Cognitive Function: confused  Memory: unable to assess  Intellect: unable to assess  Fund of knowledge: diminished  Impulse control: impulsive at times  Insight/judgment: impaired/impaired    Discharge Diagnosis:     Principal Problem:    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)  Active Problems:    Status post CVA    Chronic anemia    Major depressive disorder, recurrent severe without psychotic features ([address] )    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, without behavioral disturbance, severe ([address] )    Medical clearance for psychiatric admission    Severe protein-calorie malnutrition ([facility] )    Aspiration into airway    Discharge Medications:  See after visit summary for reconciled discharge medications provided     Scheduled medications:  Current Facility-Administered Medications   Medication Dose Route Frequency Provider Last Rate   • acetaminophen  650 mg Oral Q4H PRN RAYSA More     • acetaminophen  650 mg Oral Q4H PRN RAYSA More     • acetaminophen  975 mg Oral Q6H PRN RAYSA More     • benztropine  1 mg Intramuscular BID PRN Brigido Dey MD     • bisacodyl  10 mg Rectal Daily PRN Brigido Dey MD     • divalproex sodium  625 mg Oral HS Brigido Dey MD     • haloperidol  1 mg Oral Q6H PRN Brigido Dey MD      Or   • haloperidol lactate  1 mg Intramuscular Q6H PRN Brigido Dey MD     • haloperidol  2 mg Oral Q6H PRN Brigido Dey MD      Or   • haloperidol lactate  2 mg Intramuscular Q6H PRN Brigido Dey MD     • haloperidol  5 mg Oral Q6H PRN Brigido Dey MD      Or   • haloperidol lactate  5 mg Intramuscular Q6H PRN Brigido Dey MD     • hydrOXYzine HCL  25 mg Oral Q6H PRN Max 4/day RAYSA More     • hydrOXYzine HCL  50 mg Oral Q6H PRN Max 4/day RAYSA More     • magnesium Oxide  400 mg Oral BID Brooke Napoles PA-C     • melatonin  3 mg Oral HS Brigido Dey MD     • ondansetron  4 mg Oral Q6H PRN Bernadine Sotomayor PA-C     • polyethylene glycol  17 g Oral Daily PRN Brigido Dey MD     • QUEtiapine  125 mg Oral HS Brigido Dey MD     • sertraline  100 mg Oral Daily RAYSA More     • traZODone  50 mg Oral HS PRN RAYSA More     • trimethobenzamide  200 mg Intramuscular Q6H PRN Brigido Dey MD          PRN:  •  acetaminophen  •  acetaminophen  •  acetaminophen  •  benztropine  •  bisacodyl  •  haloperidol **OR** haloperidol lactate  •  haloperidol **OR** haloperidol lactate  •  haloperidol **OR** haloperidol lactate  •  hydrOXYzine HCL  •  hydrOXYzine HCL  •  ondansetron  •  polyethylene glycol  •  traZODone  •  trimethobenzamide      Discharge instructions/Information to patient and family:   See after visit summary for information provided to patient and family  Provisions for Follow-Up Care:  See after visit summary for information related to follow-up care and any pertinent home health orders  Discharge Statement   I spent 40 minutes discharging the patient  This time was spent on the day of discharge  I had direct contact with the patient on the day of discharge  Additional documentation is required if more than 30 minutes were spent on discharge

## 2023-06-18 NOTE — BH TRANSITION RECORD
"Contact Information: If you have any questions, concerns, pended studies, tests and/or procedures, or emergencies regarding your inpatient behavioral health visit  Please contact [facility] older adult behavioral health unit 6B [phone] and ask to speak to a , nurse or physician  A contact is available 24 hours/ 7 days a week at this number  Summary of Procedures Performed During your Stay:  Below is a list of major procedures performed during your hospital stay and a summary of results:  - No major procedures performed      WBC   Date Value Ref Range Status   06/14/2023 6 72 4 31 - 10 16 Thousand/uL Final     WBC, UA   Date Value Ref Range Status   06/13/2023 1-2 None Seen, 0-1, 1-2, 0-5, 2-4 /hpf Final     MCV   Date Value Ref Range Status   06/14/2023 96 82 - 98 fL Final     Lab Results   Component Value Date    BUN 7 06/14/2023    SODIUM 142 06/14/2023    CO2 29 06/14/2023     Lab Results   Component Value Date    ALKPHOS 44 06/14/2023     No results found for: \"CKMB\"  No results found for: \"TSH\"  INR   Date Value Ref Range Status   04/24/2023 1 03 0 84 - 1 19 Final   04/14/2023 0 97 0 84 - 1 19 Final   10/15/2022 1 05 0 84 - 1 19 Final     No results found for: \"APTT\"  No results found for: \"PHENO\"  Sodium   Date Value Ref Range Status   06/14/2023 142 135 - 147 mmol/L Final     BUN   Date Value Ref Range Status   06/14/2023 7 5 - 25 mg/dL Final     Creatinine   Date Value Ref Range Status   06/14/2023 0 51 (L) 0 60 - 1 30 mg/dL Final     Comment:     Standardized to IDMS reference method     TSH 3RD GENERATON   Date Value Ref Range Status   06/14/2023 2 102 0 450 - 4 500 uIU/mL Final     Comment:     The recommended reference ranges for TSH during pregnancy are as follows:   First trimester 0 1 to 2 5 uIU/mL   Second trimester  0 2 to 3 0 uIU/mL   Third trimester 0 3 to 3 0 uIU/m    Note: Normal ranges may not apply to patients who are transgender, non-binary, or whose legal sex, sex at " "birth, and gender identity differ  Adult TSH (3rd generation) reference range follows the recommended guidelines of the American Thy[employer] Association, January, 2020  WBC   Date Value Ref Range Status   06/14/2023 6 72 4 31 - 10 16 Thousand/uL Final     WBC, UA   Date Value Ref Range Status   06/13/2023 1-2 None Seen, 0-1, 1-2, 0-5, 2-4 /hpf Final     No components found for: \"B12\"  Lab Results   Component Value Date    FOLATE 6 5 06/14/2023     No results found for: \"RPR\"    Imaging  XR chest portable    (Results Pending)         Pending Studies (From admission, onward)     Start     Ordered    06/19/23 2000  Valproic acid level, total  Once        Comments: Please draw the sample before giving the PM dose (checking trough level)      06/16/23 1155    06/19/23 1758  CBC and differential  Every Monday      Start Status   06/19/23 1758 Scheduled   06/26/23 1758 Scheduled   07/03/23 1758 Scheduled       06/14/23 1757    06/18/23 0900  CBC  Once         06/18/23 0412    06/18/23 0900  Procalcitonin  Once         06/18/23 0412    06/18/23 0839  Comprehensive metabolic panel  Once         06/18/23 0838    06/18/23 0408  XR chest portable  1 time imaging        Question:  Reason for Exam:  Answer:  hypoxia , aspiration    06/18/23 8460    Signed and Held  CBC  Once         Signed and Held    Signed and Held  CBC and differential  Every Monday       Signed and Held    Signed and Held  Comprehensive metabolic panel  Once         Signed and Held    Signed and Held  Procalcitonin  Once         Signed and Held    Signed and Held  Valproic acid level, total  Once        Comments: Please draw the sample before giving the PM dose (checking trough level)      Signed and Held              Please follow up on the above pending studies with your PCP and/or referring provider    "

## 2023-06-18 NOTE — TREATMENT TEAM
06/18/23 0500   Assessment   Alternative Measures Calming methods;Continuous in person monitoring;Repositioning   Alternative measures evaluation Effective   Restraint Type (NV)   R Wrist Soft Restraint Discontinued   L Wrist Soft Restraint Discontinued   Education   Discontinuation Criteria Improved behavior   RN Reassessment For Continued Restraint (NV)   RN Reassessment (NV) Patient demonstrates readiness to discontinue- Discontinue Restraints     R/L wrist soft restraints discontinued at this time

## 2023-06-18 NOTE — QUICK NOTE
Met with patient's son Lawson Vazquez ([phone]) and daughter Livan Rausch ([phone]) in the ED at bedside  We discussed events overnight and this morning which they were aware of as Dr Shantanu Parker MD psychiatrist in behavioral health unit contacted family this morning regarding events  Melyssa Wrgiht and Sloane both agree that patient would not want further interventions and would not want to keep getting hospitalized and placed in behavioral health units and treated on medical floors for acute medical issues  Both are in agreement that patient would want to remain comfortable in light of recurrent aspiration likely 2/2 a large hiatal hernia with an intrathoracic stomach; she is not surgical candidate nor would family want that  Additionally, she has severe vascular dementia with behavioral disturbance and recent CVA with persistent neuro deficits that have contributed to precipitous functional and neurologic decline over the last 6 months  She is at high-risk for further imminent respiratory decompensation due to recurrent aspiration  Patient is hospice appropriate; will have hospice liaison speak with family tomorrow, in the interim will transition patient to level 4 comfort care with focus on treating sx as they arise

## 2023-06-18 NOTE — ED PROVIDER NOTES
"History  Chief Complaint   Patient presents with   • Respiratory Distress     Pt arrives from  area for evaluation of respiratory distress and vomiting \"bile black emesis \" per staff  \" they were giving meds last night and she was gagging and coughing \"  \" her sat went down to 68 \"       76year old female, hx of dementia, coming from psych floors after having vomiting with concern for aspiration event  CXR already completed, reviewed by me, appears to have RLL pneumonitis  Started on rocephin for prophylaxis  No previous 02 requirements, but now needs 4L NC  Emesis occurred after patient tried to swallow pills per report from nursing staff  Prior to Admission Medications   Prescriptions Last Dose Informant Patient Reported? Taking?    QUEtiapine (SEROquel) 25 mg tablet   No No   Sig: Take 0 5 tablets (12 5 mg total) by mouth 2 (two) times a day   Patient taking differently: Take 50 mg by mouth 2 (two) times a day   QUEtiapine (SEROquel) 25 mg tablet   Yes No   Sig: Take 25 mg by mouth 3 (three) times a day as needed (agitation)   acetaminophen (TYLENOL) 325 mg tablet   No No   Sig: Take 2 tablets (650 mg total) by mouth every 6 (six) hours as needed for mild pain, headaches or fever   Patient taking differently: Take 1,000 mg by mouth 2 (two) times a day   aspirin (ECOTRIN LOW STRENGTH) 81 mg EC tablet  Self Yes No   Sig: Take 81 mg by mouth daily   atorvastatin (LIPITOR) 40 mg tablet   No No   Sig: Take 1 tablet (40 mg total) by mouth every evening   busPIRone (BUSPAR) 10 mg tablet   No No   Sig: Take 1 tablet (10 mg total) by mouth 3 (three) times a day   clonazePAM (KlonoPIN) 0 5 mg tablet   No No   Sig: Take 0 5 tablets (0 25 mg total) by mouth 2 (two) times a day as needed for anxiety for up to 10 days   famotidine (PEPCID) 20 mg tablet   Yes No   Sig: Take 20 mg by mouth 2 (two) times a day   latanoprost (XALATAN) 0 005 % ophthalmic solution   Yes No   Sig: Administer 1 drop to both eyes daily at " bedtime   sertraline (ZOLOFT) 100 mg tablet   No No   Sig: Take 1 tablet (100 mg total) by mouth daily Do not start before April 21, 2023  timolol (TIMOPTIC) 0 5 % ophthalmic solution   Yes No   Sig: Apply 1 drop to eye 2 (two) times a day   valproic acid (DEPAKENE) 250 MG/5ML soln   Yes No   Sig: Take 250 mg by mouth 2 (two) times a day      Facility-Administered Medications: None       Past Medical History:   Diagnosis Date   • Dementia ([facility] [address] )    • No abnormality of hip joint detected on examination    • Stroke [facility])        History reviewed  No pertinent surgical history  Family History   Problem Relation Age of Onset   • Arthritis Mother      I have reviewed and agree with the history as documented  E-Cigarette/Vaping   • E-Cigarette Use Never User      E-Cigarette/Vaping Substances     Social History     Tobacco Use   • Smoking status: Every Day     Packs/day: 0 25     Types: Cigarettes   • Smokeless tobacco: Never   Vaping Use   • Vaping Use: Never used   Substance Use Topics   • Alcohol use: Yes     Comment: ocassional   • Drug use: Never       Review of Systems   Unable to perform ROS: Dementia       Physical Exam  Physical Exam  Vitals and nursing note reviewed  Constitutional:       General: She is not in acute distress  Appearance: She is well-developed  She is ill-appearing  She is not toxic-appearing or diaphoretic  HENT:      Head: Normocephalic and atraumatic  Eyes:      Conjunctiva/sclera: Conjunctivae normal    Cardiovascular:      Rate and Rhythm: Normal rate and regular rhythm  Heart sounds: No murmur heard  Pulmonary:      Effort: Pulmonary effort is normal  No respiratory distress  Breath sounds: Normal breath sounds  Abdominal:      Palpations: Abdomen is soft  Tenderness: There is no abdominal tenderness  Musculoskeletal:         General: No swelling  Cervical back: Neck supple  Skin:     General: Skin is warm and dry        Capillary Refill: Capillary refill takes less than 2 seconds  Neurological:      GCS: GCS eye subscore is 4  GCS verbal subscore is 4  GCS motor subscore is 6  Cranial Nerves: Cranial nerves 2-12 are intact  No facial asymmetry  Sensory: Sensation is intact  Motor: No atrophy, seizure activity or pronator drift        Comments: Gait not tested due to acuity of condition   Psychiatric:         Mood and Affect: Mood normal          Vital Signs  ED Triage Vitals [06/18/23 1015]   Temperature Pulse Respirations Blood Pressure SpO2   98 1 °F (36 7 °C) 95 22 129/67 93 %      Temp Source Heart Rate Source Patient Position - Orthostatic VS BP Location FiO2 (%)   Oral Monitor Lying Right arm --      Pain Score       --           Vitals:    06/18/23 1015   BP: 129/67   Pulse: 95   Patient Position - Orthostatic VS: Lying         Visual Acuity      ED Medications  Medications   divalproex sodium (DEPAKOTE SPRINKLE) capsule 625 mg (has no administration in time range)   magnesium Oxide (MAG-OX) tablet 400 mg (has no administration in time range)   melatonin tablet 3 mg (has no administration in time range)   QUEtiapine (SEROquel) tablet 125 mg (has no administration in time range)   sertraline (ZOLOFT) tablet 100 mg (has no administration in time range)   benztropine (COGENTIN) injection 1 mg (has no administration in time range)   bisacodyl (DULCOLAX) rectal suppository 10 mg (has no administration in time range)   haloperidol (HALDOL) tablet 1 mg (has no administration in time range)     Or   haloperidol lactate (HALDOL) injection 1 mg (has no administration in time range)   haloperidol (HALDOL) tablet 2 mg (has no administration in time range)     Or   haloperidol lactate (HALDOL) injection 2 mg (has no administration in time range)   haloperidol (HALDOL) tablet 5 mg (has no administration in time range)     Or   haloperidol lactate (HALDOL) injection 5 mg (has no administration in time range)   hydrOXYzine HCL (ATARAX) tablet 25 mg (has no administration in time range)   hydrOXYzine HCL (ATARAX) tablet 50 mg (has no administration in time range)   ondansetron (ZOFRAN-ODT) dispersible tablet 4 mg (has no administration in time range)   polyethylene glycol (MIRALAX) packet 17 g (has no administration in time range)   traZODone (DESYREL) tablet 50 mg (has no administration in time range)   trimethobenzamide (TIGAN) IM injection 200 mg (has no administration in time range)   acetaminophen (TYLENOL) tablet 650 mg (has no administration in time range)   cefTRIAXone (ROCEPHIN) IVPB (premix in dextrose) 1,000 mg 50 mL (has no administration in time range)       Diagnostic Studies  Results Reviewed     Procedure Component Value Units Date/Time    Comprehensive metabolic panel [[id number]]     Lab Status: No result Specimen: Blood     Procalcitonin [[id number]]     Lab Status: No result Specimen: Blood     CBC and differential [[id number]]  (Abnormal) Collected: 06/18/23 1042    Lab Status: Final result Specimen: Blood from Arm, Right Updated: 06/18/23 1059     WBC 22 15 Thousand/uL      RBC 4 36 Million/uL      Hemoglobin 13 1 g/dL      Hematocrit 42 0 %      MCV 96 fL      MCH 30 0 pg      MCHC 31 2 g/dL      RDW 16 4 %      MPV 9 6 fL      Platelets 458 Thousands/uL      nRBC 0 /100 WBCs      Neutrophils Relative 87 %      Immat GRANS % 0 %      Lymphocytes Relative 6 %      Monocytes Relative 7 %      Eosinophils Relative 0 %      Basophils Relative 0 %      Neutrophils Absolute 19 22 Thousands/µL      Immature Grans Absolute 0 09 Thousand/uL      Lymphocytes Absolute 1 26 Thousands/µL      Monocytes Absolute 1 55 Thousand/µL      Eosinophils Absolute 0 00 Thousand/µL      Basophils Absolute 0 03 Thousands/µL     Blood culture #2 [[id number]] Collected: 06/18/23 1051    Lab Status: In process Specimen: Blood from Arm, Left Updated: 06/18/23 1055    Lactic acid [[id number]] Collected: 06/18/23 1042    Lab Status:  In process Specimen: Blood from Arm, Right Updated: 06/18/23 1053    Blood culture #1 [[id number]] Collected: 06/18/23 1042    Lab Status: In process Specimen: Blood from Arm, Right Updated: 06/18/23 1053    High Sensitivity Troponin I Random [[id number]] Collected: 06/18/23 1042    Lab Status: In process Specimen: Blood from Arm, Right Updated: 06/18/23 1052    Comprehensive metabolic panel [[id number]] Collected: 06/18/23 1042    Lab Status: In process Specimen: Blood from Arm, Right Updated: 06/18/23 1052    Lipase [[id number]] Collected: 06/18/23 1042    Lab Status: In process Specimen: Blood from Arm, Right Updated: 06/18/23 [address] [[id number]] Collected: 06/18/23 1042    Lab Status: In process Specimen: Blood from Arm, Right Updated: 06/18/23 1052    Procalcitonin [[id number]] Collected: 06/18/23 1042    Lab Status: In process Specimen: Blood from Arm, Right Updated: 06/18/23 1052    B-Type Natriuretic Peptide(BNP) [[id number]] Collected: 06/18/23 1042    Lab Status: In process Specimen: Blood from Arm, Right Updated: 06/18/23 1052    UA w Reflex to Microscopic w Reflex to Culture [[id number]]     Lab Status: No result Specimen: Urine                  FL barium swallow video w speech    (Results Pending)              Procedures  Procedures         ED Course  ED Course as of 06/18/23 1103   Sun Jun 18, 2023   1103 Procedure Note: EKG  Date/Time: 06/18/23 11:03 AM   Performed by: Hema Barry  Authorized by: Hema Barry  ECG interpreted by me, the ED Provider: yes   The EKG demonstrates:  Rate 102  Rhythm sinus tachycardia  QTc 471  No ST elevations/depressions                                   SBIRT 20yo+    Flowsheet Row Most Recent Value   Initial Alcohol Screen: US AUDIT-C     1  How often do you have a drink containing alcohol? 0 Filed at: 06/18/2023 1022   2  How many drinks containing alcohol do you have on a typical day you are drinking? 0 Filed at: 06/18/2023 1022   3b  FEMALE Any Age, or MALE 65+:  How often do you have 4 or more drinks on one occassion? 0 Filed at: 06/18/2023 1022   Audit-C Score 0 Filed at: 06/18/2023 1022   LETA: How many times in the past year have you    Used an illegal drug or used a prescription medication for non-medical reasons? Never Filed at: 06/18/2023 1022                    Medical Decision Making  76year old female, [place] aspiration event causing pneumonitis and need for new oxygen requirements  RR 22, HR 95 and concern for developing infection  Sepsis labs ordered  Already received antibiotics prior to coming to ED  Plan for admission to this facility to [facility] service  Amount and/or Complexity of Data Reviewed  Labs: ordered  Risk  Decision regarding hospitalization  Disposition  Final diagnoses:   Respiratory distress   Hypoxia   Aspiration pneumonitis ([address] )     Time reflects when diagnosis was documented in both MDM as applicable and the Disposition within this note     Time User Action Codes Description Comment    6/18/2023 10:56 AM Evelyne Miguel Add [R06 03] Respiratory distress     6/18/2023 10:56 AM Evelyne Miguel Add [R09 02] Hypoxia     6/18/2023 10:57 AM Evelyne Miguel Add [J69 0] Aspiration pneumonitis [facility])       ED Disposition     ED Disposition   Admit    Condition   Stable    Date/Time   Sun Jun 18, 2023 1056    Comment   Case was discussed with SHARAN and the patient's admission status was agreed to be Admission Status: inpatient status to the service of Dr Brown [facility]   Follow-up Information    None         Patient's Medications   Discharge Prescriptions    No medications on file       No discharge procedures on file      PDMP Review     None          ED Provider  Electronically Signed by           Cielo Manuel DO  06/18/23 8107

## 2023-06-18 NOTE — ASSESSMENT & PLAN NOTE
· 2/2 aspiration pneumonia and also probably a component of superimposed aspiration pneumonitis with additional event this morning on behavioral health unit when she desat to mid 80s on RA  Sats mid 90s on 4-5L NC  · See plans for principal problem  · supp 02 to maintain sats > 94 on RA  Wean oxygen as tolerated  · 1:1 continual visual observation with hx of severe dementia with behavioral disturbance, patient trying to take off her oxygen

## 2023-06-18 NOTE — QUICK NOTE
Aspiration event occurred overnight 6/18/2023 with chocolate pudding  Per RN there was emesis around 12:45 AM     · 6/18/2023 CXR with RLL infiltrate  · O2 via NC prn for goal SpO2 > 90% resting  · Aspiration precautions, respiratory protocol - please call them as needed for assistance with suctioning the airway  · Pt vomited 1 other time around 4:30 AM but RT was present for suctioning approx 400 cc of aspirate  · RN reports the patient is not lethargic  She is at her baseline mentation  · AM labs  · Will give rocephin IM 1 g once to cover for aspiration pneumonitis however at this point no need to start Abx since she has recovered nicely from the event, would monitor VS and labs for few days to check for development of PNA whereby would schedule Abx at that point  · NPO - ST consulted for raulito Bhat examining the patient at the beside around 4:30 AM  RN reports patient feels relief of her nausea and coughing symptoms and is no longer restless  I will alert day shift SLIM provider to please review formal radiology read of the CXR, review the AM labs and evaluate the patient during rounds today  Lab: 6 Detail Level: Detailed Curettage Text: The wound bed was treated with curettage after the biopsy was performed. Hide Anticipated Plan (Based On Presumed Biopsy Results)?: No Biopsy Method: Personna blade Hemostasis: Aluminum Chloride Notification Instructions: Patient will be notified of biopsy results. However, patient instructed to call the office if not contacted within 2 weeks. Was A Bandage Applied: Yes Silver Nitrate Text: The wound bed was treated with silver nitrate after the biopsy was performed. Dressing: bandage Consent: Written consent was obtained and risks were reviewed including but not limited to scarring, infection, bleeding, scabbing, incomplete removal, nerve damage and allergy to anesthesia. Electrodesiccation And Curettage Text: The wound bed was treated with electrodesiccation and curettage after the biopsy was performed. Anesthesia Volume In Cc (Will Not Render If 0): 0.5 Type Of Destruction Used: Curettage Biopsy Type: H and E X Size Of Lesion In Cm: 0 Depth Of Biopsy: dermis Electrodesiccation Text: The wound bed was treated with electrodesiccation after the biopsy was performed. Wound Care: Petrolatum Billing Type: Third-Party Bill Lab Facility: 3 Cryotherapy Text: The wound bed was treated with cryotherapy after the biopsy was performed. Anesthesia Type: 1% lidocaine with epinephrine Post-Care Instructions: I reviewed with the patient in detail post-care instructions. Patient is to keep the biopsy site dry overnight, and then apply Vaseline twice daily until healed. Patient may apply hydrogen peroxide soaks to remove any crusting.

## 2023-06-18 NOTE — TREATMENT TEAM
06/18/23 0049   Agitated Behavior Scale   Short Attention Span, Easy Distractibility, Inability to Concentrate 3   Impulsive, Impatient, Low Tolerance for Pain or Frustration 3   Uncooperative, Resistant to Care, Demanding 3   Violent and/or Threatening Violence Toward People or Property 2   Explosive and/or Unpredictable Anger 3   Rocking, Rubbing, Moaning, Other Self-Stimulating Behavior 1   Pulling at Tubes, Restraints, etc  3   Wandering from Treatment Area 1   Restlessness, Pacing, Excessive Movement 3   Repetitive Behaviors, Motor and/or Verbal 3   Rapid, Loud or Excessive Talking 3   Sudden Changes of Mood 2   Easily Initiated - Excessive Crying and/or Laughter 1   Self-Abusiveness, Physical and/or Verbal 3   Agitated Behavior Scale Total Score  34   CIWA-Ar   Blood Pressure 164/72   Pulse 97     Pt has been increasingly agitated  Increased restlessness and combative behaviors noted  Pt has also been forcefully coughing off clear phlegm and at times chocolate mixed phlegm since having chocolate pudding for snack and meds in chocolate pudding last night  Haldol 2mg admin IM to R deltoid an alternative to PO haldol r/t coughing off of clear and chocolate mixed phlegm at this time  Will monitor for effectiveness

## 2023-06-18 NOTE — NURSING NOTE
LBM noted to be 6/12  Dulcolax 5mg PO yesterday with no desired effect  Pt NPO at this moment  Dulcolax 10mg suppository admin  Day shift will follow up for effectiveness

## 2023-06-18 NOTE — ASSESSMENT & PLAN NOTE
· Will continue behavioral health meds as ordered when she was on behavioral health unit when feasible  · Appreciate psych assistance

## 2023-06-18 NOTE — NURSING NOTE
"Restraint Face to Face   Olu Palma 76 y o  female MRN: [id number]  Unit/Bed#: [facility] 058-68 Encounter: [id number]      Physical Evaluation : Patient yelling and combative    Purpose for Restraints/ Seclusion High risk for self harm and High risk for causing significant disruption of treatment environment      Patient's reaction to the intervention;  Patient yelling and stating \"God help me\"  Patient's medical condition ; Stable    Patient's Behavioral condition ; Patient yelling and appears to be trying to get out of soft restraints  Yelling \"get me out of here\"      Restraints to be Continued        "

## 2023-06-18 NOTE — PLAN OF CARE
Problem: SAFETY, RESTRAINT - VIOLENT/SELF-DESTRUCTIVE  Goal: Remains free of harm/injury from restraints (Restraint for Violent/Self-Destructive Behavior)  Description: INTERVENTIONS:  - Instruct patient/family regarding restraint use   - Assess and monitor physiologic and psychological status   - Provide interventions and comfort measures to meet assessed patient needs   - Ensure continuous in person monitoring is provided   - Identify and implement measures to help patient regain control  - Assess readiness for release of restraint  Outcome: Progressing     Problem: SAFETY, RESTRAINT - VIOLENT/SELF-DESTRUCTIVE  Goal: Returns to optimal restraint-free functioning  Description: INTERVENTIONS:  - Assess the patient's behavior and symptoms that indicate continued need for restraint  - Identify and implement measures to help patient regain control  - Assess readiness for release of restraint   Outcome: Progressing

## 2023-06-18 NOTE — ED NOTES
Pt's family asked by this RN about pt having a leroy catheter and they declined to have one placed due to discomfort it would give their mother     Faiza Dominguez RN  06/18/23 2300

## 2023-06-18 NOTE — NURSING NOTE
Patient with emesis of black bile like liquid several times  Patient given IM Rochephin in right ventral gluteal muscle and patient tolerated same without difficulty for possible aspiration pneumonia  Patient did have a medium BM from the dulcolax suppository that was given on 11-7  Patient started to desat with O2 saturation at 70%  Oxygen applied and saturation came up to 90% on 4 liters/min on nasal cannula  Attempted to use a rebreather mask which patient promptly took off  SLIM notified and responded to unit  Patient needs to be transferred to medical via the Emergency Room per Dr Killian Ramirez for [facility]  Dr Fang Felipe notified of same and also was in room assessing patient and is in agreement with transfer  Will continue to monitor patient until transferred to ED

## 2023-06-18 NOTE — ASSESSMENT & PLAN NOTE
· Unfortunately, patient has had a very complicated hospital and behavioral health course  Essentially, son unable to care for patient at home  He at one point recently had taken her home from hospital with plans to initiate hospice services in the home however he had to bring patient back to hospital same day as he was unable to care for her  Since then, she has been in behavioral health unit though no clear long-term plan for placement as she has not been accepted previously to SNF  · Overall prognosis is very poor though she does not have a clear hospice diagnosis  · Consult psych to assist with helping medicine with DC disposition  It is unclear if she will need to be DC back to behavioral health or not  · Keep strict NPO including psych meds for now until speech eval     · Ongoing GOC as clinical course evolves  Consider palliative care consult     · Consult CM for complexity regarding disposition when deemed medically

## 2023-06-18 NOTE — H&P
"[facility]  H&P  Name: Luana Galvan 76 y o  female I MRN: [id number]  Unit/Bed#: ED 03 I Date of Admission: 6/18/2023   Date of Service: 6/18/2023 I Hospital Day: 0      Assessment/Plan   * Aspiration pneumonia of both lungs [facility])  Assessment & Plan  · Had aspiration event overnight while eating pudding  CXR obtained overnight reviewed by myself personally and shows bilat multifocal pneumonia R>L, suspicious for aspiration pneumonia  Has been issue in the past, previously evaluated by speech on prior admission and recommended puree with thin liquids  She does have large hiatal hernia, there was concern for gastric outlet obstruction on prior hospitalization that resolved without intervention  · Keep strict NPO  No meds  Will need reeval by speech; would recommend VBS given large hiatal hernia/ ro \"bottom-up\" aspiration  Additionally, she had potentially another aspiration event this morning with N/V with desaturation  · IV fluids  · Strict aspiration precautions  · IV Rocephin, f/u blood cx  · IV antiemetics  · Monitor clinically, trend fever curve and WBC ct  F/u AM procal       Acute respiratory failure with hypoxia (HCC)  Assessment & Plan  · 2/2 aspiration pneumonia and also probably a component of superimposed aspiration pneumonitis with additional event this morning on behavioral health unit when she desat to mid 80s on RA  Sats mid 90s on 4-5L NC  · See plans for principal problem  · supp 02 to maintain sats > 94 on RA  Wean oxygen as tolerated  · 1:1 continual visual observation with hx of severe dementia with behavioral disturbance, patient trying to take off her oxygen  Severe vascular dementia with psychotic disturbance ([facility] [address] )  Assessment & Plan  · Unfortunately, patient has had a very complicated hospital and behavioral health course  Essentially, son unable to care for patient at home    He at one point recently had taken her home from hospital " with plans to initiate hospice services in the home however he had to bring patient back to hospital same day as he was unable to care for her  Since then, she has been in behavioral health unit though no clear long-term plan for placement as she has not been accepted previously to SNF  · Overall prognosis is very poor though she does not have a clear hospice diagnosis  · Consult psych to assist with helping medicine with DC disposition  It is unclear if she will need to be DC back to behavioral health or not  · Keep strict NPO including psych meds for now until speech eval     · Ongoing GOC as clinical course evolves  Consider palliative care consult  · Consult CM for complexity regarding disposition when deemed medically    Major depressive disorder, recurrent severe without psychotic features [facility])  Assessment & Plan  · Will continue behavioral health meds as ordered when she was on behavioral health unit when feasible  · Appreciate psych assistance  Hiatal hernia  Assessment & Plan  · CTA in May showed almost entirety of stomach in the thoracic cavity  I suspect this is contributing to her recurrent aspiration  · Monitor abd exam and for further N/V  Monitor clinically at this point  · If there are further concerns for possible gastric outlet obstruction will get stat KUB and consult gen sx for NG decompression  VTE Pharmacologic Prophylaxis:   High Risk (Score >/= 5) - Pharmacological DVT Prophylaxis Ordered: enoxaparin (Lovenox)  Sequential Compression Devices Ordered  Code Status: Level 3 - DNAR and DNI  Discussion with family: Behavioral health physician spoke to patient's son regarding transfer to medicine and this morning's events  Anticipated Length of Stay: Patient will be admitted on an inpatient basis with an anticipated length of stay of greater than 2 midnights secondary to aspiration pneumonia         Total Time Spent on Date of Encounter in care of patient: 54 minutes This time was spent on one or more of the following: performing physical exam; counseling and coordination of care; obtaining or reviewing history; documenting in the medical record; reviewing/ordering tests, medications or procedures; communicating with other healthcare professionals and discussing with patient's family/caregivers  Chief Complaint: hypoxia, concern for aspiration    History of Present Illness:  Marce Tucker is a 76 y o  female with a PMH of very severe vascular dementia with behavioral disturbance, large hiatal hernia, aspiration, MDD, anxiety, hx of CVA with L-sided deficits, HTN, HLD, ambulatory dysfunction who was transferred from behavioral health unit at [facility] for concerns for an aspiration event overnight and possibly another aspiration event this morning with desaturation into the mid 80s with and episode of N/V  Patient has hx of prior concerns for aspiration and is maintained on pureed dysphagia diet with thin liquids  She has had admission in the past with concerns for gastric outlet obstruction in the setting of large hiatal hernia causing stomach to be almost entirely in the intrathoracic cavity  She did not require surgical intervention  Per chart review, son at some point recently wanted to bring patient home and start hospice services  Unfortunately, he was unable to manage her at home and had to bring her back in to the hospital the same day of discharge  She has had a few admission since March involving behavioral concerns though appears to have had difficulty with placement into a NH   It is unclear to this writer based on chart review how she keeps ending up admitted to behavioral health unit         Review of Systems:  Review of Systems   Unable to perform ROS: Dementia       Past Medical and Surgical History:   Past Medical History:   Diagnosis Date   • Dementia ([facility] [address] )    • No abnormality of hip joint detected on examination    • Stroke [facility])        History reviewed  No pertinent surgical history  Meds/Allergies:  Prior to Admission medications    Medication Sig Start Date End Date Taking? Authorizing Provider   acetaminophen (TYLENOL) 325 mg tablet Take 2 tablets (650 mg total) by mouth every 6 (six) hours as needed for mild pain, headaches or fever  Patient taking differently: Take 1,000 mg by mouth 2 (two) times a day 4/20/23   Kobe Fonseca MD   aspirin (ECOTRIN LOW STRENGTH) 81 mg EC tablet Take 81 mg by mouth daily    Historical Provider, MD   atorvastatin (LIPITOR) 40 mg tablet Take 1 tablet (40 mg total) by mouth every evening 4/20/23   Kobe Fonseca MD   busPIRone (BUSPAR) 10 mg tablet Take 1 tablet (10 mg total) by mouth 3 (three) times a day 3/17/23   Jesus Tirado MD   clonazePAM (KlonoPIN) 0 5 mg tablet Take 0 5 tablets (0 25 mg total) by mouth 2 (two) times a day as needed for anxiety for up to 10 days 4/20/23 4/30/23  Kobe Fonseca MD   famotidine (PEPCID) 20 mg tablet Take 20 mg by mouth 2 (two) times a day    Historical Provider, MD   latanoprost (XALATAN) 0 005 % ophthalmic solution Administer 1 drop to both eyes daily at bedtime 4/20/22   Historical Provider, MD   QUEtiapine (SEROquel) 25 mg tablet Take 0 5 tablets (12 5 mg total) by mouth 2 (two) times a day  Patient taking differently: Take 50 mg by mouth 2 (two) times a day 4/20/23   Kobe Fonseca MD   QUEtiapine (SEROquel) 25 mg tablet Take 25 mg by mouth 3 (three) times a day as needed (agitation)    Historical Provider, MD   sertraline (ZOLOFT) 100 mg tablet Take 1 tablet (100 mg total) by mouth daily Do not start before April 21, 2023 4/21/23   Kobe Fonseca MD   timolol (TIMOPTIC) 0 5 % ophthalmic solution Apply 1 drop to eye 2 (two) times a day 4/20/22   Historical Provider, MD   valproic acid (DEPAKENE) 250 MG/5ML soln Take 250 mg by mouth 2 (two) times a day    Historical Provider, MD     I have reviewed home medications using recent Epic encounter  Allergies:    Allergies   Allergen Reactions   • Lorazepam Other (See Comments)     Increased agitation         Social History:  Marital Status: /Civil Union     Substance Use History:   Social History     Substance and Sexual Activity   Alcohol Use Yes    Comment: ocassional     Social History     Tobacco Use   Smoking Status Every Day   • Packs/day: 0 25   • Types: Cigarettes   Smokeless Tobacco Never     Social History     Substance and Sexual Activity   Drug Use Never       Family History:  Family History   Problem Relation Age of Onset   • Arthritis Mother        Physical Exam:     Vitals:   Blood Pressure: 129/67 (06/18/23 1015)  Pulse: 95 (06/18/23 1015)  Temperature: 98 1 °F (36 7 °C) (06/18/23 1015)  Temp Source: Oral (06/18/23 1015)  Respirations: 22 (06/18/23 1015)  Weight - Scale: 49 6 kg (109 lb 5 6 oz) (06/18/23 1015)  SpO2: 93 % (06/18/23 1015)    Physical Exam  Vitals reviewed  Constitutional:       Comments: Thin and chronically ill appearing   Pulmonary:      Effort: No respiratory distress  Breath sounds: No wheezing or rales  Comments: Decreased breath sounds bilat  NC oxygen   Abdominal:      General: There is no distension  Palpations: Abdomen is soft  There is no mass  Tenderness: There is abdominal tenderness (epigastric )  There is no guarding  Skin:     General: Skin is warm and dry  Neurological:      Mental Status: She is disoriented            Additional Data:     Lab Results:  Results from last 7 days   Lab Units 06/18/23  1042   WBC Thousand/uL 22 15*   HEMOGLOBIN g/dL 13 1   HEMATOCRIT % 42 0   PLATELETS Thousands/uL 255   NEUTROS PCT % 87*   LYMPHS PCT % 6*   MONOS PCT % 7   EOS PCT % 0     Results from last 7 days   Lab Units 06/18/23  1042   SODIUM mmol/L 143   POTASSIUM mmol/L 3 8   CHLORIDE mmol/L 101   CO2 mmol/L 32   BUN mg/dL 18   CREATININE mg/dL 0 71   ANION GAP mmol/L 10   CALCIUM mg/dL 9 9   ALBUMIN g/dL 3 2*   TOTAL BILIRUBIN mg/dL 0 53   ALK PHOS U/L 47   ALT U/L 8   AST U/L 19   GLUCOSE RANDOM mg/dL 147*     Results from last 7 days   Lab Units 06/18/23  1042   INR  1 04             Results from last 7 days   Lab Units 06/18/23  1042   LACTIC ACID mmol/L 1 9   PROCALCITONIN ng/ml 0 64*       Lines/Drains:  Invasive Devices     Peripheral Intravenous Line  Duration           Peripheral IV 06/18/23 Right Antecubital <1 day                  Imaging: Reviewed radiology reports from this admission including: chest xray showed bilat multifocal pneumonia R>L  FL barium swallow video w speech    (Results Pending)       ** Please Note: This note has been constructed using a voice recognition system   **

## 2023-06-18 NOTE — TREATMENT TEAM
06/17/23 6985   Assessment   Alternative Measures Administer medication;Calming methods;Distraction;Continuous in person monitoring;Repositioning   Alternative measures evaluation Ineffective   Justification   Clinical Justification Harmful to self; Interferes w/medical tx   Restraint Order   Length of Order Calendar Day   Order Obtained Yes   Restraint Type (NV)   R Wrist Soft Restraint Start   L Wrist Soft Restraint Start   Education   Discontinuation Criteria Absence   Criteria Explained Yes   Patient's Response Cognitive impairment   Family Notification Patient declined notification   Restraint Monitoring (NV)   Mental Status (NV) Agitated;Confused; Restless; Yelling/Screaming   Physical Status (NV) Pulling at tubes or dressings;Combative   Skin (Under and or surrounding restraint device) (NV) No outward signs of injury   Restraint Interventions (NV)   Ambulation (NV) Declined     L/R wrist soft restraints initiated due to patient making threats to choke self to death  Pt observed physically putting hands around her neck  Pt continue to bite down and undo R arm dressing  1:1 at bedside

## 2023-06-18 NOTE — ASSESSMENT & PLAN NOTE
"· Had aspiration event overnight while eating pudding  CXR obtained overnight reviewed by myself personally and shows bilat multifocal pneumonia R>L, suspicious for aspiration pneumonia  Has been issue in the past, previously evaluated by speech on prior admission and recommended puree with thin liquids  She does have large hiatal hernia, there was concern for gastric outlet obstruction on prior hospitalization that resolved without intervention  · Keep strict NPO  No meds  Will need reeval by speech; would recommend VBS given large hiatal hernia/ ro \"bottom-up\" aspiration  Additionally, she had potentially another aspiration event this morning with N/V with desaturation  · IV fluids  · Strict aspiration precautions  · IV Rocephin, f/u blood cx  · IV antiemetics  · Monitor clinically, trend fever curve and WBC ct    F/u AM procal     "

## 2023-06-18 NOTE — NURSING NOTE
Patient transferred to ED for assessment per Dr Gee Lemon, and Dr José Valdivia for [facility]  Patient taken to ED and report given to ED staff

## 2023-06-18 NOTE — ASSESSMENT & PLAN NOTE
· Occurred overnight 6/18/2023 with chocolate pudding  · 6/18/2023 CXR with RLL infiltrate  · O2 via NC prn for goal SpO2 > 90% resting  · Aspiration precautions, respiratory protocol - please call as needed for assistance with suctioning the airway  · AM labs  · Will give rocephin IM 1 g once to cover for aspiration pneumonitis however at this point no need to start Abx since she has recovered nicely from the event, would monitor VS and labs for few days to check for development of PNA whereby would schedule Abx at that point    · NPO - ST consulted for raulito

## 2023-06-18 NOTE — PLAN OF CARE
Problem: SAFETY,RESTRAINT: NV/NON-SELF DESTRUCTIVE BEHAVIOR  Goal: Remains free of harm/injury (restraint for non violent/non self-detsructive behavior)  Description: INTERVENTIONS:  - Instruct patient/family regarding restraint use   - Assess and monitor physiologic and psychological status   - Provide interventions and comfort measures to meet assessed patient needs   - Identify and implement measures to help patient regain control  - Assess readiness for release of restraint   Outcome: Progressing  Goal: Returns to optimal restraint-free functioning  Description: INTERVENTIONS:  - Assess the patient's behavior and symptoms that indicate continued need for restraint  - Identify and implement measures to help patient regain control  - Assess readiness for release of restraint   Outcome: Progressing     Problem: Prexisting or High Potential for Compromised Skin Integrity  Goal: Skin integrity is maintained or improved  Description: INTERVENTIONS:  - Identify patients at risk for skin breakdown  - Assess and monitor skin integrity  - Assess and monitor nutrition and hydration status  - Monitor labs   - Assess for incontinence   - Turn and reposition patient  - Assist with mobility/ambulation  - Relieve pressure over bony prominences  - Avoid friction and shearing  - Provide appropriate hygiene as needed including keeping skin clean and dry  - Evaluate need for skin moisturizer/barrier cream  - Collaborate with interdisciplinary team   - Patient/family teaching  - Consider wound care consult   Outcome: Progressing     Problem: MOBILITY - ADULT  Goal: Maintain or return to baseline ADL function  Description: INTERVENTIONS:  -  Assess patient's ability to carry out ADLs; assess patient's baseline for ADL function and identify physical deficits which impact ability to perform ADLs (bathing, care of mouth/teeth, toileting, grooming, dressing, etc )  - Assess/evaluate cause of self-care deficits   - Assess range of motion  - Assess patient's mobility; develop plan if impaired  - Assess patient's need for assistive devices and provide as appropriate  - Encourage maximum independence but intervene and supervise when necessary  - Involve family in performance of ADLs  - Assess for home care needs following discharge   - Consider OT consult to assist with ADL evaluation and planning for discharge  - Provide patient education as appropriate  Outcome: Progressing  Goal: Maintains/Returns to pre admission functional level  Description: INTERVENTIONS:  - Perform BMAT or MOVE assessment daily    - Set and communicate daily mobility goal to care team and patient/family/caregiver  - Collaborate with rehabilitation services on mobility goals if consulted  - Perform Range of Motion 0 times a day  - Reposition patient every 2 hours    - Dangle patient no times a day  - Stand patient 0 times a day  - Ambulate patient  0 times a day  - Out of bed to chair no times a day   - Out of bed for meals no times a day  - Out of bed for toileting  - Record patient progress and toleration of activity level   Outcome: Progressing

## 2023-06-18 NOTE — RESPIRATORY THERAPY NOTE
Patient aspirated chocolate pudding and in the middle of the night  Her sat dropped to 86% and she was coughing up  I used the [employer] to suction the patient mouth, the back of her throat and her sat back up to 94%  Pt was feeling better and I had her on 4L NC  Xray was ordered

## 2023-06-18 NOTE — PLAN OF CARE
Problem: SAFETY, RESTRAINT - VIOLENT/SELF-DESTRUCTIVE  Goal: Remains free of harm/injury from restraints (Restraint for Violent/Self-Destructive Behavior)  Description: INTERVENTIONS:  - Instruct patient/family regarding restraint use   - Assess and monitor physiologic and psychological status   - Provide interventions and comfort measures to meet assessed patient needs   - Ensure continuous in person monitoring is provided   - Identify and implement measures to help patient regain control  - Assess readiness for release of restraint  6/18/2023 0542 by Lizz Solorzano RN  Outcome: Completed  6/17/2023 2355 by Lizz Solorzano RN  Outcome: Progressing  Goal: Returns to optimal restraint-free functioning  Description: INTERVENTIONS:  - Assess the patient's behavior and symptoms that indicate continued need for restraint  - Identify and implement measures to help patient regain control  - Assess readiness for release of restraint   6/18/2023 0542 by Lizz Solorzano RN  Outcome: Completed  6/17/2023 2355 by Lizz Solorzano RN  Outcome: Progressing

## 2023-06-18 NOTE — TREATMENT TEAM
06/18/23 0430   Emesis Output/Assessment   Emesis 200 mL  (Undigested chocolate pudding)   Unmeasured Emesis Occurrence   (Multiple)   Emesis Amount Other (Comment)  (200 CC)   Emesis Color/Appearance Other (Comment)  (undigested chocolate pudding and clear mucous phlegm)     Pt has been puking small amounts of chocolate mixed phlegm until vomiting large amount of 200CC  Moth suctioned

## 2023-06-18 NOTE — ASSESSMENT & PLAN NOTE
· CTA in May showed almost entirety of stomach in the thoracic cavity  I suspect this is contributing to her recurrent aspiration  · Monitor abd exam and for further N/V  Monitor clinically at this point  · If there are further concerns for possible gastric outlet obstruction will get stat KUB and consult gen sx for NG decompression

## 2023-06-19 NOTE — NURSING NOTE
Patient cleaned up and changed  Turned and repositioned  Mouth swabbed with mouthwash and suctioned  Patient resting comfortably  Will continue to monitor

## 2023-06-19 NOTE — ASSESSMENT & PLAN NOTE
· CTA in May showed almost entirety of stomach in the thoracic cavity  I suspect this is contributing to her recurrent aspiration      · Hospice care

## 2023-06-19 NOTE — NURSING NOTE
Trinh Gleason was given morphine 2 mg IV  She was repositioned and brief checked  Not wet or soiled  Suctioned small amount of thick dark brown liquid  She tolerated that well  Mouth care done  Report was called to Curtis Sapp at inpatient hospice  Transport team transferred her to the stretcher and secured her  Paperwork given and all patient belongings were given to team  Patient left at this time

## 2023-06-19 NOTE — HOSPICE NOTE
CONSENTS COMPLETED VIA [address]  PER [facility] TRANSPORT 1230  LILLIAN RN [address] RN PCM UPDATED VIA TT  SON/GABBY MCDONNELL NOTIFIED OF TRANSPORT TIME

## 2023-06-19 NOTE — DISCHARGE SUMMARY
[facility]  Discharge- Jaye Leyden 1945, 76 y o  female MRN: [id number]  Unit/Bed#: 7T [facility] 702-01 Encounter: [id number]  Primary Care Provider: Geri Stanford DO   Date and time admitted to hospital: 6/18/2023 10:19 AM    * Aspiration pneumonia of both lungs ([address] )  Assessment & Plan  · Had aspiration event overnight while eating pudding  CXR obtained overnight reviewed by myself personally and shows bilat multifocal pneumonia R>L, suspicious for aspiration pneumonia  Has been issue in the past, previously evaluated by speech on prior admission and recommended puree with thin liquids  She does have large hiatal hernia, there was concern for gastric outlet obstruction on prior hospitalization that resolved without intervention  · Hospice care     Acute respiratory failure with hypoxia (HCC)  Assessment & Plan  · 2/2 aspiration pneumonia and also probably a component of superimposed aspiration pneumonitis with additional event this morning on behavioral health unit when she desat to mid 80s on RA  Sats mid 90s on 4-5L NC  · See plans for principal problem  · supp 02 to maintain sats > 94 on RA  Wean oxygen as tolerated  · Hospice care    Major depressive disorder, recurrent severe without psychotic features [facility])  Assessment & Plan  · Will continue behavioral health meds as ordered when she was on behavioral health unit when feasible  · Appreciate psych assistance  Hiatal hernia  Assessment & Plan  · CTA in May showed almost entirety of stomach in the thoracic cavity  I suspect this is contributing to her recurrent aspiration  · Hospice care      Severe vascular dementia with psychotic disturbance [facility])  Assessment & Plan  · Unfortunately, patient has had a very complicated hospital and behavioral health course  Essentially, son unable to care for patient at home    He at one point recently had taken her home from hospital with plans to initiate hospice services in the home however he had to bring patient back to hospital same day as he was unable to care for her  Since then, she has been in behavioral health unit though no clear long-term plan for placement as she has not been accepted previously to SNF  · Overall prognosis is very poor though she does not have a clear hospice diagnosis  · Goals of care discussion were done with daughter and son on 6/18/2023 -decided to proceed with hospice care  · Hospice team on board - patient is appropriate for inpatient hospice today      Discharging Physician / Practitioner: Jennifer Wolf MD  PCP: Allan Gil DO  Admission Date:   Admission Orders (From admission, onward)     Ordered        06/18/23 1200  Inpatient Admission  Once            06/18/23 1059  Place in Observation  Once                      Discharge Date: 06/19/23    Medical Problems     Resolved Problems  Date Reviewed: 6/19/2023   None         Consultations During Hospital Stay:  · Hospice team    Procedures Performed:   · None    Significant Findings / Test Results:    XR chest portable Result Date: 6/18/2023  Impression: Findings noted above are suspect for multifocal pneumonia, most pronounced on the right side  Trace right effusion  Large hiatal hernia  The study was marked in [facility] for immediate notification  Workstation performed: [id number]     Incidental Findings:   · See above    Test Results Pending at Discharge (will require follow up): · None     Outpatient Tests Requested:  Not applicable    Complications: Not applicable    Reason for Admission: Hypoxia, concern for aspiration    Hospital Course:     Reinaldo Gooden is a 76 y o  female patient with PMH of very severe vascular dementia with behavior disturbance, large hiatal hernia, aspiration, MDD, anxiety, history of CVA with left-sided deficit, HTN, HLD and ambulatory dysfunction who was transferred from [facility] to medical floor due to hypoxia and concerns for aspiration    Patient has prior "concerns of aspiration and maintained on purée dysphagia diet with thin liquids  There was concern for gastric outlet obstruction in the setting of large hiatal hernia  On-call physician discussed with daughter and son regarding goals of care on 6/18/23  Family decided to proceed with hospice care  Hospice lives on is on board and patient is appropriate for inpatient hospice  Patient will be discharged to inpatient hospice today  Please see above list of diagnoses and related plan for additional information  Condition at Discharge: poor     Discharge Day Visit / Exam:     Subjective:  Patient was seen and examined at bedside  Unable to obtain ROS due to dementia  Vitals: Blood Pressure: 139/89 (06/18/23 1528)  Pulse: 105 (06/18/23 1528)  Temperature: (!) 97 4 °F (36 3 °C) (06/18/23 1528)  Temp Source: Temporal (06/18/23 1528)  Respirations: 20 (06/18/23 1528)  Height: 5' 2\" (157 5 cm) (06/18/23 1528)  Weight - Scale: 98 kg (216 lb 0 8 oz) (06/18/23 1528)  SpO2: 94 % (06/18/23 1528)  Exam:   Physical Exam  General: breathing well on oxygen, no acute distress  HEENT: NC/AT, PERRL, EOM - normal  Neck: Supple  Pulm/Chest: Normal chest wall expansion, decreased breath sounds on both side, no wheezing/rhonchi or crackles appreciated  CVS: RRR, normal S1&S2, no murmur appreciated, capillary refill <2s  Abd: soft, epigastric tenderness, non distended, bowel sounds +  MSK: move all 4 extremities spontaneously  Skin: warm  CNS: Disorientated    Discussion with Family: Discussed with son    Discharge instructions/Information to patient and family:   See after visit summary for information provided to patient and family  Provisions for Follow-Up Care:  See after visit summary for information related to follow-up care and any pertinent home health orders        Disposition:     Other: Inpatient hospice    For Discharges to [facility] SNF:   · Not Applicable to this Patient - Not Applicable to this " Patient    Planned Readmission: No     Discharge Statement:  I spent 45 minutes discharging the patient  This time was spent on the day of discharge  I had direct contact with the patient on the day of discharge  Greater than 50% of the total time was spent examining patient, answering all patient questions, arranging and discussing plan of care with patient as well as directly providing post-discharge instructions  Additional time then spent on discharge activities  Discharge Medications:  See after visit summary for reconciled discharge medications provided to patient and family        ** Please Note: This note has been constructed using a voice recognition system **

## 2023-06-19 NOTE — NURSING NOTE
Patient getting restless and pulling out nasal canula  Pain medication administered  Patient turned and reposition  Will continue  To monitor during the shift

## 2023-06-19 NOTE — NURSING NOTE
Patient cleaned and reposition  Patient is comfortably resting  Will continue to monitor during the shift

## 2023-06-19 NOTE — DISCHARGE INSTR - DIET
Left patient a voicemail stating that I would refill her script and give her 5 more pills to get her through to next appointment this week on 8/15/19. Will refill Fluoxetine 20mg #5 tablets, to take 1 tablet daily. Refill sent to pharmacy on file.     Dr. De León   [employer]

## 2023-06-19 NOTE — HOSPICE NOTE
SPOKE TO SON SATHYA VIA [facility]  REVIEWED HOSPICE SERVICES AND BENEFITS PER  GUIDELINES  QUESTIONS ANSWERED  HE IS IN AGREEMENT  CONSENTS SENT VIA [employer]  [address]  PER SARITHA RN IPU THEY CAN TAKE PT AFTER 1200  SETH URIBE UPDATED AND COPIED ON CONSENTS  ASKED SETH URIBE VIA TT TO PLEASE HAVE JOSE FISCHER CALL REPORT TO  [phone], 30 MIN PRIOR TO TRANSPORT  KEEP ALL LINES IVS ETC IN PLACE AND MEDICATE WITH COMFORT MEDS 15 MIN PRIOR TO TRANSPORT  AWAITING RETURN OF CONSENTS

## 2023-06-19 NOTE — ASSESSMENT & PLAN NOTE
· Unfortunately, patient has had a very complicated hospital and behavioral health course  Essentially, son unable to care for patient at home  He at one point recently had taken her home from hospital with plans to initiate hospice services in the home however he had to bring patient back to hospital same day as he was unable to care for her  Since then, she has been in behavioral health unit though no clear long-term plan for placement as she has not been accepted previously to SNF      · Overall prognosis is very poor though she does not have a clear hospice diagnosis  · Goals of care discussion were done with daughter and son on 6/18/2023 -decided to proceed with hospice care  · Hospice team on board - patient is appropriate for inpatient hospice today

## 2023-06-19 NOTE — ASSESSMENT & PLAN NOTE
· Had aspiration event overnight while eating pudding  CXR obtained overnight reviewed by myself personally and shows bilat multifocal pneumonia R>L, suspicious for aspiration pneumonia  Has been issue in the past, previously evaluated by speech on prior admission and recommended puree with thin liquids  She does have large hiatal hernia, there was concern for gastric outlet obstruction on prior hospitalization that resolved without intervention      · Hospice care

## 2023-06-19 NOTE — PLAN OF CARE
Problem: MOBILITY - ADULT  Goal: Maintain or return to baseline ADL function  Description: INTERVENTIONS:  -  Assess patient's ability to carry out ADLs; assess patient's baseline for ADL function and identify physical deficits which impact ability to perform ADLs (bathing, care of mouth/teeth, toileting, grooming, dressing, etc )  - Assess/evaluate cause of self-care deficits   - Assess range of motion  - Assess patient's mobility; develop plan if impaired  - Assess patient's need for assistive devices and provide as appropriate  - Encourage maximum independence but intervene and supervise when necessary  - Involve family in performance of ADLs  - Assess for home care needs following discharge   - Consider OT consult to assist with ADL evaluation and planning for discharge  - Provide patient education as appropriate  Outcome: Progressing  Goal: Maintains/Returns to pre admission functional level  Description: INTERVENTIONS:  - Perform BMAT or MOVE assessment daily    - Set and communicate daily mobility goal to care team and patient/family/caregiver     - Collaborate with rehabilitation services on mobility goals if consulted    Problem: Prexisting or High Potential for Compromised Skin Integrity  Goal: Skin integrity is maintained or improved  Description: INTERVENTIONS:  - Identify patients at risk for skin breakdown  - Assess and monitor skin integrity  - Assess and monitor nutrition and hydration status  - Monitor labs   - Assess for incontinence   - Turn and reposition patient  - Assist with mobility/ambulation  - Relieve pressure over bony prominences  - Avoid friction and shearing  - Provide appropriate hygiene as needed including keeping skin clean and dry  - Evaluate need for skin moisturizer/barrier cream  - Collaborate with interdisciplinary team   - Patient/family teaching  - Consider wound care consult   Outcome: Progressing     Problem: SAFETY,RESTRAINT: NV/NON-SELF DESTRUCTIVE BEHAVIOR  Goal: Remains free of harm/injury (restraint for non violent/non self-detsructive behavior)  Description: INTERVENTIONS:  - Instruct patient/family regarding restraint use   - Assess and monitor physiologic and psychological status   - Provide interventions and comfort measures to meet assessed patient needs   - Identify and implement measures to help patient regain control  - Assess readiness for release of restraint   Outcome: Progressing  Goal: Returns to optimal restraint-free functioning  Description: INTERVENTIONS:  - Assess the patient's behavior and symptoms that indicate continued need for restraint  - Identify and implement measures to help patient regain control  - Assess readiness for release of restraint   Outcome: Progressing   - Out of bed for toileting  - Record patient progress and toleration of activity level   Outcome: Progressing

## 2023-06-19 NOTE — HOSPICE NOTE
RECEIVED HOSPICE REFERRAL  REVIEWED WITH DR CHALINO MCINTOSH  APPROVED GIP LOC AT IPU  LEFT VMM FOR DC MCDONNELL AND DTR KIKO TO PLEASE CONTACT ME TO DISCUSS HOSPICE SERVICES  SETH URIBE UPDATED VIA TT  WILL CONTINUE TO FOLLOW

## 2023-06-19 NOTE — ASSESSMENT & PLAN NOTE
· 2/2 aspiration pneumonia and also probably a component of superimposed aspiration pneumonitis with additional event this morning on behavioral health unit when she desat to mid 80s on RA  Sats mid 90s on 4-5L NC  · See plans for principal problem  · supp 02 to maintain sats > 94 on RA  Wean oxygen as tolerated    · Hospice care

## 2023-06-22 ENCOUNTER — HOME CARE VISIT (OUTPATIENT)
Dept: HOME HOSPICE | Facility: HOSPICE | Age: 78
End: 2023-06-22
Payer: MEDICARE

## 2023-06-22 NOTE — CASE COMMUNICATION
Anisa Mari, [facility] IDG 23 [facility] Due: 23   : 1945  SOC: 23  DOD: 23, <24hrs  Diagnosis: Dementia, Aspiration Pneumonia  Primary: Son - Ania Trevino was a 76year old woman at the [facility] less than 24 hours  Son Joseph, Daughter Sahara Gloria and Melissa's niece Noe visited  Sahara Gloria and Joseph were tearful  Joseph said their mother had been through so much in the past few months with dementia prog ression and admittance to behavioral health  Family had attempted to take her home which failed due to her behaviors  Trae and Sahara Silvanodelores laughed stating their mother was using profanity and she never did in her entire life  Trae voiced feeling comforted in his decision for the [facility], feeling his mother was peaceful in the quiet environment  Hector Jones had reportedly not be part of a yahaira community for some time, but prayed a lot  Srini christopher open to bereavement follow up for the family  They are assessed at [facility]  TOD: Joyce Fritz and Noe were present when Hector Jones   Tearful and supportive of each other  Very thankful she was able to come to the [facility] and that she passed quickly  Call Assignments:  Sophia Jorge to reassess sandrine Barfield at [facility]  (Due: 23)  Randy Vela to reassess son Deborrah Nageotte at [facility]   (Due: 23)

## 2023-06-23 LAB
BACTERIA BLD CULT: NORMAL
BACTERIA BLD CULT: NORMAL

## 2023-09-05 NOTE — PHYSICAL THERAPY NOTE
PHYSICAL THERAPY NOTE          Patient Name: Alyssa Bravo  [id number] Date: 6/2/2023 06/02/23 9891   Note Type   Note type Evaluation; Cancelled Session   Cancel Reasons Medical status       Chart reviewed  Discussed during interdisciplinary rounds  Pt continues to not be appropriate for PT services given behaviors  Spoke with Dr Armando Sadler regarding discontinuing PT consult at this time and re-consulting if/when appropriate  Per Dr Armando Sadler she isn't appropriate for therapy services  Will D/C order at this time      Yoselin Figueroa, PT,DPT Yes

## 2023-12-12 NOTE — ASSESSMENT & PLAN NOTE
Presented with elevated blood pressure  Now blood pressure is currently controlled    Allow permissive hypertension to rule out stroke Home